# Patient Record
Sex: FEMALE | Race: WHITE | NOT HISPANIC OR LATINO | Employment: OTHER | URBAN - METROPOLITAN AREA
[De-identification: names, ages, dates, MRNs, and addresses within clinical notes are randomized per-mention and may not be internally consistent; named-entity substitution may affect disease eponyms.]

---

## 2017-12-24 ENCOUNTER — HOSPITAL ENCOUNTER (EMERGENCY)
Facility: HOSPITAL | Age: 42
Discharge: HOME | End: 2017-12-24
Attending: EMERGENCY MEDICINE | Admitting: EMERGENCY MEDICINE
Payer: MEDICARE

## 2017-12-24 ENCOUNTER — APPOINTMENT (EMERGENCY)
Dept: NON INVASIVE DIAGNOSTICS | Facility: HOSPITAL | Age: 42
End: 2017-12-24
Payer: MEDICARE

## 2017-12-24 ENCOUNTER — OFFICE VISIT (OUTPATIENT)
Dept: URGENT CARE | Facility: CLINIC | Age: 42
End: 2017-12-24
Payer: MEDICARE

## 2017-12-24 VITALS
HEART RATE: 84 BPM | SYSTOLIC BLOOD PRESSURE: 110 MMHG | OXYGEN SATURATION: 97 % | DIASTOLIC BLOOD PRESSURE: 70 MMHG | RESPIRATION RATE: 18 BRPM | TEMPERATURE: 98.3 F | WEIGHT: 135 LBS

## 2017-12-24 DIAGNOSIS — R60.9 EDEMA: ICD-10-CM

## 2017-12-24 DIAGNOSIS — M79.605 LEFT LEG PAIN: ICD-10-CM

## 2017-12-24 DIAGNOSIS — L03.90 CELLULITIS: ICD-10-CM

## 2017-12-24 DIAGNOSIS — R60.9 SWELLING: Primary | ICD-10-CM

## 2017-12-24 PROCEDURE — 99204 OFFICE O/P NEW MOD 45 MIN: CPT

## 2017-12-24 PROCEDURE — 99283 EMERGENCY DEPT VISIT LOW MDM: CPT

## 2017-12-24 PROCEDURE — 93971 EXTREMITY STUDY: CPT

## 2017-12-24 PROCEDURE — G0463 HOSPITAL OUTPT CLINIC VISIT: HCPCS

## 2017-12-24 RX ORDER — CEPHALEXIN 500 MG/1
500 CAPSULE ORAL 3 TIMES DAILY
Qty: 21 CAPSULE | Refills: 0 | Status: SHIPPED | OUTPATIENT
Start: 2017-12-24 | End: 2017-12-31

## 2017-12-24 RX ORDER — CEPHALEXIN 250 MG/1
500 CAPSULE ORAL ONCE
Status: COMPLETED | OUTPATIENT
Start: 2017-12-24 | End: 2017-12-24

## 2017-12-24 RX ADMIN — CEPHALEXIN 500 MG: 250 CAPSULE ORAL at 17:13

## 2017-12-24 NOTE — DISCHARGE INSTRUCTIONS
Cellulitis, Ambulatory Care   GENERAL INFORMATION:   Cellulitis  is a skin infection caused by bacteria  Common symptoms include the following:   · Fever    · A red, warm, swollen area on your skin    · Pain when the area is touched    · Bumps or blisters (abscess) that may drain pus    · Bumpy, raised skin that feels like an orange peel  Seek immediate care for the following symptoms:   · An increase in pain, redness, warmth, and size    · Red streaks coming from the infected area    · A thin, gray-brown discharge coming from your infected skin area    · A crackling under your skin when you touch it    · Purple dots or bumps on your skin, or bleeding under your skin    · New swelling and pain in your legs    · Sudden trouble breathing or chest pain  Treatment for cellulitis  may include medicines to treat the bacterial infection or decrease pain  The infection may need to be cleaned out  Damaged, dead, or infected tissue may need to be cut away to help your wound heal   Manage your symptoms:   · Elevate your wound above the level of your heart  as often as you can  This will help decrease swelling and pain  Prop your wound on pillows or blankets to keep it elevated comfortably  · Clean your wound as directed  You may need to wash the wound with soap and water  Look for signs of infection  · Wear pressure stockings as directed  The stockings are tight and put pressure on your legs  This improves blood flow and decreases swelling  Prevent cellulitis:   · Wash your hands often  Use soap and water  Wash your hands after you use the bathroom, change a child's diapers, or sneeze  Wash your hands before you prepare or eat food  Use lotion to prevent dry, cracked skin  · Do not share personal items, such as towels, clothing, and razors  · Clean exercise equipment  with germ-killing  before and after you use it    Follow up with your healthcare provider as directed:  Write down your questions so you remember to ask them during your visits  CARE AGREEMENT:   You have the right to help plan your care  Learn about your health condition and how it may be treated  Discuss treatment options with your caregivers to decide what care you want to receive  You always have the right to refuse treatment  The above information is an  only  It is not intended as medical advice for individual conditions or treatments  Talk to your doctor, nurse or pharmacist before following any medical regimen to see if it is safe and effective for you  © 2014 [address] is for End User's use only and may not be sold, redistributed or otherwise used for commercial purposes  All illustrations and images included in CareNotes® are the copyrighted property of [employer]  or Reuben Tong

## 2017-12-24 NOTE — ED PROVIDER NOTES
Final Diagnosis:  1  Swelling    2  Edema    3  Cellulitis    4  Left leg pain      Chief Complaint   Patient presents with    Leg Pain     Pt reports left calf pain and swelling x 2 days  Pt denies CP and SOB  Pt reports was evaluated at [facility] urgent care and was sent to rule out blood clot  This is a 38year old female with long hx of R  A  Presenting for left calf pain  For the past 2 days she feels it is tender, swollen, painful  Never had similar  Denies f/ch/n/v/cp/sob  Denies any upper respiratory tract infection symptoms (cough, congestion, rhinorrhea, sore throat)  Denies any urinary tract infection symptoms (burning, itching, pain, blood, frequency)  Denies similar in the past   She has been using the left side a lot more recently as she is supposed to have some type of a fusion surgery done in a few more days of her right ankle and so would be heavily LLE dependent but it is whatever the level that PT had suggested  Nothing increasingly so  No falls, direct trauma  Denies dizziness/LH  Denies numbness/tingling  No recent steroids  No blood thinners  No hx of blood clots  PMH:  - R  A  Not on meds  - Asthma  PSH:  - Joint fusions (LEFT) 3-4 years ago  - Shoulder surgery  - Elbow surgery  -   No smoking, drinking, drugs  PE:   Vitals:    17 1414 17 1636   BP: 119/91 110/70   Pulse: 88 84   Resp: 18 18   Temp: 98 3 °F (36 8 °C)    TempSrc: Oral    SpO2: 96% 97%   Weight: 61 2 kg (135 lb)    General: VSS, NAD, awake, alert  Well-nourished, well-developed  Appears stated age  Speaking normally in full sentences  Head: Normocephalic, atraumatic, nontender  Eyes: PERRL, EOM-I  No diplopia  No hyphema  No subconjunctival hemorrhages  Symmetrical lids  ENT: Atraumatic external nose and ears  MMM  No malocclusion  No stridor  Normal phonation  No drooling  Normal swallowing  Neck: Symmetric, trachea midline  No JVD    CV: RRR  +S1/S2  No murmurs or gallops  Peripheral pulses +2 throughout  No chest wall tenderness  Lungs:   Unlabored No retractions  CTAB, lungs sounds equal bilateral    No tachypnea  Abd: +BS, soft, NT/ND    MSK:   Left leg: able to move toes without difficulty  Can't PF/DF (chronically)  KF KE with pain, minimal ROM (baseline)  Tenderness of gastrocnemius posteriorly  No obvious swelling  No redness  No warmth  DP 2+ bilaterally equal    Back:   No rashes  Skin: Dry, intact  Neuro: AAOx3, GCS 15, CN II-XII grossly intact  Motor grossly intact  Psychiatric/Behavioral: Appropriate mood and affect   Exam: deferred  A:  - Left calf pain  P:  - U/S to evaluate for clot  - Likely DC home with IVY f/maggie ortho  Has appointment on Friday  - 13 point ROS was performed and all are normal unless stated in the history above  - Nursing note reviewed  Vitals reviewed  - Orders placed by myself and/or advanced practitioner / resident     - Previous chart was not reviewed  - No language barrier    - History obtained from  patient  - There are no limitations to the history obtained  - Critical care time: Not applicable for this patient  ED Course as of Dec 24 1703   Sun Dec 24, 2017   1614 U/S tech at bedside  1636 Duplex:   - negative for DVt  - Lymph node in groin  - ?cellulitis? Will treat as such  1700 Updated patient about the imaging results  Will give ABX, dc home  1703 I reviewed all testing with the patient: cellulitis based on u/s  I gave oral return precautions for what to return for in addition to the written return precautions  The patient (and any family present: ) verbalized understanding of the discharge instructions and warnings that would necessitate return to the Emergency Department  I specifically highlighted areas of special concern regarding the written and verbal discharge instructions and return precautions  All questions were answered prior to discharge        Medications cephalexin (KEFLEX) capsule 500 mg (not administered)     VAS lower limb venous duplex study, unilateral/limited    (Results Pending)     No orders of the defined types were placed in this encounter  Labs Reviewed - No data to display  Time reflects when diagnosis was documented in both MDM as applicable and the Disposition within this note     Time User Action Codes Description Comment    12/24/2017  3:34 PM Mara [address] Add [R60 9] Edema     12/24/2017  3:34 PM Mara Moose [address] Add [R60 9] Swelling     12/24/2017  5:02 PM Bony Quigley, Mirtha1 [facility] [E31 133,  L02 416] Cellulitis and abscess of left lower extremity     12/24/2017  5:02 PM Denver Hitch [R60 9] Edema     12/24/2017  5:02 PM Jolan Pickle Remove [A56 387,  L02 416] Cellulitis and abscess of left lower extremity     12/24/2017  5:02 PM Jolan Pickle Add [L03 90] Cellulitis     12/24/2017  5:02 PM Jolan Pickle Add [M79 605] Left leg pain     12/24/2017  5:02 PM Jolan Pickle Modify [R60 9] Edema     12/24/2017  5:02 PM Jolan Pickle Modify [R60 9] Swelling       ED Disposition     ED Disposition Condition Comment    Discharge  Ирина Hector discharge to home/self care      Condition at discharge: Good        Follow-up Information     Follow up With Specialties Details Why Contact Info Additional [address] [facility] Emergency Department Emergency Medicine Go to If symptoms worsen [address] [facility]  [phone] [place] ED, [address] [facility] , [place], [place], [facility],  Family Medicine Call in 1 day To make appointment for re-evaluation in 1 week [address] [place] [address] Road  [phone]           Patient's Medications   Discharge Prescriptions    CEPHALEXIN (KEFLEX) 500 MG CAPSULE    Take 1 capsule by mouth 3 (three) times a day for 7 days       Start Date: 12/24/2017End Date: 12/31/2017       Order Dose: 500 mg Quantity: 21 capsule    Refills: 0     No discharge procedures on file  None       Portions of the record may have been created with voice recognition software  Occasional wrong word or "sound a like" substitutions may have occurred due to the inherent limitations of voice recognition software  Read the chart carefully and recognize, using context, where substitutions have occurred      Electronically signed by:  Chio Melendez MD  12/24/17 7500

## 2017-12-27 NOTE — PROGRESS NOTES
Assessment   1  Pain of left calf (039 5) (X27 875)    Discussion/Summary   Discussion Summary:    Discussed with patient due to chief complaint of left posterior calf pain upon physical [place] patient have further evaluation at emergency room patient is in agreement and will go to [address] will call report at this time  Understands and agrees with treatment plan: The treatment plan was reviewed with the patient/guardian  The patient/guardian understands and agrees with the treatment plan    Counseling Documentation With Imm: The patient was counseled regarding instructions for management,-- patient and family education,-- importance of compliance with treatment  total time of encounter was 25 minutes-- and-- 10 minutes was spent counseling  Follow Up Instructions:    proceed to ER  If your symptoms worsen, go to the nearest [place] [address] Emergency Department  Chief Complaint   1  Leg Pain  Chief Complaint Free Text Note Form: Pt c/o pain in her left calf for two days  History of Present Illness   HPI: 46-year-old female at urgent care with chief complaint of left posterior calf pain for the past 3 days pain is worse when laying is dependent she has not used any over-the-counter medications at this time reports no improvement in symptoms    Hospital Based Practices Required Assessment:      Pain Assessment      the patient states they have pain  The pain is located in the left calf  Abuse And Domestic Violence Screen       Yes, the patient is safe at home  -- The patient states no one is hurting them  Depression And Suicide Screen  No, the patient has not had thoughts of hurting themself  No, the patient has not felt depressed in the past 7 days  Prefered Language is  [place]  Primary Language is  English  Readiness To Learn: Receptive  Barriers To Learning: none        Preferred Learning: verbal      Education Completed: disease/condition,-- medications-- and-- further treatment/follow-up      Teaching Method: verbal      Person Taught: patient      Evaluation Of Learning: verbalized/demonstrated understanding    Leg Pain: Lisseth Whiting presents with complaints of leg pain  Associated symptoms include swelling-- and-- calf tenderness  Review of Systems   Focused-Female:      Constitutional: No fever, no chills, feels well, no tiredness, no recent weight gain or loss  ENT: no ear ache, no loss of hearing, no nosebleeds or nasal discharge, no sore throat or hoarseness  Cardiovascular: no complaints of slow or fast heart rate, no chest pain, no palpitations, no leg claudication or lower extremity edema  Respiratory: no complaints of shortness of breath, no wheezing, no dyspnea on exertion, no orthopnea or PND  Breasts: no complaints of breast pain, breast lump or nipple discharge  Gastrointestinal: no complaints of abdominal pain, no constipation, no nausea or diarrhea, no vomiting, no bloody stools  Genitourinary: no complaints of dysuria, no incontinence, no pelvic pain, no dysmenorrhea, no vaginal discharge or abnormal vaginal bleeding  Musculoskeletal: as noted in HPI  Integumentary: no complaints of skin rash or lesion, no itching or dry skin, no skin wounds  Neurological: no complaints of headache, no confusion, no numbness or tingling, no dizziness or fainting  ROS Reviewed:    ROS reviewed  Active Problems   1  Blue color skin (782 5) (R23 0)   2  Rheumatoid arthritis (714 0) (M06 9)    Past Medical History   Active Problems And Past Medical History Reviewed: The active problems and past medical history were reviewed and updated today  Family History   Family History    1  Family history of No Significant Family History  Family History Reviewed: The family history was reviewed and updated today  Social History    · Never A Smoker  Social History Reviewed:  The social history was reviewed and updated today  The social history was reviewed and is unchanged  Surgical History   1  History of  Section   2  History of Nose Surgery  Surgical History Reviewed: The surgical history was reviewed and updated today  Current Meds    1  Aleve CAPS; Therapy: (Recorded:2014) to Recorded   2  Bromelains TABS; Therapy: (Recorded:2014) to Recorded   3  D-Hist D TB12;     Therapy: (Recorded:2014) to Recorded   4  Hydrocodone-Acetaminophen TABS; Therapy: (Recorded:2014) to Recorded   5  N-Acetyl-L-Cysteine CAPS; Therapy: (Recorded:2014) to Recorded   6  Quercetin TABS; Therapy: (Recorded:2014) to Recorded   7  Vitamin C CAPS; Therapy: (Recorded:2014) to Recorded  Medication List Reviewed: The medication list was reviewed and updated today  Allergies   1  Ibuprofen TABS   2  Tamiflu CAPS  3  Baker's Yeast   4  Dairy   5  Eggs   6  Gluten   7  Soy    Vitals   Signs   Recorded: [id number] 12:45PM   Temperature: 98 F  Heart Rate: 84  Respiration: 18  Systolic: 237  Diastolic: 68  O2 Saturation: 98    Physical Exam        Constitutional      General appearance: No acute distress, well appearing and well nourished  Eyes      Conjunctiva and lids: No swelling, erythema or discharge  Pupils and irises: Equal, round and reactive to light  Ears, Nose, Mouth, and Throat      External inspection of ears and nose: Normal        Oropharynx: Normal with no erythema, edema, exudate or lesions  Pulmonary      Respiratory effort: No increased work of breathing or signs of respiratory distress  Auscultation of lungs: Clear to auscultation  Cardiovascular      Palpation of heart: Normal PMI, no thrills  Auscultation of heart: Normal rate and rhythm, normal S1 and S2, without murmurs         Musculoskeletal      Gait and station: Normal        Inspection/palpation of joints, bones, and muscles: Abnormal  -- left posterior calf appears edematous erythematous and tender to touch        Signatures    Electronically signed by : Sedrick Baumgarten, NP; Dec 24 2017 12:56PM EST                       (Author)     Electronically signed by : MAYTE Valente ; Dec 26 2017 10:26AM EST                       (Co-author)

## 2018-01-23 VITALS
SYSTOLIC BLOOD PRESSURE: 111 MMHG | OXYGEN SATURATION: 98 % | DIASTOLIC BLOOD PRESSURE: 68 MMHG | TEMPERATURE: 98 F | HEART RATE: 84 BPM | RESPIRATION RATE: 18 BRPM

## 2020-06-04 ENCOUNTER — OFFICE VISIT (OUTPATIENT)
Dept: GYNECOLOGY | Facility: CLINIC | Age: 45
End: 2020-06-04
Payer: MEDICARE

## 2020-06-04 VITALS — SYSTOLIC BLOOD PRESSURE: 84 MMHG | DIASTOLIC BLOOD PRESSURE: 64 MMHG

## 2020-06-04 DIAGNOSIS — L30.4 INTERTRIGO: Primary | ICD-10-CM

## 2020-06-04 PROCEDURE — 99203 OFFICE O/P NEW LOW 30 MIN: CPT | Performed by: OBSTETRICS & GYNECOLOGY

## 2020-06-04 RX ORDER — FLUCONAZOLE 150 MG/1
150 TABLET ORAL ONCE
Qty: 4 TABLET | Refills: 1 | Status: SHIPPED | OUTPATIENT
Start: 2020-06-04 | End: 2020-06-04

## 2020-06-04 RX ORDER — METHYLPREDNISOLONE 4 MG/1
TABLET ORAL
Qty: 21 TABLET | Refills: 1 | Status: SHIPPED | OUTPATIENT
Start: 2020-06-04

## 2020-06-08 ENCOUNTER — TELEPHONE (OUTPATIENT)
Dept: GYNECOLOGY | Facility: CLINIC | Age: 45
End: 2020-06-08

## 2020-06-08 LAB
A VAGINAE DNA VAG NAA+PROBE-LOG#: NOT DETECTED LOG CELLS/ML
C GLABRATA DNA VAG QL NAA+PROBE: NOT DETECTED
CANDIDA DNA VAG QL NAA+PROBE: NOT DETECTED
G VAGINALIS DNA VAG NAA+PROBE-LOG#: NOT DETECTED LOG CELLS/ML
LACTOBACILLUS DNA VAG NAA+PROBE-LOG#: >8 LOG (CELLS/ML)
MEGASPHAERA SP DNA VAG NAA+PROBE-LOG#: NOT DETECTED LOG CELLS/ML
SL AMB BV CATEGORY:: NORMAL
SL AMB C. PARAPSILOSIS, DNA: NOT DETECTED
SL AMB C. TROPICALIS, DNA: NOT DETECTED
T VAGINALIS RRNA SPEC QL NAA+PROBE: NOT DETECTED

## 2020-06-15 ENCOUNTER — DOCUMENTATION (OUTPATIENT)
Dept: GYNECOLOGY | Facility: CLINIC | Age: 45
End: 2020-06-15

## 2020-06-18 ENCOUNTER — PROCEDURE VISIT (OUTPATIENT)
Dept: GYNECOLOGY | Facility: CLINIC | Age: 45
End: 2020-06-18
Payer: MEDICARE

## 2020-06-18 DIAGNOSIS — N90.89 VULVAR LESION: ICD-10-CM

## 2020-06-18 PROCEDURE — 88312 SPECIAL STAINS GROUP 1: CPT | Performed by: PATHOLOGY

## 2020-06-18 PROCEDURE — 88305 TISSUE EXAM BY PATHOLOGIST: CPT | Performed by: PATHOLOGY

## 2020-06-18 PROCEDURE — 56605 BIOPSY OF VULVA/PERINEUM: CPT | Performed by: OBSTETRICS & GYNECOLOGY

## 2020-06-26 ENCOUNTER — TELEPHONE (OUTPATIENT)
Dept: GYNECOLOGY | Facility: CLINIC | Age: 45
End: 2020-06-26

## 2021-12-11 ENCOUNTER — APPOINTMENT (OUTPATIENT)
Dept: RADIOLOGY | Facility: MEDICAL CENTER | Age: 46
End: 2021-12-11
Payer: MEDICARE

## 2021-12-11 DIAGNOSIS — R10.9 ABDOMINAL PAIN, UNSPECIFIED ABDOMINAL LOCATION: ICD-10-CM

## 2021-12-11 PROCEDURE — 74018 RADEX ABDOMEN 1 VIEW: CPT

## 2021-12-15 ENCOUNTER — HOSPITAL ENCOUNTER (OUTPATIENT)
Dept: CT IMAGING | Facility: HOSPITAL | Age: 46
Discharge: HOME | End: 2021-12-15
Payer: MEDICARE

## 2021-12-15 DIAGNOSIS — R10.9 UNSPECIFIED ABDOMINAL PAIN: ICD-10-CM

## 2021-12-15 PROCEDURE — 74177 CT ABD & PELVIS W/CONTRAST: CPT

## 2021-12-15 PROCEDURE — G1004 CDSM NDSC: HCPCS

## 2021-12-15 RX ADMIN — IOHEXOL 100 ML: 350 INJECTION, SOLUTION INTRAVENOUS at 13:54

## 2023-05-26 ENCOUNTER — OFFICE VISIT (OUTPATIENT)
Dept: URGENT CARE | Facility: CLINIC | Age: 48
End: 2023-05-26

## 2023-05-26 VITALS
TEMPERATURE: 98.6 F | SYSTOLIC BLOOD PRESSURE: 113 MMHG | OXYGEN SATURATION: 92 % | DIASTOLIC BLOOD PRESSURE: 76 MMHG | HEART RATE: 122 BPM

## 2023-05-26 DIAGNOSIS — R09.02 HYPOXIA: Primary | ICD-10-CM

## 2023-05-26 RX ORDER — CALCIUM CARBONATE/VITAMIN D3 500-10/5ML
1 LIQUID (ML) ORAL
COMMUNITY

## 2023-05-26 NOTE — PROGRESS NOTES
[facility]    NAME: Stefania Lees is a 41 y o  female  : 1975    MRN: [id number]  DATE: May 26, 2023  TIME: 6:29 PM    Assessment and Plan   Hypoxia [R09 02]  1  Hypoxia  Transfer to other facility          Patient Instructions     Patient Instructions   Recommend evaluation in the emergency room  Chief Complaint     Chief Complaint   Patient presents with   • Cough     Has chronic cough from allergies, but has worsened in the past week    • Fever   • Urticaria       History of Present Illness   29-year-old female here with history of rheumatoid arthritis and asthma  Has chronic cough from allergies but states that it worsened about 10 days ago with fever and now has urticarial rash  Has been coughing  Has been using albuterol inhaler which she  Has been also using over-the-counter natural antihistamines and Benadryl with no relief  Patient was supposed to get an injection today but could not go because she just did not feel well  Review of Systems   Review of Systems   Constitutional: Positive for fatigue and fever  Negative for appetite change and chills  HENT: Positive for congestion  Negative for ear discharge, ear pain, facial swelling, postnasal drip, sinus pressure, sneezing and sore throat  Respiratory: Positive for cough, chest tightness, shortness of breath and wheezing  Skin: Positive for rash  Neurological: Negative for headaches         Current Medications     Current Outpatient Medications:   •  Magnesium Oxide 400 MG CAPS, Take 1 capsule by mouth, Disp: , Rfl:   •  methylPREDNISolone 4 MG tablet therapy pack, Use as directed on package, Disp: 21 tablet, Rfl: 1    Current Allergies     Allergies as of 2023 - Reviewed 2023   Allergen Reaction Noted   • Corn oil - food allergy - food allergy Shortness Of Breath    • Gluten meal - food allergy Shortness Of Breath    • Isoflavones (soy) Shortness Of Breath    • Oseltamivir Shortness Of Breath 2014   • Eggs or egg-derived products - food allergy  2014   • Ibuprofen  2014   • Tilactase  2014   • Wheat bran - food allergy Other (See Comments)    • Yeast - food allergy  2014          The following portions of the patient's history were reviewed and updated as appropriate: allergies, current medications, past family history, past medical history, past social history, past surgical history and problem list    Past Medical History:   Diagnosis Date   • Asthma    • RA (rheumatoid arthritis) ([facility] [address] )      Past Surgical History:   Procedure Laterality Date   • ANKLE SURGERY      2 surgerys   •  SECTION     • ELBOW SURGERY     • JOINT REPLACEMENT      knees bilateral   • TOTAL SHOULDER REPLACEMENT       Family History   Problem Relation Age of Onset   • No Known Problems Mother    • No Known Problems Father      Social History     Socioeconomic History   • Marital status: /Civil Union     Spouse name: Not on file   • Number of children: Not on file   • Years of education: Not on file   • Highest education level: Not on file   Occupational History   • Not on file   Tobacco Use   • Smoking status: Never   • Smokeless tobacco: Never   Vaping Use   • Vaping Use: Never used   Substance and Sexual Activity   • Alcohol use: No   • Drug use: No   • Sexual activity: Not Currently     Birth control/protection: None   Other Topics Concern   • Not on file   Social History Narrative   • Not on file     Social Determinants of Health     Financial Resource Strain: Not on file   Food Insecurity: Not on file   Transportation Needs: Not on file   Physical Activity: Not on file   Stress: Not on file   Social Connections: Not on file   Intimate Partner Violence: Not on file   Housing Stability: Not on file     Medications have been verified  Objective   /76   Pulse (!) 122   Temp 98 6 °F (37 °C)   SpO2 92%      Physical Exam   Physical Exam  Vitals and nursing note reviewed  Constitutional:       General: She is not in acute distress  Appearance: She is well-developed  HENT:      Head: Normocephalic and atraumatic  Right Ear: Tympanic membrane normal       Left Ear: Tympanic membrane normal       Nose: Nose normal  No mucosal edema or rhinorrhea  Right Sinus: No maxillary sinus tenderness or frontal sinus tenderness  Left Sinus: No maxillary sinus tenderness or frontal sinus tenderness  Mouth/Throat:      Pharynx: No oropharyngeal exudate or posterior oropharyngeal erythema  Eyes:      Conjunctiva/sclera: Conjunctivae normal    Cardiovascular:      Rate and Rhythm: Regular rhythm  Tachycardia present  Heart sounds: Normal heart sounds  No murmur heard  Pulmonary:      Breath sounds: Wheezing and rhonchi present

## 2023-06-29 ENCOUNTER — HOSPITAL ENCOUNTER (OUTPATIENT)
Dept: RADIOLOGY | Facility: HOSPITAL | Age: 48
Discharge: HOME | End: 2023-06-29
Attending: OTOLARYNGOLOGY
Payer: MEDICARE

## 2023-06-29 ENCOUNTER — HOSPITAL ENCOUNTER (OUTPATIENT)
Dept: RADIOLOGY | Facility: HOSPITAL | Age: 48
End: 2023-06-29
Payer: MEDICARE

## 2023-06-29 DIAGNOSIS — R13.12 OROPHARYNGEAL DYSPHAGIA: ICD-10-CM

## 2023-06-29 DIAGNOSIS — R06.02 SHORTNESS OF BREATH: ICD-10-CM

## 2023-06-29 DIAGNOSIS — J31.2 CHRONIC SORE THROAT: ICD-10-CM

## 2023-06-29 PROCEDURE — 74230 X-RAY XM SWLNG FUNCJ C+: CPT

## 2023-06-29 PROCEDURE — 92611 MOTION FLUOROSCOPY/SWALLOW: CPT

## 2023-06-29 PROCEDURE — 71046 X-RAY EXAM CHEST 2 VIEWS: CPT

## 2023-06-29 NOTE — PROCEDURES
"Speech Pathology Videofluoroscopic Swallow Study (VFSS/VBSS/MBSS)    Patient Name: Hernandez Carpio  PWRAF'P Date: 2023     Problem List  Active Problems: There are no active Hospital Problems  Past Medical History  Past Medical History:   Diagnosis Date   • Asthma    • RA (rheumatoid arthritis) ([facility] [address] )      Past Surgical History  Past Surgical History:   Procedure Laterality Date   • ANKLE SURGERY      2 surgerys   •  SECTION     • ELBOW SURGERY     • JOINT REPLACEMENT      knees bilateral   • TOTAL SHOULDER REPLACEMENT         General Information:  Pt is a 41 y o  female with a PMH remarkable for rheumatoid arthritis and complex allergies including dairy, wheat, environmental factors, Tamiflu (and other medications) and bio meds attempted for RA  Current concerns for dysphagia include sensations that items are becoming stuck  She reports that she became sick with a \"virus\" on 5/15/23 and symptoms have persisted since that time  In conversation, patient presents with wet vocal quality, chronic throat clear, and intermittent hoarseness  Pt has been intubated at least 4x in her lifetime, all surgery related, and she reports having been on Prednisone from age [age] or longer  She also just began a course of prednisone prior to this study but has noticed as it tapers, her throat soreness is returning  A VFSS was recommended to assess oropharyngeal stage swallowing skills at this time  Pt was viewed in lateral position and assessed with thin liquid and puree  Unfortunately, due to both gluten and dairy allergy we were unable to trial a more dense solid item  However, results were conclusive with those items trialed  Results listed below  Oral stage:  Pt presented with minimal oral stage dysphagia  Mastication was timely and grossly effective with materials administered today   Bolus formation and transfer were functional   Oral control appeared adequate with no gross premature spillage " "over the base of tongue  Overall process is slightly delayed but purposeful, appears more related to hesitation by patient for fear of items becoming \"stuck  \"     Pharyngeal stage:  Pt presented with mild pharyngeal dysphagia  Velar elevation noted  Swallowing initiation was mildly delayed initially resulting in pooling to underside of epiglottis  Patient's swallow appeared to have difficulty \"triggering\" initially, with partial epiglottic inversion and then returned to neutral position, moving immediately into full inversion swallow  Laryngeal rise and anterior hyoid excursion appeared adequate  Airway entrance closure appeared adequate  Tongue base retraction appeared to be of adequate strength  Pharyngeal constriction suspected or appeared adequate  PES opening was adequate  Strategies and Efficacy: All food, liquid and the barium tablet passed through the pharynx safely and efficiently (ie no deep laryngeal penetration, aspiration or significant pharyngeal residue noted today)  Esophageal stage:  Brief view of esophagus was available during trials  Viewed suspension of puree materials near C7 with >5 second hold of items in proximal esophageal space  Once items cleared they tend to clear \"all at once\" out of view below level of shoulder  Overall patient required 3 swallows for a single bolus to clear residue from proximal esophageal segment  Assessment Summary:  Pt presents with mild oropharyngeal dysphagia characterized by delayed swallow and incomplete swallow initiation resulting in pooling of the valleculae space  Once triggered, pt's swallow has appropriate ROM and demonstrates a generalized mild weakness of BOT  During puree trials pt demonstrates trace residue on BOT and vallecular during puree trials  Suspension of materials in proximal esophageal segment noted for >5 seconds near C7, required 3 swallows to clear    During trials of thin, pt demonstrates transient penetration x2 " but no deep penetration or aspiration occurs  Due to timing of study mid-way through course of Prednisone, it's difficult to determine what role inflammation or irritation would have played on swallow today  Suspect that even mild virus exposure/illness to this area effects patient greater as an atypical patient  I would anticipate more residue, deeper penetration of materials, and in the setting of vocal cord weakness, also less able to clear penetration  Diet recommendations listed below to maintain regular diet and thin liquids  Recommending consultations for rheumatology to pursue treatment options that will be tolerated by her body (pt with anaphylaxis during last treatment cycle with bio-med requiring medication for RA which also required simultaneous use of benedryl and epinephrine to continue medication administration in office  Given overall allergy history to medications, PO items and environmental contributors, recommend pt pursue allergy testing for clarity of triggers and their role in the body if unknowingly or knowingly exposed  Given wet vocal quality, sore throat, decreased VC closure, hoarseness that varies in severity and frequent throat clear, recommend pt have videostroboscopy completed for pathology  VC were deemed inflamed on last ENT appt but no s/s of reflux suspected by ENT  Also, given allergens, autoimmune RA component, vocal quality changes, and suspension of items in the proximal esophagus- pt should also pursue GI for a full work up  Pt with questions regarding EOE defer to GI for probability  Suspect other autoimmune component possible based on overall presentation  Note: Images are available for review in PACS as desired      Recommendations:   Recommended Diet:  regular diet and thin liquids   Recommended Form of Medications: whole with liquid   Aspiration precautions and compensatory swallowing strategies: upright posture, only feed when fully alert, slow rate of feeding, small bites/sips, effortful swallow, quiet environment (tv off, limit talking, door closed, etc ) and alternating bites and sips  Consider referral to:  Rheumatology, ENT, Allergist, GI  SLP Dysphagia therapy recommended:  Not at this time until VCD identified    Results Reviewed with: patient, family and PCP   Pt/Family Education:  Initiated with patient and  present for imaging review, recommendations

## 2023-08-03 ENCOUNTER — HOSPITAL ENCOUNTER (INPATIENT)
Facility: HOSPITAL | Age: 48
LOS: 10 days | DRG: 546 | End: 2023-08-14
Attending: EMERGENCY MEDICINE | Admitting: INTERNAL MEDICINE
Payer: MEDICARE

## 2023-08-03 ENCOUNTER — APPOINTMENT (EMERGENCY)
Dept: MRI IMAGING | Facility: HOSPITAL | Age: 48
DRG: 546 | End: 2023-08-03
Payer: MEDICARE

## 2023-08-03 ENCOUNTER — APPOINTMENT (EMERGENCY)
Dept: CT IMAGING | Facility: HOSPITAL | Age: 48
DRG: 546 | End: 2023-08-03
Payer: MEDICARE

## 2023-08-03 DIAGNOSIS — E46 MALNUTRITION (HCC): ICD-10-CM

## 2023-08-03 DIAGNOSIS — E87.1 HYPONATREMIA: ICD-10-CM

## 2023-08-03 DIAGNOSIS — R20.2 PARESTHESIA AND PAIN OF BOTH UPPER EXTREMITIES: ICD-10-CM

## 2023-08-03 DIAGNOSIS — M79.601 PARESTHESIA AND PAIN OF BOTH UPPER EXTREMITIES: ICD-10-CM

## 2023-08-03 DIAGNOSIS — L89.150 PRESSURE INJURY OF COCCYGEAL REGION, UNSTAGEABLE (HCC): ICD-10-CM

## 2023-08-03 DIAGNOSIS — R52 ACUTE PAIN: Primary | ICD-10-CM

## 2023-08-03 DIAGNOSIS — M79.602 PARESTHESIA AND PAIN OF BOTH UPPER EXTREMITIES: ICD-10-CM

## 2023-08-03 DIAGNOSIS — M06.9 RHEUMATOID ARTHRITIS FLARE (HCC): ICD-10-CM

## 2023-08-03 DIAGNOSIS — G57.93 NEUROPATHY INVOLVING BOTH LOWER EXTREMITIES: ICD-10-CM

## 2023-08-03 PROBLEM — Z96.642 STATUS POST LEFT HIP REPLACEMENT: Status: ACTIVE | Noted: 2020-09-21

## 2023-08-03 PROBLEM — I73.89 ACROCYANOSIS (HCC): Status: ACTIVE | Noted: 2023-08-03

## 2023-08-03 PROBLEM — M19.90 DJD (DEGENERATIVE JOINT DISEASE): Status: ACTIVE | Noted: 2022-02-21

## 2023-08-03 PROBLEM — L40.9 PSORIASIS: Status: ACTIVE | Noted: 2023-08-03

## 2023-08-03 PROBLEM — M19.079 ANKLE ARTHRITIS: Status: ACTIVE | Noted: 2017-12-28

## 2023-08-03 PROBLEM — J45.909 ASTHMA: Status: ACTIVE | Noted: 2023-08-03

## 2023-08-03 PROBLEM — M21.542 ACQUIRED BILATERAL CLUB FEET: Status: ACTIVE | Noted: 2017-12-01

## 2023-08-03 PROBLEM — M16.12 OSTEOARTHRITIS OF LEFT HIP: Status: ACTIVE | Noted: 2020-01-01

## 2023-08-03 PROBLEM — M21.541 ACQUIRED BILATERAL CLUB FEET: Status: ACTIVE | Noted: 2017-12-01

## 2023-08-03 LAB
BASOPHILS # BLD AUTO: 0.01 THOUSANDS/ÂΜL (ref 0–0.1)
BASOPHILS NFR BLD AUTO: 0 % (ref 0–1)
EOSINOPHIL # BLD AUTO: 0 THOUSAND/ÂΜL (ref 0–0.61)
EOSINOPHIL NFR BLD AUTO: 0 % (ref 0–6)
ERYTHROCYTE [DISTWIDTH] IN BLOOD BY AUTOMATED COUNT: 16.2 % (ref 11.6–15.1)
HCT VFR BLD AUTO: 31 % (ref 34.8–46.1)
HGB BLD-MCNC: 9.5 G/DL (ref 11.5–15.4)
IMM GRANULOCYTES # BLD AUTO: 0.02 THOUSAND/UL (ref 0–0.2)
IMM GRANULOCYTES NFR BLD AUTO: 0 % (ref 0–2)
LYMPHOCYTES # BLD AUTO: 0.79 THOUSANDS/ÂΜL (ref 0.6–4.47)
LYMPHOCYTES NFR BLD AUTO: 13 % (ref 14–44)
MCH RBC QN AUTO: 24.4 PG (ref 26.8–34.3)
MCHC RBC AUTO-ENTMCNC: 30.6 G/DL (ref 31.4–37.4)
MCV RBC AUTO: 80 FL (ref 82–98)
MONOCYTES # BLD AUTO: 0.19 THOUSAND/ÂΜL (ref 0.17–1.22)
MONOCYTES NFR BLD AUTO: 3 % (ref 4–12)
NEUTROPHILS # BLD AUTO: 5.24 THOUSANDS/ÂΜL (ref 1.85–7.62)
NEUTS SEG NFR BLD AUTO: 84 % (ref 43–75)
NRBC BLD AUTO-RTO: 0 /100 WBCS
PLATELET # BLD AUTO: 414 THOUSANDS/UL (ref 149–390)
PMV BLD AUTO: 7.5 FL (ref 8.9–12.7)
RBC # BLD AUTO: 3.9 MILLION/UL (ref 3.81–5.12)
WBC # BLD AUTO: 6.25 THOUSAND/UL (ref 4.31–10.16)

## 2023-08-03 PROCEDURE — 96375 TX/PRO/DX INJ NEW DRUG ADDON: CPT

## 2023-08-03 PROCEDURE — 36415 COLL VENOUS BLD VENIPUNCTURE: CPT | Performed by: EMERGENCY MEDICINE

## 2023-08-03 PROCEDURE — 96374 THER/PROPH/DIAG INJ IV PUSH: CPT

## 2023-08-03 PROCEDURE — 70450 CT HEAD/BRAIN W/O DYE: CPT

## 2023-08-03 PROCEDURE — 84703 CHORIONIC GONADOTROPIN ASSAY: CPT | Performed by: EMERGENCY MEDICINE

## 2023-08-03 PROCEDURE — 72141 MRI NECK SPINE W/O DYE: CPT

## 2023-08-03 PROCEDURE — G1004 CDSM NDSC: HCPCS

## 2023-08-03 PROCEDURE — 99285 EMERGENCY DEPT VISIT HI MDM: CPT | Performed by: EMERGENCY MEDICINE

## 2023-08-03 PROCEDURE — 72125 CT NECK SPINE W/O DYE: CPT

## 2023-08-03 PROCEDURE — 83735 ASSAY OF MAGNESIUM: CPT | Performed by: EMERGENCY MEDICINE

## 2023-08-03 PROCEDURE — 85025 COMPLETE CBC W/AUTO DIFF WBC: CPT | Performed by: EMERGENCY MEDICINE

## 2023-08-03 PROCEDURE — 96372 THER/PROPH/DIAG INJ SC/IM: CPT

## 2023-08-03 PROCEDURE — 80048 BASIC METABOLIC PNL TOTAL CA: CPT | Performed by: EMERGENCY MEDICINE

## 2023-08-03 PROCEDURE — 99284 EMERGENCY DEPT VISIT MOD MDM: CPT

## 2023-08-03 RX ORDER — OXYCODONE HYDROCHLORIDE 10 MG/1
10 TABLET ORAL ONCE
Status: COMPLETED | OUTPATIENT
Start: 2023-08-03 | End: 2023-08-03

## 2023-08-03 RX ORDER — DEXAMETHASONE SODIUM PHOSPHATE 10 MG/ML
8 INJECTION, SOLUTION INTRAMUSCULAR; INTRAVENOUS ONCE
Status: COMPLETED | OUTPATIENT
Start: 2023-08-03 | End: 2023-08-03

## 2023-08-03 RX ORDER — HYDROCODONE BITARTRATE AND ACETAMINOPHEN 5; 325 MG/1; MG/1
1 TABLET ORAL EVERY 6 HOURS PRN
Status: DISCONTINUED | OUTPATIENT
Start: 2023-08-03 | End: 2023-08-14 | Stop reason: HOSPADM

## 2023-08-03 RX ORDER — FENTANYL CITRATE 50 UG/ML
50 INJECTION, SOLUTION INTRAMUSCULAR; INTRAVENOUS ONCE
Status: COMPLETED | OUTPATIENT
Start: 2023-08-03 | End: 2023-08-03

## 2023-08-03 RX ORDER — MELOXICAM 7.5 MG/1
7.5 TABLET ORAL DAILY
COMMUNITY
End: 2023-08-25

## 2023-08-03 RX ORDER — ONDANSETRON 2 MG/ML
4 INJECTION INTRAMUSCULAR; INTRAVENOUS EVERY 6 HOURS PRN
Status: DISCONTINUED | OUTPATIENT
Start: 2023-08-03 | End: 2023-08-14 | Stop reason: HOSPADM

## 2023-08-03 RX ORDER — HEPARIN SODIUM 5000 [USP'U]/ML
5000 INJECTION, SOLUTION INTRAVENOUS; SUBCUTANEOUS EVERY 8 HOURS SCHEDULED
Status: DISCONTINUED | OUTPATIENT
Start: 2023-08-03 | End: 2023-08-14 | Stop reason: HOSPADM

## 2023-08-03 RX ADMIN — OXYCODONE HYDROCHLORIDE 10 MG: 10 TABLET ORAL at 17:13

## 2023-08-03 RX ADMIN — FENTANYL CITRATE 50 MCG: 50 INJECTION, SOLUTION INTRAMUSCULAR; INTRAVENOUS at 23:34

## 2023-08-03 RX ADMIN — FENTANYL CITRATE 50 MCG: 50 INJECTION, SOLUTION INTRAMUSCULAR; INTRAVENOUS at 17:42

## 2023-08-03 RX ADMIN — DEXAMETHASONE SODIUM PHOSPHATE 8 MG: 10 INJECTION, SOLUTION INTRAMUSCULAR; INTRAVENOUS at 23:33

## 2023-08-03 NOTE — ED PROVIDER NOTES
History  Chief Complaint   Patient presents with   • Pain     Bilateral arm and leg pain and numbness     HPI    This is a very unfortunate 59-year-old female presents emerged department a longstanding history of asthma and more importantly rheumatoid arthritis. 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 She has a longstanding history of chronic pain in which took 2 oxycodone's for her pain which did not help earlier today.  reports that all her symptoms numbness and tingling in hands (worse on R > L, confined to 4th/ 5th digits on R side)  seem to get gradually worse since May of this year which she also has associated difficulty swallowing chronic in nature seen by ENT in  of this year, went a laryngoscopy in the office Dr. Carl Fay essentially unremarkable. Underwent a barium swallow after that visit with ENT, as the patient has mild pharyngeal dysphagia she was referred to a local vocal cord specialist and a GI specialist in our network. Recently patient was seen and evaluated for similar presentation today at [facility]. AND [facility] and the patient had a chest x-ray which shows she has some small bilateral pleural effusions otherwise negative given Lyrica and she is developed a allergic reaction to that after Benadryl and symptoms resolved. Other relevant PMHx: Distal bilateral ankle fusion, prior , right elbow surgery on the right in 2019 with subsequent replacement, right patellar fusion surgery at [facility], right total hip replacement, total bilateral knee replacement and total right shoulder replacement in 2016.   Bilateral lower extremity acrocyanosis. Prior to Admission Medications   Prescriptions Last Dose Informant Patient Reported? Taking? Magnesium Oxide 400 MG CAPS  Self Yes No   Sig: Take 1 capsule by mouth   Ventolin  (90 Base) MCG/ACT inhaler  Self Yes No   meloxicam (MOBIC) 7.5 mg tablet   Yes Yes   Sig: Take 7.5 mg by mouth daily   methylPREDNISolone 4 MG tablet therapy pack  Self No No   Sig: Use as directed on package   Patient not taking: Reported on 2023   prednisoLONE (ORAPRED) 15 mg/5 mL oral solution Not Taking  No No   Sig: 10 ml by mouth on days 1-4; 5 ml on days 5-8 and 2.5 ml on day 9-12   Patient not taking: Reported on 8/3/2023      Facility-Administered Medications: None       Past Medical History:   Diagnosis Date   • Asthma    • RA (rheumatoid arthritis) ([address] [facility])        Past Surgical History:   Procedure Laterality Date   • ANKLE SURGERY      2 surgerys   •  SECTION     • ELBOW SURGERY     • JOINT REPLACEMENT      knees bilateral   • TOTAL SHOULDER REPLACEMENT         Family History   Problem Relation Age of Onset   • No Known Problems Mother    • No Known Problems Father      I have reviewed and agree with the history as documented. E-Cigarette/Vaping   • E-Cigarette Use Never User      E-Cigarette/Vaping Substances   • Nicotine No    • THC No    • CBD No    • Flavoring No    • Other No    • Unknown No      Social History     Tobacco Use   • Smoking status: Never   • Smokeless tobacco: Never   Vaping Use   • Vaping Use: Never used   Substance Use Topics   • Alcohol use: No   • Drug use: No       Review of Systems   Constitutional: Negative. HENT: Negative. Eyes: Negative. Respiratory: Negative. Cardiovascular: Negative. Gastrointestinal: Negative. Endocrine: Negative. Genitourinary: Negative. Musculoskeletal: Negative. Skin: Negative. Allergic/Immunologic: Negative. Neurological: Negative. Hematological: Negative.     Psychiatric/Behavioral: Negative. Physical Exam  Physical Exam  Vitals and nursing note reviewed. Constitutional:       Appearance: Normal appearance. She is normal weight. HENT:      Head: Normocephalic and atraumatic. Right Ear: External ear normal.      Left Ear: External ear normal.      Nose: Nose normal.      Mouth/Throat:      Mouth: Mucous membranes are moist.      Pharynx: Oropharynx is clear. Eyes:      Extraocular Movements: Extraocular movements intact. Conjunctiva/sclera: Conjunctivae normal.      Pupils: Pupils are equal, round, and reactive to light. Cardiovascular:      Rate and Rhythm: Normal rate and regular rhythm. Pulses: Normal pulses. Heart sounds: Normal heart sounds. Pulmonary:      Effort: Pulmonary effort is normal.      Breath sounds: Normal breath sounds. Abdominal:      General: Abdomen is flat. Bowel sounds are normal.   Musculoskeletal:         General: Normal range of motion. Cervical back: Normal range of motion. Skin:     General: Skin is warm. Capillary Refill: Capillary refill takes less than 2 seconds. Neurological:      General: No focal deficit present. Mental Status: She is alert and oriented to person, place, and time. Mental status is at baseline. Sensory: Sensory deficit present. Motor: Atrophy present. No seizure activity. Comments: Numbness / paraesthesia over R hand / wrist / forearm. No motor deficits noted, contractures noted on R hand with nodules consistent with prior RA history. Distal radial pulses intact, + capillary refill noted. Psychiatric:         Mood and Affect: Mood normal.         Behavior: Behavior normal.         Thought Content:  Thought content normal.         Judgment: Judgment normal.         Vital Signs  ED Triage Vitals [08/03/23 1537]   Temperature Pulse Respirations Blood Pressure SpO2   (!) 97.2 °F (36.2 °C) (!) 106 18 121/81 95 %      Temp Source Heart Rate Source Patient Position - Orthostatic VS BP Location FiO2 (%)   Tympanic Monitor Sitting Left arm --      Pain Score       10 - Worst Possible Pain           Vitals:    08/03/23 2030 08/03/23 2300 08/04/23 0030 08/04/23 0602   BP: 132/74 124/74 130/76 125/99   Pulse:  100 70 (!) 109   Patient Position - Orthostatic VS: Lying Lying Lying Lying         Visual Acuity  Visual Acuity    Flowsheet Row Most Recent Value   L Pupil Size (mm) 3   R Pupil Size (mm) 3   L Pupil Shape Round   R Pupil Shape Round          ED Medications  Medications   albuterol (PROVENTIL HFA,VENTOLIN HFA) inhaler 2 puff (has no administration in time range)   meloxicam (MOBIC) tablet 7.5 mg (has no administration in time range)   ondansetron (ZOFRAN) injection 4 mg (4 mg Intravenous Given 8/4/23 0607)   heparin (porcine) subcutaneous injection 5,000 Units (5,000 Units Subcutaneous Given 8/4/23 0610)   HYDROcodone-acetaminophen (NORCO) 5-325 mg per tablet 1 tablet (1 tablet Oral Given 8/4/23 0200)   morphine injection 2 mg (2 mg Intravenous Given 8/4/23 8395)   magnesium Oxide (MAG-OX) tablet 400 mg (400 mg Oral Given 8/4/23 0132)   methylprednisolone (MEDROL) tablet 24 mg (24 mg Oral Given 8/4/23 0406)     Followed by   methylprednisolone (MEDROL) tablet 20 mg (has no administration in time range)     Followed by   methylPREDNISolone (MEDROL) tablet 16 mg (has no administration in time range)     Followed by   methylprednisolone (MEDROL) tablet 12 mg (has no administration in time range)     Followed by   methylprednisolone (MEDROL) tablet 8 mg (has no administration in time range)     Followed by   methylprednisolone (MEDROL) tablet 4 mg (has no administration in time range)   oxyCODONE (ROXICODONE) immediate release tablet 10 mg (10 mg Oral Given 8/3/23 1713)   fentanyl citrate (PF) 100 MCG/2ML 50 mcg (50 mcg Intramuscular Given 8/3/23 1742)   fentanyl citrate (PF) 100 MCG/2ML 50 mcg (50 mcg Intravenous Given 8/3/23 2334)   dexamethasone (PF) (DECADRON) injection 8 mg (8 mg Intravenous Given 8/3/23 2333)   ketorolac (TORADOL) injection 15 mg (15 mg Intravenous Given 8/4/23 0406)       Diagnostic Studies  Results Reviewed     Procedure Component Value Units Date/Time    hCG, qualitative pregnancy [[id number]]  (Normal) Collected: 08/03/23 2342    Lab Status: Final result Specimen: Blood from Hand, Right Updated: 08/04/23 0017     Preg, Serum Negative    Basic metabolic panel [[id number]]  (Abnormal) Collected: 08/03/23 2342    Lab Status: Final result Specimen: Blood from Hand, Right Updated: 08/04/23 0008     Sodium 127 mmol/L      Potassium 3.8 mmol/L      Chloride 96 mmol/L      CO2 23 mmol/L      ANION GAP 8 mmol/L      BUN 13 mg/dL      Creatinine 0.28 mg/dL      Glucose 108 mg/dL      Glucose, Fasting 108 mg/dL      Calcium 7.9 mg/dL      eGFR 141 ml/min/1.73sq m     Narrative:      [facility] guidelines for Chronic Kidney Disease (CKD):   •  Stage 1 with normal or high GFR (GFR > 90 mL/min/1.73 square meters)  •  Stage 2 Mild CKD (GFR = 60-89 mL/min/1.73 square meters)  •  Stage 3A Moderate CKD (GFR = 45-59 mL/min/1.73 square meters)  •  Stage 3B Moderate CKD (GFR = 30-44 mL/min/1.73 square meters)  •  Stage 4 Severe CKD (GFR = 15-29 mL/min/1.73 square meters)  •  Stage 5 End Stage CKD (GFR <15 mL/min/1.73 square meters)  Note: GFR calculation is accurate only with a steady state creatinine    Magnesium [[id number]]  (Abnormal) Collected: 08/03/23 2342    Lab Status: Final result Specimen: Blood from Hand, Right Updated: 08/04/23 0008     Magnesium 1.8 mg/dL     CBC and differential [[id number]]  (Abnormal) Collected: 08/03/23 2342    Lab Status: Final result Specimen: Blood from Hand, Right Updated: 08/03/23 2348     WBC 6.25 Thousand/uL      RBC 3.90 Million/uL      Hemoglobin 9.5 g/dL      Hematocrit 31.0 %      MCV 80 fL      MCH 24.4 pg      MCHC 30.6 g/dL      RDW 16.2 %      MPV 7.5 fL      Platelets 576 Thousands/uL      nRBC 0 /100 WBCs Neutrophils Relative 84 %      Immat GRANS % 0 %      Lymphocytes Relative 13 %      Monocytes Relative 3 %      Eosinophils Relative 0 %      Basophils Relative 0 %      Neutrophils Absolute 5.24 Thousands/µL      Immature Grans Absolute 0.02 Thousand/uL      Lymphocytes Absolute 0.79 Thousands/µL      Monocytes Absolute 0.19 Thousand/µL      Eosinophils Absolute 0.00 Thousand/µL      Basophils Absolute 0.01 Thousands/µL                  MRI cervical spine wo contrast   Final Result by SIL Solis MD (08/03 2103)   No disc herniation, canal or foraminal stenosis. 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 Workstation performed: [id number]         CT spine cervical without contrast   Final Result by Chrissy Sánchez MD (08/03 1752)      Acute on chronic left sphenoid sinusitis. Workstation performed: [id number]         CT head without contrast   Final Result by Chrissy Sánchez MD (08/03 1745)      No acute intracranial abnormality. Workstation performed: [id number]                    Procedures  Procedures         ED Course  ED Course as of 08/04/23 0714   Wed Aug 02, 2023   2230 Spoke with Jase Fallon PA-C, [facility] provider on call about disposition, request that more information be obtained to see if rheumatology is available via phone for consultation in AM, if not consider transfer to site that has this capability.    Thu Aug 03, 2023   1629 Seen and evaluated, orders placed, longstanding history of rheumatoid arthritis with multiple known drug allergies, pt presents karen significantly debilitated state, uses a wheelchair, primary care giver  present, diagnosed with juvenile rheumatoid arthritis for the age of 15 followed with this provider till she transition to adult rheumatology, had allergic reaction to Remicade has not been seen by rheumatologist since then, does not have PCP: located in [place]: as per pt: provided stated was to complicated for them, here today with acute exacerbation of chronic numbness and tingling in her feet and arms, now R hand completely numb, motor intact. Focused differential diagnosis: Acute exacerbation of chronic RA vs. Lesion in cervical spine (has neck pain) vs. Electrolyte abnormality: low mag vs. Supratentorial / psych. As the patient has a worsening of a chronic condition that has acute exacerbation, patient has not had any imaging of her neck, will proceed with CT imaging of the head neck with as needed pain medicine. 33 44 48 back into the room because the patient still having significant mount of pain after oxycodone. History with the patient and the  present at the bedside, patient woke up at 7 AM and had severe pain in her hand with numbness and tingling going up to her elbow difficult for her to feed herself she is right-hand dominant, very difficult to hold anything in her hand because of the numbness and paresthesias and pain that is bilateral in nature worse on the right than the left. Patient has an appointment on the 18 th of this month at [facility] from neurology. PRN fentanyl will be given. [phone] Currently no neurologist on call or available for consultation, no rheumatologist on call on tiger text. 1832 Due to level of patient pain, difficult to control, case reviewed with Dr. Pepe Bergman, Ubaldo Vergara provider, agree with admission if needed, but may benefit transfer to [facility] or other sites w/in network that have rheumatology available to comment and advise on RA component. Will attempt to proceed with MRI of cervical spine at campus. 1844 Case d/w Dr. Lacey Penn, radiologist on call, approval granted for semi urgent MRI of cervical spine, could be done without contrast.   1856 Updated patient as to the plan of care, pain level 7/10.   2008 Patient back from MRI.   2105 MRI shows: No disc herniation, canal or foraminal stenosis.   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   2123 Reassessment, patient still having persistent amount of pain we will place an IV, MRI shows no significant abnormality, reviewed with patient at the bedside with the , the  voiced concern about ability to take care of her at home because he owns his own business that is located in their house but he is her full-time caregiver. 2256 After consultation with the transfer center, they made multiple attempts to contact rheumatology on-call, no success, Tiger text I called the answering service of the [facility] rheumatology @ [phone] and call back. 2259 Patient signed out to Dr. Talha Orellana, awaiting call back from [facility] Rheumatology and transfer center to see if rheumatology is available for consultation, if not in person, could they be available by phone and admitted here, Pt /  updated w/ Louise Villa RN present during update. SBIRT 20yo+    Flowsheet Row Most Recent Value   Initial Alcohol Screen: US AUDIT-C     1. How often do you have a drink containing alcohol? 0 Filed at: 08/03/2023 1844   2. How many drinks containing alcohol do you have on a typical day you are drinking? 0 Filed at: 08/03/2023 1844   3a. Male UNDER 65: How often do you have five or more drinks on one occasion? 0 Filed at: 08/03/2023 1844   3b. FEMALE Any Age, or MALE 65+: How often do you have 4 or more drinks on one occassion? 0 Filed at: 08/03/2023 1844   Audit-C Score 0 Filed at: 08/03/2023 1844   SEE: How many times in the past year have you. .. Used an illegal drug or used a prescription medication for non-medical reasons?  Never Filed at: 08/03/2023 1844                    Medical Decision Making  Chronically debilitated female, very low BMI, transported by wheelchair most days by  w/ hx of poorly controlled RA (see HPI for specifics), presents with a new complaint of complete R hand numbness with parasthesias to R elbow w/ severe pain, multiple drug allergies, recently seen at [facility]. AND [facility] ED, no imaging done there, imaging here essentially unrevealing and had expected findings given hx of chronic RA since childhood. Multiple attempts were made to determine if rheumatology was available for consult, see ED course for specifics, pt  concerned about ability to take care of her at home in this state, signed out to night provider to determine disposition, admission for pain control and urgent management of acute flare RA: [place] vs. Other [facility] sites. Portions of the record may have been created with voice recognition software. Occasional wrong word or "sound a like" substitutions may have occurred due to the inherent limitations of voice recognition software. Read the chart carefully and recognize, using context, where substitutions have occurred. Amount and/or Complexity of Data Reviewed  Radiology: ordered. Risk  Prescription drug management. Decision regarding hospitalization. Disposition  Final diagnoses:   Acute pain   Rheumatoid arthritis flare ([address])     Time reflects when diagnosis was documented in both MDM as applicable and the Disposition within this note     Time User Action Codes Description Comment    8/3/2023 11:20 PM Jayy Martinez Add [R52] Acute pain     8/3/2023 11:21 PM Jayy Martinez Add [M06.9] Rheumatoid arthritis flare ([address])     8/3/2023 11:40 PM Mandi Rodriguez Add [R20.2,  M79.601,  M79.602] Paresthesia and pain of both upper extremities       ED Disposition     ED Disposition   Admit    Condition   Stable    Date/Time   Thu Aug 3, 2023 11:28 PM    Comment   Case was discussed with Dolly Norman and the patient's admission status was agreed to be Admission Status: observation status to the service of Dr. Lasha Soria .            Follow-up Information    None         Patient's Medications   Discharge Prescriptions    No medications on file       No discharge procedures on file.     PDMP Review     None          ED Provider  Electronically Signed by           Papito Sanabria III,   08/04/23 7149

## 2023-08-04 ENCOUNTER — APPOINTMENT (INPATIENT)
Dept: NON INVASIVE DIAGNOSTICS | Facility: HOSPITAL | Age: 48
DRG: 546 | End: 2023-08-04
Payer: MEDICARE

## 2023-08-04 LAB
ANION GAP SERPL CALCULATED.3IONS-SCNC: 6 MMOL/L
ANION GAP SERPL CALCULATED.3IONS-SCNC: 7 MMOL/L
ANION GAP SERPL CALCULATED.3IONS-SCNC: 8 MMOL/L
BUN SERPL-MCNC: 10 MG/DL (ref 5–25)
BUN SERPL-MCNC: 11 MG/DL (ref 5–25)
BUN SERPL-MCNC: 13 MG/DL (ref 5–25)
CALCIUM SERPL-MCNC: 7.2 MG/DL (ref 8.4–10.2)
CALCIUM SERPL-MCNC: 7.9 MG/DL (ref 8.4–10.2)
CALCIUM SERPL-MCNC: 8.4 MG/DL (ref 8.4–10.2)
CHLORIDE SERPL-SCNC: 91 MMOL/L (ref 96–108)
CHLORIDE SERPL-SCNC: 94 MMOL/L (ref 96–108)
CHLORIDE SERPL-SCNC: 96 MMOL/L (ref 96–108)
CHOLEST SERPL-MCNC: 106 MG/DL
CO2 SERPL-SCNC: 22 MMOL/L (ref 21–32)
CO2 SERPL-SCNC: 23 MMOL/L (ref 21–32)
CO2 SERPL-SCNC: 25 MMOL/L (ref 21–32)
CREAT SERPL-MCNC: 0.2 MG/DL (ref 0.6–1.3)
CREAT SERPL-MCNC: 0.28 MG/DL (ref 0.6–1.3)
CREAT SERPL-MCNC: 0.3 MG/DL (ref 0.6–1.3)
ERYTHROCYTE [DISTWIDTH] IN BLOOD BY AUTOMATED COUNT: 15.9 % (ref 11.6–15.1)
FERRITIN SERPL-MCNC: 142 NG/ML (ref 11–307)
GFR SERPL CREATININE-BSD FRML MDRD: 138 ML/MIN/1.73SQ M
GFR SERPL CREATININE-BSD FRML MDRD: 141 ML/MIN/1.73SQ M
GFR SERPL CREATININE-BSD FRML MDRD: 158 ML/MIN/1.73SQ M
GLUCOSE P FAST SERPL-MCNC: 108 MG/DL (ref 65–99)
GLUCOSE SERPL-MCNC: 108 MG/DL (ref 65–140)
GLUCOSE SERPL-MCNC: 137 MG/DL (ref 65–140)
GLUCOSE SERPL-MCNC: 183 MG/DL (ref 65–140)
HCG SERPL QL: NEGATIVE
HCT VFR BLD AUTO: 29.6 % (ref 34.8–46.1)
HDLC SERPL-MCNC: 32 MG/DL
HGB BLD-MCNC: 9 G/DL (ref 11.5–15.4)
IRON SATN MFR SERPL: 18 % (ref 15–50)
IRON SERPL-MCNC: 23 UG/DL (ref 50–170)
LDLC SERPL CALC-MCNC: 59 MG/DL (ref 0–100)
MAGNESIUM SERPL-MCNC: 1.6 MG/DL (ref 1.9–2.7)
MAGNESIUM SERPL-MCNC: 1.8 MG/DL (ref 1.9–2.7)
MCH RBC QN AUTO: 23.9 PG (ref 26.8–34.3)
MCHC RBC AUTO-ENTMCNC: 30.4 G/DL (ref 31.4–37.4)
MCV RBC AUTO: 79 FL (ref 82–98)
NONHDLC SERPL-MCNC: 74 MG/DL
PLATELET # BLD AUTO: 439 THOUSANDS/UL (ref 149–390)
PMV BLD AUTO: 7.7 FL (ref 8.9–12.7)
POTASSIUM SERPL-SCNC: 3.8 MMOL/L (ref 3.5–5.3)
POTASSIUM SERPL-SCNC: 4 MMOL/L (ref 3.5–5.3)
POTASSIUM SERPL-SCNC: 4.6 MMOL/L (ref 3.5–5.3)
RBC # BLD AUTO: 3.76 MILLION/UL (ref 3.81–5.12)
RETICS # AUTO: NORMAL 10*3/UL (ref 14097–95744)
RETICS # CALC: 0.65 % (ref 0.37–1.87)
SODIUM SERPL-SCNC: 122 MMOL/L (ref 135–147)
SODIUM SERPL-SCNC: 123 MMOL/L (ref 135–147)
SODIUM SERPL-SCNC: 127 MMOL/L (ref 135–147)
T4 FREE SERPL-MCNC: 1.06 NG/DL (ref 0.61–1.12)
TIBC SERPL-MCNC: 131 UG/DL (ref 250–450)
TRIGL SERPL-MCNC: 73 MG/DL
TSH SERPL DL<=0.05 MIU/L-ACNC: 5.23 UIU/ML (ref 0.45–4.5)
WBC # BLD AUTO: 4.73 THOUSAND/UL (ref 4.31–10.16)

## 2023-08-04 PROCEDURE — 84300 ASSAY OF URINE SODIUM: CPT | Performed by: INTERNAL MEDICINE

## 2023-08-04 PROCEDURE — 80061 LIPID PANEL: CPT | Performed by: INTERNAL MEDICINE

## 2023-08-04 PROCEDURE — 99223 1ST HOSP IP/OBS HIGH 75: CPT | Performed by: INTERNAL MEDICINE

## 2023-08-04 PROCEDURE — 85027 COMPLETE CBC AUTOMATED: CPT | Performed by: INTERNAL MEDICINE

## 2023-08-04 PROCEDURE — 83935 ASSAY OF URINE OSMOLALITY: CPT | Performed by: INTERNAL MEDICINE

## 2023-08-04 PROCEDURE — 83735 ASSAY OF MAGNESIUM: CPT | Performed by: INTERNAL MEDICINE

## 2023-08-04 PROCEDURE — 93925 LOWER EXTREMITY STUDY: CPT

## 2023-08-04 PROCEDURE — 82570 ASSAY OF URINE CREATININE: CPT | Performed by: INTERNAL MEDICINE

## 2023-08-04 PROCEDURE — 80048 BASIC METABOLIC PNL TOTAL CA: CPT | Performed by: INTERNAL MEDICINE

## 2023-08-04 PROCEDURE — 93923 UPR/LXTR ART STDY 3+ LVLS: CPT

## 2023-08-04 PROCEDURE — 36415 COLL VENOUS BLD VENIPUNCTURE: CPT | Performed by: INTERNAL MEDICINE

## 2023-08-04 PROCEDURE — 83540 ASSAY OF IRON: CPT | Performed by: INTERNAL MEDICINE

## 2023-08-04 PROCEDURE — 82607 VITAMIN B-12: CPT | Performed by: INTERNAL MEDICINE

## 2023-08-04 PROCEDURE — 84443 ASSAY THYROID STIM HORMONE: CPT | Performed by: INTERNAL MEDICINE

## 2023-08-04 PROCEDURE — 83550 IRON BINDING TEST: CPT | Performed by: INTERNAL MEDICINE

## 2023-08-04 PROCEDURE — 84439 ASSAY OF FREE THYROXINE: CPT | Performed by: INTERNAL MEDICINE

## 2023-08-04 PROCEDURE — 85045 AUTOMATED RETICULOCYTE COUNT: CPT | Performed by: INTERNAL MEDICINE

## 2023-08-04 PROCEDURE — 82728 ASSAY OF FERRITIN: CPT | Performed by: INTERNAL MEDICINE

## 2023-08-04 RX ORDER — KETOROLAC TROMETHAMINE 30 MG/ML
15 INJECTION, SOLUTION INTRAMUSCULAR; INTRAVENOUS ONCE
Status: COMPLETED | OUTPATIENT
Start: 2023-08-04 | End: 2023-08-04

## 2023-08-04 RX ORDER — GABAPENTIN 300 MG/1
300 CAPSULE ORAL 3 TIMES DAILY
Status: DISCONTINUED | OUTPATIENT
Start: 2023-08-04 | End: 2023-08-06

## 2023-08-04 RX ORDER — METHYLPREDNISOLONE 16 MG/1
16 TABLET ORAL DAILY
Status: DISCONTINUED | OUTPATIENT
Start: 2023-08-06 | End: 2023-08-04

## 2023-08-04 RX ORDER — METHYLPREDNISOLONE SODIUM SUCCINATE 40 MG/ML
40 INJECTION, POWDER, LYOPHILIZED, FOR SOLUTION INTRAMUSCULAR; INTRAVENOUS EVERY 6 HOURS SCHEDULED
Status: DISCONTINUED | OUTPATIENT
Start: 2023-08-04 | End: 2023-08-10

## 2023-08-04 RX ORDER — ALBUTEROL SULFATE 90 UG/1
2 AEROSOL, METERED RESPIRATORY (INHALATION) EVERY 4 HOURS PRN
Status: DISCONTINUED | OUTPATIENT
Start: 2023-08-04 | End: 2023-08-14 | Stop reason: HOSPADM

## 2023-08-04 RX ORDER — LANOLIN ALCOHOL/MO/W.PET/CERES
400 CREAM (GRAM) TOPICAL
Status: DISCONTINUED | OUTPATIENT
Start: 2023-08-04 | End: 2023-08-14 | Stop reason: HOSPADM

## 2023-08-04 RX ORDER — MELOXICAM 7.5 MG/1
7.5 TABLET ORAL DAILY
Status: DISCONTINUED | OUTPATIENT
Start: 2023-08-04 | End: 2023-08-14 | Stop reason: HOSPADM

## 2023-08-04 RX ORDER — METHYLPREDNISOLONE 4 MG/1
4 TABLET ORAL DAILY
Status: DISCONTINUED | OUTPATIENT
Start: 2023-08-09 | End: 2023-08-04

## 2023-08-04 RX ORDER — METHYLPREDNISOLONE 4 MG/1
12 TABLET ORAL DAILY
Status: DISCONTINUED | OUTPATIENT
Start: 2023-08-07 | End: 2023-08-04

## 2023-08-04 RX ORDER — METHYLPREDNISOLONE 4 MG/1
8 TABLET ORAL DAILY
Status: DISCONTINUED | OUTPATIENT
Start: 2023-08-08 | End: 2023-08-04

## 2023-08-04 RX ORDER — CALCIUM CARBONATE/VITAMIN D3 500-10/5ML
400 LIQUID (ML) ORAL DAILY
Status: DISCONTINUED | OUTPATIENT
Start: 2023-08-04 | End: 2023-08-04

## 2023-08-04 RX ORDER — MAGNESIUM SULFATE HEPTAHYDRATE 40 MG/ML
2 INJECTION, SOLUTION INTRAVENOUS ONCE
Status: COMPLETED | OUTPATIENT
Start: 2023-08-04 | End: 2023-08-04

## 2023-08-04 RX ORDER — METHYLPREDNISOLONE 4 MG/1
TABLET ORAL
Status: DISPENSED
Start: 2023-08-04 | End: 2023-08-04

## 2023-08-04 RX ORDER — METHYLPREDNISOLONE 16 MG/1
TABLET ORAL
Status: DISPENSED
Start: 2023-08-04 | End: 2023-08-04

## 2023-08-04 RX ADMIN — HYDROCODONE BITARTRATE AND ACETAMINOPHEN 1 TABLET: 5; 325 TABLET ORAL at 18:57

## 2023-08-04 RX ADMIN — MORPHINE SULFATE 2 MG: 2 INJECTION, SOLUTION INTRAMUSCULAR; INTRAVENOUS at 15:43

## 2023-08-04 RX ADMIN — MORPHINE SULFATE 2 MG: 2 INJECTION, SOLUTION INTRAMUSCULAR; INTRAVENOUS at 06:07

## 2023-08-04 RX ADMIN — Medication 400 MG: at 21:29

## 2023-08-04 RX ADMIN — ONDANSETRON 4 MG: 2 INJECTION INTRAMUSCULAR; INTRAVENOUS at 18:55

## 2023-08-04 RX ADMIN — HYDROCODONE BITARTRATE AND ACETAMINOPHEN 1 TABLET: 5; 325 TABLET ORAL at 09:07

## 2023-08-04 RX ADMIN — HEPARIN SODIUM 5000 UNITS: 5000 INJECTION INTRAVENOUS; SUBCUTANEOUS at 06:10

## 2023-08-04 RX ADMIN — HEPARIN SODIUM 5000 UNITS: 5000 INJECTION INTRAVENOUS; SUBCUTANEOUS at 21:29

## 2023-08-04 RX ADMIN — Medication 400 MG: at 01:32

## 2023-08-04 RX ADMIN — GABAPENTIN 300 MG: 300 CAPSULE ORAL at 15:43

## 2023-08-04 RX ADMIN — METHYLPREDNISOLONE SODIUM SUCCINATE 40 MG: 40 INJECTION, POWDER, FOR SOLUTION INTRAMUSCULAR; INTRAVENOUS at 15:43

## 2023-08-04 RX ADMIN — METHYLPREDNISOLONE 24 MG: 4 TABLET ORAL at 04:06

## 2023-08-04 RX ADMIN — HEPARIN SODIUM 5000 UNITS: 5000 INJECTION INTRAVENOUS; SUBCUTANEOUS at 00:06

## 2023-08-04 RX ADMIN — ONDANSETRON 4 MG: 2 INJECTION INTRAMUSCULAR; INTRAVENOUS at 06:07

## 2023-08-04 RX ADMIN — ONDANSETRON 4 MG: 2 INJECTION INTRAMUSCULAR; INTRAVENOUS at 12:21

## 2023-08-04 RX ADMIN — HEPARIN SODIUM 5000 UNITS: 5000 INJECTION INTRAVENOUS; SUBCUTANEOUS at 16:08

## 2023-08-04 RX ADMIN — HYDROCODONE BITARTRATE AND ACETAMINOPHEN 1 TABLET: 5; 325 TABLET ORAL at 02:00

## 2023-08-04 RX ADMIN — MAGNESIUM SULFATE HEPTAHYDRATE 2 G: 40 INJECTION, SOLUTION INTRAVENOUS at 16:11

## 2023-08-04 RX ADMIN — KETOROLAC TROMETHAMINE 15 MG: 30 INJECTION, SOLUTION INTRAMUSCULAR at 04:06

## 2023-08-04 RX ADMIN — MELOXICAM 7.5 MG: 7.5 TABLET ORAL at 09:01

## 2023-08-04 RX ADMIN — GABAPENTIN 300 MG: 300 CAPSULE ORAL at 21:29

## 2023-08-04 NOTE — ED NOTES
Asked patient if she would like vishnu moved to a hospital bed. She is declining at this time due to pain level.  Awaiting pain medication to start working and will see if she feels like moving into a hospital bed then     Seamus Castro RN  08/04/23 9952

## 2023-08-04 NOTE — ASSESSMENT & PLAN NOTE
· Not in exacerbation at this time  · We will continue prehospital Ventolin 90 mcg 2 puffs every 4 hours as needed

## 2023-08-04 NOTE — NUTRITION
08/04/23 1312   Biochemical Data,Medical Tests, and Procedures   Biochemical Data/Medical Tests/Procedures Lab values reviewed; Meds reviewed   Labs (Comment) 8/4/23 Na 122, Cl 94, creat 0.20, glucose 183, Ca 7.2, Mg 1.6, H&H 9.0/29.6   Meds (Comment) heparin, magnesium oxide, morphine, zofran   Nutrition-Focused Physical Exam   Nutrition-Focused Physical Exam Findings RN skin assessment reviewed   Nutrition-Focused Physical Exam Findings Pt is thin-framed and wheelchair bound per chart review   Medical-Related Concerns rheumatoid arthritis, asthma, mild pharyngeal dysphagia   Current PO Intake   Current Diet Order Regular diet, thin liquids   Current Meal Intake Suboptimal, but improving   Estimated calorie intake compared to estimated need Nutrient needs are not met at this time. PES Statement   Oral or Nutritional Support Intake (2) Inadequate oral intake NI-2.1   Related to Other (Comment)  (Rheumatoid arthritis flare)   As evidenced by: Per patient/family interview; Intake < estimated needs   Recommendations/Interventions   Malnutrition/BMI Present No   Summary Low BMI; poor PO - MST 1. Presents with BL arm and leg pain. Past medical history significant for rheumatoid arthritis, asthma, mild pharyngeal dysphagia. Pt is thin-framed and wheelchair bound per chart review. Weight history in past year unknown per EMR. 8/3/23 110# stated weight. Prescribed a Regular diet, thin liquids. Patient reports having a terrible appetite. States she feels hungry however is feeling sick at present. She usually has 1-2 meals daily. She follows an anti-inflammatory diet compliant with all food allergies as listed in chart. She enjoys foods such as squash, legumes, fish, chicken, cucumbers, avocados. She reports her weight is stable now. Years ago experienced weight loss. Usual body weight is 110#. Patient is agreeable to double portions and adding gluten free diet restriction. RD to follow.    Interventions/Recommendations Adjust diet order;Monitor I & O's   Education Assessment   Education Education not indicated at this time   Patient Nutrition Goals   Goal Adequate intake

## 2023-08-04 NOTE — H&P
[address]  H&P  Name: Remington Hector 41 y.o. female I MRN: [id number]  Unit/Bed#: ED 25 I Date of Admission: 8/3/2023   Date of Service: 8/4/2023 I Hospital Day: 0      Assessment/Plan   * Rheumatoid arthritis flare ([address])  Assessment & Plan  · Placed in observation medicine  · Patient has a longstanding history of rheumatoid arthritis but has not seen rheumatology in 17 years  · Patient reports being on multiple biological DMARDs in the past  · Will be given Medrol Dosepak  · Give Toradol 15 mg IV x1  · Continue prehospital Mobic 7.5 mg p.o. daily    Asthma  Assessment & Plan  · Not in exacerbation at this time  · We will continue prehospital Ventolin 90 mcg 2 puffs every 4 hours as needed         VTE Prophylaxis: Heparin  Code Status: Level 1  POLST: There is no POLST form on file for this patient (pre-hospital)  Discussion with family:  who was present at bedside at time of exam, diagnosis and treatment plan were reviewed and all questions answered to their satisfaction    Anticipated Length of Stay:  Patient will be admitted on an Observation basis with an anticipated length of stay of  < 2 midnights. Justification for Hospital Stay: Rheumatoid arthritis flare requiring steroids pain control    Chief Complaint:   Leg pain and numbness x2 weeks and bilateral arm pain and numbness x3 days    History of Present Illness:    Noe Lauren is a 41 y.o. female who presents with bilateral lower extremity pain and numbness x2 weeks and bilateral arm pain and numbness x3 days.   Patient with longstanding history of rheumatoid arthritis diagnosed at age 15 was previously followed by rheumatology but states she has not seen them in 17 years as she has been intolerant of biologicals as well as DMARDs she has tried in the past states that she began with progressive pain and numbness of her bilateral lower extremities now is complaining of arm pain and numbness that has been ongoing for the past 3 days. Patient was previously seen at [facility] in the ER started on Lyrica but she had an allergic reaction to that and took Benadryl and her symptoms resolved. Patient is denying any other complaints at this time denies any fever or chills. Review of Systems:  Review of Systems   Constitutional: Negative for chills and fever. HENT: Negative for congestion and sore throat. Respiratory: Negative for cough, shortness of breath and wheezing. Cardiovascular: Negative for chest pain and palpitations. Gastrointestinal: Negative for diarrhea, nausea and vomiting. Genitourinary: Negative for dysuria, frequency, hematuria and urgency. Musculoskeletal: Positive for arthralgias, gait problem and joint swelling. Negative for myalgias. Skin: Negative for wound. Neurological: Negative for dizziness, syncope, light-headedness and headaches. All other systems reviewed and are negative. Past Medical and Surgical History:   Past Medical History:   Diagnosis Date   • Asthma    • RA (rheumatoid arthritis) ([address])        Past Surgical History:   Procedure Laterality Date   • ANKLE SURGERY      2 surgerys   •  SECTION     • ELBOW SURGERY     • JOINT REPLACEMENT      knees bilateral   • TOTAL SHOULDER REPLACEMENT         Meds/Allergies:  Prior to Admission medications    Medication Sig Start Date End Date Taking?  Authorizing Provider   meloxicam (MOBIC) 7.5 mg tablet Take 7.5 mg by mouth daily   Yes Historical Provider, MD   Magnesium Oxide 400 MG CAPS Take 1 capsule by mouth    Historical Provider, MD   methylPREDNISolone 4 MG tablet therapy pack Use as directed on package  Patient not taking: Reported on 2023   CARMITA Hansen   prednisoLONE (ORAPRED) 15 mg/5 mL oral solution 10 ml by mouth on days 1-4; 5 ml on days 5-8 and 2.5 ml on day 9-12  Patient not taking: Reported on 8/3/2023 6/22/23   DO González Perez  ([address]) MCG/ACT inhaler  3/28/23   Historical Provider, MD     I have reviewed home medications with patient personally. Allergies: Allergies   Allergen Reactions   • Corn Oil - Food Allergy - Food Allergy Shortness Of Breath   • Gluten Meal - Food Allergy Shortness Of Breath   • Isoflavones (Soy) Shortness Of Breath   • Oseltamivir Shortness Of Breath   • Yeast - Food Allergy Shortness Of Breath   • Corn-Containing Products - Food Allergy Other (See Comments)   • Eggs Or Egg-Derived Products - Food Allergy    • Glyceryl Monolaurate Other (See Comments)     oats, barley, millot, **GLUTEN**   • Ibuprofen    • Tilactase    • Wheat Bran - Food Allergy Other (See Comments)     cough, stiffness, asthma   • Medical Tape Rash     If on for too long; please use paper tape   • Milk-Related Compounds - Food Allergy Other (See Comments)     dairy  Nasal congestion     • Sulfa Antibiotics Hives       Social History:  Marital Status: /Civil Union   Occupation: Disabled  Patient Pre-hospital Living Situation: Resides at home with   Patient Pre-hospital Level of Mobility: Wheelchair-bound  Patient Pre-hospital Diet Restrictions: None    Social History     Substance and Sexual Activity   Alcohol Use No     Social History     Tobacco Use   Smoking Status Never   Smokeless Tobacco Never     Social History     Substance and Sexual Activity   Drug Use No       Family History:  I have reviewed the patients family history    Physical Exam:   Vitals:   Blood Pressure: 130/76 (08/04/23 0030)  Pulse: 70 (08/04/23 0030)  Temperature: 98.5 °F (36.9 °C) (08/04/23 0030)  Temp Source: Tympanic (08/03/23 1537)  Respirations: 18 (08/04/23 0030)  Height: 5' 7" (170.2 cm) (08/03/23 1537)  Weight - Scale: 49.9 kg (110 lb) (08/03/23 1537)  SpO2: 95 % (08/03/23 2300)    Physical Exam  Vitals and nursing note reviewed. Constitutional:       General: She is not in acute distress. Appearance: Normal appearance. She is ill-appearing.    HENT: Head: Normocephalic and atraumatic. Right Ear: Tympanic membrane normal.      Left Ear: Tympanic membrane normal.      Nose: Nose normal.      Mouth/Throat:      Mouth: Mucous membranes are moist.      Pharynx: Oropharynx is clear. No posterior oropharyngeal erythema. Eyes:      Extraocular Movements: Extraocular movements intact. Conjunctiva/sclera: Conjunctivae normal.      Pupils: Pupils are equal, round, and reactive to light. Cardiovascular:      Rate and Rhythm: Normal rate and regular rhythm. Pulses: Normal pulses. Heart sounds: Normal heart sounds. No murmur heard. Pulmonary:      Effort: Pulmonary effort is normal. No respiratory distress. Breath sounds: Normal breath sounds. No rhonchi or rales. Abdominal:      General: Bowel sounds are normal.      Palpations: Abdomen is soft. Tenderness: There is no abdominal tenderness. Musculoskeletal:         General: Deformity present. Cervical back: Normal range of motion and neck supple. No muscular tenderness. Right lower leg: No edema. Left lower leg: No edema. Skin:     General: Skin is warm and dry. Capillary Refill: Capillary refill takes less than 2 seconds. Neurological:      General: No focal deficit present. Mental Status: She is alert and oriented to person, place, and time. Additional Data:   Lab Results: I have personally reviewed pertinent reports. Results from last 7 days   Lab Units 08/03/23  2342   WBC Thousand/uL 6.25   HEMOGLOBIN g/dL 9.5*   HEMATOCRIT % 31.0*   PLATELETS Thousands/uL 414*   NEUTROS PCT % 84*   LYMPHS PCT % 13*   MONOS PCT % 3*   EOS PCT % 0     Results from last 7 days   Lab Units 08/03/23  2342   SODIUM mmol/L 127*   POTASSIUM mmol/L 3.8   CHLORIDE mmol/L 96   CO2 mmol/L 23   BUN mg/dL 13   CREATININE mg/dL 0.28*   CALCIUM mg/dL 7.9*                   Imaging: I have personally reviewed pertinent reports.     MRI cervical spine wo contrast   Final Result by SIL Mendoza MD (08/03 2103)   No disc herniation, canal or foraminal stenosis. 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 Workstation performed: [id number]         CT spine cervical without contrast   Final Result by Dorthy Kocher, MD (08/03 1752)      Acute on chronic left sphenoid sinusitis. Workstation performed: [id number]         CT head without contrast   Final Result by Dorthy Kocher, MD (08/03 8795)      No acute intracranial abnormality. Workstation performed: [id number]             EKG, Pathology, and Other Studies Reviewed on Admission:   · EKG: N/A    Epic Records Reviewed: Yes     ** Please Note: This note has been constructed using a voice recognition system.  **

## 2023-08-04 NOTE — ASSESSMENT & PLAN NOTE
· Placed in observation medicine  · Patient has a longstanding history of rheumatoid arthritis but has not seen rheumatology in 17 years  · Patient reports being on multiple biological DMARDs in the past  · Will be given Medrol Dosepak  · Give Toradol 15 mg IV x1  · Continue prehospital Mobic 7.5 mg p.o. daily

## 2023-08-05 PROBLEM — D50.9 MICROCYTIC ANEMIA: Status: ACTIVE | Noted: 2023-08-05

## 2023-08-05 PROBLEM — G62.9 NEUROPATHY: Status: ACTIVE | Noted: 2023-08-05

## 2023-08-05 LAB
ALBUMIN SERPL BCP-MCNC: 2.6 G/DL (ref 3.5–5)
ANION GAP SERPL CALCULATED.3IONS-SCNC: 6 MMOL/L
ANION GAP SERPL CALCULATED.3IONS-SCNC: 6 MMOL/L
BUN SERPL-MCNC: 11 MG/DL (ref 5–25)
BUN SERPL-MCNC: 13 MG/DL (ref 5–25)
CALCIUM SERPL-MCNC: 8.3 MG/DL (ref 8.4–10.2)
CALCIUM SERPL-MCNC: 8.7 MG/DL (ref 8.4–10.2)
CHLORIDE SERPL-SCNC: 94 MMOL/L (ref 96–108)
CHLORIDE SERPL-SCNC: 96 MMOL/L (ref 96–108)
CO2 SERPL-SCNC: 27 MMOL/L (ref 21–32)
CO2 SERPL-SCNC: 27 MMOL/L (ref 21–32)
CREAT SERPL-MCNC: 0.3 MG/DL (ref 0.6–1.3)
CREAT SERPL-MCNC: 0.36 MG/DL (ref 0.6–1.3)
CREAT UR-MCNC: <13 MG/DL
ERYTHROCYTE [DISTWIDTH] IN BLOOD BY AUTOMATED COUNT: 15.7 % (ref 11.6–15.1)
ERYTHROCYTE [SEDIMENTATION RATE] IN BLOOD: 109 MM/HOUR (ref 0–19)
GFR SERPL CREATININE-BSD FRML MDRD: 130 ML/MIN/1.73SQ M
GFR SERPL CREATININE-BSD FRML MDRD: 138 ML/MIN/1.73SQ M
GLUCOSE SERPL-MCNC: 133 MG/DL (ref 65–140)
GLUCOSE SERPL-MCNC: 143 MG/DL (ref 65–140)
HCT VFR BLD AUTO: 30.5 % (ref 34.8–46.1)
HGB BLD-MCNC: 9.4 G/DL (ref 11.5–15.4)
MCH RBC QN AUTO: 24.4 PG (ref 26.8–34.3)
MCHC RBC AUTO-ENTMCNC: 30.8 G/DL (ref 31.4–37.4)
MCV RBC AUTO: 79 FL (ref 82–98)
OSMOLALITY UR: 106 MMOL/KG
PLATELET # BLD AUTO: 524 THOUSANDS/UL (ref 149–390)
PMV BLD AUTO: 7.9 FL (ref 8.9–12.7)
POTASSIUM SERPL-SCNC: 4.4 MMOL/L (ref 3.5–5.3)
POTASSIUM SERPL-SCNC: 4.5 MMOL/L (ref 3.5–5.3)
RBC # BLD AUTO: 3.86 MILLION/UL (ref 3.81–5.12)
SODIUM 24H UR-SCNC: 16 MOL/L
SODIUM SERPL-SCNC: 127 MMOL/L (ref 135–147)
SODIUM SERPL-SCNC: 129 MMOL/L (ref 135–147)
VIT B12 SERPL-MCNC: 340 PG/ML (ref 180–914)
WBC # BLD AUTO: 5.77 THOUSAND/UL (ref 4.31–10.16)

## 2023-08-05 PROCEDURE — 93922 UPR/L XTREMITY ART 2 LEVELS: CPT | Performed by: SURGERY

## 2023-08-05 PROCEDURE — 84207 ASSAY OF VITAMIN B-6: CPT | Performed by: PSYCHIATRY & NEUROLOGY

## 2023-08-05 PROCEDURE — 82525 ASSAY OF COPPER: CPT | Performed by: PSYCHIATRY & NEUROLOGY

## 2023-08-05 PROCEDURE — 82306 VITAMIN D 25 HYDROXY: CPT | Performed by: PSYCHIATRY & NEUROLOGY

## 2023-08-05 PROCEDURE — 80048 BASIC METABOLIC PNL TOTAL CA: CPT | Performed by: INTERNAL MEDICINE

## 2023-08-05 PROCEDURE — 84425 ASSAY OF VITAMIN B-1: CPT | Performed by: PSYCHIATRY & NEUROLOGY

## 2023-08-05 PROCEDURE — 93925 LOWER EXTREMITY STUDY: CPT | Performed by: SURGERY

## 2023-08-05 PROCEDURE — 83930 ASSAY OF BLOOD OSMOLALITY: CPT | Performed by: INTERNAL MEDICINE

## 2023-08-05 PROCEDURE — 85027 COMPLETE CBC AUTOMATED: CPT | Performed by: INTERNAL MEDICINE

## 2023-08-05 PROCEDURE — 86141 C-REACTIVE PROTEIN HS: CPT | Performed by: PSYCHIATRY & NEUROLOGY

## 2023-08-05 PROCEDURE — G0426 INPT/ED TELECONSULT50: HCPCS | Performed by: PSYCHIATRY & NEUROLOGY

## 2023-08-05 PROCEDURE — 86037 ANCA TITER EACH ANTIBODY: CPT | Performed by: PSYCHIATRY & NEUROLOGY

## 2023-08-05 PROCEDURE — 99233 SBSQ HOSP IP/OBS HIGH 50: CPT | Performed by: INTERNAL MEDICINE

## 2023-08-05 PROCEDURE — 99223 1ST HOSP IP/OBS HIGH 75: CPT | Performed by: INTERNAL MEDICINE

## 2023-08-05 PROCEDURE — 83520 IMMUNOASSAY QUANT NOS NONAB: CPT | Performed by: PSYCHIATRY & NEUROLOGY

## 2023-08-05 PROCEDURE — 82040 ASSAY OF SERUM ALBUMIN: CPT | Performed by: INTERNAL MEDICINE

## 2023-08-05 PROCEDURE — 85652 RBC SED RATE AUTOMATED: CPT | Performed by: PSYCHIATRY & NEUROLOGY

## 2023-08-05 PROCEDURE — 82747 ASSAY OF FOLIC ACID RBC: CPT | Performed by: PSYCHIATRY & NEUROLOGY

## 2023-08-05 RX ORDER — LIDOCAINE HYDROCHLORIDE 20 MG/ML
15 SOLUTION OROPHARYNGEAL 4 TIMES DAILY PRN
Status: DISCONTINUED | OUTPATIENT
Start: 2023-08-05 | End: 2023-08-06

## 2023-08-05 RX ADMIN — MELOXICAM 7.5 MG: 7.5 TABLET ORAL at 08:31

## 2023-08-05 RX ADMIN — Medication 15 ML: at 12:16

## 2023-08-05 RX ADMIN — GABAPENTIN 300 MG: 300 CAPSULE ORAL at 22:00

## 2023-08-05 RX ADMIN — METHYLPREDNISOLONE SODIUM SUCCINATE 40 MG: 40 INJECTION, POWDER, FOR SOLUTION INTRAMUSCULAR; INTRAVENOUS at 17:02

## 2023-08-05 RX ADMIN — Medication 15 ML: at 16:59

## 2023-08-05 RX ADMIN — HEPARIN SODIUM 5000 UNITS: 5000 INJECTION INTRAVENOUS; SUBCUTANEOUS at 16:02

## 2023-08-05 RX ADMIN — METHYLPREDNISOLONE SODIUM SUCCINATE 40 MG: 40 INJECTION, POWDER, FOR SOLUTION INTRAMUSCULAR; INTRAVENOUS at 13:10

## 2023-08-05 RX ADMIN — ONDANSETRON 4 MG: 2 INJECTION INTRAMUSCULAR; INTRAVENOUS at 06:20

## 2023-08-05 RX ADMIN — METHYLPREDNISOLONE SODIUM SUCCINATE 40 MG: 40 INJECTION, POWDER, FOR SOLUTION INTRAMUSCULAR; INTRAVENOUS at 06:20

## 2023-08-05 RX ADMIN — GABAPENTIN 300 MG: 300 CAPSULE ORAL at 08:31

## 2023-08-05 RX ADMIN — HEPARIN SODIUM 5000 UNITS: 5000 INJECTION INTRAVENOUS; SUBCUTANEOUS at 06:24

## 2023-08-05 RX ADMIN — GABAPENTIN 300 MG: 300 CAPSULE ORAL at 16:02

## 2023-08-05 RX ADMIN — HEPARIN SODIUM 5000 UNITS: 5000 INJECTION INTRAVENOUS; SUBCUTANEOUS at 22:04

## 2023-08-05 RX ADMIN — Medication 400 MG: at 22:03

## 2023-08-05 RX ADMIN — HYDROCODONE BITARTRATE AND ACETAMINOPHEN 1 TABLET: 5; 325 TABLET ORAL at 06:18

## 2023-08-05 RX ADMIN — ONDANSETRON 4 MG: 2 INJECTION INTRAMUSCULAR; INTRAVENOUS at 00:23

## 2023-08-05 RX ADMIN — METHYLPREDNISOLONE SODIUM SUCCINATE 40 MG: 40 INJECTION, POWDER, FOR SOLUTION INTRAMUSCULAR; INTRAVENOUS at 00:20

## 2023-08-05 RX ADMIN — HYDROCODONE BITARTRATE AND ACETAMINOPHEN 1 TABLET: 5; 325 TABLET ORAL at 06:20

## 2023-08-05 NOTE — ASSESSMENT & PLAN NOTE
· Presented with bilateral upper and lower extremity pain, weakness, and numbness. · In particular discomfort is located from elbows to hands and knees to feet  · Recently seen in ED and given Lyrica, however, had an adverse reaction to this medication  · MRI C-spine (8/3): No disc herniation, canal or foraminal stenosis.  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   · B12 pending  · Continue gabapentin 300 mg 3 times daily; increase as needed  · We will continue Solu-Medrol 40 mg every 6 hours given possibility of RA flare as well  · Neurology consultation requested

## 2023-08-05 NOTE — ASSESSMENT & PLAN NOTE
· Not on any maintenance therapy at this time as the patient has several allergies to DMARDs  · Continue IV Solu-Medrol as above for possible acute RA flare  · Would recommend further outpatient follow-up with rheumatology

## 2023-08-05 NOTE — TELEMEDICINE
REQUIRED DOCUMENTATION:     1. This service was provided via Telemedicine. 2. Patient located at [facility]. 3. TeleMed provider: Laura Oquendo MD.  4. Identify all parties in room with patient during tele consult:  Patient,   5. Patient was then informed that this was a Telemedicine visit and that the exam was being conducted confidentially over secure lines. My office door was closed. No one else was in the room. Patient acknowledged consent and understanding of privacy and security of the Telemedicine visit, and gave us permission to have the assistant stay in the room in order to assist with the history and to conduct the exam.  I informed the patient that I have reviewed their record in Epic and presented the opportunity for them to ask any questions regarding the visit today. The patient agreed to participate. Consulted by: Zuleyka Chaney III, DO         Assessment and Recommendations:    No new Assessment & Plan notes have been filed under this hospital service since the last note was generated. 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 Reversible neuropathy workup, Vit B1, B6, B12, Folate, Copper, TSH/free T4, Vit D, HbA1C. ESR/ hs CRP/ ANCA ordered, however it should be noted that Inflammatory workup wont be reliable as she has long standing RA    She would benefit from EMG/ Nerve conduction studies for objective assessment of her neuropathy, typically done on outpatient basis by neuromuscular specialist    Opioids wont be best therapy for neuropathy pain. She is started on Solumedrol 40 mg q6 by primary team for rheumatoid flare. Would defer RA management per primary team/ Rheumatology. She was also started on gabapentin 300 tid. Dose can be further increased to 300-300-600 after one week if needed. Loura Phlegm will need follow up in in 6 weeks with neuromuscular attending. She will require a EMG/NCS within 4-6 weeks. History/Data Review: This is a 41 y.o. female with h/o advanced rheumatoid arthritis presented with bilateral lower extremity pain and numbness x2 weeks and bilateral arm pain and numbness x3 days. Patient with longstanding history of rheumatoid arthritis diagnosed at age 15 was previously followed by rheumatology. She has not seen them in more than a decade She has been intolerant of biologicals as well as DMARDs she has tried in the past. She started to have progressive pain and numbness of her bilateral lower extremities now is complaining of arm pain and numbness. More prominent in Right compared to left, especially on 4/5 th digit/ ulnar distribution It got worse in last few days/ week. She was was previously seen at [facility] in the ER started on Lyrica but she had an allergic reaction to that, which required Benadryl. Not specific about type of allergy.       No hemibody weakness, slurred speech, blurred vision or neurological deficits except numbness and pain     Vitals: Blood pressure 101/72, pulse 82, temperature 97.8 °F (36.6 °C), resp. rate 15, height 5' 7" (1.702 m), weight 49.9 kg (110 lb), SpO2 94 %. ,Body mass index is 17.23 kg/m². Lab, Imaging and other studies: I have personally reviewed pertinent reports.   , CBC:   Results from last 7 days   Lab Units 08/05/23  0617 08/04/23  0858 08/03/23  2342   WBC Thousand/uL 5.77 4.73 6.25   RBC Million/uL 3.86 3.76* 3.90   HEMOGLOBIN g/dL 9.4* 9.0* 9.5*   HEMATOCRIT % 30.5* 29.6* 31.0*   MCV fL 79* 79* 80*   PLATELETS Thousands/uL 524* 439* 414*   , BMP/CMP:   Results from last 7 days   Lab Units 08/05/23  0617 08/05/23  0030 08/04/23  1820   SODIUM mmol/L 129* 127* 123*   POTASSIUM mmol/L 4.5 4.4 4.6   CHLORIDE mmol/L 96 94* 91*   CO2 mmol/L 27 27 25   BUN mg/dL 13 11 11   CREATININE mg/dL 0.36* 0.30* 0.30*   CALCIUM mg/dL 8.7 8.3* 8.4   EGFR ml/min/1.73sq m 130 138 138   , Vitamin B12:   , HgBA1C:   , TSH:   Results from last 7 days   Lab Units 08/04/23  0858   TSH 3RD GENERATON uIU/mL 5.229*   , Coagulation:   , Lipid Profile:   Results from last 7 days   Lab Units 08/04/23  0858   HDL mg/dL 32*   LDL CALC mg/dL 59   TRIGLYCERIDES mg/dL 73   , Ammonia:   , Urinalysis:       Invalid input(s): "URIBILINOGEN", Drug Screen:   , Medication Drug Levels:       Invalid input(s): "CARBAMAZEPINE", "LACOSAMIDE", "OXCARBAZEPINE",     Available brain Imaging during admission including CT, MRI brains      Current Facility-Administered Medications:   •  albuterol (PROVENTIL HFA,VENTOLIN HFA) inhaler 2 puff, 2 puff, Inhalation, Q4H PRN, Ila Alonso PA-C  •  gabapentin (NEURONTIN) capsule 300 mg, 300 mg, Oral, TID, Paulett Prima Gasca, DO, 300 mg at 08/05/23 0831  •  heparin (porcine) subcutaneous injection 5,000 Units, 5,000 Units, Subcutaneous, Q8H [address], Ila Alonso PA-C, 5,000 Units at 08/05/23 9529  •  HYDROcodone-acetaminophen ([address]) 5-325 mg per tablet 1 tablet, 1 tablet, Oral, Q6H PRN, Ila Alonso PA-C, 1 tablet at 08/05/23 0620  •  Lidocaine Viscous HCl (XYLOCAINE) 2 % mucosal solution 15 mL, 15 mL, Swish & Spit, 4x Daily PRN, Paulett Prima Gasca, DO, 15 mL at 08/05/23 1216  •  magnesium Oxide (MAG-OX) tablet 400 mg, 400 mg, Oral, HS, Ila Alonso PA-C, 400 mg at 08/04/23 2129  •  meloxicam (MOBIC) tablet 7.5 mg, 7.5 mg, Oral, Daily, Ila Alonso PA-C, 7.5 mg at 08/05/23 0831  •  methylPREDNISolone sodium succinate (Solu-MEDROL) injection 40 mg, 40 mg, Intravenous, Q6H [address] [facility], , 40 mg at 08/05/23 0620  •  morphine injection 2 mg, 2 mg, Intravenous, Q4H PRN, Ila Alonso PA-C, 2 mg at 08/04/23 5129  •  ondansetron (ZOFRAN) injection 4 mg, 4 mg, Intravenous, Q6H PRN, Ila Alonso PA-C, 4 mg at 08/05/23 0517    The purposes of this evaluation are for urgent/ emergent neurological assessment and management, while this patient is admitted at a remote hospital location with a very limited in-house neurologist availability. It complements, but does not substitute in-person evaluation by neurologist in hospital, or outpatient neurologist visit.

## 2023-08-05 NOTE — PROGRESS NOTES
[id number] [facility]  Progress Note  Name: Neha To I  MRN: [id number]  Unit/Bed#: -01 I Date of Admission: 8/3/2023   Date of Service: 8/5/2023 I Hospital Day: 1    Assessment/Plan   * Bilateral upper and lower extremity neuropathy  Assessment & Plan  · Presented with bilateral upper and lower extremity pain, weakness, and numbness. · In particular discomfort is located from elbows to hands and knees to feet  · Recently seen in ED and given Lyrica, however, had an adverse reaction to this medication  · MRI C-spine (8/3): No disc herniation, canal or foraminal stenosis. 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   · B12 pending  · Continue gabapentin 300 mg 3 times daily; increase as needed  · We will continue Solu-Medrol 40 mg every 6 hours given possibility of RA flare as well  · Neurology consultation requested    Hyponatremia  Assessment & Plan  · History of acute on chronic hyponatremia  · Has required salt tabs in the past following surgical intervention  · In the setting of a patient who takes meloxicam for pain and chronic pain/nausea  · Na has improved to 129 this morning  · Continue symptomatic control as best as possible  · Nephrology following    Microcytic anemia  Assessment & Plan  · No active bleeding noted at this time  · Anemia panel showing combination of LANDEN and anemia of chronic disease  · We will continue to monitor with daily labs    Rheumatoid arthritis ([address])  Assessment & Plan  · Not on any maintenance therapy at this time as the patient has several allergies to DMARDs  · Continue IV Solu-Medrol as above for possible acute RA flare  · Would recommend further outpatient follow-up with rheumatology         VTE Pharmacologic Prophylaxis: VTE Score: 1 Heprarin ordered    Patient Centered Rounds: I performed bedside rounds with nursing staff today.   Discussions with Specialists or Other Care Team Provider: Nursing    Education and Discussions with Family / Patient: Patient updated on plan of care. Total Time Spent on Date of Encounter in care of patient: 35 minutes This time was spent on one or more of the following: performing physical exam; counseling and coordination of care; obtaining or reviewing history; documenting in the medical record; reviewing/ordering tests, medications or procedures; communicating with other healthcare professionals and discussing with patient's family/caregivers. Current Length of Stay: 1 day(s)  Current Patient Status: Inpatient   Certification Statement: The patient will continue to require additional inpatient hospital stay due to Upper/lower extremity neuropathy; hyponatremia  Discharge Plan: TBD based on clinical improvement    Code Status: Level 1 - Full Code    Subjective:   Patient is resting in bed without any acute complaints. She notes mild improvement in her bilateral upper and lower extremity pain with initiation of gabapentin and IV Solu-Medrol. Bilateral lower extremities feel warm to the touch this morning which is significant improvement from prior examination yesterday afternoon. She continues to report significant nausea but no vomiting which has made it difficult for her to have any oral intake. Objective:     Vitals:   Temp (24hrs), Av.5 °F (36.4 °C), Min:96.9 °F (36.1 °C), Max:97.9 °F (36.6 °C)    Temp:  [96.9 °F (36.1 °C)-97.9 °F (36.6 °C)] 97.8 °F (36.6 °C)  HR:  [] 82  Resp:  [15-18] 15  BP: (101-116)/(72-88) 101/72  SpO2:  [94 %] 94 %  Body mass index is 17.23 kg/m². Input and Output Summary (last 24 hours): Intake/Output Summary (Last 24 hours) at 2023 1136  Last data filed at 2023 0736  Gross per 24 hour   Intake 0 ml   Output --   Net 0 ml       Physical Exam:   Physical Exam  Vitals and nursing note reviewed. Constitutional:       General: She is not in acute distress. Appearance: She is underweight.       Comments: Chronically ill-appearing   HENT: Head: Normocephalic and atraumatic. Mouth/Throat:      Mouth: Mucous membranes are moist.      Pharynx: Oropharynx is clear. Eyes:      Extraocular Movements: Extraocular movements intact. Conjunctiva/sclera: Conjunctivae normal.   Cardiovascular:      Rate and Rhythm: Normal rate and regular rhythm. Pulses: Normal pulses. Heart sounds: Normal heart sounds. No murmur heard. Pulmonary:      Effort: Pulmonary effort is normal. No respiratory distress. Breath sounds: Normal breath sounds. Abdominal:      General: Bowel sounds are normal. There is no distension. Palpations: Abdomen is soft. Tenderness: There is no abdominal tenderness. Musculoskeletal:         General: No swelling. Cervical back: Normal range of motion and neck supple. Comments: Significant joint deformities in the hands with ulnar deviation, boutonniere deformity, and swan deformity; range of motion is limited at the ankles due to prior fusion   Skin:     General: Skin is warm and dry. Capillary Refill: Capillary refill takes less than 2 seconds. Neurological:      General: No focal deficit present. Mental Status: She is alert and oriented to person, place, and time. Psychiatric:         Mood and Affect: Mood normal.         Behavior: Behavior normal.         Labs:  Results from last 7 days   Lab Units 08/05/23  0617 08/04/23  0858 08/03/23  2342   WBC Thousand/uL 5.77   < > 6.25   HEMOGLOBIN g/dL 9.4*   < > 9.5*   HEMATOCRIT % 30.5*   < > 31.0*   PLATELETS Thousands/uL 524*   < > 414*   NEUTROS PCT %  --   --  84*   LYMPHS PCT %  --   --  13*   MONOS PCT %  --   --  3*   EOS PCT %  --   --  0    < > = values in this interval not displayed.      Results from last 7 days   Lab Units 08/05/23  0617   SODIUM mmol/L 129*   POTASSIUM mmol/L 4.5   CHLORIDE mmol/L 96   CO2 mmol/L 27   BUN mg/dL 13   CREATININE mg/dL 0.36*   ANION GAP mmol/L 6   CALCIUM mg/dL 8.7   ALBUMIN g/dL 2.6*   GLUCOSE RANDOM mg/dL 143*                       Lines/Drains:  Invasive Devices     Peripheral Intravenous Line  Duration           Peripheral IV 08/03/23 Right;Ventral (anterior) Hand 1 day                Imaging: No new imaging. Recent Cultures (last 7 days):         Last 24 Hours Medication List:   Current Facility-Administered Medications   Medication Dose Route Frequency Provider Last Rate   • albuterol  2 puff Inhalation Q4H PRN Romario Blanchard PA-C     • gabapentin  300 mg Oral TID [facility] G[place], DO     • heparin (porcine)  5,000 Units Subcutaneous [facility] Romario Blanchard PA-C     • HYDROcodone-acetaminophen  1 tablet Oral Q6H PRN Romario Blanchard PA-C     • Lidocaine Viscous HCl  15 mL Swish & Spit 4x Daily PRN [facility], DO     • magnesium Oxide  400 mg Oral HS Romario Blanchard PA-C     • meloxicam  7.5 mg Oral Daily Romario Blanchard PA-C     • methylPREDNISolone sodium succinate  40 mg Intravenous Q6H [address], DO     • morphine injection  2 mg Intravenous Q4H PRN Romario Blanchard PA-C     • ondansetron  4 mg Intravenous Q6H PRN Romario Blanchard PA-C          Today, Patient Was Seen By: Jaylene Carbajal DO    **Please Note: This note may have been constructed using a voice recognition system. **

## 2023-08-05 NOTE — CASE MANAGEMENT
Case Management Progress Note    Patient name Isamar Hector  Location /-01 MRN [id number]  : 1975 Date 2023       LOS (days): 1  Geometric Mean LOS (GMLOS) (days): 3.40  Days to GMLOS:2.3        OBJECTIVE:  Current admission status: Inpatient  Preferred Pharmacy:   [address] [address], [address]  [address] [facility] [address]  Phone: [phone] Fax: [phone]    [address] [facility], [address] [place] [address]  Phone: [phone] Fax: [phone]    Primary Care Provider: Nereida Kehr, DO    Primary Insurance: MEDICARE  Secondary Insurance:     PROGRESS NOTE:  Reviewed the chart for CM purposes. Do not anticipate any care needs upon DC. Will continue to follow for any care needs.

## 2023-08-05 NOTE — ASSESSMENT & PLAN NOTE
· No active bleeding noted at this time  · Anemia panel showing combination of LANDEN and anemia of chronic disease  · We will continue to monitor with daily labs

## 2023-08-05 NOTE — PLAN OF CARE
Problem: Potential for Falls  Goal: Patient will remain free of falls  Description: INTERVENTIONS:  - Educate patient/family on patient safety including physical limitations  - Instruct patient to call for assistance with activity   - Consult OT/PT to assist with strengthening/mobility   - Keep Call bell within reach  - Keep bed low and locked with side rails adjusted as appropriate  - Keep care items and personal belongings within reach  - Initiate and maintain comfort rounds  - Make Fall Risk Sign visible to staff  - Offer Toileting every 2 Hours, in advance of need  - Initiate/Maintain bed alarm  - Obtain necessary fall risk management equipment: bed alarm  - Apply yellow socks and bracelet for high fall risk patients  - Consider moving patient to room near nurses station  Outcome: Progressing     Problem: MOBILITY - ADULT  Goal: Maintain or return to baseline ADL function  Description: INTERVENTIONS:  -  Assess patient's ability to carry out ADLs; assess patient's baseline for ADL function and identify physical deficits which impact ability to perform ADLs (bathing, care of mouth/teeth, toileting, grooming, dressing, etc.)  - Assess/evaluate cause of self-care deficits   - Assess range of motion  - Assess patient's mobility; develop plan if impaired  - Assess patient's need for assistive devices and provide as appropriate  - Encourage maximum independence but intervene and supervise when necessary  - Involve family in performance of ADLs  - Assess for home care needs following discharge   - Consider OT consult to assist with ADL evaluation and planning for discharge  - Provide patient education as appropriate  Outcome: Progressing  Goal: Maintains/Returns to pre admission functional level  Description: INTERVENTIONS:  - Perform BMAT or MOVE assessment daily.   - Set and communicate daily mobility goal to care team and patient/family/caregiver.    - Collaborate with rehabilitation services on mobility goals if consulted  - Perform Range of Motion 3 times a day. - Reposition patient every 2 hours. - Dangle patient 3 times a day  - Stand patient 3 times a day  - Ambulate patient 3 times a day  - Out of bed to chair 3 times a day   - Out of bed for meals 3 times a day  - Out of bed for toileting  - Record patient progress and toleration of activity level   Outcome: Progressing     Problem: Prexisting or High Potential for Compromised Skin Integrity  Goal: Skin integrity is maintained or improved  Description: INTERVENTIONS:  - Identify patients at risk for skin breakdown  - Assess and monitor skin integrity  - Assess and monitor nutrition and hydration status  - Monitor labs   - Assess for incontinence   - Turn and reposition patient  - Assist with mobility/ambulation  - Relieve pressure over bony prominences  - Avoid friction and shearing  - Provide appropriate hygiene as needed including keeping skin clean and dry  - Evaluate need for skin moisturizer/barrier cream  - Collaborate with interdisciplinary team   - Patient/family teaching  - Consider wound care consult   Outcome: Progressing     Problem: Nutrition/Hydration-ADULT  Goal: Nutrient/Hydration intake appropriate for improving, restoring or maintaining nutritional needs  Description: Monitor and assess patient's nutrition/hydration status for malnutrition. Collaborate with interdisciplinary team and initiate plan and interventions as ordered. Monitor patient's weight and dietary intake as ordered or per policy. Utilize nutrition screening tool and intervene as necessary. Determine patient's food preferences and provide high-protein, high-caloric foods as appropriate.      INTERVENTIONS:  - Monitor oral intake, urinary output, labs, and treatment plans  - Assess nutrition and hydration status and recommend course of action  - Evaluate amount of meals eaten  - Assist patient with eating if necessary   - Allow adequate time for meals  - Recommend/ encourage appropriate diets, oral nutritional supplements, and vitamin/mineral supplements  - Order, calculate, and assess calorie counts as needed  - Recommend, monitor, and adjust tube feedings and TPN/PPN based on assessed needs  - Assess need for intravenous fluids  - Provide specific nutrition/hydration education as appropriate  - Include patient/family/caregiver in decisions related to nutrition  Outcome: Progressing

## 2023-08-05 NOTE — PLAN OF CARE
Problem: Potential for Falls  Goal: Patient will remain free of falls  Description: INTERVENTIONS:  - Educate patient/family on patient safety including physical limitations  - Instruct patient to call for assistance with activity   - Consult OT/PT to assist with strengthening/mobility   - Keep Call bell within reach  - Keep bed low and locked with side rails adjusted as appropriate  - Keep care items and personal belongings within reach  - Initiate and maintain comfort rounds  - Make Fall Risk Sign visible to staff  - Offer Toileting every 2 Hours, in advance of need  - Initiate/Maintain bed alarm  - Obtain necessary fall risk management equipment: bed alarm  - Apply yellow socks and bracelet for high fall risk patients  - Consider moving patient to room near nurses station  8/5/2023 1507 by Lencho Smith RN  Outcome: Progressing  8/5/2023 1507 by Lencho Smith RN  Outcome: Progressing     Problem: MOBILITY - ADULT  Goal: Maintain or return to baseline ADL function  Description: INTERVENTIONS:  -  Assess patient's ability to carry out ADLs; assess patient's baseline for ADL function and identify physical deficits which impact ability to perform ADLs (bathing, care of mouth/teeth, toileting, grooming, dressing, etc.)  - Assess/evaluate cause of self-care deficits   - Assess range of motion  - Assess patient's mobility; develop plan if impaired  - Assess patient's need for assistive devices and provide as appropriate  - Encourage maximum independence but intervene and supervise when necessary  - Involve family in performance of ADLs  - Assess for home care needs following discharge   - Consider OT consult to assist with ADL evaluation and planning for discharge  - Provide patient education as appropriate  8/5/2023 1507 by Lencho Smith RN  Outcome: Progressing  8/5/2023 1507 by Lencho Smith RN  Outcome: Progressing  Goal: Maintains/Returns to pre admission functional level  Description: INTERVENTIONS:  - Perform BMAT or MOVE assessment daily.   - Set and communicate daily mobility goal to care team and patient/family/caregiver. - Collaborate with rehabilitation services on mobility goals if consulted  - Perform Range of Motion 3 times a day. - Reposition patient every 2 hours. - Dangle patient 3 times a day  - Stand patient 3 times a day  - Ambulate patient 3 times a day  - Out of bed to chair 3 times a day   - Out of bed for meals 3 times a day  - Out of bed for toileting  - Record patient progress and toleration of activity level   8/5/2023 1507 by Imelda Shelby RN  Outcome: Progressing  8/5/2023 1507 by Imelda Shelby RN  Outcome: Progressing     Problem: Prexisting or High Potential for Compromised Skin Integrity  Goal: Skin integrity is maintained or improved  Description: INTERVENTIONS:  - Identify patients at risk for skin breakdown  - Assess and monitor skin integrity  - Assess and monitor nutrition and hydration status  - Monitor labs   - Assess for incontinence   - Turn and reposition patient  - Assist with mobility/ambulation  - Relieve pressure over bony prominences  - Avoid friction and shearing  - Provide appropriate hygiene as needed including keeping skin clean and dry  - Evaluate need for skin moisturizer/barrier cream  - Collaborate with interdisciplinary team   - Patient/family teaching  - Consider wound care consult   8/5/2023 1507 by Imelda Shelby RN  Outcome: Progressing  8/5/2023 1507 by Imelda Shelby RN  Outcome: Progressing     Problem: Nutrition/Hydration-ADULT  Goal: Nutrient/Hydration intake appropriate for improving, restoring or maintaining nutritional needs  Description: Monitor and assess patient's nutrition/hydration status for malnutrition. Collaborate with interdisciplinary team and initiate plan and interventions as ordered. Monitor patient's weight and dietary intake as ordered or per policy. Utilize nutrition screening tool and intervene as necessary.  Determine patient's food preferences and provide high-protein, high-caloric foods as appropriate.      INTERVENTIONS:  - Monitor oral intake, urinary output, labs, and treatment plans  - Assess nutrition and hydration status and recommend course of action  - Evaluate amount of meals eaten  - Assist patient with eating if necessary   - Allow adequate time for meals  - Recommend/ encourage appropriate diets, oral nutritional supplements, and vitamin/mineral supplements  - Order, calculate, and assess calorie counts as needed  - Recommend, monitor, and adjust tube feedings and TPN/PPN based on assessed needs  - Assess need for intravenous fluids  - Provide specific nutrition/hydration education as appropriate  - Include patient/family/caregiver in decisions related to nutrition  8/5/2023 1507 by Osvaldo Villasenor, RN  Outcome: Progressing  8/5/2023 1507 by Osvaldo Villasenor, RN  Outcome: Progressing

## 2023-08-05 NOTE — CONSULTS
Consultation - Nephrology   Candace Adele Hector 41 y.o. female MRN: [id number]  Unit/Bed#: -01 Encounter: [id number]      A/P:  1. Hyponatremia acute on chronic   - records review reveal that her sodium ranges 132 mmlol liter   - she chronically takes meloxicam which can cause hyponatremia   - she also has chronic pain which causes nonosmotic release of ADH   - albumin, and urine studies are pending at this time   - would continue to follow and not institute a fluid restriction due to dec po intake   - check cortisol and aldosterone levels   - check urine electrolytes   - expect correction to her normally low  Level    2. Asthma   - is oxygenating well today    3. Flare of rheumatoid arthritis   - receiving IV steroids   - had a reaction to a biologic in the past so did not pursue rheumatology follow up   - will have a rheumatology consult during this admission           Thank you for allowing us to participate in the care of your patient. Please feel free to contact us regarding the care of this patient, or any other questions/concerns that may be applicable.     Patient Active Problem List   Diagnosis   • Abnormal electrocardiogram   • Abnormal findings on diagnostic imaging of heart and coronary circulation   • Acquired bilateral club feet   • Acrocyanosis (HCC)   • Ankle arthritis   • Asthma   • Blue color skin   • Cardiac murmur   • Cardiomyopathy (HCC)   • DJD (degenerative joint disease)   • Hyponatremia   • Nonrheumatic mitral valve insufficiency   • Osteoarthritis of left hip   • Psoriasis   • Rheumatoid arthritis flare (HCC)   • Status post left hip replacement       History of Present Illness   Physician Requesting Consult: Cm Rose DO  Reason for Consult / Principal Problem: acute on chronic hyponatremia  Hx and PE limited by:   HPI: Wicho Clark is a 41y.o. year old female who presents with long standing juvenile rheumatoid arthritis with bilateral lower and upper extremity numbness and pain. She had been given Lyrica but stopped due to side effects. She states that she has lost 10 lbs of weight in the last 6 months. She had dysphagia  And has seen GI and ENT (neg laryngoscopy). We are consulted due t a serum sodium of 123 mmol/liter on admission. Overnight her serum sodium has improved to 129 mmol/liter without IVF. She received steroid treatment  She takes meloxicam at home . History obtained from chart review and the patient    Constitutional ROS- Has fatigue no , fever, chills, night sweats,has 10 lb  weight decline over 6 months  HEENT ROS- No , blurred vision. .  Endocrine ROS- No history diabetes mellitus or thyroid disease. Cardiovascular ROS- Denies chest pain, palpitation,has  dyspnea exertion,no  orthopnea, claudication. Pulmonary ROS-Has long standing asthma. Has a cough at itimes. GI ROS- Denies abdominal pain, diarrhea,has  Nausea,this morningno, vomiting, constipation, blood in stools,    Hematological ROS- Genitourinary ROS- Denies change in urination, hematuria  . Musculoskeletal ROS-Has muscle weakness and joint pain, Is nonambulatory and wheelchair boundin. Dermatological ROS-. Psychiatric ROS-Generally not depressed but feels down today. Neurological ROS- No stroke or TIA symptoms.  .    Historical Information   Past Medical History:   Diagnosis Date   • Asthma    • RA (rheumatoid arthritis) ([facility])      Past Surgical History:   Procedure Laterality Date   • ANKLE SURGERY      2 surgerys   •  SECTION     • ELBOW SURGERY     • JOINT REPLACEMENT      knees bilateral   • TOTAL SHOULDER REPLACEMENT       Social History   Social History     Substance and Sexual Activity   Alcohol Use No     Social History     Substance and Sexual Activity   Drug Use No     Social History     Tobacco Use   Smoking Status Never   Smokeless Tobacco Never     Family History   Problem Relation Age of Onset   • No Known Problems Mother    • No Known Problems Father Meds/Allergies   all current active meds have been reviewed, current meds:   Current Facility-Administered Medications   Medication Dose Route Frequency   • albuterol (PROVENTIL HFA,VENTOLIN HFA) inhaler 2 puff  2 puff Inhalation Q4H PRN   • gabapentin (NEURONTIN) capsule 300 mg  300 mg Oral TID   • heparin (porcine) subcutaneous injection 5,000 Units  5,000 Units Subcutaneous Q8H [address]   • HYDROcodone-acetaminophen (NORCO) 5-325 mg per tablet 1 tablet  1 tablet Oral Q6H PRN   • Lidocaine Viscous HCl (XYLOCAINE) 2 % mucosal solution 15 mL  15 mL Swish & Spit 4x Daily PRN   • magnesium Oxide (MAG-OX) tablet 400 mg  400 mg Oral HS   • meloxicam (MOBIC) tablet 7.5 mg  7.5 mg Oral Daily   • methylPREDNISolone sodium succinate (Solu-MEDROL) injection 40 mg  40 mg Intravenous Q6H [address]   • morphine injection 2 mg  2 mg Intravenous Q4H PRN   • ondansetron (ZOFRAN) injection 4 mg  4 mg Intravenous Q6H PRN    and PTA meds:    Medications Prior to Admission   Medication   • meloxicam (MOBIC) 7.5 mg tablet   • Magnesium Oxide 400 MG CAPS   • methylPREDNISolone 4 MG tablet therapy pack   • prednisoLONE (ORAPRED) 15 mg/5 mL oral solution   • Ventolin  (90 Base) MCG/ACT inhaler         Allergies   Allergen Reactions   • Corn Oil - Food Allergy - Food Allergy Shortness Of Breath   • Gluten Meal - Food Allergy Shortness Of Breath   • Isoflavones (Soy) Shortness Of Breath   • Oseltamivir Shortness Of Breath   • Yeast - Food Allergy Shortness Of Breath   • Tilactase    • Medical Tape Rash     If on for too long; please use paper tape   • Sulfa Antibiotics Hives       Objective     Intake/Output Summary (Last 24 hours) at 8/5/2023 1057  Last data filed at 8/5/2023 0736  Gross per 24 hour   Intake 0 ml   Output --   Net 0 ml       Invasive Devices:        Physical Exam      No intake/output data recorded.     Vitals:    08/05/23 0736   BP: 101/72   Pulse: 82   Resp:    Temp: 97.8 °F (36.6 °C)   SpO2: 94%       General Appearance:    No acute distress. Cooperative. Appears stated age. Head:    Normocephalic. Atraumatic. Normal jaw occlusion. Eyes:    Lids, conjunctiva normal. No scleral icterus. Ears:    Normal external ears. Nose:   Nares normal. No drainage. Mouth:   Lips, tongue normal. Mucosa normal. Phonation normal.   Neck:   Supple. Symmetrical.   Back:     Symmetric. No CVA tenderness. Lungs:     Normal respiratory effort. Clear to auscultation bilaterally. Chest wall:    No tenderness or deformity. Heart:    Regular rate and rhythm. Normal S1 and S2. No murmur. No JVD. No edema. Abdomen:     Soft. Non-tender. Bowel sounds active. Genitourinary:   No Barker catheter present. Extremities:   Extremities with contractures MCP and feet with deformities   Skin:   Warm and dry. Has pallor,no  jaundice, rash, ecchymoses. Neurologic:   Alert and oriented has a whispery weak voice. Current Weight: Weight - Scale: 49.9 kg (110 lb)  First Weight: Weight - Scale: 49.9 kg (110 lb)    Lab Results:  I have personally reviewed pertinent labs.     CBC:   Lab Results   Component Value Date    WBC 5.77 08/05/2023    HGB 9.4 (L) 08/05/2023    HCT 30.5 (L) 08/05/2023    MCV 79 (L) 08/05/2023     (H) 08/05/2023    RBC 3.86 08/05/2023    MCH 24.4 (L) 08/05/2023    MCHC 30.8 (L) 08/05/2023    RDW 15.7 (H) 08/05/2023    MPV 7.9 (L) 08/05/2023     CMP:   Lab Results   Component Value Date    K 4.5 08/05/2023    CL 96 08/05/2023    CO2 27 08/05/2023    BUN 13 08/05/2023    CREATININE 0.36 (L) 08/05/2023    CALCIUM 8.7 08/05/2023    EGFR 130 08/05/2023     Phosphorus: No results found for: "PHOS"  Magnesium: No results found for: "MG"  Urinalysis: No results found for: "COLORU", "CLARITYU", "Whyte Tono", "PHUR", "LEUKOCYTESUR", "NITRITE", "PROTEINUA", "GLUCOSEU", "Aline Whitehall", "Haresh Moser", "BLOODU"  Ionized Calcium: No results found for: "CAION"  Coagulation: No results found for: "PT", "INR", "APTT"  Troponin: No results found for: "TROPONINI"  ABG: No results found for: "PHART", "CCM8TMR", "PO2ART", "NUU4XVE", "X3QFSIPT", "BEART", "SOURCE"    Results from last 7 days   Lab Units 08/05/23  0617 08/05/23  0030 08/04/23  1820   POTASSIUM mmol/L 4.5 4.4 4.6   CHLORIDE mmol/L 96 94* 91*   CO2 mmol/L 27 27 25   BUN mg/dL 13 11 11   CREATININE mg/dL 0.36* 0.30* 0.30*   CALCIUM mg/dL 8.7 8.3* 8.4       Radiology review:  Procedure: MRI cervical spine wo contrast    Result Date: 8/3/2023  Narrative: MRI CERVICAL SPINE WITHOUT CONTRAST INDICATION: Chronic neck pain. Paresthesia.) COMPARISON: CT from earlier today. TECHNIQUE:  Multiplanar, multisequence imaging of the cervical spine was performed. . IMAGE QUALITY:  Diagnostic FINDINGS: ALIGNMENT: There is straightening of the normal cervical lordosis. Minimal anterolisthesis at C5-C6, C6-7 and C7-T1. C7-T1 is unchanged. MARROW SIGNAL: There is bilateral facet ankylosis from C2-C6. Subchondral edema and sclerosis in the C6-7 facets related to degenerative facet arthropathy. No suspicious marrow signal normality. CERVICAL AND VISUALIZED THORACIC CORD:  Normal signal within the visualized cord. PREVERTEBRAL AND PARASPINAL SOFT TISSUES:  Normal. VISUALIZED POSTERIOR FOSSA:  The visualized posterior fossa demonstrates no abnormal signal. CERVICAL DISC SPACES: C2-C3: No disc herniation, canal or foraminal stenosis. C3-C4: No disc herniation, canal or foraminal stenosis. C4-C5: No disc herniation, canal or foraminal stenosis. C5-C6: Minimal anterolisthesis. No disc herniation, canal or foraminal stenosis. C6-C7: Small disc bulge and facet arthropathy, left worse than right. No significant canal or foraminal stenosis. C7-T1: Minimal anterolisthesis. No disc herniation, canal or foraminal stenosis. UPPER THORACIC DISC SPACES:  Normal. OTHER FINDINGS:  None. Impression: No disc herniation, canal or foraminal stenosis. 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  Workstation performed: [id number]     Procedure: CT spine cervical without contrast    Result Date: 8/3/2023  Narrative: CT CERVICAL SPINE - WITHOUT CONTRAST INDICATION:   Neck pain, chronic neck pain, parasthesia. COMPARISON:  None. TECHNIQUE:  CT examination of the cervical spine was performed without intravenous contrast.  Contiguous axial images were obtained. Multiplanar 2D reformatted images were created from the source data. Radiation dose length product (DLP) for this visit:  201.66 mGy-cm . This examination, like all CT scans performed in the [facility], was performed utilizing techniques to minimize radiation dose exposure, including the use of iterative  reconstruction and automated exposure control. IMAGE QUALITY:  Diagnostic. FINDINGS: ALIGNMENT:  Normal alignment of the cervical spine. No subluxation. VERTEBRAE:  No fracture. DEGENERATIVE CHANGES: Mild multilevel cervical degenerative changes are noted without critical central canal stenosis. PREVERTEBRAL AND PARASPINAL SOFT TISSUES: Acute on chronic left sphenoid sinusitis with an air-fluid level and periosteal thickening. THORACIC INLET:  Normal.     Impression: Acute on chronic left sphenoid sinusitis. Workstation performed: [id number]     Procedure: CT head without contrast    Result Date: 8/3/2023  Narrative: CT BRAIN - WITHOUT CONTRAST INDICATION:   hand numbness. COMPARISON:  None. TECHNIQUE:  CT examination of the brain was performed. Multiplanar 2D reformatted images were created from the source data. Radiation dose length product (DLP) for this visit:  819.14 mGy-cm . This examination, like all CT scans performed in the [facility], was performed utilizing techniques to minimize radiation dose exposure, including the use of iterative  reconstruction and automated exposure control. IMAGE QUALITY:  Diagnostic. FINDINGS: PARENCHYMA:  No intracranial mass, mass effect or midline shift. No CT signs of acute infarction.   No acute parenchymal hemorrhage. VENTRICLES AND EXTRA-AXIAL SPACES:  Normal for the patient's age. VISUALIZED ORBITS: Normal visualized orbits. PARANASAL SINUSES: Mild mucosal thickening of the visualized paranasal sinuses. CALVARIUM AND EXTRACRANIAL SOFT TISSUES:  Normal.     Impression: No acute intracranial abnormality. Workstation performed: [id number]         EKG, Pathology, and Other Studies: I reviewed current notes and prior notes and labs      Counseling / Coordination of Care  Total ADDITIONAL floor / unit time spent today 35 minutes. Greater than 50% of total time was spent with the patient and / or family counseling and / or coordination of care. A description of the counseling / coordination of care: will discuss with primary team    Jennifer Miller MD      This consultation note was produced in part using a dictation device which may document imprecise wording from author's original intent.

## 2023-08-05 NOTE — ASSESSMENT & PLAN NOTE
· History of acute on chronic hyponatremia  · Has required salt tabs in the past following surgical intervention  · In the setting of a patient who takes meloxicam for pain and chronic pain/nausea  · Na has improved to 129 this morning  · Continue symptomatic control as best as possible  · Nephrology following

## 2023-08-06 LAB
ANION GAP SERPL CALCULATED.3IONS-SCNC: 6 MMOL/L
BUN SERPL-MCNC: 22 MG/DL (ref 5–25)
CALCIUM SERPL-MCNC: 8.8 MG/DL (ref 8.4–10.2)
CHLORIDE SERPL-SCNC: 99 MMOL/L (ref 96–108)
CO2 SERPL-SCNC: 28 MMOL/L (ref 21–32)
CREAT SERPL-MCNC: 0.46 MG/DL (ref 0.6–1.3)
CRP SERPL HS-MCNC: >90 MG/L
ERYTHROCYTE [DISTWIDTH] IN BLOOD BY AUTOMATED COUNT: 16.1 % (ref 11.6–15.1)
GFR SERPL CREATININE-BSD FRML MDRD: 120 ML/MIN/1.73SQ M
GLUCOSE SERPL-MCNC: 136 MG/DL (ref 65–140)
HCT VFR BLD AUTO: 27.7 % (ref 34.8–46.1)
HGB BLD-MCNC: 8.4 G/DL (ref 11.5–15.4)
MCH RBC QN AUTO: 24.2 PG (ref 26.8–34.3)
MCHC RBC AUTO-ENTMCNC: 30.3 G/DL (ref 31.4–37.4)
MCV RBC AUTO: 80 FL (ref 82–98)
OSMOLALITY UR/SERPL-RTO: 286 MMOL/KG (ref 282–298)
PLATELET # BLD AUTO: 568 THOUSANDS/UL (ref 149–390)
PMV BLD AUTO: 8.1 FL (ref 8.9–12.7)
POTASSIUM SERPL-SCNC: 4.6 MMOL/L (ref 3.5–5.3)
RBC # BLD AUTO: 3.47 MILLION/UL (ref 3.81–5.12)
SODIUM SERPL-SCNC: 133 MMOL/L (ref 135–147)
WBC # BLD AUTO: 9.11 THOUSAND/UL (ref 4.31–10.16)

## 2023-08-06 PROCEDURE — 85027 COMPLETE CBC AUTOMATED: CPT | Performed by: INTERNAL MEDICINE

## 2023-08-06 PROCEDURE — 80048 BASIC METABOLIC PNL TOTAL CA: CPT | Performed by: INTERNAL MEDICINE

## 2023-08-06 PROCEDURE — 99232 SBSQ HOSP IP/OBS MODERATE 35: CPT | Performed by: INTERNAL MEDICINE

## 2023-08-06 RX ORDER — GABAPENTIN 400 MG/1
400 CAPSULE ORAL 3 TIMES DAILY
Status: DISCONTINUED | OUTPATIENT
Start: 2023-08-06 | End: 2023-08-11

## 2023-08-06 RX ORDER — DIPHENHYDRAMINE HYDROCHLORIDE AND LIDOCAINE HYDROCHLORIDE AND ALUMINUM HYDROXIDE AND MAGNESIUM HYDRO
10 KIT EVERY 4 HOURS PRN
Status: DISCONTINUED | OUTPATIENT
Start: 2023-08-06 | End: 2023-08-14 | Stop reason: HOSPADM

## 2023-08-06 RX ADMIN — GABAPENTIN 400 MG: 400 CAPSULE ORAL at 17:11

## 2023-08-06 RX ADMIN — METHYLPREDNISOLONE SODIUM SUCCINATE 40 MG: 40 INJECTION, POWDER, FOR SOLUTION INTRAMUSCULAR; INTRAVENOUS at 17:11

## 2023-08-06 RX ADMIN — METHYLPREDNISOLONE SODIUM SUCCINATE 40 MG: 40 INJECTION, POWDER, FOR SOLUTION INTRAMUSCULAR; INTRAVENOUS at 05:12

## 2023-08-06 RX ADMIN — HEPARIN SODIUM 5000 UNITS: 5000 INJECTION INTRAVENOUS; SUBCUTANEOUS at 05:12

## 2023-08-06 RX ADMIN — METHYLPREDNISOLONE SODIUM SUCCINATE 40 MG: 40 INJECTION, POWDER, FOR SOLUTION INTRAMUSCULAR; INTRAVENOUS at 00:01

## 2023-08-06 RX ADMIN — HEPARIN SODIUM 5000 UNITS: 5000 INJECTION INTRAVENOUS; SUBCUTANEOUS at 21:46

## 2023-08-06 RX ADMIN — GABAPENTIN 300 MG: 300 CAPSULE ORAL at 08:58

## 2023-08-06 RX ADMIN — HEPARIN SODIUM 5000 UNITS: 5000 INJECTION INTRAVENOUS; SUBCUTANEOUS at 14:16

## 2023-08-06 RX ADMIN — DIPHENHYDRAMINE HYDROCHLORIDE AND LIDOCAINE HYDROCHLORIDE AND ALUMINUM HYDROXIDE AND MAGNESIUM HYDRO 10 ML: KIT at 12:09

## 2023-08-06 RX ADMIN — DIPHENHYDRAMINE HYDROCHLORIDE AND LIDOCAINE HYDROCHLORIDE AND ALUMINUM HYDROXIDE AND MAGNESIUM HYDRO 10 ML: KIT at 17:11

## 2023-08-06 RX ADMIN — GABAPENTIN 400 MG: 400 CAPSULE ORAL at 21:39

## 2023-08-06 RX ADMIN — HYDROCODONE BITARTRATE AND ACETAMINOPHEN 1 TABLET: 5; 325 TABLET ORAL at 05:11

## 2023-08-06 RX ADMIN — Medication 400 MG: at 21:39

## 2023-08-06 RX ADMIN — METHYLPREDNISOLONE SODIUM SUCCINATE 40 MG: 40 INJECTION, POWDER, FOR SOLUTION INTRAMUSCULAR; INTRAVENOUS at 12:10

## 2023-08-06 RX ADMIN — MELOXICAM 7.5 MG: 7.5 TABLET ORAL at 08:58

## 2023-08-06 NOTE — PLAN OF CARE
Problem: MUSCULOSKELETAL - ADULT  Goal: Maintain or return mobility to safest level of function  Description: INTERVENTIONS:  - Assess patient's ability to carry out ADLs; assess patient's baseline for ADL function and identify physical deficits which impact ability to perform ADLs (bathing, care of mouth/teeth, toileting, grooming, dressing, etc.)  - Assess/evaluate cause of self-care deficits   - Assess range of motion  - Assess patient's mobility  - Assess patient's need for assistive devices and provide as appropriate  - Encourage maximum independence but intervene and supervise when necessary  - Involve family in performance of ADLs  - Assess for home care needs following discharge   - Consider OT consult to assist with ADL evaluation and planning for discharge  - Provide patient education as appropriate  Outcome: Progressing

## 2023-08-06 NOTE — PLAN OF CARE
Problem: Potential for Falls  Goal: Patient will remain free of falls  Description: INTERVENTIONS:  - Educate patient/family on patient safety including physical limitations  - Instruct patient to call for assistance with activity   - Consult OT/PT to assist with strengthening/mobility   - Keep Call bell within reach  - Keep bed low and locked with side rails adjusted as appropriate  - Keep care items and personal belongings within reach  - Initiate and maintain comfort rounds  - Make Fall Risk Sign visible to staff  - Offer Toileting every 2 Hours, in advance of need  - Initiate/Maintain bed alarm  - Obtain necessary fall risk management equipment: bed alarm  - Apply yellow socks and bracelet for high fall risk patients  - Consider moving patient to room near nurses station  Outcome: Progressing     Problem: MOBILITY - ADULT  Goal: Maintain or return to baseline ADL function  Description: INTERVENTIONS:  -  Assess patient's ability to carry out ADLs; assess patient's baseline for ADL function and identify physical deficits which impact ability to perform ADLs (bathing, care of mouth/teeth, toileting, grooming, dressing, etc.)  - Assess/evaluate cause of self-care deficits   - Assess range of motion  - Assess patient's mobility; develop plan if impaired  - Assess patient's need for assistive devices and provide as appropriate  - Encourage maximum independence but intervene and supervise when necessary  - Involve family in performance of ADLs  - Assess for home care needs following discharge   - Consider OT consult to assist with ADL evaluation and planning for discharge  - Provide patient education as appropriate  Outcome: Progressing  Goal: Maintains/Returns to pre admission functional level  Description: INTERVENTIONS:  - Perform BMAT or MOVE assessment daily.   - Set and communicate daily mobility goal to care team and patient/family/caregiver.    - Collaborate with rehabilitation services on mobility goals if consulted  - Perform Range of Motion 3 times a day. - Reposition patient every 2 hours. - Dangle patient 3 times a day  - Stand patient 3 times a day  - Ambulate patient 3 times a day  - Out of bed to chair 3 times a day   - Out of bed for meals 3 times a day  - Out of bed for toileting  - Record patient progress and toleration of activity level   Outcome: Progressing     Problem: Prexisting or High Potential for Compromised Skin Integrity  Goal: Skin integrity is maintained or improved  Description: INTERVENTIONS:  - Identify patients at risk for skin breakdown  - Assess and monitor skin integrity  - Assess and monitor nutrition and hydration status  - Monitor labs   - Assess for incontinence   - Turn and reposition patient  - Assist with mobility/ambulation  - Relieve pressure over bony prominences  - Avoid friction and shearing  - Provide appropriate hygiene as needed including keeping skin clean and dry  - Evaluate need for skin moisturizer/barrier cream  - Collaborate with interdisciplinary team   - Patient/family teaching  - Consider wound care consult   Outcome: Progressing     Problem: Nutrition/Hydration-ADULT  Goal: Nutrient/Hydration intake appropriate for improving, restoring or maintaining nutritional needs  Description: Monitor and assess patient's nutrition/hydration status for malnutrition. Collaborate with interdisciplinary team and initiate plan and interventions as ordered. Monitor patient's weight and dietary intake as ordered or per policy. Utilize nutrition screening tool and intervene as necessary. Determine patient's food preferences and provide high-protein, high-caloric foods as appropriate.      INTERVENTIONS:  - Monitor oral intake, urinary output, labs, and treatment plans  - Assess nutrition and hydration status and recommend course of action  - Evaluate amount of meals eaten  - Assist patient with eating if necessary   - Allow adequate time for meals  - Recommend/ encourage appropriate diets, oral nutritional supplements, and vitamin/mineral supplements  - Order, calculate, and assess calorie counts as needed  - Recommend, monitor, and adjust tube feedings and TPN/PPN based on assessed needs  - Assess need for intravenous fluids  - Provide specific nutrition/hydration education as appropriate  - Include patient/family/caregiver in decisions related to nutrition  Outcome: Progressing     Problem: INFECTION - ADULT  Goal: Absence or prevention of progression during hospitalization  Description: INTERVENTIONS:  - Assess and monitor for signs and symptoms of infection  - Monitor lab/diagnostic results  - Monitor all insertion sites, i.e. indwelling lines, tubes, and drains  - Monitor endotracheal if appropriate and nasal secretions for changes in amount and color  - [place] appropriate cooling/warming therapies per order  - Administer medications as ordered  - Instruct and encourage patient and family to use good hand hygiene technique  - Identify and instruct in appropriate isolation precautions for identified infection/condition  Outcome: Progressing  Goal: Absence of fever/infection during neutropenic period  Description: INTERVENTIONS:  - Monitor WBC    Outcome: Progressing     Problem: SAFETY ADULT  Goal: Patient will remain free of falls  Description: INTERVENTIONS:  - Educate patient/family on patient safety including physical limitations  - Instruct patient to call for assistance with activity   - Consult OT/PT to assist with strengthening/mobility   - Keep Call bell within reach  - Keep bed low and locked with side rails adjusted as appropriate  - Keep care items and personal belongings within reach  - Initiate and maintain comfort rounds  - Make Fall Risk Sign visible to staff  - Offer Toileting every 2 Hours, in advance of need  - Initiate/Maintain bed alarm  - Obtain necessary fall risk management equipment:  bed alarm  - Apply yellow socks and bracelet for high fall risk patients  - Consider moving patient to room near nurses station  Outcome: Progressing     Problem: MUSCULOSKELETAL - ADULT  Goal: Maintain or return mobility to safest level of function  Description: INTERVENTIONS:  - Assess patient's ability to carry out ADLs; assess patient's baseline for ADL function and identify physical deficits which impact ability to perform ADLs (bathing, care of mouth/teeth, toileting, grooming, dressing, etc.)  - Assess/evaluate cause of self-care deficits   - Assess range of motion  - Assess patient's mobility  - Assess patient's need for assistive devices and provide as appropriate  - Encourage maximum independence but intervene and supervise when necessary  - Involve family in performance of ADLs  - Assess for home care needs following discharge   - Consider OT consult to assist with ADL evaluation and planning for discharge  - Provide patient education as appropriate  Outcome: Progressing  Goal: Maintain proper alignment of affected body part  Description: INTERVENTIONS:  - Support, maintain and protect limb and body alignment  - Provide patient/ family with appropriate education  Outcome: Progressing

## 2023-08-06 NOTE — PROGRESS NOTES
Progress Note - Nephrology   Marymayito Hector 41 y.o. female MRN: [id number]  Unit/Bed#: -01 Encounter: [id number]    A/P:  1. Acute on chronic hyponatremia   - Has improved to 133 mmol/L this morning   - She is back at her baseline.    -No indication for salt tablets today. - Apparently needs meloxicam for pain which can contribute to hyponatremia   - She eats and drinks little so would not institute a fluid restriction   - Check cortisol and aldosterone levels    2. Asthma   - Appears comfortable today with clear lung fields    3. Flare of rheumatoid arthritis   - Receiving IV steroids   - Had taken Remicade in the past and had anaphylaxis and was afraid to try any other biologic   - She is open to following up with rheumatology for treatment options for pain and decrease in joint destruction        Follow up reason for today's visit: Hyponatremia    Neuropathy    Patient Active Problem List   Diagnosis   • Abnormal electrocardiogram   • Abnormal findings on diagnostic imaging of heart and coronary circulation   • Acquired bilateral club feet   • Acrocyanosis (HCC)   • Ankle arthritis   • Asthma   • Blue color skin   • Cardiac murmur   • Cardiomyopathy ([address])   • DJD (degenerative joint disease)   • Hyponatremia   • Nonrheumatic mitral valve insufficiency   • Osteoarthritis of left hip   • Psoriasis   • Rheumatoid arthritis ([address])   • Status post left hip replacement   • Bilateral upper and lower extremity neuropathy   • Microcytic anemia         Subjective:   Still has numbness of her upper and lower extremities but remainder of the review of systems is unremarkable    Objective:     Vitals: Blood pressure 109/77, pulse 78, temperature (!) 96.1 °F (35.6 °C), temperature source Oral, resp. rate 18, height 5' 7" (1.702 m), weight 49.9 kg (110 lb), SpO2 94 %. ,Body mass index is 17.23 kg/m².     Weight (last 2 days)     None            Intake/Output Summary (Last 24 hours) at 8/6/2023 1122  Last data filed at 8/6/2023 0840  Gross per 24 hour   Intake 180 ml   Output --   Net 180 ml     No intake/output data recorded. Physical Exam: /77 (BP Location: Right arm)   Pulse 78   Temp (!) 96.1 °F (35.6 °C) (Oral)   Resp 18   Ht 5' 7" (1.702 m)   Wt 49.9 kg (110 lb)   SpO2 94%   BMI 17.23 kg/m²     General Appearance:    Alert, cooperative, no distress, appears stated age   Head:    Normocephalic, without obvious abnormality, atraumatic   Eyes:    Conjunctiva/corneas clear   Ears:    Normal external ears   Nose:   Nares normal, septum midline, mucosa normal, no drainage    or sinus tenderness   Throat:   Lips, mucosa, and tongue normal; teeth and gums normal   Neck:   Supple, symmetrical, trachea midline, no adenopathy;        thyroid:  No enlargement/tenderness/nodules; no carotid    bruit or JVD   Back:     Symmetric, no curvature, ROM normal, no CVA tenderness   Lungs:     Clear to auscultation bilaterally, respirations unlabored   Chest wall:    No tenderness or deformity   Heart:    Regular rate and rhythm, S1 and S2 normal, no murmur, rub   or gallop   Abdomen:     Soft, non-tender, bowel sounds active   Extremities:   Extremities swelling of MCPs and MTPs with contractures   Skin:   Skin color pale   Lymph nodes:   Cervical normal   Neurologic:   CNII-XII intact            Lab, Imaging and other studies: I have personally reviewed pertinent labs. CBC:   Lab Results   Component Value Date    WBC 9.11 08/06/2023    HGB 8.4 (L) 08/06/2023    HCT 27.7 (L) 08/06/2023    MCV 80 (L) 08/06/2023     (H) 08/06/2023    RBC 3.47 (L) 08/06/2023    MCH 24.2 (L) 08/06/2023    MCHC 30.3 (L) 08/06/2023    RDW 16.1 (H) 08/06/2023    MPV 8.1 (L) 08/06/2023     CMP:   Lab Results   Component Value Date    K 4.6 08/06/2023    CL 99 08/06/2023    CO2 28 08/06/2023    BUN 22 08/06/2023    CREATININE 0.46 (L) 08/06/2023    CALCIUM 8.8 08/06/2023    EGFR 120 08/06/2023       .   Results from last 7 days   Lab Units 08/06/23  0532 08/05/23  0617 08/05/23  0030   POTASSIUM mmol/L 4.6 4.5 4.4   CHLORIDE mmol/L 99 96 94*   CO2 mmol/L 28 27 27   BUN mg/dL 22 13 11   CREATININE mg/dL 0.46* 0.36* 0.30*   CALCIUM mg/dL 8.8 8.7 8.3*         Phosphorus: No results found for: "PHOS"  Magnesium: No results found for: "MG"  Urinalysis: No results found for: "COLORU", "CLARITYU", "SPECGRAV", "PHUR", "LEUKOCYTESUR", "NITRITE", "PROTEINUA", "GLUCOSEU", "Gonzalo Hoar", "BILIRUBINUR", "BLOODU"  Ionized Calcium: No results found for: "CAION"  Coagulation: No results found for: "PT", "INR", "APTT"  Troponin: No results found for: "TROPONINI"  ABG: No results found for: "PHART", "KSS9YST", "PO2ART", "BJD8EEH", "F2GBNSPE", "BEART", "SOURCE"  Radiology review:     IMAGING  Procedure: VAS lower limb arterial duplex, complete bilateral    Result Date: 8/5/2023  Narrative:  [facility] REPORT CLINICAL: Indications:  Patient presents with cold, painful, numb lower extremities x several days, worsening. Operative History: No prior heart or vascular surgery Risk Factors: Patient has history of rheumatoid arthritis  FINDINGS:  Segment                Right                  Left                                          Waveform   PSV (cm/s)  Waveform   PSV (cm/s)  Post. Tibial           Triphasic              Triphasic              Ant.  Tibial            Triphasic              Triphasic              Common Femoral Artery                     51                     48  Prox Profunda                             26                     26  Prox SFA                                  38                     34  Mid SFA                                   47                     55  Dist SFA                                  26                     37  Proximal Pop                              27                     26  Distal Pop                                39                     37  Tibioperoneal                             39                     50  Dist Post Tibial                          40                     59  Prox. Ant. Tibial                         27                         Dist. Ant. Tibial                         44                     38     CONCLUSION:  Impression: RIGHT LOWER LIMB: Diffuse disease noted throughout the femoral-popliteal arteries without significant focal stenosis. Evidence suggestive of tibioperoneal disease. Ankle/Brachial index:   1.16, normal. (Prior 1.1) PVR/ PPG tracings are normal. Metatarsal pressure of 107 mmHg Great toe pressure of 82 mmHg, within the healing range  LEFT LOWER LIMB: Diffuse disease noted throughout the femoral-popliteal arteries without significant focal stenosis. Evidence suggestive of tibioperoneal disease Ankle/Brachial index:   1.20, normal. (Prior 1.0) PVR/ PPG tracings are normal. Metatarsal pressure of 117 mmHg Great toe pressure of 86 mmHg, within the healing range  Compared to previous GRAZYNA study on 4/13/2014, there is no significant change. No prior duplex for comparison. SIGNATURE: Electronically Signed by: Ponce Benavidez MD on 2023-08-05 07:32:56 PM    Procedure: MRI cervical spine wo contrast    Result Date: 8/3/2023  Narrative: MRI CERVICAL SPINE WITHOUT CONTRAST INDICATION: Chronic neck pain. Paresthesia.) COMPARISON: CT from earlier today. TECHNIQUE:  Multiplanar, multisequence imaging of the cervical spine was performed. . IMAGE QUALITY:  Diagnostic FINDINGS: ALIGNMENT: There is straightening of the normal cervical lordosis. Minimal anterolisthesis at C5-C6, C6-7 and C7-T1. C7-T1 is unchanged. MARROW SIGNAL: There is bilateral facet ankylosis from C2-C6. Subchondral edema and sclerosis in the C6-7 facets related to degenerative facet arthropathy. No suspicious marrow signal normality. CERVICAL AND VISUALIZED THORACIC CORD:  Normal signal within the visualized cord.  PREVERTEBRAL AND PARASPINAL SOFT TISSUES:  Normal. VISUALIZED POSTERIOR FOSSA:  The visualized posterior fossa demonstrates no abnormal signal. CERVICAL DISC SPACES: C2-C3: No disc herniation, canal or foraminal stenosis. C3-C4: No disc herniation, canal or foraminal stenosis. C4-C5: No disc herniation, canal or foraminal stenosis. C5-C6: Minimal anterolisthesis. No disc herniation, canal or foraminal stenosis. C6-C7: Small disc bulge and facet arthropathy, left worse than right. No significant canal or foraminal stenosis. C7-T1: Minimal anterolisthesis. No disc herniation, canal or foraminal stenosis. UPPER THORACIC DISC SPACES:  Normal. OTHER FINDINGS:  None. Impression: No disc herniation, canal or foraminal stenosis. 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 Workstation performed: [id number]     Procedure: CT spine cervical without contrast    Result Date: 8/3/2023  Narrative: CT CERVICAL SPINE - WITHOUT CONTRAST INDICATION:   Neck pain, chronic neck pain, parasthesia. COMPARISON:  None. TECHNIQUE:  CT examination of the cervical spine was performed without intravenous contrast.  Contiguous axial images were obtained. Multiplanar 2D reformatted images were created from the source data. Radiation dose length product (DLP) for this visit:  201.66 mGy-cm . This examination, like all CT scans performed in the [facility], was performed utilizing techniques to minimize radiation dose exposure, including the use of iterative  reconstruction and automated exposure control. IMAGE QUALITY:  Diagnostic. FINDINGS: ALIGNMENT:  Normal alignment of the cervical spine. No subluxation. VERTEBRAE:  No fracture. DEGENERATIVE CHANGES: Mild multilevel cervical degenerative changes are noted without critical central canal stenosis. PREVERTEBRAL AND PARASPINAL SOFT TISSUES: Acute on chronic left sphenoid sinusitis with an air-fluid level and periosteal thickening. THORACIC INLET:  Normal.     Impression: Acute on chronic left sphenoid sinusitis.  Workstation performed: [id number]     Procedure: CT head without contrast    Result Date: 8/3/2023  Narrative: CT BRAIN - WITHOUT CONTRAST INDICATION:   hand numbness. COMPARISON:  None. TECHNIQUE:  CT examination of the brain was performed. Multiplanar 2D reformatted images were created from the source data. Radiation dose length product (DLP) for this visit:  819.14 mGy-cm . This examination, like all CT scans performed in the [facility], was performed utilizing techniques to minimize radiation dose exposure, including the use of iterative  reconstruction and automated exposure control. IMAGE QUALITY:  Diagnostic. FINDINGS: PARENCHYMA:  No intracranial mass, mass effect or midline shift. No CT signs of acute infarction. No acute parenchymal hemorrhage. VENTRICLES AND EXTRA-AXIAL SPACES:  Normal for the patient's age. VISUALIZED ORBITS: Normal visualized orbits. PARANASAL SINUSES: Mild mucosal thickening of the visualized paranasal sinuses. CALVARIUM AND EXTRACRANIAL SOFT TISSUES:  Normal.     Impression: No acute intracranial abnormality.  Workstation performed: [id number]       Current Facility-Administered Medications   Medication Dose Route Frequency   • albuterol (PROVENTIL HFA,VENTOLIN HFA) inhaler 2 puff  2 puff Inhalation Q4H PRN   • diphenhydramine, lidocaine, Al/Mg hydroxide, simethicone (Magic Mouthwash) oral solution 10 mL  10 mL Swish & Spit Q4H PRN   • gabapentin (NEURONTIN) capsule 400 mg  400 mg Oral TID   • heparin (porcine) subcutaneous injection 5,000 Units  5,000 Units Subcutaneous Q8H [address]   • HYDROcodone-acetaminophen (NORCO) 5-325 mg per tablet 1 tablet  1 tablet Oral Q6H PRN   • magnesium Oxide (MAG-OX) tablet 400 mg  400 mg Oral HS   • meloxicam (MOBIC) tablet 7.5 mg  7.5 mg Oral Daily   • methylPREDNISolone sodium succinate (Solu-MEDROL) injection 40 mg  40 mg Intravenous Q6H [address]   • morphine injection 2 mg  2 mg Intravenous Q4H PRN   • ondansetron (ZOFRAN) injection 4 mg  4 mg Intravenous Q6H PRN     Medications Discontinued During This Encounter Medication Reason   • Magnesium Oxide CAPS 400 mg    • methylprednisolone (MEDROL) tablet 20 mg    • methylPREDNISolone (MEDROL) tablet 16 mg    • methylprednisolone (MEDROL) tablet 12 mg    • methylprednisolone (MEDROL) tablet 8 mg    • methylprednisolone (MEDROL) tablet 4 mg    • Lidocaine Viscous HCl (XYLOCAINE) 2 % mucosal solution 15 mL    • gabapentin (NEURONTIN) capsule 300 mg        Amy Lala MD      This progress note was produced in part using a dictation device which may document imprecise wording from author's original intent.

## 2023-08-06 NOTE — ASSESSMENT & PLAN NOTE
· Presented with bilateral upper and lower extremity pain, weakness, and numbness. · In particular discomfort is located from elbows to hands and knees to feet  · Recently seen in ED and given Lyrica, however, had an adverse reaction to this medication  · MRI C-spine (8/3): No disc herniation, canal or foraminal stenosis. 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 · Increase gabapentin to 400 mg 3 times daily; increase as needed  · We will continue Solu-Medrol 40 mg every 6 hours given possibility of RA flare as well  · Neurology evaluation appreciated  · Will need follow up in in 6 weeks with neuromuscular attending. She will require a EMG/NCS within 4-6 weeks.

## 2023-08-06 NOTE — ASSESSMENT & PLAN NOTE
· History of acute on chronic hyponatremia  · Has required salt tabs in the past following surgical intervention  · In the setting of a patient who takes meloxicam for pain and chronic pain/nausea  · Na has improved to 133 this morning  · Continue symptomatic control as best as possible  · Nephrology following

## 2023-08-06 NOTE — PROGRESS NOTES
[address]  Progress Note  Name: Storm Shearer I  MRN: [id number]  Unit/Bed#: -01 I Date of Admission: 8/3/2023   Date of Service: 8/6/2023 I Hospital Day: 2    Assessment/Plan   * Bilateral upper and lower extremity neuropathy  Assessment & Plan  · Presented with bilateral upper and lower extremity pain, weakness, and numbness. · In particular discomfort is located from elbows to hands and knees to feet  · Recently seen in ED and given Lyrica, however, had an adverse reaction to this medication  · MRI C-spine (8/3): No disc herniation, canal or foraminal stenosis. 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 · Increase gabapentin to 400 mg 3 times daily; increase as needed  · We will continue Solu-Medrol 40 mg every 6 hours given possibility of RA flare as well  · Neurology evaluation appreciated  · Will need follow up in in 6 weeks with neuromuscular attending. She will require a EMG/NCS within 4-6 weeks.     Hyponatremia  Assessment & Plan  · History of acute on chronic hyponatremia  · Has required salt tabs in the past following surgical intervention  · In the setting of a patient who takes meloxicam for pain and chronic pain/nausea  · Na has improved to 133 this morning  · Continue symptomatic control as best as possible  · Nephrology following    Microcytic anemia  Assessment & Plan  · No active bleeding noted at this time  · Anemia panel showing combination of LANDEN and anemia of chronic disease  · We will continue to monitor with daily labs    Rheumatoid arthritis ([address])  Assessment & Plan  · Not on any maintenance therapy at this time as the patient has several allergies to DMARDs  · Continue IV Solu-Medrol as above for possible acute RA flare  · Would recommend further outpatient follow-up with rheumatology    Asthma  Assessment & Plan  · Not in exacerbation at this time  · We will continue prehospital Ventolin 90 mcg 2 puffs every 4 hours as needed VTE Pharmacologic Prophylaxis: VTE Score: 1 Heparin     Patient Centered Rounds: I performed bedside rounds with nursing staff today. Discussions with Specialists or Other Care Team Provider: Nursing    Education and Discussions with Family / Patient: Patient updated on plan of care. Total Time Spent on Date of Encounter in care of patient: 35 minutes This time was spent on one or more of the following: performing physical exam; counseling and coordination of care; obtaining or reviewing history; documenting in the medical record; reviewing/ordering tests, medications or procedures; communicating with other healthcare professionals and discussing with patient's family/caregivers. Current Length of Stay: 2 day(s)  Current Patient Status: Inpatient   Certification Statement: The patient will continue to require additional inpatient hospital stay due to acute RA flare with neuropathy  Discharge Plan: Anticipate discharge in 24-48 hrs to home. Code Status: Level 1 - Full Code    Subjective:   Patient is resting in bed without any acute complaints. She continues to report improvement in her pain and numbness. She was able to tolerate some oral intake but continues to have some pain due to mucositis. No significant over night events reported by nursing. Objective:     Vitals:   Temp (24hrs), Av.2 °F (36.2 °C), Min:96.1 °F (35.6 °C), Max:98 °F (36.7 °C)    Temp:  [96.1 °F (35.6 °C)-98 °F (36.7 °C)] 96.1 °F (35.6 °C)  HR:  [78-79] 78  Resp:  [16-18] 18  BP: (106-109)/(70-77) 109/77  SpO2:  [94 %-95 %] 94 %  Body mass index is 17.23 kg/m². Input and Output Summary (last 24 hours): Intake/Output Summary (Last 24 hours) at 2023 1054  Last data filed at 2023 0840  Gross per 24 hour   Intake 180 ml   Output --   Net 180 ml       Physical Exam:   Physical Exam  Vitals and nursing note reviewed. Constitutional:       General: She is not in acute distress.      Appearance: She is well-developed. Comments: Chronically ill appearing   HENT:      Head: Normocephalic and atraumatic. Mouth/Throat:      Comments: Mucositis  Eyes:      Extraocular Movements: Extraocular movements intact. Conjunctiva/sclera: Conjunctivae normal.   Cardiovascular:      Rate and Rhythm: Normal rate and regular rhythm. Pulses: Normal pulses. Heart sounds: Normal heart sounds. No murmur heard. Pulmonary:      Effort: Pulmonary effort is normal. No respiratory distress. Breath sounds: Normal breath sounds. Abdominal:      General: Bowel sounds are normal. There is no distension. Palpations: Abdomen is soft. Tenderness: There is no abdominal tenderness. Musculoskeletal:      Cervical back: Neck supple. Comments: Significant joint deformities in the hands with ulnar deviation, boutonniere deformity, and swan deformity, range of motion is limited at the ankles due to prior fusion   Skin:     General: Skin is warm and dry. Capillary Refill: Capillary refill takes less than 2 seconds. Neurological:      General: No focal deficit present. Mental Status: She is alert and oriented to person, place, and time. Mental status is at baseline. Psychiatric:         Mood and Affect: Mood normal.         Behavior: Behavior normal.         Judgment: Judgment normal.          Labs:  Results from last 7 days   Lab Units 08/06/23  0532 08/04/23  0858 08/03/23  2342   WBC Thousand/uL 9.11   < > 6.25   HEMOGLOBIN g/dL 8.4*   < > 9.5*   HEMATOCRIT % 27.7*   < > 31.0*   PLATELETS Thousands/uL 568*   < > 414*   NEUTROS PCT %  --   --  84*   LYMPHS PCT %  --   --  13*   MONOS PCT %  --   --  3*   EOS PCT %  --   --  0    < > = values in this interval not displayed.      Results from last 7 days   Lab Units 08/06/23  0532 08/05/23  0617   SODIUM mmol/L 133* 129*   POTASSIUM mmol/L 4.6 4.5   CHLORIDE mmol/L 99 96   CO2 mmol/L 28 27   BUN mg/dL 22 13   CREATININE mg/dL 0.46* 0.36*   ANION GAP mmol/L 6 6   CALCIUM mg/dL 8.8 8.7   ALBUMIN g/dL  --  2.6*   GLUCOSE RANDOM mg/dL 136 143*                       Lines/Drains:  Invasive Devices     Peripheral Intravenous Line  Duration           Peripheral IV 23 Right;Ventral (anterior) Hand 2 days                      Imaging: No new imaging. Recent Cultures (last 7 days):         Last 24 Hours Medication List:   Current Facility-Administered Medications   Medication Dose Route Frequency Provider Last Rate   • albuterol  2 puff Inhalation Q4H PRN Nelson McCaskill, PA-C     • diphenhydramine, lidocaine, Al/Mg hydroxide, simethicone  10 mL Swish & Spit Q4H PRN [facility], DO     • gabapentin  400 mg Oral TID [facility], DO     • heparin (porcine)  5,000 Units Subcutaneous [facility] Nelson McCaskill, PA-C     • HYDROcodone-acetaminophen  1 tablet Oral Q6H PRN Nelson McCaskill, PA-C     • magnesium Oxide  400 mg Oral HS Nelson McCaskill, PA-C     • meloxicam  7.5 mg Oral Daily Nelson McCaskill, PA-C     • methylPREDNISolone sodium succinate  40 mg Intravenous Q6H [address],      • morphine injection  2 mg Intravenous Q4H PRN Nelson McCaskill, PA-C     • ondansetron  4 mg Intravenous Q6H PRN Nelson McCaskill, PA-C          Today, Patient Was Seen By: Flako Aquino DO    **Please Note: This note may have been constructed using a voice recognition system. **

## 2023-08-07 ENCOUNTER — APPOINTMENT (INPATIENT)
Dept: MRI IMAGING | Facility: HOSPITAL | Age: 48
DRG: 546 | End: 2023-08-07
Payer: MEDICARE

## 2023-08-07 LAB
ANION GAP SERPL CALCULATED.3IONS-SCNC: 7 MMOL/L
BUN SERPL-MCNC: 22 MG/DL (ref 5–25)
CALCIUM SERPL-MCNC: 8.7 MG/DL (ref 8.4–10.2)
CHLORIDE SERPL-SCNC: 100 MMOL/L (ref 96–108)
CO2 SERPL-SCNC: 27 MMOL/L (ref 21–32)
CREAT SERPL-MCNC: 0.32 MG/DL (ref 0.6–1.3)
ERYTHROCYTE [DISTWIDTH] IN BLOOD BY AUTOMATED COUNT: 16.3 % (ref 11.6–15.1)
GFR SERPL CREATININE-BSD FRML MDRD: 135 ML/MIN/1.73SQ M
GLUCOSE SERPL-MCNC: 126 MG/DL (ref 65–140)
HCT VFR BLD AUTO: 29.3 % (ref 34.8–46.1)
HGB BLD-MCNC: 8.7 G/DL (ref 11.5–15.4)
MCH RBC QN AUTO: 24.3 PG (ref 26.8–34.3)
MCHC RBC AUTO-ENTMCNC: 29.7 G/DL (ref 31.4–37.4)
MCV RBC AUTO: 82 FL (ref 82–98)
PLATELET # BLD AUTO: 522 THOUSANDS/UL (ref 149–390)
PMV BLD AUTO: 7.9 FL (ref 8.9–12.7)
POTASSIUM SERPL-SCNC: 4.7 MMOL/L (ref 3.5–5.3)
RBC # BLD AUTO: 3.58 MILLION/UL (ref 3.81–5.12)
SODIUM SERPL-SCNC: 134 MMOL/L (ref 135–147)
WBC # BLD AUTO: 7.71 THOUSAND/UL (ref 4.31–10.16)

## 2023-08-07 PROCEDURE — 99233 SBSQ HOSP IP/OBS HIGH 50: CPT

## 2023-08-07 PROCEDURE — 99232 SBSQ HOSP IP/OBS MODERATE 35: CPT | Performed by: INTERNAL MEDICINE

## 2023-08-07 PROCEDURE — 72157 MRI CHEST SPINE W/O & W/DYE: CPT

## 2023-08-07 PROCEDURE — 80048 BASIC METABOLIC PNL TOTAL CA: CPT | Performed by: INTERNAL MEDICINE

## 2023-08-07 PROCEDURE — 72158 MRI LUMBAR SPINE W/O & W/DYE: CPT

## 2023-08-07 PROCEDURE — 85027 COMPLETE CBC AUTOMATED: CPT | Performed by: INTERNAL MEDICINE

## 2023-08-07 PROCEDURE — A9585 GADOBUTROL INJECTION: HCPCS

## 2023-08-07 RX ORDER — GADOBUTROL 604.72 MG/ML
4 INJECTION INTRAVENOUS
Status: COMPLETED | OUTPATIENT
Start: 2023-08-07 | End: 2023-08-07

## 2023-08-07 RX ADMIN — METHYLPREDNISOLONE SODIUM SUCCINATE 40 MG: 40 INJECTION, POWDER, FOR SOLUTION INTRAMUSCULAR; INTRAVENOUS at 06:03

## 2023-08-07 RX ADMIN — MELOXICAM 7.5 MG: 7.5 TABLET ORAL at 08:55

## 2023-08-07 RX ADMIN — HEPARIN SODIUM 5000 UNITS: 5000 INJECTION INTRAVENOUS; SUBCUTANEOUS at 06:03

## 2023-08-07 RX ADMIN — HEPARIN SODIUM 5000 UNITS: 5000 INJECTION INTRAVENOUS; SUBCUTANEOUS at 21:33

## 2023-08-07 RX ADMIN — METHYLPREDNISOLONE SODIUM SUCCINATE 40 MG: 40 INJECTION, POWDER, FOR SOLUTION INTRAMUSCULAR; INTRAVENOUS at 12:30

## 2023-08-07 RX ADMIN — HYDROCODONE BITARTRATE AND ACETAMINOPHEN 1 TABLET: 5; 325 TABLET ORAL at 12:30

## 2023-08-07 RX ADMIN — Medication 400 MG: at 21:33

## 2023-08-07 RX ADMIN — HEPARIN SODIUM 5000 UNITS: 5000 INJECTION INTRAVENOUS; SUBCUTANEOUS at 14:46

## 2023-08-07 RX ADMIN — DIPHENHYDRAMINE HYDROCHLORIDE AND LIDOCAINE HYDROCHLORIDE AND ALUMINUM HYDROXIDE AND MAGNESIUM HYDRO 10 ML: KIT at 12:38

## 2023-08-07 RX ADMIN — GADOBUTROL 4 ML: 604.72 INJECTION INTRAVENOUS at 11:48

## 2023-08-07 RX ADMIN — GABAPENTIN 400 MG: 400 CAPSULE ORAL at 08:55

## 2023-08-07 RX ADMIN — GABAPENTIN 400 MG: 400 CAPSULE ORAL at 21:33

## 2023-08-07 RX ADMIN — GABAPENTIN 400 MG: 400 CAPSULE ORAL at 16:41

## 2023-08-07 RX ADMIN — METHYLPREDNISOLONE SODIUM SUCCINATE 40 MG: 40 INJECTION, POWDER, FOR SOLUTION INTRAMUSCULAR; INTRAVENOUS at 17:31

## 2023-08-07 RX ADMIN — DIPHENHYDRAMINE HYDROCHLORIDE AND LIDOCAINE HYDROCHLORIDE AND ALUMINUM HYDROXIDE AND MAGNESIUM HYDRO 10 ML: KIT at 07:54

## 2023-08-07 RX ADMIN — METHYLPREDNISOLONE SODIUM SUCCINATE 40 MG: 40 INJECTION, POWDER, FOR SOLUTION INTRAMUSCULAR; INTRAVENOUS at 00:23

## 2023-08-07 RX ADMIN — METHYLPREDNISOLONE SODIUM SUCCINATE 40 MG: 40 INJECTION, POWDER, FOR SOLUTION INTRAMUSCULAR; INTRAVENOUS at 23:50

## 2023-08-07 NOTE — PROGRESS NOTES
[address]  Progress Note  Name: Mason Batista I  MRN: [id number]  Unit/Bed#: -01 I Date of Admission: 8/3/2023   Date of Service: 8/7/2023 I Hospital Day: 3    Assessment/Plan   * Bilateral upper and lower extremity neuropathy  Assessment & Plan  · Resident on admission   · Presented with bilateral upper and lower extremity pain, weakness, and numbness  · Recently seen in ED and given Lyrica, however, had an adverse reaction to this medication  · MRI C-spine (8/3): No disc herniation, canal or foraminal stenosis. 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 · Today patient reports that the pain in her upper and lower extremities has improved since the steroid was added, however, feels that the numbness and weakness in her hands is worse to the point that she cannot hold utensils to feed herself. She also notes that there is no improvement in the numbness in her feet. States the foot numbness started about 2 weeks ago after an extended car ride that lasted 12 hours  · Discussed findings with neurology, will obtain MRI T-spine and L-spine with and without contrast and LP  · Consult IR for LP  · Continue gabapentin  · Continue Solu-Medrol  · Per neurology will need follow up in in 6 weeks with neuromuscular attending. She will require a EMG/NCS within 4-6 weeks. Rheumatoid arthritis ([address])  Assessment & Plan  · Not on any maintenance therapy at this time as the patient has several allergies to DMARDs  · Continue IV Solu-Medrol as above for possible acute RA flare  · Planning rheumatology follow-up in outpatient setting    Hyponatremia  Assessment & Plan  · History of acute on chronic hyponatremia  · Has required salt tabs in the past following surgical intervention  · In the setting of a patient who takes meloxicam for pain and chronic pain/nausea  · 8/7 serum sodium 134  · Nephrology following, appreciate recommendations  · BMP a.m.     Microcytic anemia  Assessment & Plan  · No active bleeding noted at this time  · Anemia panel showing combination of LANDEN and anemia of chronic disease  · CBC a.m. Asthma  Assessment & Plan  · Without exacerbation  · Continue Ventolin inhaler           VTE Pharmacologic Prophylaxis:   Pharmacologic: Heparin  Mechanical VTE Prophylaxis in Place: Yes    Patient Centered Rounds: I have performed bedside rounds with nursing staff today. Discussions with Specialists or Other Care Team Provider: Neurology, IR, nursing, case management    Education and Discussions with Family / Patient: Treatment plan discussed with patient and  at bedside, both of whom understand the plan as it has been explained and are agreeable to the plan as stated. All questions answered to satisfaction. Time Spent for Care: 45 minutes. More than 50% of total time spent on counseling and coordination of care as described above. Current Length of Stay: 3 day(s)    Current Patient Status: Inpatient   Certification Statement: The patient will continue to require additional inpatient hospital stay due to Need for MRI T-spine, L-spine, pending LP    Discharge Plan: Pending hospital course. Patient is from home with  prior to arrival.  She is wheelchair bound at baseline. She had worsening weakness in upper extremities, continues with numbness in her hands and feet. Discussed with neurology, will obtain further imaging including MRI C-spine and L-spine with and without contrast and an LP. Discussed this with IR, unable to do LP today. Planning LP tomorrow, neurology is okay with this plan. Code Status: Level 1 - Full Code      Subjective:   Patient reports weakness in her hands, states that "I cannot hold a utensil to feed myself". Still complaining of numbness in hands and feet. States that the pain in her hands and feet has greatly improved since steroids were added.     Objective:     Vitals:   Temp (24hrs), Av.6 °F (36.4 °C), Min:97.1 °F (36.2 °C), Max:98.1 °F (36.7 °C)    Temp:  [97.1 °F (36.2 °C)-98.1 °F (36.7 °C)] 97.6 °F (36.4 °C)  HR:  [82-89] 89  Resp:  [15-18] 15  BP: (103-118)/(75-84) 118/84  SpO2:  [94 %-96 %] 95 %  Body mass index is 17.23 kg/m². Input and Output Summary (last 24 hours): Intake/Output Summary (Last 24 hours) at 8/7/2023 1514  Last data filed at 8/7/2023 1300  Gross per 24 hour   Intake 660 ml   Output --   Net 660 ml       Physical Exam:     Physical Exam  Constitutional:       General: She is not in acute distress. Appearance: Normal appearance. She is not ill-appearing. HENT:      Head: Normocephalic and atraumatic. Nose: Nose normal.      Mouth/Throat:      Mouth: Mucous membranes are moist.   Eyes:      Extraocular Movements: Extraocular movements intact. Conjunctiva/sclera: Conjunctivae normal.      Pupils: Pupils are equal, round, and reactive to light. Cardiovascular:      Rate and Rhythm: Normal rate and regular rhythm. Pulses: Normal pulses. Heart sounds: Normal heart sounds. Pulmonary:      Effort: Pulmonary effort is normal. No respiratory distress. Breath sounds: Normal breath sounds. No wheezing or rales. Abdominal:      General: Bowel sounds are normal. There is no distension. Palpations: Abdomen is soft. Tenderness: There is no abdominal tenderness. There is no guarding. Musculoskeletal:         General: Normal range of motion. Skin:     General: Skin is warm and dry. Capillary Refill: Capillary refill takes less than 2 seconds. Neurological:      General: No focal deficit present. Mental Status: She is alert and oriented to person, place, and time. Mental status is at baseline. Comments: Patient continues with numbness and tingling in hands and feet; reports motor weakness in hands today. Gait not assessed at this time,  at bedside reports patient is wheelchair-bound at baseline.    Psychiatric: Mood and Affect: Mood normal.         Behavior: Behavior normal.         Thought Content: Thought content normal.         Judgment: Judgment normal.         Additional Data:     Labs:    Results from last 7 days   Lab Units 08/07/23  0558 08/04/23  0858 08/03/23  2342   WBC Thousand/uL 7.71   < > 6.25   HEMOGLOBIN g/dL 8.7*   < > 9.5*   HEMATOCRIT % 29.3*   < > 31.0*   PLATELETS Thousands/uL 522*   < > 414*   NEUTROS PCT %  --   --  84*   LYMPHS PCT %  --   --  13*   MONOS PCT %  --   --  3*   EOS PCT %  --   --  0    < > = values in this interval not displayed. Results from last 7 days   Lab Units 08/07/23  0558   POTASSIUM mmol/L 4.7   CHLORIDE mmol/L 100   CO2 mmol/L 27   BUN mg/dL 22   CREATININE mg/dL 0.32*   CALCIUM mg/dL 8.7           * I Have Reviewed All Lab Data Listed Above. * Additional Pertinent Lab Tests Reviewed:  [address] Admission Reviewed    Imaging:    Imaging Reports Reviewed Today Include: MRI C-spine, VAS lower extremity  Imaging Personally Reviewed by Myself Includes:      Recent Cultures (last 7 days):           Last 24 Hours Medication List:   Current Facility-Administered Medications   Medication Dose Route Frequency Provider Last Rate   • albuterol  2 puff Inhalation Q4H PRN Flor Bravo PA-C     • diphenhydramine, lidocaine, Al/Mg hydroxide, simethicone  10 mL Swish & Spit Q4H PRN [facility], DO     • gabapentin  400 mg Oral TID [facility], DO     • heparin (porcine)  5,000 Units Subcutaneous [facility] Flor Bravo PA-C     • HYDROcodone-acetaminophen  1 tablet Oral Q6H PRN Flor Bravo PA-C     • magnesium Oxide  400 mg Oral HS Flor Bravo PA-C     • meloxicam  7.5 mg Oral Daily Flor Bravo PA-C     • methylPREDNISolone sodium succinate  40 mg Intravenous Q6H [address],      • morphine injection  2 mg Intravenous Q4H PRN Flor Bravo PA-C     • ondansetron  4 mg Intravenous Q6H PRN Flor Bravo PA-C          Today, Patient Was Seen By: CARMITA Newman    ** Please Note: Dictation voice to text software may have been used in the creation of this document.  **

## 2023-08-07 NOTE — ASSESSMENT & PLAN NOTE
· Resident on admission   · Presented with bilateral upper and lower extremity pain, weakness, and numbness  · Recently seen in ED and given Lyrica, however, had an adverse reaction to this medication  · MRI C-spine (8/3): No disc herniation, canal or foraminal stenosis. 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 · Today patient reports that the pain in her upper and lower extremities has improved since the steroid was added, however, feels that the numbness and weakness in her hands is worse to the point that she cannot hold utensils to feed herself. She also notes that there is no improvement in the numbness in her feet. States the foot numbness started about 2 weeks ago after an extended car ride that lasted 12 hours  · Discussed findings with neurology, will obtain MRI T-spine and L-spine with and without contrast and LP  · Consult IR for LP  · Continue gabapentin  · Continue Solu-Medrol  · Per neurology will need follow up in in 6 weeks with neuromuscular attending. She will require a EMG/NCS within 4-6 weeks.

## 2023-08-07 NOTE — ASSESSMENT & PLAN NOTE
· No active bleeding noted at this time  · Anemia panel showing combination of LANDEN and anemia of chronic disease  · CBC a.m.

## 2023-08-07 NOTE — PROGRESS NOTES
Progress Note - Nephrology   Mary Sonia Hector 41 y.o. female MRN: [id number]  Unit/Bed#: -01 Encounter: [id number]    A/P:  1. Acute on chronic hyponatremia   Continue to monitor, patient's cortisol level remains pending, serum sodium level now up to 134 mmol/L. Avoid excess fluid intake. 2.  Rheumatoid arthritis   Patient currently experiencing active flare, on Solu-Medrol 40 mg IV every 6 hours. Patient is also on meloxicam 7.5 mg daily. 3.  Hypomagnesemia   Patient is taking oral supplement, check level tomorrow, additional supplement as indicated. 4.  Iron deficiency   Patient will benefit from iron supplementation, if she has been receiving iron supplementation along, she may need to have IV iron infused when clinically appropriate. Follow up reason for today's visit: Hyponatremia/hypomagnesemia    Neuropathy    Patient Active Problem List   Diagnosis   • Abnormal electrocardiogram   • Abnormal findings on diagnostic imaging of heart and coronary circulation   • Acquired bilateral club feet   • Acrocyanosis (HCC)   • Ankle arthritis   • Asthma   • Blue color skin   • Cardiac murmur   • Cardiomyopathy (HCC)   • DJD (degenerative joint disease)   • Hyponatremia   • Nonrheumatic mitral valve insufficiency   • Osteoarthritis of left hip   • Psoriasis   • Rheumatoid arthritis (HCC)   • Status post left hip replacement   • Bilateral upper and lower extremity neuropathy   • Microcytic anemia         Subjective:   No acute issues, patient able to eat breakfast this morning with no nausea or vomiting. Objective:     Vitals: Blood pressure 103/75, pulse 88, temperature 98.1 °F (36.7 °C), resp. rate 16, height 5' 7" (1.702 m), weight 49.9 kg (110 lb), SpO2 94 %. ,Body mass index is 17.23 kg/m².     Weight (last 2 days)     None            Intake/Output Summary (Last 24 hours) at 8/7/2023 1138  Last data filed at 8/7/2023 0900  Gross per 24 hour   Intake 660 ml   Output --   Net 660 ml     I/O last 3 completed shifts: In: 65 [P.O.:660]  Out: -          Physical Exam: /75   Pulse 88   Temp 98.1 °F (36.7 °C)   Resp 16   Ht 5' 7" (1.702 m)   Wt 49.9 kg (110 lb)   SpO2 94%   BMI 17.23 kg/m²     General Appearance:    Alert, cooperative, no distress, appears stated age   Head:    Normocephalic, without obvious abnormality, atraumatic   Eyes:    Conjunctiva/corneas clear   Ears:    Normal external ears   Nose:   Nares normal, septum midline, mucosa normal, no drainage    or sinus tenderness   Throat:   Lips, mucosa, and tongue normal; teeth and gums normal   Neck:   Supple   Back:     Symmetric, no curvature, ROM normal, no CVA tenderness   Lungs:     Clear to auscultation bilaterally, respirations unlabored   Chest wall:    No tenderness or deformity   Heart:    Regular rate and rhythm, S1 and S2 normal, no murmur, rub   or gallop   Abdomen:     Soft, non-tender, bowel sounds active   Extremities:   Extremities normal, atraumatic, no cyanosis or edema   Skin:   Skin color, texture, turgor normal, no rashes or lesions   Lymph nodes:   Cervical normal   Neurologic:   CNII-XII intact            Lab, Imaging and other studies: I have personally reviewed pertinent labs. CBC:   Lab Results   Component Value Date    WBC 7.71 08/07/2023    HGB 8.7 (L) 08/07/2023    HCT 29.3 (L) 08/07/2023    MCV 82 08/07/2023     (H) 08/07/2023    RBC 3.58 (L) 08/07/2023    MCH 24.3 (L) 08/07/2023    MCHC 29.7 (L) 08/07/2023    RDW 16.3 (H) 08/07/2023    MPV 7.9 (L) 08/07/2023     CMP:   Lab Results   Component Value Date    K 4.7 08/07/2023     08/07/2023    CO2 27 08/07/2023    BUN 22 08/07/2023    CREATININE 0.32 (L) 08/07/2023    CALCIUM 8.7 08/07/2023    EGFR 135 08/07/2023       .   Results from last 7 days   Lab Units 08/07/23  0558 08/06/23  0532 08/05/23  0617   POTASSIUM mmol/L 4.7 4.6 4.5   CHLORIDE mmol/L 100 99 96   CO2 mmol/L 27 28 27   BUN mg/dL 22 22 13   CREATININE mg/dL 0.32* 0.46* 0.36* CALCIUM mg/dL 8.7 8.8 8.7         Phosphorus: No results found for: "PHOS"  Magnesium: No results found for: "MG"  Urinalysis: No results found for: "COLORU", "CLARITYU", "[place]", "PHUR", "LEUKOCYTESUR", "NITRITE", "[place]", "GLUCOSEU", "Sergio Barley", "[place]", "BLOODU"  Ionized Calcium: No results found for: "CAION"  Coagulation: No results found for: "PT", "INR", "APTT"  Troponin: No results found for: "TROPONINI"  ABG: No results found for: "PHART", "TAD6LSO", "PO2ART", "DRV5OVG", "Z9QXIOCV", "BEART", "SOURCE"  Radiology review:     IMAGING  Procedure: VAS lower limb arterial duplex, complete bilateral    Result Date: 8/5/2023  Narrative:  [facility] REPORT CLINICAL: Indications:  Patient presents with cold, painful, numb lower extremities x several days, worsening. Operative History: No prior heart or vascular surgery Risk Factors: Patient has history of rheumatoid arthritis  FINDINGS:  Segment                Right                  Left                                          Waveform   PSV (cm/s)  Waveform   PSV (cm/s)  Post. Tibial           Triphasic              Triphasic              Ant. Tibial            Triphasic              Triphasic              Common Femoral Artery                     51                     48  Prox Profunda                             26                     26  Prox SFA                                  38                     34  Mid SFA                                   47                     55  Dist SFA                                  26                     37  Proximal Pop                              27                     26  Distal Pop                                39                     37  Tibioperoneal                             39                     50  Dist Post Tibial                          40                     59  Prox. Ant. Tibial                         27                         Dist. Ant.  Tibial                         44 45     CONCLUSION:  Impression: RIGHT LOWER LIMB: Diffuse disease noted throughout the femoral-popliteal arteries without significant focal stenosis. Evidence suggestive of tibioperoneal disease. Ankle/Brachial index:   1.16, normal. (Prior 1.1) PVR/ PPG tracings are normal. Metatarsal pressure of 107 mmHg Great toe pressure of 82 mmHg, within the healing range  LEFT LOWER LIMB: Diffuse disease noted throughout the femoral-popliteal arteries without significant focal stenosis. Evidence suggestive of tibioperoneal disease Ankle/Brachial index:   1.20, normal. (Prior 1.0) PVR/ PPG tracings are normal. Metatarsal pressure of 117 mmHg Great toe pressure of 86 mmHg, within the healing range  Compared to previous GRAZYNA study on 4/13/2014, there is no significant change. No prior duplex for comparison.   SIGNATURE: Electronically Signed by: Kristyn Franco MD on 2023-08-05 07:32:56 PM      Current Facility-Administered Medications   Medication Dose Route Frequency   • albuterol (PROVENTIL HFA,VENTOLIN HFA) inhaler 2 puff  2 puff Inhalation Q4H PRN   • diphenhydramine, lidocaine, Al/Mg hydroxide, simethicone (Magic Mouthwash) oral solution 10 mL  10 mL Swish & Spit Q4H PRN   • gabapentin (NEURONTIN) capsule 400 mg  400 mg Oral TID   • heparin (porcine) subcutaneous injection 5,000 Units  5,000 Units Subcutaneous Q8H [address]   • HYDROcodone-acetaminophen (NORCO) 5-325 mg per tablet 1 tablet  1 tablet Oral Q6H PRN   • magnesium Oxide (MAG-OX) tablet 400 mg  400 mg Oral HS   • meloxicam (MOBIC) tablet 7.5 mg  7.5 mg Oral Daily   • methylPREDNISolone sodium succinate (Solu-MEDROL) injection 40 mg  40 mg Intravenous Q6H [address]   • morphine injection 2 mg  2 mg Intravenous Q4H PRN   • ondansetron (ZOFRAN) injection 4 mg  4 mg Intravenous Q6H PRN     Medications Discontinued During This Encounter   Medication Reason   • Magnesium Oxide CAPS 400 mg    • methylprednisolone (MEDROL) tablet 20 mg    • methylPREDNISolone (MEDROL) tablet 16 mg • methylprednisolone (MEDROL) tablet 12 mg    • methylprednisolone (MEDROL) tablet 8 mg    • methylprednisolone (MEDROL) tablet 4 mg    • Lidocaine Viscous HCl (XYLOCAINE) 2 % mucosal solution 15 mL    • gabapentin (NEURONTIN) capsule 300 mg        Anjum Hoskins DO      This progress note was produced in part using a dictation device which may document imprecise wording from author's original intent.

## 2023-08-07 NOTE — ASSESSMENT & PLAN NOTE
Problem: Self Harm/Suicidality  Goal: Will have no self-injury during hospital stay  Description: INTERVENTIONS:  1. Q 30 MINUTES: Routine safety checks  2. Q SHIFT & PRN: Assess risk to determine if routine checks are adequate to maintain patient safety  5/18/2022 2159 by Sherry Lujan LPN  Outcome: Progressing  Note: Patient is free of self harm at this time. Patient denies self harm this shift. Patient agrees to seek out staff if thoughts to harm self arise. Staff will provide support and reassurance as needed. Safety checks maintained every 15 minutes. Problem: Pain  Goal: Verbalizes/displays adequate comfort level or baseline comfort level  5/18/2022 2159 by Sherry Lujan LPN  Outcome: Progressing  Note: Patient admits to pain this shift. Patient state pain at a 4 on a 0-10 scale at this time. Patient educated and agrees to alert staff if pain gets worse or does not go away. Patient monitored every 15 minutes with environmental safety checks. · History of acute on chronic hyponatremia  · Has required salt tabs in the past following surgical intervention  · In the setting of a patient who takes meloxicam for pain and chronic pain/nausea  · 8/7 serum sodium 134  · Nephrology following, appreciate recommendations  · BMP a.m.

## 2023-08-07 NOTE — ASSESSMENT & PLAN NOTE
· Not on any maintenance therapy at this time as the patient has several allergies to DMARDs  · Continue IV Solu-Medrol as above for possible acute RA flare  · Planning rheumatology follow-up in outpatient setting

## 2023-08-08 ENCOUNTER — APPOINTMENT (INPATIENT)
Dept: INTERVENTIONAL RADIOLOGY/VASCULAR | Facility: HOSPITAL | Age: 48
DRG: 546 | End: 2023-08-08
Attending: RADIOLOGY
Payer: MEDICARE

## 2023-08-08 LAB
ANION GAP SERPL CALCULATED.3IONS-SCNC: 4 MMOL/L
APPEARANCE CSF: NORMAL
BASOPHILS # BLD AUTO: 0.01 THOUSANDS/ÂΜL (ref 0–0.1)
BASOPHILS NFR BLD AUTO: 0 % (ref 0–1)
BUN SERPL-MCNC: 21 MG/DL (ref 5–25)
CALCIUM SERPL-MCNC: 8.4 MG/DL (ref 8.4–10.2)
CHLORIDE SERPL-SCNC: 99 MMOL/L (ref 96–108)
CO2 SERPL-SCNC: 30 MMOL/L (ref 21–32)
CREAT SERPL-MCNC: 0.34 MG/DL (ref 0.6–1.3)
EOSINOPHIL # BLD AUTO: 0 THOUSAND/ÂΜL (ref 0–0.61)
EOSINOPHIL NFR BLD AUTO: 0 % (ref 0–6)
ERYTHROCYTE [DISTWIDTH] IN BLOOD BY AUTOMATED COUNT: 16.2 % (ref 11.6–15.1)
FOLATE BLD-MCNC: 245 NG/ML
FOLATE RBC-MCNC: 822 NG/ML
GFR SERPL CREATININE-BSD FRML MDRD: 133 ML/MIN/1.73SQ M
GLUCOSE CSF-MCNC: 79 MG/DL (ref 40–70)
GLUCOSE SERPL-MCNC: 112 MG/DL (ref 65–140)
GRAM STN SPEC: NORMAL
HCT VFR BLD AUTO: 28.5 % (ref 34.8–46.1)
HCT VFR BLD AUTO: 29.8 % (ref 34–46.6)
HGB BLD-MCNC: 8.6 G/DL (ref 11.5–15.4)
IMM GRANULOCYTES # BLD AUTO: 0.06 THOUSAND/UL (ref 0–0.2)
IMM GRANULOCYTES NFR BLD AUTO: 1 % (ref 0–2)
INR PPP: 1.07 (ref 0.84–1.19)
LYMPHOCYTES # BLD AUTO: 2.08 THOUSANDS/ÂΜL (ref 0.6–4.47)
LYMPHOCYTES NFR BLD AUTO: 28 % (ref 14–44)
MCH RBC QN AUTO: 24.5 PG (ref 26.8–34.3)
MCHC RBC AUTO-ENTMCNC: 30.2 G/DL (ref 31.4–37.4)
MCV RBC AUTO: 81 FL (ref 82–98)
MONOCYTES # BLD AUTO: 0.26 THOUSAND/ÂΜL (ref 0.17–1.22)
MONOCYTES NFR BLD AUTO: 4 % (ref 4–12)
NEUTROPHILS # BLD AUTO: 5.04 THOUSANDS/ÂΜL (ref 1.85–7.62)
NEUTS SEG NFR BLD AUTO: 67 % (ref 43–75)
NRBC BLD AUTO-RTO: 0 /100 WBCS
PLATELET # BLD AUTO: 465 THOUSANDS/UL (ref 149–390)
PMV BLD AUTO: 7.8 FL (ref 8.9–12.7)
POTASSIUM SERPL-SCNC: 4.4 MMOL/L (ref 3.5–5.3)
PROT CSF-MCNC: 27 MG/DL (ref 15–45)
PROTHROMBIN TIME: 13.9 SECONDS (ref 11.6–14.5)
RBC # BLD AUTO: 3.51 MILLION/UL (ref 3.81–5.12)
RBC # CSF MANUAL: 67 UL (ref 0–10)
SODIUM SERPL-SCNC: 133 MMOL/L (ref 135–147)
TOTAL CELLS COUNTED BLD: NO
TUBE # CSF: 4
WBC # BLD AUTO: 7.45 THOUSAND/UL (ref 4.31–10.16)
WBC # CSF AUTO: 0 /UL (ref 0–5)

## 2023-08-08 PROCEDURE — 82300 ASSAY OF CADMIUM: CPT | Performed by: PSYCHIATRY & NEUROLOGY

## 2023-08-08 PROCEDURE — 80048 BASIC METABOLIC PNL TOTAL CA: CPT

## 2023-08-08 PROCEDURE — 83825 ASSAY OF MERCURY: CPT | Performed by: PSYCHIATRY & NEUROLOGY

## 2023-08-08 PROCEDURE — 36410 VNPNXR 3YR/> PHY/QHP DX/THER: CPT

## 2023-08-08 PROCEDURE — 82175 ASSAY OF ARSENIC: CPT | Performed by: PSYCHIATRY & NEUROLOGY

## 2023-08-08 PROCEDURE — 89050 BODY FLUID CELL COUNT: CPT | Performed by: PSYCHIATRY & NEUROLOGY

## 2023-08-08 PROCEDURE — 99233 SBSQ HOSP IP/OBS HIGH 50: CPT

## 2023-08-08 PROCEDURE — 86039 ANTINUCLEAR ANTIBODIES (ANA): CPT | Performed by: PSYCHIATRY & NEUROLOGY

## 2023-08-08 PROCEDURE — 82040 ASSAY OF SERUM ALBUMIN: CPT | Performed by: PSYCHIATRY & NEUROLOGY

## 2023-08-08 PROCEDURE — 83916 OLIGOCLONAL BANDS: CPT | Performed by: PSYCHIATRY & NEUROLOGY

## 2023-08-08 PROCEDURE — NC001 PR NO CHARGE: Performed by: RADIOLOGY

## 2023-08-08 PROCEDURE — 99232 SBSQ HOSP IP/OBS MODERATE 35: CPT | Performed by: INTERNAL MEDICINE

## 2023-08-08 PROCEDURE — 84157 ASSAY OF PROTEIN OTHER: CPT | Performed by: PSYCHIATRY & NEUROLOGY

## 2023-08-08 PROCEDURE — 85025 COMPLETE CBC W/AUTO DIFF WBC: CPT

## 2023-08-08 PROCEDURE — 83655 ASSAY OF LEAD: CPT | Performed by: PSYCHIATRY & NEUROLOGY

## 2023-08-08 PROCEDURE — 10030 IMG GID FLU COLL DRG SFT TIS: CPT | Performed by: RADIOLOGY

## 2023-08-08 PROCEDURE — 86038 ANTINUCLEAR ANTIBODIES: CPT | Performed by: PSYCHIATRY & NEUROLOGY

## 2023-08-08 PROCEDURE — 86235 NUCLEAR ANTIGEN ANTIBODY: CPT | Performed by: PSYCHIATRY & NEUROLOGY

## 2023-08-08 PROCEDURE — NC001 PR NO CHARGE: Performed by: PHYSICIAN ASSISTANT

## 2023-08-08 PROCEDURE — 89051 BODY FLUID CELL COUNT: CPT | Performed by: PSYCHIATRY & NEUROLOGY

## 2023-08-08 PROCEDURE — 62328 DX LMBR SPI PNXR W/FLUOR/CT: CPT

## 2023-08-08 PROCEDURE — 87476 LYME DIS DNA AMP PROBE: CPT | Performed by: PSYCHIATRY & NEUROLOGY

## 2023-08-08 PROCEDURE — 84166 PROTEIN E-PHORESIS/URINE/CSF: CPT | Performed by: PSYCHIATRY & NEUROLOGY

## 2023-08-08 PROCEDURE — C1751 CATH, INF, PER/CENT/MIDLINE: HCPCS

## 2023-08-08 PROCEDURE — 82945 GLUCOSE OTHER FLUID: CPT | Performed by: PSYCHIATRY & NEUROLOGY

## 2023-08-08 PROCEDURE — 85610 PROTHROMBIN TIME: CPT | Performed by: RADIOLOGY

## 2023-08-08 PROCEDURE — 62328 DX LMBR SPI PNXR W/FLUOR/CT: CPT | Performed by: RADIOLOGY

## 2023-08-08 PROCEDURE — 83883 ASSAY NEPHELOMETRY NOT SPEC: CPT | Performed by: PSYCHIATRY & NEUROLOGY

## 2023-08-08 PROCEDURE — 009U3ZX DRAINAGE OF SPINAL CANAL, PERCUTANEOUS APPROACH, DIAGNOSTIC: ICD-10-PCS | Performed by: RADIOLOGY

## 2023-08-08 PROCEDURE — 86225 DNA ANTIBODY NATIVE: CPT | Performed by: PSYCHIATRY & NEUROLOGY

## 2023-08-08 PROCEDURE — 82784 ASSAY IGA/IGD/IGG/IGM EACH: CPT | Performed by: PSYCHIATRY & NEUROLOGY

## 2023-08-08 PROCEDURE — B01B1ZZ FLUOROSCOPY OF SPINAL CORD USING LOW OSMOLAR CONTRAST: ICD-10-PCS | Performed by: RADIOLOGY

## 2023-08-08 PROCEDURE — 84165 PROTEIN E-PHORESIS SERUM: CPT | Performed by: PSYCHIATRY & NEUROLOGY

## 2023-08-08 PROCEDURE — 86334 IMMUNOFIX E-PHORESIS SERUM: CPT | Performed by: PSYCHIATRY & NEUROLOGY

## 2023-08-08 RX ORDER — LIDOCAINE HYDROCHLORIDE 10 MG/ML
INJECTION, SOLUTION EPIDURAL; INFILTRATION; INTRACAUDAL; PERINEURAL AS NEEDED
Status: COMPLETED | OUTPATIENT
Start: 2023-08-08 | End: 2023-08-08

## 2023-08-08 RX ADMIN — METHYLPREDNISOLONE SODIUM SUCCINATE 40 MG: 40 INJECTION, POWDER, FOR SOLUTION INTRAMUSCULAR; INTRAVENOUS at 12:59

## 2023-08-08 RX ADMIN — GABAPENTIN 400 MG: 400 CAPSULE ORAL at 10:05

## 2023-08-08 RX ADMIN — Medication 400 MG: at 21:37

## 2023-08-08 RX ADMIN — DIPHENHYDRAMINE HYDROCHLORIDE AND LIDOCAINE HYDROCHLORIDE AND ALUMINUM HYDROXIDE AND MAGNESIUM HYDRO 10 ML: KIT at 17:06

## 2023-08-08 RX ADMIN — MELOXICAM 7.5 MG: 7.5 TABLET ORAL at 10:05

## 2023-08-08 RX ADMIN — GABAPENTIN 400 MG: 400 CAPSULE ORAL at 21:34

## 2023-08-08 RX ADMIN — HEPARIN SODIUM 5000 UNITS: 5000 INJECTION INTRAVENOUS; SUBCUTANEOUS at 13:51

## 2023-08-08 RX ADMIN — DIPHENHYDRAMINE HYDROCHLORIDE AND LIDOCAINE HYDROCHLORIDE AND ALUMINUM HYDROXIDE AND MAGNESIUM HYDRO 10 ML: KIT at 10:05

## 2023-08-08 RX ADMIN — METHYLPREDNISOLONE SODIUM SUCCINATE 40 MG: 40 INJECTION, POWDER, FOR SOLUTION INTRAMUSCULAR; INTRAVENOUS at 05:55

## 2023-08-08 RX ADMIN — GABAPENTIN 400 MG: 400 CAPSULE ORAL at 17:06

## 2023-08-08 RX ADMIN — HEPARIN SODIUM 5000 UNITS: 5000 INJECTION INTRAVENOUS; SUBCUTANEOUS at 05:56

## 2023-08-08 RX ADMIN — METHYLPREDNISOLONE SODIUM SUCCINATE 40 MG: 40 INJECTION, POWDER, FOR SOLUTION INTRAMUSCULAR; INTRAVENOUS at 17:06

## 2023-08-08 RX ADMIN — LIDOCAINE HYDROCHLORIDE 10 ML: 10 INJECTION, SOLUTION EPIDURAL; INFILTRATION; INTRACAUDAL; PERINEURAL at 11:32

## 2023-08-08 RX ADMIN — METHYLPREDNISOLONE SODIUM SUCCINATE 40 MG: 40 INJECTION, POWDER, FOR SOLUTION INTRAMUSCULAR; INTRAVENOUS at 23:55

## 2023-08-08 RX ADMIN — HYDROCODONE BITARTRATE AND ACETAMINOPHEN 1 TABLET: 5; 325 TABLET ORAL at 13:54

## 2023-08-08 RX ADMIN — HEPARIN SODIUM 5000 UNITS: 5000 INJECTION INTRAVENOUS; SUBCUTANEOUS at 21:34

## 2023-08-08 NOTE — ASSESSMENT & PLAN NOTE
· POA,history of acute on chronic hyponatremia  · Now resolved, serum sodium today 136  · Has required salt tabs in the past following surgical intervention  · In the setting of a patient who takes meloxicam for pain and chronic pain/nausea  · Nephrology following, appreciate recommendations  · Further monitoring of labs to be determined at receiving facility

## 2023-08-08 NOTE — BRIEF OP NOTE (RAD/CATH)
IR LUMBAR PUNCTURE Procedure Note    PATIENT NAME: Sharri Hector  : 1975  MRN: [id number]    Pre-op Diagnosis:   1. Acute pain    2. Rheumatoid arthritis flare (HCC)    3. Paresthesia and pain of both upper extremities    4. Hyponatremia    5. Neuropathy involving both lower extremities      Post-op Diagnosis:   1. Acute pain    2. Rheumatoid arthritis flare (HCC)    3. Paresthesia and pain of both upper extremities    4. Hyponatremia    5.  Neuropathy involving both lower extremities        Provider:   Bobbi Teixeira PA-C    Estimated Blood Loss: none    Findings: FL guided lumbar puncture, clear csf, opening pressure approx 13-14cm    Specimens: collected and sent    Complications:  None immedaite    Anesthesia: local    Bobbi Teixeira PA-C     Date: 2023  Time: 11:57 AM
38.2

## 2023-08-08 NOTE — PLAN OF CARE
Problem: Potential for Falls  Goal: Patient will remain free of falls  Description: INTERVENTIONS:  - Educate patient/family on patient safety including physical limitations  - Instruct patient to call for assistance with activity   - Consult OT/PT to assist with strengthening/mobility   - Keep Call bell within reach  - Keep bed low and locked with side rails adjusted as appropriate  - Keep care items and personal belongings within reach  - Initiate and maintain comfort rounds  - Make Fall Risk Sign visible to staff  - Offer Toileting every 2 Hours, in advance of need  - Initiate/Maintain bed alarm  - Apply yellow socks and bracelet for high fall risk patients  - Consider moving patient to room near nurses station  Outcome: Progressing     Problem: MOBILITY - ADULT  Goal: Maintain or return to baseline ADL function  Description: INTERVENTIONS:  -  Assess patient's ability to carry out ADLs; assess patient's baseline for ADL function and identify physical deficits which impact ability to perform ADLs (bathing, care of mouth/teeth, toileting, grooming, dressing, etc.)  - Assess/evaluate cause of self-care deficits   - Assess range of motion  - Assess patient's mobility; develop plan if impaired  - Assess patient's need for assistive devices and provide as appropriate  - Encourage maximum independence but intervene and supervise when necessary  - Involve family in performance of ADLs  - Assess for home care needs following discharge   - Consider OT consult to assist with ADL evaluation and planning for discharge  - Provide patient education as appropriate  Outcome: Progressing  Goal: Maintains/Returns to pre admission functional level  Description: INTERVENTIONS:  - Perform BMAT or MOVE assessment daily.   - Set and communicate daily mobility goal to care team and patient/family/caregiver. - Collaborate with rehabilitation services on mobility goals if consulted  - Perform Range of Motion 3 times a day.   - Reposition patient every 2 hours. - Dangle patient 3 times a day  - Stand patient 3 times a day  - Ambulate patient 3 times a day  - Out of bed to chair 3 times a day   - Out of bed for meals 3 times a day  - Out of bed for toileting  - Record patient progress and toleration of activity level   Outcome: Progressing     Problem: Prexisting or High Potential for Compromised Skin Integrity  Goal: Skin integrity is maintained or improved  Description: INTERVENTIONS:  - Identify patients at risk for skin breakdown  - Assess and monitor skin integrity  - Assess and monitor nutrition and hydration status  - Monitor labs   - Assess for incontinence   - Turn and reposition patient  - Assist with mobility/ambulation  - Relieve pressure over bony prominences  - Avoid friction and shearing  - Provide appropriate hygiene as needed including keeping skin clean and dry  - Evaluate need for skin moisturizer/barrier cream  - Collaborate with interdisciplinary team   - Patient/family teaching  - Consider wound care consult   Outcome: Progressing     Problem: Nutrition/Hydration-ADULT  Goal: Nutrient/Hydration intake appropriate for improving, restoring or maintaining nutritional needs  Description: Monitor and assess patient's nutrition/hydration status for malnutrition. Collaborate with interdisciplinary team and initiate plan and interventions as ordered. Monitor patient's weight and dietary intake as ordered or per policy. Utilize nutrition screening tool and intervene as necessary. Determine patient's food preferences and provide high-protein, high-caloric foods as appropriate.      INTERVENTIONS:  - Monitor oral intake, urinary output, labs, and treatment plans  - Assess nutrition and hydration status and recommend course of action  - Evaluate amount of meals eaten  - Assist patient with eating if necessary   - Allow adequate time for meals  - Recommend/ encourage appropriate diets, oral nutritional supplements, and vitamin/mineral supplements  - Order, calculate, and assess calorie counts as needed  - Recommend, monitor, and adjust tube feedings and TPN/PPN based on assessed needs  - Assess need for intravenous fluids  - Provide specific nutrition/hydration education as appropriate  - Include patient/family/caregiver in decisions related to nutrition  Outcome: Progressing     Problem: INFECTION - ADULT  Goal: Absence or prevention of progression during hospitalization  Description: INTERVENTIONS:  - Assess and monitor for signs and symptoms of infection  - Monitor lab/diagnostic results  - Monitor all insertion sites, i.e. indwelling lines, tubes, and drains  - Monitor endotracheal if appropriate and nasal secretions for changes in amount and color  - [place] appropriate cooling/warming therapies per order  - Administer medications as ordered  - Instruct and encourage patient and family to use good hand hygiene technique  - Identify and instruct in appropriate isolation precautions for identified infection/condition  Outcome: Progressing  Goal: Absence of fever/infection during neutropenic period  Description: INTERVENTIONS:  - Monitor WBC    Outcome: Progressing     Problem: SAFETY ADULT  Goal: Patient will remain free of falls  Description: INTERVENTIONS:  - Educate patient/family on patient safety including physical limitations  - Instruct patient to call for assistance with activity   - Consult OT/PT to assist with strengthening/mobility   - Keep Call bell within reach  - Keep bed low and locked with side rails adjusted as appropriate  - Keep care items and personal belongings within reach  - Initiate and maintain comfort rounds  - Make Fall Risk Sign visible to staff  - Offer Toileting every 2 Hours, in advance of need  - Initiate/Maintain bed alarm  - Obtain necessary fall risk management equipment: bed alarm  - Apply yellow socks and bracelet for high fall risk patients  - Consider moving patient to room near nurses station  Outcome: Progressing     Problem: MUSCULOSKELETAL - ADULT  Goal: Maintain or return mobility to safest level of function  Description: INTERVENTIONS:  - Assess patient's ability to carry out ADLs; assess patient's baseline for ADL function and identify physical deficits which impact ability to perform ADLs (bathing, care of mouth/teeth, toileting, grooming, dressing, etc.)  - Assess/evaluate cause of self-care deficits   - Assess range of motion  - Assess patient's mobility  - Assess patient's need for assistive devices and provide as appropriate  - Encourage maximum independence but intervene and supervise when necessary  - Involve family in performance of ADLs  - Assess for home care needs following discharge   - Consider OT consult to assist with ADL evaluation and planning for discharge  - Provide patient education as appropriate  Outcome: Progressing  Goal: Maintain proper alignment of affected body part  Description: INTERVENTIONS:  - Support, maintain and protect limb and body alignment  - Provide patient/ family with appropriate education  Outcome: Progressing

## 2023-08-08 NOTE — DISCHARGE INSTRUCTIONS
[facility]  Interventional Radiology  [phone]   Lumbar Puncture     WHAT YOU NEED TO KNOW:   Lumbar puncture (LP) is a procedure in which a needle is inserted in your back and into your spinal canal. This is usually done to collect cerebrospinal fluid (CSF) to check for an infection, inflammation, bleeding, or other conditions that affect the brain. CSF is a clear, protective fluid that flows around the brain and inside the spinal canal. LP may also be done to remove CSF to reduce pressure in the brain. DISCHARGE INSTRUCTIONS:     Follow up with your healthcare provider as directed: Write down your questions so you remember to ask them during your visits. Post-lumbar puncture headache: You may develop a headache during the first few hours after your LP that may last for several days. The headache may be mild to severe and may get worse when you sit or stand. The following may help ease a post-lumbar puncture headache:  Drink plenty of liquids: You should drink more liquid than usual after your LP. Ask how much liquid is right for you. Caffeine may be used to treat a headache. Drinks, such as coffee, tea, or some sodas, have caffeine. Ask a Do not drink alcohol. Lie down: If you have a headache after your lumbar puncture, it may be helpful to lie down and rest.  You may have a slight soreness over the LP area. This is normal.  Remove the band aid or dressing in 24 hours. Contact Interventional Radiology imediately  at [phone] [place] PATIENTS: Contact Interventional Radiology at [phone]) [place] PATIENTS: Contact Interventional Radiology at [phone]) if any of the following occur: You have a severe headache that does not get better after you lie down. Persistent nausea or vomiting   You have a fever. You have a stiff neck or have trouble thinking clearly. Your legs, feet, or other parts below the waist feel numb, tingly, or weak.    You have bleeding or a discharge coming from the area where the needle was put into your back. You have severe pain in your back or neck.

## 2023-08-08 NOTE — WOUND OSTOMY CARE
Consult Note - Wound   Jeromy Hector 41 y.o. female MRN: [id number]  Unit/Bed#: -01 Encounter: [id number]    History and Present Illness:  41year old female patient admitted with bilateral upper and lower extremity neuropathy. Wound care consulted for sacral wound. Patient history significant for asthma, Rheumatoid arthritis, microcytic anemia, acquired club feet, and DJD. Assessment Findings:   Patient in bed for assessment, she has a wheelchair at the bedside with offloading cushion, patient has other offloading cushions as well from home at bedside. She is an assist to turn onto her side due to pain.  at bedside, and is assisting with patient care, at this time the patient is dependent for care. She is not incontinent. She is awake, alert, very soft spoken and cooperative with care. Paient is on a P-500 low air loss therapy surface. 1. Bilateral heel intact, elevated on pillow  2. POA-unstageable pressure injury to the left upper buttock at the sacral region. Per  the wound started a couple of days prior to admission. Appears as evolving deep tissue injury presents as unstageable wound. Periwound blanchable erythema. Wound bed mix of dry brown eschar with small open yellow wound to the base, outer edge with yellow slough. Wound may evolve to a stage 3 or stage 4. Other scarring noted to the buttocks from prior wounds. Skin and Wound Care Plan:   1. Offloading cushion to chair when OOB  2. Elevate heels off of bed with pillows to offload  3. Turn and reposition patient  Q2 hours  4. Cleanse wound to the left upper buttocks at the sacral region with Remedy foaming cleanser, pat dry. Place oil emulsion-non-adherent dressing on wound bed, top with Allevyn life silicone bordered foam dressing, doc with T, date, change every other day and PRN with soilage or dislodgement    Follow up with Wound Care center on discharge recommended.        Wounds:  Wound 08/07/23 Sacrum (Active) Wound Image    08/07/23 1440   Wound Description Dry;Beefy red;Brown;Eschar;Fragile 08/07/23 1440   Pressure Injury Stage U 08/07/23 1440   Nia-wound Assessment Erythema 08/07/23 1440   Wound Length (cm) 1.7 cm 08/07/23 1440   Wound Width (cm) 1.7 cm 08/07/23 1440   Wound Depth (cm) 0.1 cm 08/07/23 1440   Wound Surface Area (cm^2) 2.89 cm^2 08/07/23 1440   Wound Volume (cm^3) 0.289 cm^3 08/07/23 1440   Calculated Wound Volume (cm^3) 0.29 cm^3 08/07/23 1440   Tunneling 0 cm 08/07/23 1440   Undermining 0 08/07/23 1440   Drainage Amount Scant 08/07/23 1440   Drainage Description Brown 08/07/23 1440   Treatments Cleansed 08/07/23 1440   Dressing Non adherent; Foam, Silicon (eg.  Allevyn, etc) 08/07/23 1440   Dressing Changed Changed 08/07/23 1440   Patient Tolerance Tolerated well 08/07/23 1440     Reviewed plan of care with primary RN Justen  Recommendations written as orders  Wound care team to follow weekly while admitted  Questions or concerns [phone] BSN, RN, [facility]

## 2023-08-08 NOTE — PROGRESS NOTES
Progress Note - Nephrology   [place] Tonia Hector 41 y.o. female MRN: [id number]  Unit/Bed#: -01 Encounter: [id number]    A/P:  1. Acute on chronic hyponatremia              Serum sodium level remained stable at 133 mmol/L. Continue fluid restriction at this time, patient currently does not have a sodium deficiency. 2.  Rheumatoid arthritis              Continue care according to hospitalist, patient on meloxicam 7.5 mg daily, also on IV steroids. 3.  Hypomagnesemia              Continues oral supplementation at this time, follow-up magnesium levels from time to time. 4.  Iron deficiency   As noted previously, patient may benefit from iron infusions, otherwise initiation of oral supplementation when clinically appropriate. Consider the use of heme iron as opposed to elemental iron supplements. We will follow the patient's labs, please contact us regarding seeing the patient personally if clinically indicated going forward during this hospitalization. Follow up reason for today's visit: Hyponatremia/hypomagnesemia/iron deficiency    Neuropathy    Patient Active Problem List   Diagnosis   • Abnormal electrocardiogram   • Abnormal findings on diagnostic imaging of heart and coronary circulation   • Acquired bilateral club feet   • Acrocyanosis (HCC)   • Ankle arthritis   • Asthma   • Blue color skin   • Cardiac murmur   • Cardiomyopathy (HCC)   • DJD (degenerative joint disease)   • Hyponatremia   • Nonrheumatic mitral valve insufficiency   • Osteoarthritis of left hip   • Psoriasis   • Rheumatoid arthritis (HCC)   • Status post left hip replacement   • Bilateral upper and lower extremity neuropathy   • Microcytic anemia         Subjective:   Patient appetite continues to slowly improve, ate all of her eggs this morning. No nausea or vomiting. Objective:     Vitals: Blood pressure 121/87, pulse 86, temperature 97.6 °F (36.4 °C), temperature source Oral, resp.  rate 18, height 5' 7" (1.702 m), weight 49.9 kg (110 lb), SpO2 93 %. ,Body mass index is 17.23 kg/m². Weight (last 2 days)     None            Intake/Output Summary (Last 24 hours) at 8/8/2023 0923  Last data filed at 8/8/2023 4011  Gross per 24 hour   Intake 540 ml   Output --   Net 540 ml     I/O last 3 completed shifts: In: 600 [P.O.:600]  Out: -          Physical Exam: /87 (BP Location: Left arm)   Pulse 86   Temp 97.6 °F (36.4 °C) (Oral)   Resp 18   Ht 5' 7" (1.702 m)   Wt 49.9 kg (110 lb)   SpO2 93%   BMI 17.23 kg/m²     General Appearance:    Alert, cooperative, no distress, appears stated age   Head:    Normocephalic, without obvious abnormality, atraumatic   Eyes:    Conjunctiva/corneas clear   Ears:    Normal external ears   Nose:   Nares normal, septum midline, mucosa normal, no drainage    or sinus tenderness   Throat:   Lips, mucosa, and tongue normal; teeth and gums normal   Neck:   Supple   Back:     Symmetric, no curvature, ROM normal, no CVA tenderness   Lungs:     Clear to auscultation bilaterally, respirations unlabored   Chest wall:    No tenderness or deformity   Heart:    Regular rate and rhythm, S1 and S2 normal, no murmur, rub   or gallop   Abdomen:     Soft, non-tender, bowel sounds active   Extremities:   Extremities normal, atraumatic, no cyanosis or edema   Skin:   Skin color, texture, turgor normal, no rashes or lesions   Lymph nodes:   Cervical normal   Neurologic:   CNII-XII intact            Lab, Imaging and other studies: I have personally reviewed pertinent labs.   CBC:   Lab Results   Component Value Date    WBC 7.45 08/08/2023    HGB 8.6 (L) 08/08/2023    HCT 28.5 (L) 08/08/2023    MCV 81 (L) 08/08/2023     (H) 08/08/2023    RBC 3.51 (L) 08/08/2023    MCH 24.5 (L) 08/08/2023    MCHC 30.2 (L) 08/08/2023    RDW 16.2 (H) 08/08/2023    MPV 7.8 (L) 08/08/2023    NRBC 0 08/08/2023     CMP:   Lab Results   Component Value Date    K 4.4 08/08/2023    CL 99 08/08/2023    CO2 30 08/08/2023    BUN 21 08/08/2023    CREATININE 0.34 (L) 08/08/2023    CALCIUM 8.4 08/08/2023    EGFR 133 08/08/2023       . Results from last 7 days   Lab Units 08/08/23  0634 08/07/23  0558 08/06/23  0532   POTASSIUM mmol/L 4.4 4.7 4.6   CHLORIDE mmol/L 99 100 99   CO2 mmol/L 30 27 28   BUN mg/dL 21 22 22   CREATININE mg/dL 0.34* 0.32* 0.46*   CALCIUM mg/dL 8.4 8.7 8.8         Phosphorus: No results found for: "PHOS"  Magnesium: No results found for: "MG"  Urinalysis: No results found for: "COLORU", "CLARITYU", "SPECGRAV", "PHUR", "LEUKOCYTESUR", "NITRITE", "PROTEINUA", "GLUCOSEU", "[place]", "BILIRUBINUR", "BLOODU"  Ionized Calcium: No results found for: "CAION"  Coagulation: No results found for: "PT", "INR", "APTT"  Troponin: No results found for: "TROPONINI"  ABG: No results found for: "PHART", "SRO1VOS", "PO2ART", "GCB6ICD", "R0JOZELO", "BEART", "SOURCE"  Radiology review:     IMAGING  Procedure: MRI lumbar spine w wo contrast    Result Date: 8/7/2023  Narrative: MRI LUMBAR SPINE WITH AND WITHOUT CONTRAST INDICATION: Bilateral arm and leg numbness. COMPARISON:  None. TECHNIQUE:  Multiplanar, multisequence imaging of the lumbar spine was performed before and after gadolinium administration. . IV Contrast:  4 mL of Gadobutrol injection (SINGLE-DOSE) IMAGE QUALITY:  Diagnostic FINDINGS: VERTEBRAL BODIES:  There are 5 lumbar type vertebral bodies. There is dextroscoliosis with apex at L1-2. Normal marrow signal is identified within the visualized bony structures. No discrete marrow lesion. SACRUM:  Normal signal within the sacrum. No evidence of insufficiency or stress fracture. DISTAL CORD AND CONUS:  Normal size and signal within the distal cord and conus. PARASPINAL SOFT TISSUES:  Paraspinal soft tissues are unremarkable. LOWER THORACIC DISC SPACES:  Normal disc height and signal.  No disc herniation, canal stenosis or foraminal narrowing. LUMBAR DISC SPACES: L1-L2: No disc bulge. Mild facet arthropathy.  No canal or foraminal stenosis. L2-L3: No disc bulge. Mild facet arthropathy. No canal or foraminal stenosis. L3-L4: No disc bulge. Mild facet arthropathy. No canal or foraminal stenosis. L4-L5: There is degenerative disc dehydration without significant disc height loss. No significant disc bulge. Mild to moderate facet arthropathy. No canal or foraminal stenosis. L5-S1: No disc bulge. Mild facet arthropathy. No canal or foraminal stenosis. POSTCONTRAST IMAGING:  No abnormal enhancement. OTHER FINDINGS: Hepatomegaly     Impression: 1. No abnormal signal or pathologic enhancement in the visualized cord. 2.  Mild degenerative change without canal or foraminal stenosis. 3.  Hepatomegaly. Workstation performed: [id number]     Procedure: MRI thoracic spine w wo contrast    Result Date: 8/7/2023  Narrative: MRI THORACIC SPINE WITH AND WITHOUT CONTRAST INDICATION: no. COMPARISON:  None. TECHNIQUE:  Multiplanar, multisequence imaging of the thoracic spine was performed before and after gadolinium administration. . IV Contrast:  4 mL of Gadobutrol injection (SINGLE-DOSE) IMAGE QUALITY:  Diagnostic. FINDINGS: ALIGNMENT:  Normal alignment of the thoracic spine. No compression fracture. No subluxation. No evidence of scoliosis. MARROW SIGNAL:  Normal marrow signal is identified within the visualized bony structures. No discrete marrow lesion. THORACIC CORD:  Normal signal within the thoracic cord. PREVERTEBRAL AND PARASPINAL SOFT TISSUES:   Normal. THORACIC DEGENERATIVE CHANGE:  No disc herniation, canal stenosis or foraminal narrowing. No degenerative changes. POSTCONTRAST:  No abnormal enhancement. OTHER FINDINGS: Small right and minimal left pleural effusion. Impression: 1. No cord signal abnormality or pathologic enhancement. Unremarkable MRI of the thoracic spine. 2.  Small right and minimal left pleural effusion.  Workstation performed: [id number]       Current Facility-Administered Medications   Medication Dose Route Frequency   • albuterol (PROVENTIL HFA,VENTOLIN HFA) inhaler 2 puff  2 puff Inhalation Q4H PRN   • diphenhydramine, lidocaine, Al/Mg hydroxide, simethicone (Magic Mouthwash) oral solution 10 mL  10 mL Swish & Spit Q4H PRN   • gabapentin (NEURONTIN) capsule 400 mg  400 mg Oral TID   • heparin (porcine) subcutaneous injection 5,000 Units  5,000 Units Subcutaneous Q8H [address]   • HYDROcodone-acetaminophen (NORCO) 5-325 mg per tablet 1 tablet  1 tablet Oral Q6H PRN   • magnesium Oxide (MAG-OX) tablet 400 mg  400 mg Oral HS   • meloxicam (MOBIC) tablet 7.5 mg  7.5 mg Oral Daily   • methylPREDNISolone sodium succinate (Solu-MEDROL) injection 40 mg  40 mg Intravenous Q6H [address]   • morphine injection 2 mg  2 mg Intravenous Q4H PRN   • ondansetron (ZOFRAN) injection 4 mg  4 mg Intravenous Q6H PRN     Medications Discontinued During This Encounter   Medication Reason   • Magnesium Oxide CAPS 400 mg    • methylprednisolone (MEDROL) tablet 20 mg    • methylPREDNISolone (MEDROL) tablet 16 mg    • methylprednisolone (MEDROL) tablet 12 mg    • methylprednisolone (MEDROL) tablet 8 mg    • methylprednisolone (MEDROL) tablet 4 mg    • Lidocaine Viscous HCl (XYLOCAINE) 2 % mucosal solution 15 mL    • gabapentin (NEURONTIN) capsule 300 mg        Lucia Arriaga DO      This progress note was produced in part using a dictation device which may document imprecise wording from author's original intent.

## 2023-08-08 NOTE — ASSESSMENT & PLAN NOTE
· Without exacerbation  · On room air with nonlabored respirations  · Continue Ventolin inhaler as needed

## 2023-08-08 NOTE — ASSESSMENT & PLAN NOTE
· POA  · Presented with bilateral upper and lower extremity pain, weakness, and numbness  · Recently seen in ED and given Lyrica, however, had an adverse reaction to this medication  · MRI C-spine (8/3): No disc herniation, canal or foraminal stenosis. 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 · 8/7 patient reports that the pain in her upper and lower extremities has improved since the steroid was added, however, feels that the numbness and weakness in her hands is worse to the point that she cannot hold utensils to feed herself. She also notes that there is no improvement in the numbness in her feet. States the foot numbness started about 2 weeks ago after an extended car ride that lasted 12 hours  · 8/7 discussed findings with neurology, will obtain MRI T-spine and L-spine with and without contrast and LP  · 8/7 MRI thoracic spine: No cord signal abnormality or pathologic enhancement. Unremarkable MRI of thoracic spine. Small right and minimal left pleural effusion. · 8/7 MRI lumbar spine: No abnormal signal or pathologic enhancement in the visualized cord. Mild degenerative changes without canal or foraminal stenosis. Hepatomegaly. · Assessment  unchanged since 8/8. Continues with burning sensation in hands and feet, unable to wiggle toes, limited mobility of first 2 fingers and right hand and unable to move fingers on left hand. · 8/8 IR performed LP for AIPD/CIPD per my discussion with neurology  · 8/11 CSF fluid studies per orders of neurology. Discussed with neurology via [place] text, [facility] fluid does not indicate AIDP etiology. Patient noted to have B6 deficiency which neurology feels may be causing some of symptoms.   Will add vitamin B6 supplementation starting today  · 8/11 patient reports with worsening burning in her hands and feet today, will increase gabapentin and monitor effectiveness  · Continue Solu-Medrol  (have been titrating down)  · Neurology has been following closely, telemetry conference with neurology today. 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 Nurse established diagnosis would require rheumatology consultation and nerve biopsy. Recommending transfer to [facility]. Dr. Dennis Chino of neurology did the telemetry conference, discussed findings with neurologist at [facility]   · Transfer to [facility].  For pickup by Amy Devries today at 15:45

## 2023-08-08 NOTE — ASSESSMENT & PLAN NOTE
· Not on any maintenance therapy at this time as the patient has several allergies to DMARDs  · Continue IV Solu-Medrol as above for possible acute RA flare, titrating downward  · Planning rheumatology follow-up in outpatient setting. Patient reports that she had not seen a rheumatologist in about 17 years and that her PCP was managing her rheumatoid arthritis. She added that she does [facility] [place] [facility] because that is the quickest she could get in. · 8/11, I secured outpatient appointment with Yasmany Light rheumatologist for 9/11 at 8 AM  · After neurology telemetry consult today plan is to transfer [facility]. Follow-up rheumatology at [facility].   · Transferred to [facility], arrangements finalized

## 2023-08-08 NOTE — PLAN OF CARE
Problem: Potential for Falls  Goal: Patient will remain free of falls  Description: INTERVENTIONS:  - Educate patient/family on patient safety including physical limitations  - Instruct patient to call for assistance with activity   - Consult OT/PT to assist with strengthening/mobility   - Keep Call bell within reach  - Keep bed low and locked with side rails adjusted as appropriate  - Keep care items and personal belongings within reach  - Initiate and maintain comfort rounds  - Make Fall Risk Sign visible to staff  - Offer Toileting every 2 Hours, in advance of need  - Initiate/Maintain 2alarm  - Obtain necessary fall risk management equipment: 2  - Apply yellow socks and bracelet for high fall risk patients  - Consider moving patient to room near nurses station  Outcome: Progressing     Problem: MOBILITY - ADULT  Goal: Maintain or return to baseline ADL function  Description: INTERVENTIONS:  -  Assess patient's ability to carry out ADLs; assess patient's baseline for ADL function and identify physical deficits which impact ability to perform ADLs (bathing, care of mouth/teeth, toileting, grooming, dressing, etc.)  - Assess/evaluate cause of self-care deficits   - Assess range of motion  - Assess patient's mobility; develop plan if impaired  - Assess patient's need for assistive devices and provide as appropriate  - Encourage maximum independence but intervene and supervise when necessary  - Involve family in performance of ADLs  - Assess for home care needs following discharge   - Consider OT consult to assist with ADL evaluation and planning for discharge  - Provide patient education as appropriate  Outcome: Progressing  Goal: Maintains/Returns to pre admission functional level  Description: INTERVENTIONS:  - Perform BMAT or MOVE assessment daily.   - Set and communicate daily mobility goal to care team and patient/family/caregiver.    - Collaborate with rehabilitation services on mobility goals if consulted  - Perform Range of Motion 2 times a day. - Reposition patient every 2 hours. - Dangle patient 2 times a day  - Stand patient 2 times a day  - Ambulate patient 2 times a day  - Out of bed to chair 2 times a day   - Out of bed for meals 2 times a day  - Out of bed for toileting  - Record patient progress and toleration of activity level   Outcome: Progressing     Problem: Prexisting or High Potential for Compromised Skin Integrity  Goal: Skin integrity is maintained or improved  Description: INTERVENTIONS:  - Identify patients at risk for skin breakdown  - Assess and monitor skin integrity  - Assess and monitor nutrition and hydration status  - Monitor labs   - Assess for incontinence   - Turn and reposition patient  - Assist with mobility/ambulation  - Relieve pressure over bony prominences  - Avoid friction and shearing  - Provide appropriate hygiene as needed including keeping skin clean and dry  - Evaluate need for skin moisturizer/barrier cream  - Collaborate with interdisciplinary team   - Patient/family teaching  - Consider wound care consult   Outcome: Progressing     Problem: Nutrition/Hydration-ADULT  Goal: Nutrient/Hydration intake appropriate for improving, restoring or maintaining nutritional needs  Description: Monitor and assess patient's nutrition/hydration status for malnutrition. Collaborate with interdisciplinary team and initiate plan and interventions as ordered. Monitor patient's weight and dietary intake as ordered or per policy. Utilize nutrition screening tool and intervene as necessary. Determine patient's food preferences and provide high-protein, high-caloric foods as appropriate.      INTERVENTIONS:  - Monitor oral intake, urinary output, labs, and treatment plans  - Assess nutrition and hydration status and recommend course of action  - Evaluate amount of meals eaten  - Assist patient with eating if necessary   - Allow adequate time for meals  - Recommend/ encourage appropriate diets, oral nutritional supplements, and vitamin/mineral supplements  - Order, calculate, and assess calorie counts as needed  - Recommend, monitor, and adjust tube feedings and TPN/PPN based on assessed needs  - Assess need for intravenous fluids  - Provide specific nutrition/hydration education as appropriate  - Include patient/family/caregiver in decisions related to nutrition  Outcome: Progressing     Problem: INFECTION - ADULT  Goal: Absence or prevention of progression during hospitalization  Description: INTERVENTIONS:  - Assess and monitor for signs and symptoms of infection  - Monitor lab/diagnostic results  - Monitor all insertion sites, i.e. indwelling lines, tubes, and drains  - Monitor endotracheal if appropriate and nasal secretions for changes in amount and color  - [place] appropriate cooling/warming therapies per order  - Administer medications as ordered  - Instruct and encourage patient and family to use good hand hygiene technique  - Identify and instruct in appropriate isolation precautions for identified infection/condition  Outcome: Progressing  Goal: Absence of fever/infection during neutropenic period  Description: INTERVENTIONS:  - Monitor WBC    Outcome: Progressing     Problem: SAFETY ADULT  Goal: Patient will remain free of falls  Description: INTERVENTIONS:  - Educate patient/family on patient safety including physical limitations  - Instruct patient to call for assistance with activity   - Consult OT/PT to assist with strengthening/mobility   - Keep Call bell within reach  - Keep bed low and locked with side rails adjusted as appropriate  - Keep care items and personal belongings within reach  - Initiate and maintain comfort rounds  - Make Fall Risk Sign visible to staff  - Offer Toileting every 2 Hours, in advance of need  - Initiate/Maintain 2alarm  - Obtain necessary fall risk management equipment: 2  - Apply yellow socks and bracelet for high fall risk patients  - Consider moving patient to room near nurses station  Outcome: Progressing     Problem: MUSCULOSKELETAL - ADULT  Goal: Maintain or return mobility to safest level of function  Description: INTERVENTIONS:  - Assess patient's ability to carry out ADLs; assess patient's baseline for ADL function and identify physical deficits which impact ability to perform ADLs (bathing, care of mouth/teeth, toileting, grooming, dressing, etc.)  - Assess/evaluate cause of self-care deficits   - Assess range of motion  - Assess patient's mobility  - Assess patient's need for assistive devices and provide as appropriate  - Encourage maximum independence but intervene and supervise when necessary  - Involve family in performance of ADLs  - Assess for home care needs following discharge   - Consider OT consult to assist with ADL evaluation and planning for discharge  - Provide patient education as appropriate  Outcome: Progressing  Goal: Maintain proper alignment of affected body part  Description: INTERVENTIONS:  - Support, maintain and protect limb and body alignment  - Provide patient/ family with appropriate education  Outcome: Progressing

## 2023-08-08 NOTE — PROGRESS NOTES
[id number] [facility]  Progress Note  Name: Brittney Coronado I  MRN: [id number]  Unit/Bed#: -01 I Date of Admission: 8/3/2023   Date of Service: 8/8/2023 I Hospital Day: 4    Assessment/Plan   * Bilateral upper and lower extremity neuropathy  Assessment & Plan  · POA  · Presented with bilateral upper and lower extremity pain, weakness, and numbness  · Recently seen in ED and given Lyrica, however, had an adverse reaction to this medication  · MRI C-spine (8/3): No disc herniation, canal or foraminal stenosis. 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 · 8/7 today patient reports that the pain in her upper and lower extremities has improved since the steroid was added, however, feels that the numbness and weakness in her hands is worse to the point that she cannot hold utensils to feed herself. She also notes that there is no improvement in the numbness in her feet. States the foot numbness started about 2 weeks ago after an extended car ride that lasted 12 hours  · 8/7 discussed findings with neurology, will obtain MRI T-spine and L-spine with and without contrast and LP  · 8/7 MRI thoracic spine: No cord signal abnormality or pathologic enhancement. Unremarkable MRI of thoracic spine. Small right and minimal left pleural effusion. · 8/7 MRI lumbar spine: No abnormal signal or pathologic enhancement in the visualized cord. Mild degenerative changes without canal or foraminal stenosis. Hepatomegaly. 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 She will require a EMG/NCS within 4-6 weeks.     Rheumatoid arthritis ([address])  Assessment & Plan  · Not on any maintenance therapy at this time as the patient has several allergies to DMARDs  · Continue IV Solu-Medrol as above for possible acute RA flare  · Planning rheumatology follow-up in outpatient setting    Hyponatremia  Assessment & Plan  · History of acute on chronic hyponatremia  · Has required salt tabs in the past following surgical intervention  · In the setting of a patient who takes meloxicam for pain and chronic pain/nausea  · Nephrology following, appreciate recommendations  · BMP a.m. Microcytic anemia  Assessment & Plan  · No active bleeding noted at this time  · Anemia panel showing combination of LANDEN and anemia of chronic disease  · CBC a.m. Asthma  Assessment & Plan  · Currently without exacerbation  · Nonlabored respirations on room air, no shortness of breath, clear lungs  · Continue Ventolin inhaler           VTE Pharmacologic Prophylaxis:   Pharmacologic: Heparin  Mechanical VTE Prophylaxis in Place: Yes    Patient Centered Rounds: I have performed bedside rounds with nursing staff today. Discussions with Specialists or Other Care Team Provider: Neurology, IR, nursing, case management    Education and Discussions with Family / Patient: Treatment plan discussed with patient and  at bedside, both of whom understand the plan as it has been explained and are agreeable to the plan as stated. All questions answered to satisfaction. Time Spent for Care: 44 minutes. More than 50% of total time spent on counseling and coordination of care as described above. Current Length of Stay: 4 day(s)    Current Patient Status: Inpatient   Certification Statement: The patient will continue to require additional inpatient hospital stay due to IR consulted, patient is pending LP today. Discharge Plan: Pending hospital course. Patient is from home with  prior to arrival.  She is wheelchair bound at baseline.   As of yesterday patient reported that the pain in her hands and feet improved since the steroids were added, however, she is now having intermittent burning sensation and numbness in her hands and feet. She also reported that she never had numbness in her feet up to 2 weeks ago, although she does not ambulate at baseline and is wheelchair-bound. I discussed this with neurology, patient had further imaging including MRI of T-spine and L-spine both of which were essentially unremarkable. This morning her symptoms have not changed. Again I discussed this with neurology, IR will do LP today. Unclear discharge plan at this time, patient is hoping to return home with her  on discharge. Code Status: Level 1 - Full Code      Subjective:   Patient reports continued numbness in her hands and feet and occasional burning sensation in her hands and feet. Also reports she still does not have mobility in her fingers of her left hand and cannot hold a utensil to eat. The findings were no worse than yesterday, essentially unchanged. Objective:     Vitals:   Temp (24hrs), Av.6 °F (36.4 °C), Min:97.6 °F (36.4 °C), Max:97.6 °F (36.4 °C)    Temp:  [97.6 °F (36.4 °C)] 97.6 °F (36.4 °C)  HR:  [80-92] 86  Resp:  [15-18] 18  BP: (110-121)/(81-87) 121/87  SpO2:  [92 %-97 %] 93 %  Body mass index is 17.23 kg/m². Input and Output Summary (last 24 hours): Intake/Output Summary (Last 24 hours) at 2023 1131  Last data filed at 2023 7276  Gross per 24 hour   Intake 540 ml   Output --   Net 540 ml       Physical Exam:     Physical Exam  Constitutional:       General: She is not in acute distress. Appearance: Normal appearance. She is not ill-appearing. HENT:      Head: Normocephalic and atraumatic. Nose: Nose normal.      Mouth/Throat:      Mouth: Mucous membranes are moist.   Eyes:      Extraocular Movements: Extraocular movements intact. Conjunctiva/sclera: Conjunctivae normal.      Pupils: Pupils are equal, round, and reactive to light. Cardiovascular:      Rate and Rhythm: Normal rate and regular rhythm. Pulses: Normal pulses.       Heart sounds: Normal heart sounds. Pulmonary:      Effort: Pulmonary effort is normal. No respiratory distress. Breath sounds: Normal breath sounds. No wheezing or rales. Abdominal:      General: Bowel sounds are normal. There is no distension. Palpations: Abdomen is soft. Tenderness: There is no abdominal tenderness. There is no guarding. Musculoskeletal:         General: Normal range of motion. Skin:     General: Skin is warm and dry. Capillary Refill: Capillary refill takes less than 2 seconds. Neurological:      General: No focal deficit present. Mental Status: She is alert and oriented to person, place, and time. Mental status is at baseline. Comments: Patient continues with numbness and tingling in hands and feet; reports motor weakness in hands today. Gait not assessed at this time,  at bedside reports patient is wheelchair-bound at baseline. Psychiatric:         Mood and Affect: Mood normal.         Behavior: Behavior normal.         Thought Content: Thought content normal.         Judgment: Judgment normal.         Additional Data:     Labs:    Results from last 7 days   Lab Units 08/08/23  0634   WBC Thousand/uL 7.45   HEMOGLOBIN g/dL 8.6*   HEMATOCRIT % 28.5*   PLATELETS Thousands/uL 465*   NEUTROS PCT % 67   LYMPHS PCT % 28   MONOS PCT % 4   EOS PCT % 0     Results from last 7 days   Lab Units 08/08/23  0634   POTASSIUM mmol/L 4.4   CHLORIDE mmol/L 99   CO2 mmol/L 30   BUN mg/dL 21   CREATININE mg/dL 0.34*   CALCIUM mg/dL 8.4     Results from last 7 days   Lab Units 08/08/23  1015   INR  1.07       * I Have Reviewed All Lab Data Listed Above. * Additional Pertinent Lab Tests Reviewed:  [facility] Admission Reviewed    Imaging:    Imaging Reports Reviewed Today Include: MRI C-spine, VAS lower extremity, RI T-spine, MRI L-spine  Imaging Personally Reviewed by Myself Includes:      Recent Cultures (last 7 days):           Last 24 Hours Medication List:   Current Facility-Administered Medications   Medication Dose Route Frequency Provider Last Rate   • albuterol  2 puff Inhalation Q4H PRN Oralia Falcon PA-C     • diphenhydramine, lidocaine, Al/Mg hydroxide, simethicone  10 mL Swish & Spit Q4H PRN Ethelda Moulding Gasca, DO     • gabapentin  400 mg Oral TID Ethelda Moulding Gasca, DO     • heparin (porcine)  5,000 Units Subcutaneous [facility] Oralia Falcon PA-C     • HYDROcodone-acetaminophen  1 tablet Oral Q6H PRN Oralia Falcon PA-C     • magnesium Oxide  400 mg Oral HS Oralia Falcon PA-C     • meloxicam  7.5 mg Oral Daily Oralia Falcon PA-C     • methylPREDNISolone sodium succinate  40 mg Intravenous Q6H [address], DO     • morphine injection  2 mg Intravenous Q4H PRN Oralia Falcon PA-C     • ondansetron  4 mg Intravenous Q6H PRN Oralia Falcon PA-C          Today, Patient Was Seen By: CARMITA Shelby    ** Please Note: Dictation voice to text software may have been used in the creation of this document.  **

## 2023-08-08 NOTE — ASSESSMENT & PLAN NOTE
· No active bleeding noted at this time  · Anemia panel showing combination of LANDEN and anemia of chronic disease  · Nephrology recommending heme iron supplementation  · Further monitoring of labs to be determined at receiving facility

## 2023-08-08 NOTE — TELEMEDICINE
e-Consult (IPC)  - Interventional Radiology  Baljit Hector 41 y.o. female MRN: [id number]  Unit/Bed#: -01 Encounter: [id number]          Interventional Radiology has been consulted to evaluate Ирина Hector    We were consulted by [facility] concerning this patient with neuropathy of the upper and lower extremities of unclear etiology. Case was previously reviewed by neurology, there is possibility patients symptoms may be inflammatory in nature to the patient's history of RA. MRIs have been obtained. A lumbar puncture has been ordered for a more thorough evaluation. Inpatient Consult to IR  Consult performed by: Nii Patel PA-C  Consult ordered by: CARMITA Stapleton        08/08/23    Assessment/Recommendation:     1. Polyneuropathy  - plan for LP today with IR, scheduling pending  - reviewed with Dr. Gurpreet Leos    11-20 minutes, >50% of the total time devoted to medical consultative verbal/EMR discussion between providers. Written report will be generated in the EMR. Thank you for allowing Interventional Radiology to participate in the care of Ryan Allison. Please don't hesitate to call or TigerText us with any questions.      Nii Patel PA-C

## 2023-08-08 NOTE — DISCHARGE INSTR - OTHER ORDERS
Skin and Wound Care Plan:   1. Offloading cushion to chair when OOB  2. Elevate heels off of bed with pillows to offload  3. Turn and reposition patient  Q2 hours  4. Cleanse wound to the left upper buttocks at the sacral region with Remedy foaming cleanser, pat dry. Place oil emulsion-non-adherent dressing on wound bed, top with Allevyn life silicone bordered foam dressing, doc with T, date, change every other day and PRN with soilage or dislodgement    Follow up with Wound Care center on discharge recommended.

## 2023-08-08 NOTE — CASE MANAGEMENT
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   • Asthma 2023   • Psoriasis 2023   • DJD (degenerative joint disease) 2022   • Hyponatremia 2020   • Status post left hip replacement 2020   • Osteoarthritis of left hip 2020   • Ankle arthritis 2017   • Acquired bilateral club feet 2017   • Abnormal electrocardiogram 2016   • Abnormal findings on diagnostic imaging of heart and coronary circulation 2016   • Cardiac murmur 2016   • Cardiomyopathy ([address]) 2016   • Nonrheumatic mitral valve insufficiency 2016   • Blue color skin 2014   • Rheumatoid arthritis ([address]) 2014      LOS (days): 4  Geometric Mean LOS (GMLOS) (days): 3.40  Days to GMLOS:-0.7     OBJECTIVE:    Risk of Unplanned Readmission Score: 8.02     Current admission status: Inpatient    Preferred Pharmacy:   [facility] [place] [address], [address] [place]  [address] [facility]. DR. Noel PEARSON [address]  Phone: [phone] Fax: [phone]    7 [facility], [address] [place] [address]  Phone: [phone] Fax: [phone]    Primary Care Provider: Arnav Araujo DO    Primary Insurance: MEDICARE  Secondary Insurance:     ASSESSMENT:  Alem Proxies    There are no active Health Care Proxies on file.        Advance Directives  Does patient have a [address] [place] [address] Avenue?: No  Was patient offered paperwork?: Yes (Paperwork provided)  Does patient currently have a Health Care decision maker?: Yes, please see Health Care Proxy section  Does patient have Advance Directives?: No  Was patient offered paperwork?: Yes (Provided the paperwork)  Primary Contact: Raheel Hector spouse    Readmission Root Cause  30 Day Readmission: No    Patient Information  Admitted from[de-identified] Home  Mental Status: Alert  During Assessment patient was accompanied by: Spouse  Assessment information provided by[de-identified] Patient, Spouse  Primary Caregiver: Spouse  Caregiver's Name[de-identified] Virgil Hint Relationship to Patient[de-identified] Family Member  Caregiver's Telephone Number[de-identified] [phone]  Support Systems: Spouse/significant other  [place] of Residence: [facility] do you live in?: [place]  Type of Current Residence: [facility]  In the last 12 months, was there a time when you were not able to pay the mortgage or rent on time?: No  In the last 12 months, how many places have you lived?: 1  In the last 12 months, was there a time when you did not have a steady place to sleep or slept in a shelter (including now)?: No  Homeless/housing insecurity resource given?: N/A  Living Arrangements: Lives w/ Spouse/significant other  Is patient a ?: No    Activities of Daily Living Prior to Admission  Functional Status: Assistance  Completes ADLs independently?: No  Level of ADL dependence: Assistance  Ambulates independently?: No  Level of ambulatory dependence:  Total Dependent  Does patient use assisted devices?: Yes (WC bound)  Assisted Devices (DME) used: Madalyn Barker, Electric wheelchair, Wheelchair  Does patient currently own DME?: Yes  What DME does the patient currently own?: Electric Wheelchair, Madalyn Barker, Wheelchair (Platform walker)  Does patient have a history of Outpatient Therapy (PT/OT)?: Yes ([facility])  Does the patient have a history of Short-Term Rehab?: Yes [facility])  Does patient have a history of HHC?: Yes ([facility])  Does patient currently have [facility]?: No    Patient Information Continued  Income Source: SSI/SSD  Does patient have prescription coverage?: Yes  Within the past 12 months, you worried that your food would run out before you got the money to buy more.: Never true  Within the past 12 months, the food you bought just didn't last and you didn't have money to get more.: Never true  Food insecurity resource given?: N/A  Does patient receive dialysis treatments?: No  Does patient have a history of substance abuse?: No  Means of Transportation  Means of Transport to Appts[de-identified] Family transport  In the past 12 months, has lack of transportation kept you from medical appointments or from getting medications?: No  In the past 12 months, has lack of transportation kept you from meetings, work, or from getting things needed for daily living?: No  Was application for public transport provided?: N/A    DISCHARGE DETAILS:    Discharge planning discussed with[de-identified] Pt and spouse  Freedom of Choice: Yes  Comments - Freedom of Choice: Pt agreeable to a blanket referral via [address] for PT/OT    Contacts  Patient Contacts: Marci  Relationship to Patient[de-identified] Family  Contact Method:  In Person  Reason/Outcome: Emergency [address]         Is the patient interested in [address] at discharge?: Yes  [facility] requested[de-identified] Occupational Therapy, Physical [address] [facility] Name[de-identified] [facility] External Referral Reason (only applicable if external HHA name selected): Patient has established relationship with provider  [facility] Provider[de-identified] PCP  Home Health Services Needed[de-identified] Strengthening/Theraputic Exercises to Improve Function, Gait/ADL Training, Evaluate Functional Status and Safety  Homebound Criteria Met[de-identified] Uses an Assist Device (i.e. cane, walker, etc), Requires the Assistance of Another Person for Safe Ambulation or to Leave the Home  Supporting Clincal Findings[de-identified] Limited Endurance  Treatment Team Recommendation: Home with [address] [facility]  Discharge Destination Plan[de-identified] Home with [address] at Discharge : Family

## 2023-08-09 PROBLEM — E44.0 MODERATE MALNUTRITION (HCC): Status: ACTIVE | Noted: 2023-08-09

## 2023-08-09 LAB
ANA SER QL IA: POSITIVE
ANION GAP SERPL CALCULATED.3IONS-SCNC: 6 MMOL/L
BASOPHILS # BLD AUTO: 0.01 THOUSANDS/ÂΜL (ref 0–0.1)
BASOPHILS NFR BLD AUTO: 0 % (ref 0–1)
BUN SERPL-MCNC: 21 MG/DL (ref 5–25)
CALCIUM SERPL-MCNC: 8.8 MG/DL (ref 8.4–10.2)
CHLORIDE SERPL-SCNC: 98 MMOL/L (ref 96–108)
CO2 SERPL-SCNC: 30 MMOL/L (ref 21–32)
CREAT SERPL-MCNC: 0.39 MG/DL (ref 0.6–1.3)
EOSINOPHIL # BLD AUTO: 0 THOUSAND/ÂΜL (ref 0–0.61)
EOSINOPHIL NFR BLD AUTO: 0 % (ref 0–6)
ERYTHROCYTE [DISTWIDTH] IN BLOOD BY AUTOMATED COUNT: 16.6 % (ref 11.6–15.1)
GFR SERPL CREATININE-BSD FRML MDRD: 127 ML/MIN/1.73SQ M
GLUCOSE SERPL-MCNC: 128 MG/DL (ref 65–140)
HCT VFR BLD AUTO: 30.9 % (ref 34.8–46.1)
HGB BLD-MCNC: 9.2 G/DL (ref 11.5–15.4)
IMM GRANULOCYTES # BLD AUTO: 0.1 THOUSAND/UL (ref 0–0.2)
IMM GRANULOCYTES NFR BLD AUTO: 1 % (ref 0–2)
LYMPHOCYTES # BLD AUTO: 2.19 THOUSANDS/ÂΜL (ref 0.6–4.47)
LYMPHOCYTES NFR BLD AUTO: 25 % (ref 14–44)
MCH RBC QN AUTO: 24.2 PG (ref 26.8–34.3)
MCHC RBC AUTO-ENTMCNC: 29.8 G/DL (ref 31.4–37.4)
MCV RBC AUTO: 81 FL (ref 82–98)
MONOCYTES # BLD AUTO: 0.3 THOUSAND/ÂΜL (ref 0.17–1.22)
MONOCYTES NFR BLD AUTO: 3 % (ref 4–12)
NEUTROPHILS # BLD AUTO: 6.31 THOUSANDS/ÂΜL (ref 1.85–7.62)
NEUTS SEG NFR BLD AUTO: 71 % (ref 43–75)
NRBC BLD AUTO-RTO: 0 /100 WBCS
PLATELET # BLD AUTO: 507 THOUSANDS/UL (ref 149–390)
PMV BLD AUTO: 7.7 FL (ref 8.9–12.7)
POTASSIUM SERPL-SCNC: 4.2 MMOL/L (ref 3.5–5.3)
RBC # BLD AUTO: 3.8 MILLION/UL (ref 3.81–5.12)
SODIUM SERPL-SCNC: 134 MMOL/L (ref 135–147)
WBC # BLD AUTO: 8.91 THOUSAND/UL (ref 4.31–10.16)

## 2023-08-09 PROCEDURE — 83521 IG LIGHT CHAINS FREE EACH: CPT | Performed by: STUDENT IN AN ORGANIZED HEALTH CARE EDUCATION/TRAINING PROGRAM

## 2023-08-09 PROCEDURE — 85025 COMPLETE CBC W/AUTO DIFF WBC: CPT

## 2023-08-09 PROCEDURE — 82784 ASSAY IGA/IGD/IGG/IGM EACH: CPT | Performed by: STUDENT IN AN ORGANIZED HEALTH CARE EDUCATION/TRAINING PROGRAM

## 2023-08-09 PROCEDURE — 80048 BASIC METABOLIC PNL TOTAL CA: CPT

## 2023-08-09 PROCEDURE — 99233 SBSQ HOSP IP/OBS HIGH 50: CPT

## 2023-08-09 RX ADMIN — DIPHENHYDRAMINE HYDROCHLORIDE AND LIDOCAINE HYDROCHLORIDE AND ALUMINUM HYDROXIDE AND MAGNESIUM HYDRO 10 ML: KIT at 16:47

## 2023-08-09 RX ADMIN — GABAPENTIN 400 MG: 400 CAPSULE ORAL at 09:45

## 2023-08-09 RX ADMIN — METHYLPREDNISOLONE SODIUM SUCCINATE 40 MG: 40 INJECTION, POWDER, FOR SOLUTION INTRAMUSCULAR; INTRAVENOUS at 23:57

## 2023-08-09 RX ADMIN — DIPHENHYDRAMINE HYDROCHLORIDE AND LIDOCAINE HYDROCHLORIDE AND ALUMINUM HYDROXIDE AND MAGNESIUM HYDRO 10 ML: KIT at 23:57

## 2023-08-09 RX ADMIN — HEPARIN SODIUM 5000 UNITS: 5000 INJECTION INTRAVENOUS; SUBCUTANEOUS at 21:29

## 2023-08-09 RX ADMIN — SODIUM CHLORIDE 200 MG: 9 INJECTION, SOLUTION INTRAVENOUS at 15:33

## 2023-08-09 RX ADMIN — METHYLPREDNISOLONE SODIUM SUCCINATE 40 MG: 40 INJECTION, POWDER, FOR SOLUTION INTRAMUSCULAR; INTRAVENOUS at 17:00

## 2023-08-09 RX ADMIN — METHYLPREDNISOLONE SODIUM SUCCINATE 40 MG: 40 INJECTION, POWDER, FOR SOLUTION INTRAMUSCULAR; INTRAVENOUS at 12:57

## 2023-08-09 RX ADMIN — GABAPENTIN 400 MG: 400 CAPSULE ORAL at 15:33

## 2023-08-09 RX ADMIN — METHYLPREDNISOLONE SODIUM SUCCINATE 40 MG: 40 INJECTION, POWDER, FOR SOLUTION INTRAMUSCULAR; INTRAVENOUS at 05:05

## 2023-08-09 RX ADMIN — GABAPENTIN 400 MG: 400 CAPSULE ORAL at 20:45

## 2023-08-09 RX ADMIN — Medication 400 MG: at 21:29

## 2023-08-09 RX ADMIN — HEPARIN SODIUM 5000 UNITS: 5000 INJECTION INTRAVENOUS; SUBCUTANEOUS at 14:28

## 2023-08-09 RX ADMIN — HEPARIN SODIUM 5000 UNITS: 5000 INJECTION INTRAVENOUS; SUBCUTANEOUS at 05:07

## 2023-08-09 RX ADMIN — MELOXICAM 7.5 MG: 7.5 TABLET ORAL at 09:45

## 2023-08-09 RX ADMIN — HYDROCODONE BITARTRATE AND ACETAMINOPHEN 1 TABLET: 5; 325 TABLET ORAL at 20:45

## 2023-08-09 NOTE — PROGRESS NOTES
[address]  Progress Note  Name: Norma Carbajal I  MRN: [id number]  Unit/Bed#: -01 I Date of Admission: 8/3/2023   Date of Service: 8/9/2023 I Hospital Day: 5    Assessment/Plan   * Bilateral upper and lower extremity neuropathy  Assessment & Plan  · POA  · Presented with bilateral upper and lower extremity pain, weakness, and numbness  · Recently seen in ED and given Lyrica, however, had an adverse reaction to this medication  · MRI C-spine (8/3): No disc herniation, canal or foraminal stenosis. 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 · 8/7 patient reports that the pain in her upper and lower extremities has improved since the steroid was added, however, feels that the numbness and weakness in her hands is worse to the point that she cannot hold utensils to feed herself. She also notes that there is no improvement in the numbness in her feet. States the foot numbness started about 2 weeks ago after an extended car ride that lasted 12 hours  · 8/7 discussed findings with neurology, will obtain MRI T-spine and L-spine with and without contrast and LP  · 8/7 MRI thoracic spine: No cord signal abnormality or pathologic enhancement. Unremarkable MRI of thoracic spine. Small right and minimal left pleural effusion. · 8/7 MRI lumbar spine: No abnormal signal or pathologic enhancement in the visualized cord. Mild degenerative changes without canal or foraminal stenosis. Hepatomegaly.   · As of 8/8, assessment findings unchanged from day prior  · 8/9 assessment findings unchanged, reports continued numbness in hands and feet, inability to wiggle toes and limited mobility of fingers; still unable to grasp utensil to feed self  · 8/8 IR performed LP for AIPD/CIPD per my discussion with neurology  · CSF fluid studies per orders of neurology, most are still ending results  · Continue gabapentin  · Continue Solu-Medrol  · Per neurology will need follow up in in 6 weeks with neuromuscular attending. She will require a EMG/NCS within 4-6 weeks. Rheumatoid arthritis ([address])  Assessment & Plan  · Not on any maintenance therapy at this time as the patient has several allergies to DMARDs  · Continue IV Solu-Medrol as above for possible acute RA flare  · Planning rheumatology follow-up in outpatient setting. Patient reports that she had not seen a rheumatologist in about 17 years and that her PCP was managing her rheumatoid arthritis. She added that she does have a rheumatology appointment with a doctor at [facility] in [place] in January because that is the quickest she could get in. Hyponatremia  Assessment & Plan  · History of acute on chronic hyponatremia  · Has required salt tabs in the past following surgical intervention  · In the setting of a patient who takes meloxicam for pain and chronic pain/nausea  · Nephrology following, appreciate recommendations  · BMP a.m. Microcytic anemia  Assessment & Plan  · No active bleeding noted at this time  · Anemia panel showing combination of LANDEN and anemia of chronic disease  · Initiate iron supplementation  · CBC a.m. Asthma  Assessment & Plan  · Without exacerbation  · Nonlabored respirations on room air, no shortness of breath, clear lungs  · Continue Ventolin inhaler           VTE Pharmacologic Prophylaxis:   Pharmacologic: Heparin  Mechanical VTE Prophylaxis in Place: Yes    Patient Centered Rounds: I have performed bedside rounds with nursing staff today. Discussions with Specialists or Other Care Team Provider: Neurology, IR, nursing, case management    Education and Discussions with Family / Patient: Treatment plan discussed with patient and  at bedside, both of whom understand the plan as it has been explained and are agreeable to the plan as stated. All questions answered to satisfaction. Time Spent for Care: 40 minutes.   More than 50% of total time spent on counseling and coordination of care as described above. Current Length of Stay: 5 day(s)    Current Patient Status: Inpatient   Certification Statement: The patient will continue to require additional inpatient hospital stay due to Receiving IV iron infusion, IV steroids    Discharge Plan: Pending hospital course. Patient is from home with  prior to arrival.  She is wheelchair bound at baseline. She continues with numbness in her hands and feet, continues with decreased mobility with fingers on right hand, unable to wiggle fingers on left hand. This is unchanged. Patient had an LP yesterday by IR, CSF results per orders of neurology who are following patient. Continues on IV steroids for presumed RA flare. The pain has significantly improved with the steroids. Also added IV iron infusion x 1 today. Repeat labs in the morning. Code Status: Level 1 - Full Code      Subjective:   Patient reports continued numbness in her hands and feet change from yesterday. Also reports she still does not have mobility in her fingers of her left hand and cannot hold a utensil to eat. These findings have been unchanged over the last 48 hours, she does report that she no longer has pain in her hands and feet since the IV steroid was added. Objective:     Vitals:   Temp (24hrs), Av.2 °F (36.2 °C), Min:97 °F (36.1 °C), Max:97.5 °F (36.4 °C)    Temp:  [97 °F (36.1 °C)-97.5 °F (36.4 °C)] 97 °F (36.1 °C)  HR:  [74-86] 74  Resp:  [16-18] 16  BP: (112-128)/(83-91) 124/91  SpO2:  [92 %-95 %] 93 %  Body mass index is 17.23 kg/m². Input and Output Summary (last 24 hours): Intake/Output Summary (Last 24 hours) at 2023 1257  Last data filed at 2023 0900  Gross per 24 hour   Intake 480 ml   Output --   Net 480 ml       Physical Exam:     Physical Exam  Constitutional:       General: She is not in acute distress. Appearance: Normal appearance. She is not ill-appearing. HENT:      Head: Normocephalic and atraumatic. Nose: Nose normal.      Mouth/Throat:      Mouth: Mucous membranes are moist.   Eyes:      Extraocular Movements: Extraocular movements intact. Conjunctiva/sclera: Conjunctivae normal.      Pupils: Pupils are equal, round, and reactive to light. Cardiovascular:      Rate and Rhythm: Normal rate and regular rhythm. Pulses: Normal pulses. Heart sounds: Normal heart sounds. Pulmonary:      Effort: Pulmonary effort is normal. No respiratory distress. Breath sounds: Normal breath sounds. No wheezing or rales. Abdominal:      General: Bowel sounds are normal. There is no distension. Palpations: Abdomen is soft. Tenderness: There is no abdominal tenderness. There is no guarding. Musculoskeletal:         General: Normal range of motion. Skin:     General: Skin is warm and dry. Capillary Refill: Capillary refill takes less than 2 seconds. Neurological:      General: No focal deficit present. Mental Status: She is alert and oriented to person, place, and time. Mental status is at baseline. Comments: Patient continues with numbness and tingling in hands and feet; he is with motor weakness in her hands. Gait not assessed at this time,  at bedside reports patient is wheelchair-bound at baseline. Psychiatric:         Mood and Affect: Mood normal.         Behavior: Behavior normal.         Thought Content:  Thought content normal.         Judgment: Judgment normal.         Additional Data:     Labs:    Results from last 7 days   Lab Units 08/09/23  0503   WBC Thousand/uL 8.91   HEMOGLOBIN g/dL 9.2*   HEMATOCRIT % 30.9*   PLATELETS Thousands/uL 507*   NEUTROS PCT % 71   LYMPHS PCT % 25   MONOS PCT % 3*   EOS PCT % 0     Results from last 7 days   Lab Units 08/09/23  0503   POTASSIUM mmol/L 4.2   CHLORIDE mmol/L 98   CO2 mmol/L 30   BUN mg/dL 21   CREATININE mg/dL 0.39*   CALCIUM mg/dL 8.8     Results from last 7 days   Lab Units 08/08/23  1015   INR  1.07       * I Have Reviewed All Lab Data Listed Above. * Additional Pertinent Lab Tests Reviewed: [address] Admission Reviewed    Imaging:    Imaging Reports Reviewed Today Include: MRI C-spine, VAS lower extremity, RI T-spine, MRI L-spine  Imaging Personally Reviewed by Myself Includes:      Recent Cultures (last 7 days):     Results from last 7 days   Lab Units 08/08/23  1239   GRAM STAIN RESULT  No Polys or Bacteria seen       Last 24 Hours Medication List:   Current Facility-Administered Medications   Medication Dose Route Frequency Provider Last Rate   • albuterol  2 puff Inhalation Q4H PRN Lela Santoyo PA-C     • diphenhydramine, lidocaine, Al/Mg hydroxide, simethicone  10 mL Swish & Spit Q4H PRN Adonis Gasca, DO     • gabapentin  400 mg Oral TID Adonis Gasca, DO     • heparin (porcine)  5,000 Units Subcutaneous [facility] Lela Santoyo PA-C     • HYDROcodone-acetaminophen  1 tablet Oral Q6H PRN Lela Santoyo PA-C     • iron sucrose  200 mg Intravenous Once CARMITA Stapleton     • magnesium Oxide  400 mg Oral HS Lela Santoyo PA-C     • meloxicam  7.5 mg Oral Daily Lela Santoyo PA-C     • methylPREDNISolone sodium succinate  40 mg Intravenous Q6H [address],      • morphine injection  2 mg Intravenous Q4H PRN Lela Santoyo PA-C     • ondansetron  4 mg Intravenous Q6H PRN Lela Santoyo PA-C          Today, Patient Was Seen By: CARMITA Gallegos    ** Please Note: Dictation voice to text software may have been used in the creation of this document.  **

## 2023-08-09 NOTE — CASE MANAGEMENT
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   • Asthma 2023   • Psoriasis 2023   • DJD (degenerative joint disease) 2022   • Hyponatremia 2020   • Status post left hip replacement 2020   • Osteoarthritis of left hip 2020   • Ankle arthritis 2017   • Acquired bilateral club feet 2017   • Abnormal electrocardiogram 2016   • Abnormal findings on diagnostic imaging of heart and coronary circulation 2016   • Cardiac murmur 2016   • Cardiomyopathy ([address]) 2016   • Nonrheumatic mitral valve insufficiency 2016   • Blue color skin 2014   • Rheumatoid arthritis ([address]) 2014      LOS (days): 5  Geometric Mean LOS (GMLOS) (days): 3.40  Days to GMLOS:-1.8     OBJECTIVE:  Risk of Unplanned Readmission Score: 8.05         Current admission status: Inpatient   Preferred Pharmacy:   [address] [place] [address], [address] [place] [address]. [address]  [address] [facility]. [place] [address]  Phone: [phone] Fax: [phone]    9 [facility], [address] [place] [address] Road  [address] [place] [address] Road [address]  Phone: [phone] Fax: [phone]    Primary Care Provider: Leoma Barthel, DO    Primary Insurance: MEDICARE  Secondary Insurance:     DISCHARGE DETAILS:  Provided the pt choice list for [facility] to pt and spouse to review.   Will choose from the list.

## 2023-08-09 NOTE — PLAN OF CARE
Problem: Potential for Falls  Goal: Patient will remain free of falls  Description: INTERVENTIONS:  - Educate patient/family on patient safety including physical limitations  - Instruct patient to call for assistance with activity   - Consult OT/PT to assist with strengthening/mobility   - Keep Call bell within reach  - Keep bed low and locked with side rails adjusted as appropriate  - Keep care items and personal belongings within reach  - Initiate and maintain comfort rounds  - Make Fall Risk Sign visible to staff  - Apply yellow socks and bracelet for high fall risk patients  - Consider moving patient to room near nurses station  8/8/2023 2011 by Johnna Nunez RN  Outcome: Progressing  8/8/2023 2011 by Johnna Nunez RN  Outcome: Progressing     Problem: MOBILITY - ADULT  Goal: Maintain or return to baseline ADL function  Description: INTERVENTIONS:  -  Assess patient's ability to carry out ADLs; assess patient's baseline for ADL function and identify physical deficits which impact ability to perform ADLs (bathing, care of mouth/teeth, toileting, grooming, dressing, etc.)  - Assess/evaluate cause of self-care deficits   - Assess range of motion  - Assess patient's mobility; develop plan if impaired  - Assess patient's need for assistive devices and provide as appropriate  - Encourage maximum independence but intervene and supervise when necessary  - Involve family in performance of ADLs  - Assess for home care needs following discharge   - Consider OT consult to assist with ADL evaluation and planning for discharge  - Provide patient education as appropriate  8/8/2023 2011 by Johnna Nunez RN  Outcome: Progressing  8/8/2023 2011 by Johnna Nunez RN  Outcome: Progressing  Goal: Maintains/Returns to pre admission functional level  Description: INTERVENTIONS:  - Perform BMAT or MOVE assessment daily.   - Set and communicate daily mobility goal to care team and patient/family/caregiver.    - Collaborate with rehabilitation services on mobility goals if consulted  - Reposition patient every 2 hours. - Dangle patient 3 times a day  - Stand patient 3 times a day  - Ambulate patient 3 times a day  - Out of bed to chair 3 times a day   - Out of bed for meals 3 times a day  - Out of bed for toileting  - Record patient progress and toleration of activity level   8/8/2023 2011 by David Hanna RN  Outcome: Progressing  8/8/2023 2011 by David Hanna RN  Outcome: Progressing     Problem: Prexisting or High Potential for Compromised Skin Integrity  Goal: Skin integrity is maintained or improved  Description: INTERVENTIONS:  - Identify patients at risk for skin breakdown  - Assess and monitor skin integrity  - Assess and monitor nutrition and hydration status  - Monitor labs   - Assess for incontinence   - Turn and reposition patient  - Assist with mobility/ambulation  - Relieve pressure over bony prominences  - Avoid friction and shearing  - Provide appropriate hygiene as needed including keeping skin clean and dry  - Evaluate need for skin moisturizer/barrier cream  - Collaborate with interdisciplinary team   - Patient/family teaching  - Consider wound care consult   8/8/2023 2011 by David Hanna RN  Outcome: Progressing  8/8/2023 2011 by David Hanna RN  Outcome: Progressing     Problem: Nutrition/Hydration-ADULT  Goal: Nutrient/Hydration intake appropriate for improving, restoring or maintaining nutritional needs  Description: Monitor and assess patient's nutrition/hydration status for malnutrition. Collaborate with interdisciplinary team and initiate plan and interventions as ordered. Monitor patient's weight and dietary intake as ordered or per policy. Utilize nutrition screening tool and intervene as necessary. Determine patient's food preferences and provide high-protein, high-caloric foods as appropriate.      INTERVENTIONS:  - Monitor oral intake, urinary output, labs, and treatment plans  - Assess nutrition and hydration status and recommend course of action  - Evaluate amount of meals eaten  - Assist patient with eating if necessary   - Allow adequate time for meals  - Recommend/ encourage appropriate diets, oral nutritional supplements, and vitamin/mineral supplements  - Order, calculate, and assess calorie counts as needed  - Recommend, monitor, and adjust tube feedings and TPN/PPN based on assessed needs  - Assess need for intravenous fluids  - Provide specific nutrition/hydration education as appropriate  - Include patient/family/caregiver in decisions related to nutrition  8/8/2023 2011 by Bubba Pryor RN  Outcome: Progressing  8/8/2023 2011 by Bubba Pryor RN  Outcome: Progressing     Problem: INFECTION - ADULT  Goal: Absence or prevention of progression during hospitalization  Description: INTERVENTIONS:  - Assess and monitor for signs and symptoms of infection  - Monitor lab/diagnostic results  - Monitor all insertion sites, i.e. indwelling lines, tubes, and drains  - Monitor endotracheal if appropriate and nasal secretions for changes in amount and color  - [place] appropriate cooling/warming therapies per order  - Administer medications as ordered  - Instruct and encourage patient and family to use good hand hygiene technique  - Identify and instruct in appropriate isolation precautions for identified infection/condition  8/8/2023 2011 by Bubba Pryor RN  Outcome: Progressing  8/8/2023 2011 by Bubba Pryor RN  Outcome: Progressing  Goal: Absence of fever/infection during neutropenic period  Description: INTERVENTIONS:  - Monitor WBC    8/8/2023 2011 by Bubba Pryor RN  Outcome: Progressing  8/8/2023 2011 by Bubba Pryor RN  Outcome: Progressing     Problem: SAFETY ADULT  Goal: Patient will remain free of falls  Description: INTERVENTIONS:  - Educate patient/family on patient safety including physical limitations  - Instruct patient to call for assistance with activity   - Consult OT/PT to assist with strengthening/mobility   - Keep Call bell within reach  - Keep bed low and locked with side rails adjusted as appropriate  - Keep care items and personal belongings within reach  - Initiate and maintain comfort rounds  - Make Fall Risk Sign visible to staff  - Offer Toileting every 2 Hours, in advance of need  - Initiate/Maintain bed alarm  - Obtain necessary fall risk management equipment  - Apply yellow socks and bracelet for high fall risk patients  - Consider moving patient to room near nurses station  8/8/2023 2011 by Timothy Bender RN  Outcome: Progressing  8/8/2023 2011 by Timothy Bender RN  Outcome: Progressing     Problem: MUSCULOSKELETAL - ADULT  Goal: Maintain or return mobility to safest level of function  Description: INTERVENTIONS:  - Assess patient's ability to carry out ADLs; assess patient's baseline for ADL function and identify physical deficits which impact ability to perform ADLs (bathing, care of mouth/teeth, toileting, grooming, dressing, etc.)  - Assess/evaluate cause of self-care deficits   - Assess range of motion  - Assess patient's mobility  - Assess patient's need for assistive devices and provide as appropriate  - Encourage maximum independence but intervene and supervise when necessary  - Involve family in performance of ADLs  - Assess for home care needs following discharge   - Consider OT consult to assist with ADL evaluation and planning for discharge  - Provide patient education as appropriate  8/8/2023 2011 by Timothy Bender RN  Outcome: Progressing  8/8/2023 2011 by Timothy Bender RN  Outcome: Progressing  Goal: Maintain proper alignment of affected body part  Description: INTERVENTIONS:  - Support, maintain and protect limb and body alignment  - Provide patient/ family with appropriate education  8/8/2023 2011 by Timothy Bender RN  Outcome: Progressing  8/8/2023 2011 by Timothy Bender RN  Outcome: Progressing

## 2023-08-09 NOTE — CONSULTS
Patient stated she is not sure what her current wt is estimated by patient as 110#. Spoke with RN and will try to get bed zeroed out for accurate bed wt. Patient stated her appetite is starting to improve now. She is trying to complete 50-75% meals and is trying to eat more now with double portions. Her food allergies do limit extensive menu variety. Patient did agree to try Ensure Plant Based Protein shake to see if she would like as po supplements.  Stated her  is also bringing some hummus from home for snack

## 2023-08-09 NOTE — PLAN OF CARE
Problem: Potential for Falls  Goal: Patient will remain free of falls  Description: INTERVENTIONS:  - Educate patient/family on patient safety including physical limitations  - Instruct patient to call for assistance with activity   - Consult OT/PT to assist with strengthening/mobility   - Keep Call bell within reach  - Keep bed low and locked with side rails adjusted as appropriate  - Keep care items and personal belongings within reach  - Initiate and maintain comfort rounds  - Make Fall Risk Sign visible to staff  - Offer Toileting every 2 Hours, in advance of need  - Initiate/Maintain bed alarm  - Obtain necessary fall risk management equipment: bed alarm  - Apply yellow socks and bracelet for high fall risk patients  - Consider moving patient to room near nurses station  Outcome: Progressing     Problem: MOBILITY - ADULT  Goal: Maintain or return to baseline ADL function  Description: INTERVENTIONS:  -  Assess patient's ability to carry out ADLs; assess patient's baseline for ADL function and identify physical deficits which impact ability to perform ADLs (bathing, care of mouth/teeth, toileting, grooming, dressing, etc.)  - Assess/evaluate cause of self-care deficits   - Assess range of motion  - Assess patient's mobility; develop plan if impaired  - Assess patient's need for assistive devices and provide as appropriate  - Encourage maximum independence but intervene and supervise when necessary  - Involve family in performance of ADLs  - Assess for home care needs following discharge   - Consider OT consult to assist with ADL evaluation and planning for discharge  - Provide patient education as appropriate  Outcome: Progressing  Goal: Maintains/Returns to pre admission functional level  Description: INTERVENTIONS:  - Perform BMAT or MOVE assessment daily.   - Set and communicate daily mobility goal to care team and patient/family/caregiver.    - Collaborate with rehabilitation services on mobility goals if consulted  - Perform Range of Motion 3 times a day. - Reposition patient every 2 hours. - Dangle patient 3 times a day  - Stand patient 3 times a day  - Ambulate patient 3 times a day  - Out of bed to chair 3 times a day   - Out of bed for meals 3 times a day  - Out of bed for toileting  - Record patient progress and toleration of activity level   Outcome: Progressing     Problem: Prexisting or High Potential for Compromised Skin Integrity  Goal: Skin integrity is maintained or improved  Description: INTERVENTIONS:  - Identify patients at risk for skin breakdown  - Assess and monitor skin integrity  - Assess and monitor nutrition and hydration status  - Monitor labs   - Assess for incontinence   - Turn and reposition patient  - Assist with mobility/ambulation  - Relieve pressure over bony prominences  - Avoid friction and shearing  - Provide appropriate hygiene as needed including keeping skin clean and dry  - Evaluate need for skin moisturizer/barrier cream  - Collaborate with interdisciplinary team   - Patient/family teaching  - Consider wound care consult   Outcome: Progressing     Problem: Nutrition/Hydration-ADULT  Goal: Nutrient/Hydration intake appropriate for improving, restoring or maintaining nutritional needs  Description: Monitor and assess patient's nutrition/hydration status for malnutrition. Collaborate with interdisciplinary team and initiate plan and interventions as ordered. Monitor patient's weight and dietary intake as ordered or per policy. Utilize nutrition screening tool and intervene as necessary. Determine patient's food preferences and provide high-protein, high-caloric foods as appropriate.      INTERVENTIONS:  - Monitor oral intake, urinary output, labs, and treatment plans  - Assess nutrition and hydration status and recommend course of action  - Evaluate amount of meals eaten  - Assist patient with eating if necessary   - Allow adequate time for meals  - Recommend/ encourage appropriate diets, oral nutritional supplements, and vitamin/mineral supplements  - Order, calculate, and assess calorie counts as needed  - Recommend, monitor, and adjust tube feedings and TPN/PPN based on assessed needs  - Assess need for intravenous fluids  - Provide specific nutrition/hydration education as appropriate  - Include patient/family/caregiver in decisions related to nutrition  Outcome: Progressing     Problem: INFECTION - ADULT  Goal: Absence or prevention of progression during hospitalization  Description: INTERVENTIONS:  - Assess and monitor for signs and symptoms of infection  - Monitor lab/diagnostic results  - Monitor all insertion sites, i.e. indwelling lines, tubes, and drains  - Monitor endotracheal if appropriate and nasal secretions for changes in amount and color  - [place] appropriate cooling/warming therapies per order  - Administer medications as ordered  - Instruct and encourage patient and family to use good hand hygiene technique  - Identify and instruct in appropriate isolation precautions for identified infection/condition  Outcome: Progressing  Goal: Absence of fever/infection during neutropenic period  Description: INTERVENTIONS:  - Monitor WBC    Outcome: Progressing     Problem: SAFETY ADULT  Goal: Patient will remain free of falls  Description: INTERVENTIONS:  - Educate patient/family on patient safety including physical limitations  - Instruct patient to call for assistance with activity   - Consult OT/PT to assist with strengthening/mobility   - Keep Call bell within reach  - Keep bed low and locked with side rails adjusted as appropriate  - Keep care items and personal belongings within reach  - Initiate and maintain comfort rounds  - Make Fall Risk Sign visible to staff  - Offer Toileting every 2 Hours, in advance of need  - Initiate/Maintain bed alarm  - Obtain necessary fall risk management equipment: bed alarm  - Apply yellow socks and bracelet for high fall risk patients  - Consider moving patient to room near nurses station  Outcome: Progressing     Problem: MUSCULOSKELETAL - ADULT  Goal: Maintain or return mobility to safest level of function  Description: INTERVENTIONS:  - Assess patient's ability to carry out ADLs; assess patient's baseline for ADL function and identify physical deficits which impact ability to perform ADLs (bathing, care of mouth/teeth, toileting, grooming, dressing, etc.)  - Assess/evaluate cause of self-care deficits   - Assess range of motion  - Assess patient's mobility  - Assess patient's need for assistive devices and provide as appropriate  - Encourage maximum independence but intervene and supervise when necessary  - Involve family in performance of ADLs  - Assess for home care needs following discharge   - Consider OT consult to assist with ADL evaluation and planning for discharge  - Provide patient education as appropriate  Outcome: Progressing  Goal: Maintain proper alignment of affected body part  Description: INTERVENTIONS:  - Support, maintain and protect limb and body alignment  - Provide patient/ family with appropriate education  Outcome: Progressing

## 2023-08-09 NOTE — MALNUTRITION/BMI
This medical record reflects one or more clinical indicators suggestive of malnutrition and underweight    Malnutrition Findings:   Adult Malnutrition type: Chronic illness  Adult Degree of Malnutrition: Malnutrition of moderate degree  Malnutrition Characteristics: Fat loss, Muscle loss, Inadequate energy                  360 Statement: Malnutrition of Moderate degree related to disease/condition RA evidenced by <75% energy intake versus needs for > 1 month exhibiting moderate clavicle region muscle loss and moderate buccal fat pads loss,Weight loss to be validated when current bed wt obtained. Treating with Gluten Free diet double portions and trialing Ensure Plant Based Protein Shakes BID as po supplement. BMI Findings:  Adult BMI Classifications: Underweight < 18.5        Body mass index is 17.23 kg/m². See Nutrition note dated 08/09/2023   for additional details. Completed nutrition assessment is viewable in the nutrition documentation.

## 2023-08-10 PROBLEM — E53.1 VITAMIN B6 DEFICIENCY: Status: ACTIVE | Noted: 2023-08-10

## 2023-08-10 LAB
ALBUMIN SERPL ELPH-MCNC: 2.71 G/DL (ref 3.2–5.1)
ALBUMIN SERPL ELPH-MCNC: 37.7 % (ref 48–70)
ALPHA1 GLOB SERPL ELPH-MCNC: 0.4 G/DL (ref 0.15–0.47)
ALPHA1 GLOB SERPL ELPH-MCNC: 5.5 % (ref 1.8–7)
ALPHA2 GLOB SERPL ELPH-MCNC: 0.92 G/DL (ref 0.42–1.04)
ALPHA2 GLOB SERPL ELPH-MCNC: 12.8 % (ref 5.9–14.9)
ANION GAP SERPL CALCULATED.3IONS-SCNC: 4 MMOL/L
BASOPHILS # BLD AUTO: 0.01 THOUSANDS/ÂΜL (ref 0–0.1)
BASOPHILS NFR BLD AUTO: 0 % (ref 0–1)
BETA GLOB ABNORMAL SERPL ELPH-MCNC: 0.37 G/DL (ref 0.31–0.57)
BETA1 GLOB SERPL ELPH-MCNC: 5.2 % (ref 4.7–7.7)
BETA2 GLOB SERPL ELPH-MCNC: 5.4 % (ref 3.1–7.9)
BETA2+GAMMA GLOB SERPL ELPH-MCNC: 0.39 G/DL (ref 0.2–0.58)
BUN SERPL-MCNC: 27 MG/DL (ref 5–25)
C-ANCA TITR SER IF: ABNORMAL TITER
CALCIUM SERPL-MCNC: 8.4 MG/DL (ref 8.4–10.2)
CHLORIDE SERPL-SCNC: 101 MMOL/L (ref 96–108)
CO2 SERPL-SCNC: 30 MMOL/L (ref 21–32)
CREAT SERPL-MCNC: 0.37 MG/DL (ref 0.6–1.3)
EOSINOPHIL # BLD AUTO: 0 THOUSAND/ÂΜL (ref 0–0.61)
EOSINOPHIL NFR BLD AUTO: 0 % (ref 0–6)
ERYTHROCYTE [DISTWIDTH] IN BLOOD BY AUTOMATED COUNT: 17.2 % (ref 11.6–15.1)
GAMMA GLOB ABNORMAL SERPL ELPH-MCNC: 2.4 G/DL (ref 0.4–1.66)
GAMMA GLOB SERPL ELPH-MCNC: 33.4 % (ref 6.9–22.3)
GFR SERPL CREATININE-BSD FRML MDRD: 129 ML/MIN/1.73SQ M
GLUCOSE SERPL-MCNC: 121 MG/DL (ref 65–140)
HCT VFR BLD AUTO: 32.2 % (ref 34.8–46.1)
HGB BLD-MCNC: 9.6 G/DL (ref 11.5–15.4)
IGA SERPL-MCNC: 375 MG/DL (ref 70–400)
IGG SERPL-MCNC: 2270 MG/DL (ref 700–1600)
IGG/ALB SER: 0.61 {RATIO} (ref 1.1–1.8)
IGM SERPL-MCNC: 559 MG/DL (ref 40–230)
IMM GRANULOCYTES # BLD AUTO: 0.18 THOUSAND/UL (ref 0–0.2)
IMM GRANULOCYTES NFR BLD AUTO: 2 % (ref 0–2)
INTERPRETATION UR IFE-IMP: NORMAL
LYMPHOCYTES # BLD AUTO: 1.88 THOUSANDS/ÂΜL (ref 0.6–4.47)
LYMPHOCYTES NFR BLD AUTO: 17 % (ref 14–44)
MCH RBC QN AUTO: 24.6 PG (ref 26.8–34.3)
MCHC RBC AUTO-ENTMCNC: 29.8 G/DL (ref 31.4–37.4)
MCV RBC AUTO: 82 FL (ref 82–98)
MONOCYTES # BLD AUTO: 0.37 THOUSAND/ÂΜL (ref 0.17–1.22)
MONOCYTES NFR BLD AUTO: 3 % (ref 4–12)
MYELOPEROXIDASE AB SER IA-ACNC: >8 UNITS (ref 0–0.9)
NEUTROPHILS # BLD AUTO: 8.5 THOUSANDS/ÂΜL (ref 1.85–7.62)
NEUTS SEG NFR BLD AUTO: 78 % (ref 43–75)
NRBC BLD AUTO-RTO: 0 /100 WBCS
P-ANCA ATYPICAL TITR SER IF: ABNORMAL TITER
P-ANCA TITR SER IF: ABNORMAL TITER
PLATELET # BLD AUTO: 478 THOUSANDS/UL (ref 149–390)
PMV BLD AUTO: 7.9 FL (ref 8.9–12.7)
POTASSIUM SERPL-SCNC: 4.4 MMOL/L (ref 3.5–5.3)
PROT PATTERN SERPL ELPH-IMP: ABNORMAL
PROT SERPL-MCNC: 7.2 G/DL (ref 6.4–8.2)
PROTEINASE3 AB SER IA-ACNC: <0.2 UNITS (ref 0–0.9)
RBC # BLD AUTO: 3.91 MILLION/UL (ref 3.81–5.12)
SODIUM SERPL-SCNC: 135 MMOL/L (ref 135–147)
VIT B1 BLD-SCNC: 84 NMOL/L (ref 66.5–200)
VIT B6 SERPL-MCNC: 2.7 UG/L (ref 3.4–65.2)
WBC # BLD AUTO: 10.94 THOUSAND/UL (ref 4.31–10.16)

## 2023-08-10 PROCEDURE — 85025 COMPLETE CBC W/AUTO DIFF WBC: CPT

## 2023-08-10 PROCEDURE — 80048 BASIC METABOLIC PNL TOTAL CA: CPT

## 2023-08-10 PROCEDURE — 99233 SBSQ HOSP IP/OBS HIGH 50: CPT

## 2023-08-10 PROCEDURE — 84165 PROTEIN E-PHORESIS SERUM: CPT | Performed by: STUDENT IN AN ORGANIZED HEALTH CARE EDUCATION/TRAINING PROGRAM

## 2023-08-10 PROCEDURE — 83521 IG LIGHT CHAINS FREE EACH: CPT | Performed by: STUDENT IN AN ORGANIZED HEALTH CARE EDUCATION/TRAINING PROGRAM

## 2023-08-10 RX ORDER — PYRIDOXINE HCL (VITAMIN B6) 50 MG
50 TABLET ORAL DAILY
Status: DISCONTINUED | OUTPATIENT
Start: 2023-08-11 | End: 2023-08-14 | Stop reason: HOSPADM

## 2023-08-10 RX ORDER — PYRIDOXINE HCL (VITAMIN B6) 50 MG
50 TABLET ORAL DAILY
Status: DISCONTINUED | OUTPATIENT
Start: 2023-08-10 | End: 2023-08-10

## 2023-08-10 RX ORDER — METHYLPREDNISOLONE SODIUM SUCCINATE 40 MG/ML
40 INJECTION, POWDER, LYOPHILIZED, FOR SOLUTION INTRAMUSCULAR; INTRAVENOUS EVERY 8 HOURS SCHEDULED
Status: DISCONTINUED | OUTPATIENT
Start: 2023-08-10 | End: 2023-08-11

## 2023-08-10 RX ADMIN — DIPHENHYDRAMINE HYDROCHLORIDE AND LIDOCAINE HYDROCHLORIDE AND ALUMINUM HYDROXIDE AND MAGNESIUM HYDRO 10 ML: KIT at 09:25

## 2023-08-10 RX ADMIN — METHYLPREDNISOLONE SODIUM SUCCINATE 40 MG: 40 INJECTION, POWDER, FOR SOLUTION INTRAMUSCULAR; INTRAVENOUS at 05:37

## 2023-08-10 RX ADMIN — HEPARIN SODIUM 5000 UNITS: 5000 INJECTION INTRAVENOUS; SUBCUTANEOUS at 21:12

## 2023-08-10 RX ADMIN — HYDROCODONE BITARTRATE AND ACETAMINOPHEN 1 TABLET: 5; 325 TABLET ORAL at 00:00

## 2023-08-10 RX ADMIN — GABAPENTIN 400 MG: 400 CAPSULE ORAL at 09:25

## 2023-08-10 RX ADMIN — PYRIDOXINE HYDROCHLORIDE 50 MG: 100 INJECTION, SOLUTION INTRAMUSCULAR; INTRAVENOUS at 14:15

## 2023-08-10 RX ADMIN — METHYLPREDNISOLONE SODIUM SUCCINATE 40 MG: 40 INJECTION, POWDER, FOR SOLUTION INTRAMUSCULAR; INTRAVENOUS at 21:12

## 2023-08-10 RX ADMIN — MELOXICAM 7.5 MG: 7.5 TABLET ORAL at 09:25

## 2023-08-10 RX ADMIN — HEPARIN SODIUM 5000 UNITS: 5000 INJECTION INTRAVENOUS; SUBCUTANEOUS at 14:11

## 2023-08-10 RX ADMIN — Medication 400 MG: at 21:12

## 2023-08-10 RX ADMIN — HEPARIN SODIUM 5000 UNITS: 5000 INJECTION INTRAVENOUS; SUBCUTANEOUS at 05:37

## 2023-08-10 RX ADMIN — GABAPENTIN 400 MG: 400 CAPSULE ORAL at 21:12

## 2023-08-10 RX ADMIN — HYDROCODONE BITARTRATE AND ACETAMINOPHEN 1 TABLET: 5; 325 TABLET ORAL at 21:57

## 2023-08-10 RX ADMIN — GABAPENTIN 400 MG: 400 CAPSULE ORAL at 15:59

## 2023-08-10 NOTE — PROGRESS NOTES
[id number] [facility]  Progress Note  Name: Amanda Eubanks I  MRN: [id number]  Unit/Bed#: -Myrtle I Date of Admission: 8/3/2023   Date of Service: 8/10/2023 I Hospital Day: 6    Assessment/Plan   * Bilateral upper and lower extremity neuropathy  Assessment & Plan  · POA  · Presented with bilateral upper and lower extremity pain, weakness, and numbness  · Recently seen in ED and given Lyrica, however, had an adverse reaction to this medication  · MRI C-spine (8/3): No disc herniation, canal or foraminal stenosis. 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 · 8/7 patient reports that the pain in her upper and lower extremities has improved since the steroid was added, however, feels that the numbness and weakness in her hands is worse to the point that she cannot hold utensils to feed herself. She also notes that there is no improvement in the numbness in her feet. States the foot numbness started about 2 weeks ago after an extended car ride that lasted 12 hours  · 8/7 discussed findings with neurology, will obtain MRI T-spine and L-spine with and without contrast and LP  · 8/7 MRI thoracic spine: No cord signal abnormality or pathologic enhancement. Unremarkable MRI of thoracic spine. Small right and minimal left pleural effusion. · 8/7 MRI lumbar spine: No abnormal signal or pathologic enhancement in the visualized cord. Mild degenerative changes without canal or foraminal stenosis. Hepatomegaly. · Assessment changes unchanged since 8/8  · 8/8 IR performed LP for AIPD/CIPD per my discussion with neurology  · CSF fluid studies per orders of neurology. Discussed with neurology today [place] text, [facility] fluid does not indicate AIDP etiology. Patient noted to have B6 deficiency which neurology feels may be causing some of symptoms.   Will add vitamin B6 supplementation starting today  · Continue gabapentin  · Continue Solu-Medrol, continue titrating dose down  · Per neurology will need follow up in in 6 weeks with neuromuscular attending. She will require a EMG/NCS within 4-6 weeks. Neurology will follow in outpatient setting. As of conversation today they feel not much else can be done in the inpatient setting. Rheumatoid arthritis ([address])  Assessment & Plan  · Not on any maintenance therapy at this time as the patient has several allergies to DMARDs  · Continue IV Solu-Medrol as above for possible acute RA flare, titrating downward  · Planning rheumatology follow-up in outpatient setting. Patient reports that she had not seen a rheumatologist in about 17 years and that her PCP was managing her rheumatoid arthritis. She added that she does have a rheumatology appointment with a doctor at [facility] in [place] in January because that is the quickest she could get in. Moderate protein-calorie malnutrition ([address])  Assessment & Plan  Malnutrition Findings:   Adult Malnutrition type: Chronic illness  Adult Degree of Malnutrition: Malnutrition of moderate degree  Malnutrition Characteristics: Fat loss, Muscle loss, Inadequate energy        ·  Present on admission  · Evidenced by less than 75% energy intake versus need for greater than 1 month exhibiting moderate clavicle region muscle loss and moderate buccal fat pad loss  · Continue to monitor daily weights  · Nutritional consult appreciate recommendations  · Continue gluten-free double portion diet and Ensure plant based protein shakes twice daily as supplements         360 Statement: Malnutrition of Moderate degree related to disease/condition RA evidenced by <75% energy intake versus needs for > 1 month exhibiting moderate clavicle region muscle loss and moderate buccal fat pads loss,Weight loss to be validated when current bed wt obtained. Treating with Gluten Free diet double portions and trialing Ensure Plant Based Protein Shakes BID as po supplement.     BMI Findings:  Adult BMI Classifications: Underweight < 18.5 Body mass index is 15.43 kg/m². Hyponatremia  Assessment & Plan  · History of acute on chronic hyponatremia  · Has required salt tabs in the past following surgical intervention  · In the setting of a patient who takes meloxicam for pain and chronic pain/nausea  · Nephrology following, appreciate recommendations  · BMP a.m. Microcytic anemia  Assessment & Plan  · No active bleeding noted at this time  · Anemia panel showing combination of LANDEN and anemia of chronic disease  · Initiate iron supplementation  · CBC a.m. Asthma  Assessment & Plan  · Without exacerbation  · Continue Ventolin inhaler as needed    Vitamin B6 deficiency  Assessment & Plan  · Initiate supplementation today           VTE Pharmacologic Prophylaxis:   Pharmacologic: Heparin  Mechanical VTE Prophylaxis in Place: Yes    Patient Centered Rounds: I have performed bedside rounds with nursing staff today. Discussions with Specialists or Other Care Team Provider: Neurology, IR, nursing, case management    Education and Discussions with Family / Patient: Treatment plan discussed with patient and  at bedside, both of whom understand the plan as it has been explained and are agreeable to the plan as stated. All questions answered to satisfaction. Time Spent for Care: 45 minutes. More than 50% of total time spent on counseling and coordination of care as described above. Current Length of Stay: 6 day(s)    Current Patient Status: Inpatient   Certification Statement: The patient will continue to require additional inpatient hospital stay due to Receiving IV receiving IV steroids, titrating downward. Completing 24-hour urine study. Discharge Plan: Plan is for patient to be discharged home with home health care on discharge. Spoke with neurology today who reviewed patient's LP and imaging. Feel no further workup needed in the hospital.  She is to have her EMG as soon as possible.   Also noted that patient had vitamin B6 deficiency which could be contributing to her symptoms. She was initiated on vitamin B6 supplementation today. Titrating IV steroids down, plan to transition to oral prednisone on discharge. Tentative discharge next 24 to 48 hours. She is in the process of completing a 24-hour urine study at this time. Code Status: Level 1 - Full Code      Subjective:   Patient reports continued numbness in her hands and feet change from yesterday. Also reports she still does not have mobility in her fingers of her left hand and cannot hold a utensil to eat. These findings have been unchanged over the last 48 hours, she does report that she no longer has pain in her hands and feet since the IV steroid was added. Objective:     Vitals:   Temp (24hrs), Av.2 °F (36.2 °C), Min:96.4 °F (35.8 °C), Max:97.7 °F (36.5 °C)    Temp:  [96.4 °F (35.8 °C)-97.7 °F (36.5 °C)] 96.4 °F (35.8 °C)  HR:  [77-88] 81  Resp:  [18] 18  BP: (111-126)/(81-92) 126/92  SpO2:  [92 %-95 %] 92 %  Body mass index is 15.43 kg/m². Input and Output Summary (last 24 hours): Intake/Output Summary (Last 24 hours) at 8/10/2023 1438  Last data filed at 8/10/2023 0100  Gross per 24 hour   Intake --   Output 400 ml   Net -400 ml       Physical Exam:     Physical Exam  Constitutional:       General: She is not in acute distress. Appearance: Normal appearance. She is not ill-appearing. HENT:      Head: Normocephalic and atraumatic. Nose: Nose normal.      Mouth/Throat:      Mouth: Mucous membranes are moist.   Eyes:      Extraocular Movements: Extraocular movements intact. Conjunctiva/sclera: Conjunctivae normal.      Pupils: Pupils are equal, round, and reactive to light. Cardiovascular:      Rate and Rhythm: Normal rate and regular rhythm. Pulses: Normal pulses. Heart sounds: Normal heart sounds. Pulmonary:      Effort: Pulmonary effort is normal. No respiratory distress. Breath sounds: Normal breath sounds.  No wheezing or rales. Abdominal:      General: Bowel sounds are normal. There is no distension. Palpations: Abdomen is soft. Tenderness: There is no abdominal tenderness. There is no guarding. Musculoskeletal:         General: Normal range of motion. Skin:     General: Skin is warm and dry. Capillary Refill: Capillary refill takes less than 2 seconds. Neurological:      General: No focal deficit present. Mental Status: She is alert and oriented to person, place, and time. Mental status is at baseline. Comments: Patient continues with numbness and tingling in hands and feet; he is with motor weakness in her hands. Gait not assessed at this time,  at bedside reports patient is wheelchair-bound at baseline. Psychiatric:         Mood and Affect: Mood normal.         Behavior: Behavior normal.         Thought Content: Thought content normal.         Judgment: Judgment normal.         Additional Data:     Labs:    Results from last 7 days   Lab Units 08/10/23  0533   WBC Thousand/uL 10.94*   HEMOGLOBIN g/dL 9.6*   HEMATOCRIT % 32.2*   PLATELETS Thousands/uL 478*   NEUTROS PCT % 78*   LYMPHS PCT % 17   MONOS PCT % 3*   EOS PCT % 0     Results from last 7 days   Lab Units 08/10/23  0533   POTASSIUM mmol/L 4.4   CHLORIDE mmol/L 101   CO2 mmol/L 30   BUN mg/dL 27*   CREATININE mg/dL 0.37*   CALCIUM mg/dL 8.4     Results from last 7 days   Lab Units 08/08/23  1015   INR  1.07       * I Have Reviewed All Lab Data Listed Above. * Additional Pertinent Lab Tests Reviewed:  [address] Admission Reviewed    Imaging:    Imaging Reports Reviewed Today Include: MRI C-spine, VAS lower extremity, RI T-spine, MRI L-spine  Imaging Personally Reviewed by Myself Includes:      Recent Cultures (last 7 days):     Results from last 7 days   Lab Units 08/08/23  1239   GRAM STAIN RESULT  No Polys or Bacteria seen       Last 24 Hours Medication List:   Current Facility-Administered Medications   Medication Dose Route Frequency Provider Last Rate   • albuterol  2 puff Inhalation Q4H PRN Julia Watson PA-C     • diphenhydramine, lidocaine, Al/Mg hydroxide, simethicone  10 mL Swish & Spit Q4H PRN Esperanza Reil Gasca, DO     • gabapentin  400 mg Oral TID Esperanza Reil Gasca, DO     • heparin (porcine)  5,000 Units Subcutaneous [facility] Julia Watson PA-C     • HYDROcodone-acetaminophen  1 tablet Oral Q6H PRN Julia Watson PA-C     • magnesium Oxide  400 mg Oral HS Julia Watson PA-C     • meloxicam  7.5 mg Oral Daily Julia Watson PA-C     • methylPREDNISolone sodium succinate  40 mg Intravenous Q8H [address] CARMITA Stapleton     • morphine injection  2 mg Intravenous Q4H PRN Julia Watson PA-C     • ondansetron  4 mg Intravenous Q6H PRN Julia Watson PA-C     • [START ON 8/11/2023] pyridoxine  50 mg Oral Daily CARMITA Stapleton          Today, Patient Was Seen By: CARMITA James    ** Please Note: Dictation voice to text software may have been used in the creation of this document.  **

## 2023-08-10 NOTE — CASE MANAGEMENT
Case Management Discharge Planning Note    Patient name Torito Hector  Location /-01 MRN [id number]  : 1975 Date 8/10/2023       Current Admission Date: 8/3/2023  Current Admission Diagnosis:Bilateral upper and lower extremity neuropathy   Patient Active Problem List    Diagnosis Date Noted   • Vitamin B6 deficiency 08/10/2023   • Moderate protein-calorie malnutrition ([address]) 2023   • Bilateral upper and lower extremity neuropathy 2023   • Microcytic anemia 2023   • 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   • Asthma 2023   • Psoriasis 2023   • DJD (degenerative joint disease) 2022   • Hyponatremia 2020   • Status post left hip replacement 2020   • Osteoarthritis of left hip 2020   • Ankle arthritis 2017   • Acquired bilateral club feet 2017   • Abnormal electrocardiogram 2016   • Abnormal findings on diagnostic imaging of heart and coronary circulation 2016   • Cardiac murmur 2016   • Cardiomyopathy ([address]) 2016   • Nonrheumatic mitral valve insufficiency 2016   • Blue color skin 2014   • Rheumatoid arthritis ([address]) 2014      LOS (days): 6  Geometric Mean LOS (GMLOS) (days): 3.40  Days to GMLOS:-2.8     OBJECTIVE:  Risk of Unplanned Readmission Score: 9.68         Current admission status: Inpatient   Preferred Pharmacy:   [address] [place] [address] [address] Nadia S, [address] [place], [place] [address]. [address]  [address] [facility].  DR. Yaakov PEARSON [id number]  Phone: [phone] Fax: [phone]    [facility], [address] [place] [address]  Phone: [phone] Fax: [phone]    Primary Care Provider: Rickey Mortimer, DO    Primary Insurance: MEDICARE  Secondary Insurance:     DISCHARGE DETAILS:    Discharge planning discussed with[de-identified] patient  Freedom of Choice: Yes  Comments - Freedom of Choice: list was rviewed with the pt- pt's choice is [facility] DME Referral Provided  Referral made for DME?: No    Other Referral/Resources/Interventions Provided:  Interventions: C  Referral Comments: home with [address]    Would you like to participate in our [address] service program?  : No - Declined    Treatment Team Recommendation: Home with [address] (home with spouse & Brett Miguel follow up- family)

## 2023-08-10 NOTE — PLAN OF CARE
Problem: Potential for Falls  Goal: Patient will remain free of falls  Description: INTERVENTIONS:  - Educate patient/family on patient safety including physical limitations  - Instruct patient to call for assistance with activity   - Consult OT/PT to assist with strengthening/mobility   - Keep Call bell within reach  - Keep bed low and locked with side rails adjusted as appropriate  - Keep care items and personal belongings within reach  - Initiate and maintain comfort rounds  - Make Fall Risk Sign visible to staff  - Offer Toileting every 2 Hours, in advance of need  - Initiate/Maintain bed alarm  - Obtain necessary fall risk management equipment: bed alarm  - Apply yellow socks and bracelet for high fall risk patients  - Consider moving patient to room near nurses station  Outcome: Progressing     Problem: MOBILITY - ADULT  Goal: Maintain or return to baseline ADL function  Description: INTERVENTIONS:  -  Assess patient's ability to carry out ADLs; assess patient's baseline for ADL function and identify physical deficits which impact ability to perform ADLs (bathing, care of mouth/teeth, toileting, grooming, dressing, etc.)  - Assess/evaluate cause of self-care deficits   - Assess range of motion  - Assess patient's mobility; develop plan if impaired  - Assess patient's need for assistive devices and provide as appropriate  - Encourage maximum independence but intervene and supervise when necessary  - Involve family in performance of ADLs  - Assess for home care needs following discharge   - Consider OT consult to assist with ADL evaluation and planning for discharge  - Provide patient education as appropriate  Outcome: Progressing  Goal: Maintains/Returns to pre admission functional level  Description: INTERVENTIONS:  - Perform BMAT or MOVE assessment daily.   - Set and communicate daily mobility goal to care team and patient/family/caregiver.    - Collaborate with rehabilitation services on mobility goals if consulted  - Perform Range of Motion 3 times a day. - Reposition patient every 2 hours. - Dangle patient 3 times a day  - Stand patient 3 times a day  - Ambulate patient 3 times a day  - Out of bed to chair 3 times a day   - Out of bed for meals 3 times a day  - Out of bed for toileting  - Record patient progress and toleration of activity level   Outcome: Progressing     Problem: Prexisting or High Potential for Compromised Skin Integrity  Goal: Skin integrity is maintained or improved  Description: INTERVENTIONS:  - Identify patients at risk for skin breakdown  - Assess and monitor skin integrity  - Assess and monitor nutrition and hydration status  - Monitor labs   - Assess for incontinence   - Turn and reposition patient  - Assist with mobility/ambulation  - Relieve pressure over bony prominences  - Avoid friction and shearing  - Provide appropriate hygiene as needed including keeping skin clean and dry  - Evaluate need for skin moisturizer/barrier cream  - Collaborate with interdisciplinary team   - Patient/family teaching  - Consider wound care consult   Outcome: Progressing     Problem: Nutrition/Hydration-ADULT  Goal: Nutrient/Hydration intake appropriate for improving, restoring or maintaining nutritional needs  Description: Monitor and assess patient's nutrition/hydration status for malnutrition. Collaborate with interdisciplinary team and initiate plan and interventions as ordered. Monitor patient's weight and dietary intake as ordered or per policy. Utilize nutrition screening tool and intervene as necessary. Determine patient's food preferences and provide high-protein, high-caloric foods as appropriate.      INTERVENTIONS:  - Monitor oral intake, urinary output, labs, and treatment plans  - Assess nutrition and hydration status and recommend course of action  - Evaluate amount of meals eaten  - Assist patient with eating if necessary   - Allow adequate time for meals  - Recommend/ encourage appropriate diets, oral nutritional supplements, and vitamin/mineral supplements  - Order, calculate, and assess calorie counts as needed  - Recommend, monitor, and adjust tube feedings and TPN/PPN based on assessed needs  - Assess need for intravenous fluids  - Provide specific nutrition/hydration education as appropriate  - Include patient/family/caregiver in decisions related to nutrition  Outcome: Progressing     Problem: INFECTION - ADULT  Goal: Absence or prevention of progression during hospitalization  Description: INTERVENTIONS:  - Assess and monitor for signs and symptoms of infection  - Monitor lab/diagnostic results  - Monitor all insertion sites, i.e. indwelling lines, tubes, and drains  - Monitor endotracheal if appropriate and nasal secretions for changes in amount and color  - [place] appropriate cooling/warming therapies per order  - Administer medications as ordered  - Instruct and encourage patient and family to use good hand hygiene technique  - Identify and instruct in appropriate isolation precautions for identified infection/condition  Outcome: Progressing  Goal: Absence of fever/infection during neutropenic period  Description: INTERVENTIONS:  - Monitor WBC    Outcome: Progressing     Problem: SAFETY ADULT  Goal: Patient will remain free of falls  Description: INTERVENTIONS:  - Educate patient/family on patient safety including physical limitations  - Instruct patient to call for assistance with activity   - Consult OT/PT to assist with strengthening/mobility   - Keep Call bell within reach  - Keep bed low and locked with side rails adjusted as appropriate  - Keep care items and personal belongings within reach  - Initiate and maintain comfort rounds  - Make Fall Risk Sign visible to staff  - Offer Toileting every 2 Hours, in advance of need  - Initiate/Maintain bed alarm  - Obtain necessary fall risk management equipment: bed alarm  - Apply yellow socks and bracelet for high fall risk patients  - Consider moving patient to room near nurses station  Outcome: Progressing     Problem: MUSCULOSKELETAL - ADULT  Goal: Maintain or return mobility to safest level of function  Description: INTERVENTIONS:  - Assess patient's ability to carry out ADLs; assess patient's baseline for ADL function and identify physical deficits which impact ability to perform ADLs (bathing, care of mouth/teeth, toileting, grooming, dressing, etc.)  - Assess/evaluate cause of self-care deficits   - Assess range of motion  - Assess patient's mobility  - Assess patient's need for assistive devices and provide as appropriate  - Encourage maximum independence but intervene and supervise when necessary  - Involve family in performance of ADLs  - Assess for home care needs following discharge   - Consider OT consult to assist with ADL evaluation and planning for discharge  - Provide patient education as appropriate  Outcome: Progressing  Goal: Maintain proper alignment of affected body part  Description: INTERVENTIONS:  - Support, maintain and protect limb and body alignment  - Provide patient/ family with appropriate education  Outcome: Progressing

## 2023-08-10 NOTE — ASSESSMENT & PLAN NOTE
Malnutrition Findings:   Adult Malnutrition type: Chronic illness  Adult Degree of Malnutrition: Malnutrition of moderate degree  Malnutrition Characteristics: Fat loss, Muscle loss, Inadequate energy            · Present on admission  · Evidenced by less than 75% energy intake versus need for greater than 1 month exhibiting moderate clavicle region muscle loss and moderate buccal fat pad loss  · Continue to monitor daily weights  · Nutritional consult appreciate recommendations  · Continue gluten-free double portion diet and Ensure plant based protein shakes twice daily as supplements  · Has been tolerating oral diet         360 Statement: Malnutrition of Moderate degree related to disease/condition RA evidenced by <75% energy intake versus needs for > 1 month exhibiting moderate clavicle region muscle loss and moderate buccal fat pads loss,Weight loss to be validated when current bed wt obtained. Treating with Gluten Free diet double portions and trialing Ensure Plant Based Protein Shakes BID as po supplement. BMI Findings:  Adult BMI Classifications: Underweight < 18.5        Body mass index is 15.43 kg/m².

## 2023-08-10 NOTE — PROGRESS NOTES
-- Patient:  -- MRN: [id number]  -- Aidin Request ID: [id number]  -- Level of care reserved: [address]  -- Partner Reserved: [facility] [place] [id number] [facility] [phone]  -- Clinical needs requested:  -- Geography searched: [id number]  -- Start of Service:  -- Request sent: 12:05pm EDT on 8/9/2023 by Puneet Badillo  -- Partner reserved: 3:51pm EDT on 8/10/2023 by Ofelia Lai  -- Choice list shared: 3:59pm EDT on 8/9/2023 by Puneet Badillo

## 2023-08-10 NOTE — PLAN OF CARE
Problem: Potential for Falls  Goal: Patient will remain free of falls  Description: INTERVENTIONS:  - Educate patient/family on patient safety including physical limitations  - Instruct patient to call for assistance with activity   - Consult OT/PT to assist with strengthening/mobility   - Keep Call bell within reach  - Keep bed low and locked with side rails adjusted as appropriate  - Keep care items and personal belongings within reach  - Initiate and maintain comfort rounds  - Make Fall Risk Sign visible to staff  - Offer Toileting every 2 Hours, in advance of need  - Initiate/Maintain bed alarm  - Obtain necessary fall risk management equipment: bed alarm  - Apply yellow socks and bracelet for high fall risk patients  - Consider moving patient to room near nurses station  Outcome: Progressing     Problem: MOBILITY - ADULT  Goal: Maintain or return to baseline ADL function  Description: INTERVENTIONS:  -  Assess patient's ability to carry out ADLs; assess patient's baseline for ADL function and identify physical deficits which impact ability to perform ADLs (bathing, care of mouth/teeth, toileting, grooming, dressing, etc.)  - Assess/evaluate cause of self-care deficits   - Assess range of motion  - Assess patient's mobility; develop plan if impaired  - Assess patient's need for assistive devices and provide as appropriate  - Encourage maximum independence but intervene and supervise when necessary  - Involve family in performance of ADLs  - Assess for home care needs following discharge   - Consider OT consult to assist with ADL evaluation and planning for discharge  - Provide patient education as appropriate  Outcome: Progressing     Problem: Prexisting or High Potential for Compromised Skin Integrity  Goal: Skin integrity is maintained or improved  Description: INTERVENTIONS:  - Identify patients at risk for skin breakdown  - Assess and monitor skin integrity  - Assess and monitor nutrition and hydration status  - Monitor labs   - Assess for incontinence   - Turn and reposition patient  - Assist with mobility/ambulation  - Relieve pressure over bony prominences  - Avoid friction and shearing  - Provide appropriate hygiene as needed including keeping skin clean and dry  - Evaluate need for skin moisturizer/barrier cream  - Collaborate with interdisciplinary team   - Patient/family teaching  - Consider wound care consult   Outcome: Progressing

## 2023-08-11 LAB
25(OH)D2 SERPL-MCNC: 1.7 NG/ML
25(OH)D3 SERPL-MCNC: 7.9 NG/ML
25(OH)D3+25(OH)D2 SERPL-MCNC: 9.6 NG/ML
ALB CSF/SERPL: 4 {RATIO} (ref 0–8)
ALBUMIN CSF-MCNC: 11 MG/DL (ref 8–37)
ALBUMIN SERPL-MCNC: 2.9 G/DL (ref 3.9–4.9)
ALBUMIN UR ELPH-MCNC: 100 %
ALPHA1 GLOB MFR UR ELPH: 0 %
ALPHA2 GLOB MFR UR ELPH: 0 %
ANA HOMOGEN SER QL IF: NORMAL
ANA HOMOGEN TITR SER: NORMAL {TITER}
ANION GAP SERPL CALCULATED.3IONS-SCNC: 4 MMOL/L
B-GLOBULIN MFR UR ELPH: 0 %
BASOPHILS # BLD AUTO: 0.02 THOUSANDS/ÂΜL (ref 0–0.1)
BASOPHILS NFR BLD AUTO: 0 % (ref 0–1)
BUN SERPL-MCNC: 28 MG/DL (ref 5–25)
CALCIUM SERPL-MCNC: 8.3 MG/DL (ref 8.4–10.2)
CHLORIDE SERPL-SCNC: 101 MMOL/L (ref 96–108)
CHROMATIN AB SERPL-ACNC: POSITIVE
CO2 SERPL-SCNC: 29 MMOL/L (ref 21–32)
COPPER SERPL-MCNC: 120 UG/DL (ref 80–158)
CREAT SERPL-MCNC: 0.36 MG/DL (ref 0.6–1.3)
DSDNA AB SER-ACNC: <4 IU/ML
ENA SS-A AB SER IA-ACNC: NEGATIVE
ENA SS-B AB SER IA-ACNC: NEGATIVE
EOSINOPHIL # BLD AUTO: 0 THOUSAND/ÂΜL (ref 0–0.61)
EOSINOPHIL NFR BLD AUTO: 0 % (ref 0–6)
ERYTHROCYTE [DISTWIDTH] IN BLOOD BY AUTOMATED COUNT: 18.1 % (ref 11.6–15.1)
GAMMA GLOB MFR UR ELPH: 0 %
GFR SERPL CREATININE-BSD FRML MDRD: 130 ML/MIN/1.73SQ M
GLUCOSE SERPL-MCNC: 98 MG/DL (ref 65–140)
HCT VFR BLD AUTO: 31.5 % (ref 34.8–46.1)
HGB BLD-MCNC: 9.4 G/DL (ref 11.5–15.4)
IGG CSF-MCNC: 5 MG/DL (ref 0–6.7)
IGG SERPL-MCNC: 2193 MG/DL (ref 586–1602)
IGG SYNTH RATE SER+CSF CALC-MRATE: -2.1 MG/DAY
IGG/ALB CLEAR SER+CSF-RTO: 0.6 (ref 0–0.7)
IGG/ALB CSF: 0.45 {RATIO} (ref 0–0.25)
IMM GRANULOCYTES # BLD AUTO: 0.3 THOUSAND/UL (ref 0–0.2)
IMM GRANULOCYTES NFR BLD AUTO: 2 % (ref 0–2)
KAPPA LC FREE SER-MCNC: 41.4 MG/L (ref 3.3–19.4)
KAPPA LC FREE/LAMBDA FREE SER: 1.02 {RATIO} (ref 0.26–1.65)
LAMBDA LC FREE SERPL-MCNC: 40.7 MG/L (ref 5.7–26.3)
LYMPHOCYTES # BLD AUTO: 2.7 THOUSANDS/ÂΜL (ref 0.6–4.47)
LYMPHOCYTES NFR BLD AUTO: 21 % (ref 14–44)
MCH RBC QN AUTO: 24.7 PG (ref 26.8–34.3)
MCHC RBC AUTO-ENTMCNC: 29.8 G/DL (ref 31.4–37.4)
MCV RBC AUTO: 83 FL (ref 82–98)
MONOCYTES # BLD AUTO: 0.55 THOUSAND/ÂΜL (ref 0.17–1.22)
MONOCYTES NFR BLD AUTO: 4 % (ref 4–12)
NEUTROPHILS # BLD AUTO: 9.46 THOUSANDS/ÂΜL (ref 1.85–7.62)
NEUTS SEG NFR BLD AUTO: 73 % (ref 43–75)
NRBC BLD AUTO-RTO: 0 /100 WBCS
PLATELET # BLD AUTO: 494 THOUSANDS/UL (ref 149–390)
PMV BLD AUTO: 7.7 FL (ref 8.9–12.7)
POTASSIUM SERPL-SCNC: 4.5 MMOL/L (ref 3.5–5.3)
PROT PATTERN UR ELPH-IMP: NORMAL
PROT UR-MCNC: 12 MG/DL
RBC # BLD AUTO: 3.81 MILLION/UL (ref 3.81–5.12)
SODIUM SERPL-SCNC: 134 MMOL/L (ref 135–147)
WBC # BLD AUTO: 13.03 THOUSAND/UL (ref 4.31–10.16)

## 2023-08-11 PROCEDURE — 99233 SBSQ HOSP IP/OBS HIGH 50: CPT

## 2023-08-11 PROCEDURE — 85025 COMPLETE CBC W/AUTO DIFF WBC: CPT

## 2023-08-11 PROCEDURE — 80048 BASIC METABOLIC PNL TOTAL CA: CPT

## 2023-08-11 RX ORDER — METHYLPREDNISOLONE SODIUM SUCCINATE 40 MG/ML
40 INJECTION, POWDER, LYOPHILIZED, FOR SOLUTION INTRAMUSCULAR; INTRAVENOUS EVERY 12 HOURS SCHEDULED
Status: DISCONTINUED | OUTPATIENT
Start: 2023-08-11 | End: 2023-08-12

## 2023-08-11 RX ORDER — GABAPENTIN 300 MG/1
600 CAPSULE ORAL 3 TIMES DAILY
Status: DISCONTINUED | OUTPATIENT
Start: 2023-08-11 | End: 2023-08-14 | Stop reason: HOSPADM

## 2023-08-11 RX ADMIN — HYDROCODONE BITARTRATE AND ACETAMINOPHEN 1 TABLET: 5; 325 TABLET ORAL at 21:02

## 2023-08-11 RX ADMIN — HEPARIN SODIUM 5000 UNITS: 5000 INJECTION INTRAVENOUS; SUBCUTANEOUS at 06:05

## 2023-08-11 RX ADMIN — DIPHENHYDRAMINE HYDROCHLORIDE AND LIDOCAINE HYDROCHLORIDE AND ALUMINUM HYDROXIDE AND MAGNESIUM HYDRO 10 ML: KIT at 16:10

## 2023-08-11 RX ADMIN — DIPHENHYDRAMINE HYDROCHLORIDE AND LIDOCAINE HYDROCHLORIDE AND ALUMINUM HYDROXIDE AND MAGNESIUM HYDRO 10 ML: KIT at 10:24

## 2023-08-11 RX ADMIN — GABAPENTIN 400 MG: 400 CAPSULE ORAL at 10:06

## 2023-08-11 RX ADMIN — Medication 400 MG: at 21:03

## 2023-08-11 RX ADMIN — METHYLPREDNISOLONE SODIUM SUCCINATE 40 MG: 40 INJECTION, POWDER, FOR SOLUTION INTRAMUSCULAR; INTRAVENOUS at 06:05

## 2023-08-11 RX ADMIN — HEPARIN SODIUM 5000 UNITS: 5000 INJECTION INTRAVENOUS; SUBCUTANEOUS at 21:04

## 2023-08-11 RX ADMIN — GABAPENTIN 600 MG: 300 CAPSULE ORAL at 16:09

## 2023-08-11 RX ADMIN — METHYLPREDNISOLONE SODIUM SUCCINATE 40 MG: 40 INJECTION, POWDER, FOR SOLUTION INTRAMUSCULAR; INTRAVENOUS at 21:05

## 2023-08-11 RX ADMIN — HYDROCODONE BITARTRATE AND ACETAMINOPHEN 1 TABLET: 5; 325 TABLET ORAL at 06:14

## 2023-08-11 RX ADMIN — GABAPENTIN 600 MG: 300 CAPSULE ORAL at 21:08

## 2023-08-11 RX ADMIN — HEPARIN SODIUM 5000 UNITS: 5000 INJECTION INTRAVENOUS; SUBCUTANEOUS at 16:09

## 2023-08-11 RX ADMIN — MELOXICAM 7.5 MG: 7.5 TABLET ORAL at 10:06

## 2023-08-11 RX ADMIN — Medication 50 MG: at 13:09

## 2023-08-11 NOTE — PLAN OF CARE
Problem: Potential for Falls  Goal: Patient will remain free of falls  Description: INTERVENTIONS:  - Educate patient/family on patient safety including physical limitations  - Instruct patient to call for assistance with activity   - Consult OT/PT to assist with strengthening/mobility   - Keep Call bell within reach  - Keep bed low and locked with side rails adjusted as appropriate  - Keep care items and personal belongings within reach  - Initiate and maintain comfort rounds  - Make Fall Risk Sign visible to staff  - Offer Toileting every 2 Hours, in advance of need  - Initiate/Maintain bed alarm  - Obtain necessary fall risk management equipment: bed alarm  - Apply yellow socks and bracelet for high fall risk patients  - Consider moving patient to room near nurses station  Outcome: Progressing     Problem: MOBILITY - ADULT  Goal: Maintain or return to baseline ADL function  Description: INTERVENTIONS:  -  Assess patient's ability to carry out ADLs; assess patient's baseline for ADL function and identify physical deficits which impact ability to perform ADLs (bathing, care of mouth/teeth, toileting, grooming, dressing, etc.)  - Assess/evaluate cause of self-care deficits   - Assess range of motion  - Assess patient's mobility; develop plan if impaired  - Assess patient's need for assistive devices and provide as appropriate  - Encourage maximum independence but intervene and supervise when necessary  - Involve family in performance of ADLs  - Assess for home care needs following discharge   - Consider OT consult to assist with ADL evaluation and planning for discharge  - Provide patient education as appropriate  Outcome: Progressing  Goal: Maintains/Returns to pre admission functional level  Description: INTERVENTIONS:  - Perform BMAT or MOVE assessment daily.   - Set and communicate daily mobility goal to care team and patient/family/caregiver.    - Collaborate with rehabilitation services on mobility goals if consulted  - Perform Range of Motion 3 times a day. - Reposition patient every 2 hours. - Dangle patient 3 times a day  - Stand patient 3 times a day  - Ambulate patient 3 times a day  - Out of bed to chair 3 times a day   - Out of bed for meals 3 times a day  - Out of bed for toileting  - Record patient progress and toleration of activity level   Outcome: Progressing     Problem: Prexisting or High Potential for Compromised Skin Integrity  Goal: Skin integrity is maintained or improved  Description: INTERVENTIONS:  - Identify patients at risk for skin breakdown  - Assess and monitor skin integrity  - Assess and monitor nutrition and hydration status  - Monitor labs   - Assess for incontinence   - Turn and reposition patient  - Assist with mobility/ambulation  - Relieve pressure over bony prominences  - Avoid friction and shearing  - Provide appropriate hygiene as needed including keeping skin clean and dry  - Evaluate need for skin moisturizer/barrier cream  - Collaborate with interdisciplinary team   - Patient/family teaching  - Consider wound care consult   Outcome: Progressing     Problem: Nutrition/Hydration-ADULT  Goal: Nutrient/Hydration intake appropriate for improving, restoring or maintaining nutritional needs  Description: Monitor and assess patient's nutrition/hydration status for malnutrition. Collaborate with interdisciplinary team and initiate plan and interventions as ordered. Monitor patient's weight and dietary intake as ordered or per policy. Utilize nutrition screening tool and intervene as necessary. Determine patient's food preferences and provide high-protein, high-caloric foods as appropriate.      INTERVENTIONS:  - Monitor oral intake, urinary output, labs, and treatment plans  - Assess nutrition and hydration status and recommend course of action  - Evaluate amount of meals eaten  - Assist patient with eating if necessary   - Allow adequate time for meals  - Recommend/ encourage appropriate diets, oral nutritional supplements, and vitamin/mineral supplements  - Order, calculate, and assess calorie counts as needed  - Recommend, monitor, and adjust tube feedings and TPN/PPN based on assessed needs  - Assess need for intravenous fluids  - Provide specific nutrition/hydration education as appropriate  - Include patient/family/caregiver in decisions related to nutrition  Outcome: Progressing     Problem: INFECTION - ADULT  Goal: Absence or prevention of progression during hospitalization  Description: INTERVENTIONS:  - Assess and monitor for signs and symptoms of infection  - Monitor lab/diagnostic results  - Monitor all insertion sites, i.e. indwelling lines, tubes, and drains  - Monitor endotracheal if appropriate and nasal secretions for changes in amount and color  - [place] appropriate cooling/warming therapies per order  - Administer medications as ordered  - Instruct and encourage patient and family to use good hand hygiene technique  - Identify and instruct in appropriate isolation precautions for identified infection/condition  Outcome: Progressing  Goal: Absence of fever/infection during neutropenic period  Description: INTERVENTIONS:  - Monitor WBC    Outcome: Progressing     Problem: SAFETY ADULT  Goal: Patient will remain free of falls  Description: INTERVENTIONS:  - Educate patient/family on patient safety including physical limitations  - Instruct patient to call for assistance with activity   - Consult OT/PT to assist with strengthening/mobility   - Keep Call bell within reach  - Keep bed low and locked with side rails adjusted as appropriate  - Keep care items and personal belongings within reach  - Initiate and maintain comfort rounds  - Make Fall Risk Sign visible to staff  - Offer Toileting every 2 Hours, in advance of need  - Initiate/Maintain bed alarm  - Obtain necessary fall risk management equipment: bed alarm  - Apply yellow socks and bracelet for high fall risk patients  - Consider moving patient to room near nurses station  Outcome: Progressing     Problem: MUSCULOSKELETAL - ADULT  Goal: Maintain or return mobility to safest level of function  Description: INTERVENTIONS:  - Assess patient's ability to carry out ADLs; assess patient's baseline for ADL function and identify physical deficits which impact ability to perform ADLs (bathing, care of mouth/teeth, toileting, grooming, dressing, etc.)  - Assess/evaluate cause of self-care deficits   - Assess range of motion  - Assess patient's mobility  - Assess patient's need for assistive devices and provide as appropriate  - Encourage maximum independence but intervene and supervise when necessary  - Involve family in performance of ADLs  - Assess for home care needs following discharge   - Consider OT consult to assist with ADL evaluation and planning for discharge  - Provide patient education as appropriate  Outcome: Progressing  Goal: Maintain proper alignment of affected body part  Description: INTERVENTIONS:  - Support, maintain and protect limb and body alignment  - Provide patient/ family with appropriate education  Outcome: Progressing

## 2023-08-11 NOTE — PROGRESS NOTES
[id number] [facility]  Progress Note  Name: Any Guzman I  MRN: [id number]  Unit/Bed#: -01 I Date of Admission: 8/3/2023   Date of Service: 8/11/2023 I Hospital Day: 7    Assessment/Plan   * Bilateral upper and lower extremity neuropathy  Assessment & Plan  · POA  · Presented with bilateral upper and lower extremity pain, weakness, and numbness  · Recently seen in ED and given Lyrica, however, had an adverse reaction to this medication  · MRI C-spine (8/3): No disc herniation, canal or foraminal stenosis. 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 · 8/7 patient reports that the pain in her upper and lower extremities has improved since the steroid was added, however, feels that the numbness and weakness in her hands is worse to the point that she cannot hold utensils to feed herself. She also notes that there is no improvement in the numbness in her feet. States the foot numbness started about 2 weeks ago after an extended car ride that lasted 12 hours  · 8/7 discussed findings with neurology, will obtain MRI T-spine and L-spine with and without contrast and LP  · 8/7 MRI thoracic spine: No cord signal abnormality or pathologic enhancement. Unremarkable MRI of thoracic spine. Small right and minimal left pleural effusion. · 8/7 MRI lumbar spine: No abnormal signal or pathologic enhancement in the visualized cord. Mild degenerative changes without canal or foraminal stenosis. Hepatomegaly. · Assessment changes unchanged since 8/8  · 8/8 IR performed LP for AIPD/CIPD per my discussion with neurology  · CSF fluid studies per orders of neurology. Discussed with neurology today [place] text, USF fluid does not indicate AIDP etiology. Patient noted to have B6 deficiency which neurology feels may be causing some of symptoms.   Will add vitamin B6 supplementation starting today  · Patient reports with worsening burning in her hands and feet today, will increase gabapentin and monitor effectiveness  · Continue Solu-Medrol, continue titrating dose down  · Per neurology will need follow up in in 6 weeks with neuromuscular attending. She will require a EMG/NCS within 4-6 weeks. Neurology will follow in outpatient setting. As of conversation today they feel not much else can be done in the inpatient setting. Rheumatoid arthritis ([address])  Assessment & Plan  · Not on any maintenance therapy at this time as the patient has several allergies to DMARDs  · Continue IV Solu-Medrol as above for possible acute RA flare, titrating downward  · Planning rheumatology follow-up in outpatient setting. Patient reports that she had not seen a rheumatologist in about 17 years and that her PCP was managing her rheumatoid arthritis. She added that she does have a rheumatology appointment with a doctor at [facility] in [place] in January because that is the quickest she could get in. Moderate protein-calorie malnutrition ([address])  Assessment & Plan  Malnutrition Findings:   Adult Malnutrition type: Chronic illness  Adult Degree of Malnutrition: Malnutrition of moderate degree  Malnutrition Characteristics: Fat loss, Muscle loss, Inadequate energy        ·  Present on admission  · Evidenced by less than 75% energy intake versus need for greater than 1 month exhibiting moderate clavicle region muscle loss and moderate buccal fat pad loss  · Continue to monitor daily weights  · Nutritional consult appreciate recommendations  · Continue gluten-free double portion diet and Ensure plant based protein shakes twice daily as supplements         360 Statement: Malnutrition of Moderate degree related to disease/condition RA evidenced by <75% energy intake versus needs for > 1 month exhibiting moderate clavicle region muscle loss and moderate buccal fat pads loss,Weight loss to be validated when current bed wt obtained.  Treating with Gluten Free diet double portions and trialing Ensure Plant Based Protein Shakes BID as po supplement. BMI Findings:  Adult BMI Classifications: Underweight < 18.5        Body mass index is 15.43 kg/m². Hyponatremia  Assessment & Plan  · History of acute on chronic hyponatremia  · Has required salt tabs in the past following surgical intervention  · In the setting of a patient who takes meloxicam for pain and chronic pain/nausea  · Nephrology following, appreciate recommendations  · BMP a.m. Microcytic anemia  Assessment & Plan  · No active bleeding noted at this time  · Anemia panel showing combination of LANDEN and anemia of chronic disease  · Nephrology recommending heme iron supplementation  · CBC a.m. Asthma  Assessment & Plan  · Without exacerbation  · On room air with nonlabored respirations  · Continue Ventolin inhaler as needed    Vitamin B6 deficiency  Assessment & Plan  · Initiated supplementation 8/10, will continue           VTE Pharmacologic Prophylaxis:   Pharmacologic: Heparin  Mechanical VTE Prophylaxis in Place: Yes    Patient Centered Rounds: I have performed bedside rounds with nursing staff today. Discussions with Specialists or Other Care Team Provider: Neurology, IR, nursing, case management    Education and Discussions with Family / Patient: Treatment plan discussed with patient and  and daughter at bedside, all of whom understand the plan as it has been explained and are agreeable to the plan as stated. All questions answered to satisfaction. They are pleased with the care patient has been receiving. Time Spent for Care: 45 minutes. More than 50% of total time spent on counseling and coordination of care as described above.     Current Length of Stay: 7 day(s)    Current Patient Status: Inpatient   Certification Statement: The patient will continue to require additional inpatient hospital stay due to Titrating IV steroids, reported worsening burning sensation hands and feet today increased gabapentin we will monitor effectiveness    Discharge Plan: Plan is for patient to be discharged home with home health care on discharge. Spoke with neurology yesterday, no further in-hospital work-up per neurology. She will need an EMG in the outpatient setting as soon as possible. She also needs to follow with rheumatology. I am currently titrating her IV Solu-Medrol downward, she was on high dose on admission. Planning to discharge her home on prednisone taper. She was also reporting increased burning sensation in hands and feet today. Increase gabapentin, will monitor effectiveness overnight. Tentative discharge home with home health care next 24-48 hours. Code Status: Level 1 - Full Code      Subjective:   Patient reporting increased burning hands and feet overnight. Will increase gabapentin and monitor effectiveness. Still reports decreased mobility fingers on right hand, no mobility of the fingers on left hand unchanged. We will continue to monitor    Objective:     Vitals:   Temp (24hrs), Av.8 °F (36.6 °C), Min:97.6 °F (36.4 °C), Max:98.1 °F (36.7 °C)    Temp:  [97.6 °F (36.4 °C)-98.1 °F (36.7 °C)] 97.6 °F (36.4 °C)  HR:  [78-84] 78  Resp:  [18-19] 19  BP: (109-123)/(82-91) 121/88  SpO2:  [92 %-96 %] 96 %  Body mass index is 15.43 kg/m². Input and Output Summary (last 24 hours):     No intake or output data in the 24 hours ending 23 1342    Physical Exam:     Physical Exam  Constitutional:       General: She is not in acute distress. Appearance: Normal appearance. She is not ill-appearing. HENT:      Head: Normocephalic and atraumatic. Nose: Nose normal.      Mouth/Throat:      Mouth: Mucous membranes are moist.   Eyes:      Extraocular Movements: Extraocular movements intact. Conjunctiva/sclera: Conjunctivae normal.      Pupils: Pupils are equal, round, and reactive to light. Cardiovascular:      Rate and Rhythm: Normal rate and regular rhythm. Pulses: Normal pulses.       Heart sounds: Normal heart sounds. Pulmonary:      Effort: Pulmonary effort is normal. No respiratory distress. Breath sounds: Normal breath sounds. No wheezing or rales. Abdominal:      General: Bowel sounds are normal. There is no distension. Palpations: Abdomen is soft. Tenderness: There is no abdominal tenderness. There is no guarding. Musculoskeletal:         General: Normal range of motion. Skin:     General: Skin is warm and dry. Capillary Refill: Capillary refill takes less than 2 seconds. Neurological:      General: No focal deficit present. Mental Status: She is alert and oriented to person, place, and time. Mental status is at baseline. Comments: Patient continues with numbness and tingling in hands and feet, also reported increased burning sensation in hands and feet when you touch them since yesterday; continues with  motor weakness in her hands. Gait not assessed at this time,  at bedside reports patient is wheelchair-bound at baseline. Psychiatric:         Mood and Affect: Mood normal.         Behavior: Behavior normal.         Thought Content: Thought content normal.         Judgment: Judgment normal.         Additional Data:     Labs:    Results from last 7 days   Lab Units 08/11/23  0607   WBC Thousand/uL 13.03*   HEMOGLOBIN g/dL 9.4*   HEMATOCRIT % 31.5*   PLATELETS Thousands/uL 494*   NEUTROS PCT % 73   LYMPHS PCT % 21   MONOS PCT % 4   EOS PCT % 0     Results from last 7 days   Lab Units 08/11/23  0607   POTASSIUM mmol/L 4.5   CHLORIDE mmol/L 101   CO2 mmol/L 29   BUN mg/dL 28*   CREATININE mg/dL 0.36*   CALCIUM mg/dL 8.3*     Results from last 7 days   Lab Units 08/08/23  1015   INR  1.07       * I Have Reviewed All Lab Data Listed Above. * Additional Pertinent Lab Tests Reviewed:  All [facility] Admission Reviewed    Imaging:    Imaging Reports Reviewed Today Include: MRI C-spine, VAS lower extremity, RI T-spine, MRI L-spine  Imaging Personally Reviewed by Myself Includes:      Recent Cultures (last 7 days):     Results from last 7 days   Lab Units 08/08/23  1239   GRAM STAIN RESULT  No Polys or Bacteria seen       Last 24 Hours Medication List:   Current Facility-Administered Medications   Medication Dose Route Frequency Provider Last Rate   • albuterol  2 puff Inhalation Q4H PRN Orval Socks, PA-C     • diphenhydramine, lidocaine, Al/Mg hydroxide, simethicone  10 mL Swish & Spit Q4H PRN [place], DO     • gabapentin  600 mg Oral TID CARMITA Stapleton     • heparin (porcine)  5,000 Units Subcutaneous [facility] Orval Socks, PA-C     • HYDROcodone-acetaminophen  1 tablet Oral Q6H PRN Orval Socks, FAHAD     • magnesium Oxide  400 mg Oral HS Orval Socks, PA-C     • meloxicam  7.5 mg Oral Daily Orval Socks, PA-C     • methylPREDNISolone sodium succinate  40 mg Intravenous Q12H [facility] CARMITA Stapleton     • morphine injection  2 mg Intravenous Q4H PRN Orval Socks, FAHAD     • ondansetron  4 mg Intravenous Q6H PRN Orval Socks, PA-C     • pyridoxine  50 mg Oral Daily CARMITA Stapleton          Today, Patient Was Seen By: CARMITA Richards    ** Please Note: Dictation voice to text software may have been used in the creation of this document.  **

## 2023-08-11 NOTE — CASE MANAGEMENT
Case Management Discharge Planning Note    Patient name Gee Hector  Location /-01 MRN [id number]  : 1975 Date 2023       Current Admission Date: 8/3/2023  Current Admission Diagnosis:Bilateral upper and lower extremity neuropathy   Patient Active Problem List    Diagnosis Date Noted   • Vitamin B6 deficiency 08/10/2023   • Moderate protein-calorie malnutrition ([address]) 2023   • Bilateral upper and lower extremity neuropathy 2023   • Microcytic anemia 2023   • 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   • Asthma 2023   • Psoriasis 2023   • DJD (degenerative joint disease) 2022   • Hyponatremia 2020   • Status post left hip replacement 2020   • Osteoarthritis of left hip 2020   • Ankle arthritis 2017   • Acquired bilateral club feet 2017   • Abnormal electrocardiogram 2016   • Abnormal findings on diagnostic imaging of heart and coronary circulation 2016   • Cardiac murmur 2016   • Cardiomyopathy ([address]) 2016   • Nonrheumatic mitral valve insufficiency 2016   • Blue color skin 2014   • Rheumatoid arthritis ([address]) 2014      LOS (days): 7  Geometric Mean LOS (GMLOS) (days): 3.40  Days to GMLOS:-3.6     OBJECTIVE:  Risk of Unplanned Readmission Score: 11.25     Current admission status: Inpatient   Preferred Pharmacy:   [address] [place] [address] [address] [facility] Nadia [address], [address] [place] [address] [facility]. [address]. PITTMAN[address]  [address] [facility].  DR. Carlitos PEARSON [id number]  Phone: [phone] Fax: [phone]    [address] [facility], [address] [place] [address]  Phone: [phone] Fax: [phone]    Primary Care Provider: Racheal Escobar DO    Primary Insurance: MEDICARE  Secondary Insurance:     DISCHARGE DETAILS:  IMM Given (Date):: 23  IMM Given to[de-identified] Family (Reviewed the IMM with the pt and the spouse who are in agreement with same)     As per SLIM the pt will be DC tomorrow. Family and pt in agreement with same. Will need AVS faxed to [facility].

## 2023-08-11 NOTE — DISCHARGE INSTR - AVS FIRST PAGE
I confirmed a rheumatology appointment for you with Dr. Karan Alford Monday, September 11 at 8 AM.  The location is [facility], [address] [facility], [address] [place] [address].   You have any questions or concerns regarding this appointment please call [phone]

## 2023-08-11 NOTE — PROGRESS NOTES
Pastoral Care Progress Note    2023  Patient: Huyen Hector : 1975  Admission Date & Time: 8/3/2023 1540  MRN: [id number] [facility]: [id number]        [address] [place] [address] in consult with CM initiated support visit to pt and family. Pt received CH upright in bed,  and daughter present bedside. Pt shared that she has become discouraged because of the length of her admission. Pt attests that in the past day it is her perception that her problem may be identified. Pt attests she is trying to remain hopeful.  shared that he really wants Saloni Delgado to come home, but he doesn't want her to leave the hospital too early, before her current issues are treated. Family shared about their involvement in the local fair and their plans for the near future of enjoying the summer before their daughter returns to school. Ch provided empathetic listening, support, encouragement. Chaplaincy Interventions Utilized:   Empowerment: Normalized experience of patient/family    Relationship Building: Listened empathically      Chaplaincy Outcomes Achieved:   Identified meaningful connections      Spiritual Coping Strategies Utilized:   Connectedness     23 1300   Clinical Encounter Type   Visited With Patient and family together  ( and daughter bedside)   Routine Visit Introduction   Referral From

## 2023-08-12 LAB
ANION GAP SERPL CALCULATED.3IONS-SCNC: 6 MMOL/L
ARSENIC BLD-MCNC: 2 UG/L (ref 0–9)
BASOPHILS # BLD AUTO: 0.02 THOUSANDS/ÂΜL (ref 0–0.1)
BASOPHILS NFR BLD AUTO: 0 % (ref 0–1)
BUN SERPL-MCNC: 30 MG/DL (ref 5–25)
CADMIUM BLD-MCNC: <0.5 UG/L (ref 0–1.2)
CALCIUM SERPL-MCNC: 8.6 MG/DL (ref 8.4–10.2)
CHLORIDE SERPL-SCNC: 101 MMOL/L (ref 96–108)
CO2 SERPL-SCNC: 26 MMOL/L (ref 21–32)
CREAT SERPL-MCNC: 0.34 MG/DL (ref 0.6–1.3)
EOSINOPHIL # BLD AUTO: 0 THOUSAND/ÂΜL (ref 0–0.61)
EOSINOPHIL NFR BLD AUTO: 0 % (ref 0–6)
ERYTHROCYTE [DISTWIDTH] IN BLOOD BY AUTOMATED COUNT: 18.8 % (ref 11.6–15.1)
GFR SERPL CREATININE-BSD FRML MDRD: 133 ML/MIN/1.73SQ M
GLUCOSE SERPL-MCNC: 104 MG/DL (ref 65–140)
HCT VFR BLD AUTO: 32.6 % (ref 34.8–46.1)
HGB BLD-MCNC: 9.9 G/DL (ref 11.5–15.4)
IMM GRANULOCYTES # BLD AUTO: 0.35 THOUSAND/UL (ref 0–0.2)
IMM GRANULOCYTES NFR BLD AUTO: 2 % (ref 0–2)
KAPPA LC UR-MCNC: 23.83 MG/L (ref 1.17–86.46)
KAPPA LC/LAMBDA UR: 3.97 {RATIO} (ref 1.83–14.26)
LAMBDA LC UR-MCNC: 6 MG/L (ref 0.27–15.21)
LEAD BLD-MCNC: <1 UG/DL (ref 0–3.4)
LYMPHOCYTES # BLD AUTO: 3 THOUSANDS/ÂΜL (ref 0.6–4.47)
LYMPHOCYTES NFR BLD AUTO: 19 % (ref 14–44)
MCH RBC QN AUTO: 25 PG (ref 26.8–34.3)
MCHC RBC AUTO-ENTMCNC: 30.4 G/DL (ref 31.4–37.4)
MCV RBC AUTO: 82 FL (ref 82–98)
MERCURY BLD-MCNC: 1.5 UG/L (ref 0–14.9)
MONOCYTES # BLD AUTO: 0.7 THOUSAND/ÂΜL (ref 0.17–1.22)
MONOCYTES NFR BLD AUTO: 4 % (ref 4–12)
NEUTROPHILS # BLD AUTO: 11.99 THOUSANDS/ÂΜL (ref 1.85–7.62)
NEUTS SEG NFR BLD AUTO: 75 % (ref 43–75)
NRBC BLD AUTO-RTO: 0 /100 WBCS
PLATELET # BLD AUTO: 512 THOUSANDS/UL (ref 149–390)
PMV BLD AUTO: 7.8 FL (ref 8.9–12.7)
POTASSIUM SERPL-SCNC: 4.5 MMOL/L (ref 3.5–5.3)
PROCALCITONIN SERPL-MCNC: 0.09 NG/ML
RBC # BLD AUTO: 3.96 MILLION/UL (ref 3.81–5.12)
SODIUM SERPL-SCNC: 133 MMOL/L (ref 135–147)
WBC # BLD AUTO: 16.06 THOUSAND/UL (ref 4.31–10.16)

## 2023-08-12 PROCEDURE — 83825 ASSAY OF MERCURY: CPT

## 2023-08-12 PROCEDURE — 82175 ASSAY OF ARSENIC: CPT

## 2023-08-12 PROCEDURE — 80048 BASIC METABOLIC PNL TOTAL CA: CPT

## 2023-08-12 PROCEDURE — 83655 ASSAY OF LEAD: CPT

## 2023-08-12 PROCEDURE — 82570 ASSAY OF URINE CREATININE: CPT

## 2023-08-12 PROCEDURE — 85025 COMPLETE CBC W/AUTO DIFF WBC: CPT

## 2023-08-12 PROCEDURE — 99233 SBSQ HOSP IP/OBS HIGH 50: CPT

## 2023-08-12 PROCEDURE — 84145 PROCALCITONIN (PCT): CPT

## 2023-08-12 RX ORDER — METHYLPREDNISOLONE SODIUM SUCCINATE 40 MG/ML
30 INJECTION, POWDER, LYOPHILIZED, FOR SOLUTION INTRAMUSCULAR; INTRAVENOUS EVERY 12 HOURS SCHEDULED
Status: DISCONTINUED | OUTPATIENT
Start: 2023-08-12 | End: 2023-08-14 | Stop reason: HOSPADM

## 2023-08-12 RX ADMIN — HEPARIN SODIUM 5000 UNITS: 5000 INJECTION INTRAVENOUS; SUBCUTANEOUS at 21:22

## 2023-08-12 RX ADMIN — HEPARIN SODIUM 5000 UNITS: 5000 INJECTION INTRAVENOUS; SUBCUTANEOUS at 13:58

## 2023-08-12 RX ADMIN — METHYLPREDNISOLONE SODIUM SUCCINATE 30 MG: 40 INJECTION, POWDER, FOR SOLUTION INTRAMUSCULAR; INTRAVENOUS at 08:19

## 2023-08-12 RX ADMIN — HEPARIN SODIUM 5000 UNITS: 5000 INJECTION INTRAVENOUS; SUBCUTANEOUS at 06:04

## 2023-08-12 RX ADMIN — GABAPENTIN 600 MG: 300 CAPSULE ORAL at 08:19

## 2023-08-12 RX ADMIN — HYDROCODONE BITARTRATE AND ACETAMINOPHEN 1 TABLET: 5; 325 TABLET ORAL at 21:47

## 2023-08-12 RX ADMIN — GABAPENTIN 600 MG: 300 CAPSULE ORAL at 21:20

## 2023-08-12 RX ADMIN — MELOXICAM 7.5 MG: 7.5 TABLET ORAL at 08:19

## 2023-08-12 RX ADMIN — HYDROCODONE BITARTRATE AND ACETAMINOPHEN 1 TABLET: 5; 325 TABLET ORAL at 16:00

## 2023-08-12 RX ADMIN — Medication 50 MG: at 08:19

## 2023-08-12 RX ADMIN — GABAPENTIN 600 MG: 300 CAPSULE ORAL at 16:00

## 2023-08-12 RX ADMIN — HYDROCODONE BITARTRATE AND ACETAMINOPHEN 1 TABLET: 5; 325 TABLET ORAL at 03:33

## 2023-08-12 RX ADMIN — METHYLPREDNISOLONE SODIUM SUCCINATE 30 MG: 40 INJECTION, POWDER, FOR SOLUTION INTRAMUSCULAR; INTRAVENOUS at 21:23

## 2023-08-12 RX ADMIN — Medication 400 MG: at 21:20

## 2023-08-12 NOTE — NURSING NOTE
Patient resting in bed at present, pleasant affect. Complains of pain 4/10. Offers no other complaints at this time. Call bell and belongings within reach.

## 2023-08-12 NOTE — PROGRESS NOTES
[address] [place] [employer]  Progress Note  Name: Joey Hilliard I  MRN: [id number]  Unit/Bed#: -01 I Date of Admission: 8/3/2023   Date of Service: 8/12/2023 I Hospital Day: 8    Assessment/Plan   * Bilateral upper and lower extremity neuropathy  Assessment & Plan  · POA  · Presented with bilateral upper and lower extremity pain, weakness, and numbness  · Recently seen in ED and given Lyrica, however, had an adverse reaction to this medication  · MRI C-spine (8/3): No disc herniation, canal or foraminal stenosis. 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 · 8/7 patient reports that the pain in her upper and lower extremities has improved since the steroid was added, however, feels that the numbness and weakness in her hands is worse to the point that she cannot hold utensils to feed herself. She also notes that there is no improvement in the numbness in her feet. States the foot numbness started about 2 weeks ago after an extended car ride that lasted 12 hours  · 8/7 discussed findings with neurology, will obtain MRI T-spine and L-spine with and without contrast and LP  · 8/7 MRI thoracic spine: No cord signal abnormality or pathologic enhancement. Unremarkable MRI of thoracic spine. Small right and minimal left pleural effusion. · 8/7 MRI lumbar spine: No abnormal signal or pathologic enhancement in the visualized cord. Mild degenerative changes without canal or foraminal stenosis. Hepatomegaly. · Assessment  unchanged since 8/8. Continues with burning sensation in hands and feet, unable to wiggle toes, limited mobility of first 2 fingers and right hand and unable to move fingers on left hand. · 8/8 IR performed LP for AIPD/CIPD per my discussion with neurology  · 8/11 CSF fluid studies per orders of neurology. Discussed with neurology via [place] text, [facility] fluid does not indicate AIDP etiology.   Patient noted to have B6 deficiency which neurology feels may be causing some of symptoms. Will add vitamin B6 supplementation starting today  · 8/11 patient reports with worsening burning in her hands and feet today, will increase gabapentin and monitor effectiveness  · Continue Solu-Medrol  (have been titrating down)  · Per neurology will need follow up in in 6 weeks with neuromuscular attending. She will require a EMG/NCS within 4-6 weeks. Neurology will follow in outpatient setting. As of conversation with neurologist on 8/11 they feel not much else can be done in the inpatient setting. Rheumatoid arthritis ([address])  Assessment & Plan  · Not on any maintenance therapy at this time as the patient has several allergies to DMARDs  · Continue IV Solu-Medrol as above for possible acute RA flare, titrating downward  · Planning rheumatology follow-up in outpatient setting. Patient reports that she had not seen a rheumatologist in about 17 years and that her PCP was managing her rheumatoid arthritis. She added that she does have a rheumatology appointment with a doctor at [facility] in [place] in January because that is the quickest she could get in.   · 8[facility]11, [facility] at 8 AM    Moderate protein-calorie malnutrition ([address])  Assessment & Plan  Malnutrition Findings:   Adult Malnutrition type: Chronic illness  Adult Degree of Malnutrition: Malnutrition of moderate degree  Malnutrition Characteristics: Fat loss, Muscle loss, Inadequate energy        ·  Present on admission  · Evidenced by less than 75% energy intake versus need for greater than 1 month exhibiting moderate clavicle region muscle loss and moderate buccal fat pad loss  · Continue to monitor daily weights  · Nutritional consult appreciate recommendations  · Continue gluten-free double portion diet and Ensure plant based protein shakes twice daily as supplements  · Has been tolerating oral diet         360 Statement: Malnutrition of Moderate degree related to disease/condition RA evidenced by <75% energy intake versus needs for > 1 month exhibiting moderate clavicle region muscle loss and moderate buccal fat pads loss,Weight loss to be validated when current bed wt obtained. Treating with Gluten Free diet double portions and trialing Ensure Plant Based Protein Shakes BID as po supplement. BMI Findings:  Adult BMI Classifications: Underweight < 18.5        Body mass index is 15.43 kg/m². Hyponatremia  Assessment & Plan  · History of acute on chronic hyponatremia  · Serum sodium today 133  · Has required salt tabs in the past following surgical intervention  · In the setting of a patient who takes meloxicam for pain and chronic pain/nausea  · Nephrology following, appreciate recommendations  · BMP a.m. Microcytic anemia  Assessment & Plan  · No active bleeding noted at this time  · Anemia panel showing combination of LANDEN and anemia of chronic disease  · Nephrology recommending heme iron supplementation  · CBC a.m. Asthma  Assessment & Plan  · Without exacerbation  · On room air with nonlabored respirations  · Continue Ventolin inhaler as needed    Vitamin B6 deficiency  Assessment & Plan  · Initiated supplementation 8/10, will continue           VTE Pharmacologic Prophylaxis:   Pharmacologic: Heparin  Mechanical VTE Prophylaxis in Place: Yes    Patient Centered Rounds: I have performed bedside rounds with nursing staff today. Discussions with Specialists or Other Care Team Provider: Neurology, IR, nursing, case management    Education and Discussions with Family / Patient: Treatment plan discussed with patient who understands all its been explained. All questions answered to satisfaction. Time Spent for Care: 45 minutes. More than 50% of total time spent on counseling and coordination of care as described above.     Current Length of Stay: 8 day(s)    Current Patient Status: Inpatient   Certification Statement: The patient will continue to require additional inpatient hospital stay due to Titrating IV steroids, reported worsening burning sensation hands and feet today increased gabapentin we will monitor effectiveness    Discharge Plan: Plan is for patient to be discharged home with home health care on discharge. Patient concerned with persistent burning sensation in hands and feet and inability to wiggle toes or fingers. I again reviewed with neurology via [facility] text, will add urine for heavy metal screen. Agree no further inpatient work-up at this time, will need EMG as soon as possible for definitive diagnosis. Neurology will reach out to the clinic to get an expedited appointment. Plan is to discharge patient home in a.m. on prednisone taper with follow-ups with PCP, neurology, and rheumatology. Code Status: Level 1 - Full Code      Subjective:   Patient states that she is concerned that she still cannot wiggle her fingers or toes. Still having some burning sensation when you touch her hands or feet. Objective:     Vitals:   Temp (24hrs), Av.7 °F (36.5 °C), Min:97.7 °F (36.5 °C), Max:97.7 °F (36.5 °C)    Temp:  [97.7 °F (36.5 °C)] 97.7 °F (36.5 °C)  HR:  [79-81] 79  Resp:  [18-19] 18  BP: (104-117)/(77-84) 117/84  SpO2:  [96 %-97 %] 97 %  Body mass index is 15.43 kg/m². Input and Output Summary (last 24 hours): Intake/Output Summary (Last 24 hours) at 2023 1559  Last data filed at 2023 1319  Gross per 24 hour   Intake 240 ml   Output --   Net 240 ml       Physical Exam:     Physical Exam  Constitutional:       General: She is not in acute distress. Appearance: Normal appearance. She is not ill-appearing. HENT:      Head: Normocephalic and atraumatic. Nose: Nose normal.      Mouth/Throat:      Mouth: Mucous membranes are moist.   Eyes:      Extraocular Movements: Extraocular movements intact.       Conjunctiva/sclera: Conjunctivae normal.      Pupils: Pupils are equal, round, and reactive to light. Cardiovascular:      Rate and Rhythm: Normal rate and regular rhythm. Pulses: Normal pulses. Heart sounds: Normal heart sounds. Pulmonary:      Effort: Pulmonary effort is normal. No respiratory distress. Breath sounds: Normal breath sounds. No wheezing or rales. Abdominal:      General: Bowel sounds are normal. There is no distension. Palpations: Abdomen is soft. Tenderness: There is no abdominal tenderness. There is no guarding. Musculoskeletal:         General: Normal range of motion. Skin:     General: Skin is warm and dry. Capillary Refill: Capillary refill takes less than 2 seconds. Neurological:      General: No focal deficit present. Mental Status: She is alert and oriented to person, place, and time. Mental status is at baseline. Comments: Patient continues with numbness and tingling in hands and feet, continued burning sensation in hands and feet when you touch them. Psychiatric:         Mood and Affect: Mood normal.         Behavior: Behavior normal.         Thought Content: Thought content normal.         Judgment: Judgment normal.         Additional Data:     Labs:    Results from last 7 days   Lab Units 08/12/23  0605   WBC Thousand/uL 16.06*   HEMOGLOBIN g/dL 9.9*   HEMATOCRIT % 32.6*   PLATELETS Thousands/uL 512*   NEUTROS PCT % 75   LYMPHS PCT % 19   MONOS PCT % 4   EOS PCT % 0     Results from last 7 days   Lab Units 08/12/23  0605   POTASSIUM mmol/L 4.5   CHLORIDE mmol/L 101   CO2 mmol/L 26   BUN mg/dL 30*   CREATININE mg/dL 0.34*   CALCIUM mg/dL 8.6     Results from last 7 days   Lab Units 08/08/23  1015   INR  1.07       * I Have Reviewed All Lab Data Listed Above. * Additional Pertinent Lab Tests Reviewed:  All [facility] Admission Reviewed    Imaging:    Imaging Reports Reviewed Today Include: MRI C-spine, VAS lower extremity, RI T-spine, MRI L-spine  Imaging Personally Reviewed by Myself Includes:      Recent Cultures (last 7 days):     Results from last 7 days   Lab Units 08/08/23  1239   GRAM STAIN RESULT  No Polys or Bacteria seen       Last 24 Hours Medication List:   Current Facility-Administered Medications   Medication Dose Route Frequency Provider Last Rate   • albuterol  2 puff Inhalation Q4H PRN Joshua Grier PA-C     • diphenhydramine, lidocaine, Al/Mg hydroxide, simethicone  10 mL Swish & Spit Q4H PRN [place], DO     • gabapentin  600 mg Oral TID CARMITA Stapleton     • heparin (porcine)  5,000 Units Subcutaneous [facility] Joshua Grier PA-C     • HYDROcodone-acetaminophen  1 tablet Oral Q6H PRN Joshua Grier PA-C     • magnesium Oxide  400 mg Oral HS Joshua Grier PA-C     • meloxicam  7.5 mg Oral Daily Joshua Grier PA-C     • methylPREDNISolone sodium succinate  30 mg Intravenous Q12H [address] CARMITA Stapleton     • morphine injection  2 mg Intravenous Q4H PRN Joshua Grier PA-C     • ondansetron  4 mg Intravenous Q6H PRN Joshua Grier PA-C     • pyridoxine  50 mg Oral Daily CARMITA Stapleton          Today, Patient Was Seen By: CARMITA Garcia    ** Please Note: Dictation voice to text software may have been used in the creation of this document.  **

## 2023-08-12 NOTE — PLAN OF CARE
Problem: Potential for Falls  Goal: Patient will remain free of falls  Description: INTERVENTIONS:  - Educate patient/family on patient safety including physical limitations  - Instruct patient to call for assistance with activity   - Consult OT/PT to assist with strengthening/mobility   - Keep Call bell within reach  - Keep bed low and locked with side rails adjusted as appropriate  - Keep care items and personal belongings within reach  - Initiate and maintain comfort rounds  - Make Fall Risk Sign visible to staff  - Offer Toileting every 2 Hours, in advance of need  - Initiate/Maintain bed alarm  - Obtain necessary fall risk management equipment: bed alarm  - Apply yellow socks and bracelet for high fall risk patients  - Consider moving patient to room near nurses station  8/11/2023 2205 by Eduard Morfin RN  Outcome: Progressing  8/11/2023 2205 by Eduard Morfin RN  Outcome: Progressing     Problem: MOBILITY - ADULT  Goal: Maintain or return to baseline ADL function  Description: INTERVENTIONS:  -  Assess patient's ability to carry out ADLs; assess patient's baseline for ADL function and identify physical deficits which impact ability to perform ADLs (bathing, care of mouth/teeth, toileting, grooming, dressing, etc.)  - Assess/evaluate cause of self-care deficits   - Assess range of motion  - Assess patient's mobility; develop plan if impaired  - Assess patient's need for assistive devices and provide as appropriate  - Encourage maximum independence but intervene and supervise when necessary  - Involve family in performance of ADLs  - Assess for home care needs following discharge   - Consider OT consult to assist with ADL evaluation and planning for discharge  - Provide patient education as appropriate  8/11/2023 2205 by Eduard Morfin RN  Outcome: Progressing  8/11/2023 2205 by Eduard Morfin RN  Outcome: Progressing  Goal: Maintains/Returns to pre admission functional level  Description: INTERVENTIONS:  - Perform BMAT or MOVE assessment daily.   - Set and communicate daily mobility goal to care team and patient/family/caregiver. - Collaborate with rehabilitation services on mobility goals if consulted  - Perform Range of Motion 3 times a day. - Reposition patient every 2 hours. - Dangle patient 3 times a day  - Stand patient 3 times a day  - Ambulate patient 3 times a day  - Out of bed to chair 3 times a day   - Out of bed for meals 3 times a day  - Out of bed for toileting  - Record patient progress and toleration of activity level   8/11/2023 2205 by David Edouard RN  Outcome: Progressing  8/11/2023 2205 by David Edouard RN  Outcome: Progressing     Problem: Prexisting or High Potential for Compromised Skin Integrity  Goal: Skin integrity is maintained or improved  Description: INTERVENTIONS:  - Identify patients at risk for skin breakdown  - Assess and monitor skin integrity  - Assess and monitor nutrition and hydration status  - Monitor labs   - Assess for incontinence   - Turn and reposition patient  - Assist with mobility/ambulation  - Relieve pressure over bony prominences  - Avoid friction and shearing  - Provide appropriate hygiene as needed including keeping skin clean and dry  - Evaluate need for skin moisturizer/barrier cream  - Collaborate with interdisciplinary team   - Patient/family teaching  - Consider wound care consult   8/11/2023 2205 by David Edouard RN  Outcome: Progressing  8/11/2023 2205 by David Edouard RN  Outcome: Progressing     Problem: Nutrition/Hydration-ADULT  Goal: Nutrient/Hydration intake appropriate for improving, restoring or maintaining nutritional needs  Description: Monitor and assess patient's nutrition/hydration status for malnutrition. Collaborate with interdisciplinary team and initiate plan and interventions as ordered. Monitor patient's weight and dietary intake as ordered or per policy. Utilize nutrition screening tool and intervene as necessary.  Determine patient's food preferences and provide high-protein, high-caloric foods as appropriate.      INTERVENTIONS:  - Monitor oral intake, urinary output, labs, and treatment plans  - Assess nutrition and hydration status and recommend course of action  - Evaluate amount of meals eaten  - Assist patient with eating if necessary   - Allow adequate time for meals  - Recommend/ encourage appropriate diets, oral nutritional supplements, and vitamin/mineral supplements  - Order, calculate, and assess calorie counts as needed  - Recommend, monitor, and adjust tube feedings and TPN/PPN based on assessed needs  - Assess need for intravenous fluids  - Provide specific nutrition/hydration education as appropriate  - Include patient/family/caregiver in decisions related to nutrition  8/11/2023 2205 by Rudi Hall RN  Outcome: Progressing  8/11/2023 2205 by Rudi Hall RN  Outcome: Progressing     Problem: INFECTION - ADULT  Goal: Absence or prevention of progression during hospitalization  Description: INTERVENTIONS:  - Assess and monitor for signs and symptoms of infection  - Monitor lab/diagnostic results  - Monitor all insertion sites, i.e. indwelling lines, tubes, and drains  - Monitor endotracheal if appropriate and nasal secretions for changes in amount and color  - Rembrandt appropriate cooling/warming therapies per order  - Administer medications as ordered  - Instruct and encourage patient and family to use good hand hygiene technique  - Identify and instruct in appropriate isolation precautions for identified infection/condition  8/11/2023 2205 by Rudi Hall RN  Outcome: Progressing  8/11/2023 2205 by Rudi Hall RN  Outcome: Progressing  Goal: Absence of fever/infection during neutropenic period  Description: INTERVENTIONS:  - Monitor WBC    8/11/2023 2205 by Rudi Hall RN  Outcome: Progressing  8/11/2023 2205 by Rudi Hall RN  Outcome: Progressing     Problem: SAFETY ADULT  Goal: Patient will remain free of falls  Description: INTERVENTIONS:  - Educate patient/family on patient safety including physical limitations  - Instruct patient to call for assistance with activity   - Consult OT/PT to assist with strengthening/mobility   - Keep Call bell within reach  - Keep bed low and locked with side rails adjusted as appropriate  - Keep care items and personal belongings within reach  - Initiate and maintain comfort rounds  - Make Fall Risk Sign visible to staff  - Offer Toileting every 2 Hours, in advance of need  - Initiate/Maintain bed alarm  - Obtain necessary fall risk management equipment: bed alarm  - Apply yellow socks and bracelet for high fall risk patients  - Consider moving patient to room near nurses station  8/11/2023 2205 by Ana Cadena RN  Outcome: Progressing  8/11/2023 2205 by Ana Cadena RN  Outcome: Progressing     Problem: MUSCULOSKELETAL - ADULT  Goal: Maintain or return mobility to safest level of function  Description: INTERVENTIONS:  - Assess patient's ability to carry out ADLs; assess patient's baseline for ADL function and identify physical deficits which impact ability to perform ADLs (bathing, care of mouth/teeth, toileting, grooming, dressing, etc.)  - Assess/evaluate cause of self-care deficits   - Assess range of motion  - Assess patient's mobility  - Assess patient's need for assistive devices and provide as appropriate  - Encourage maximum independence but intervene and supervise when necessary  - Involve family in performance of ADLs  - Assess for home care needs following discharge   - Consider OT consult to assist with ADL evaluation and planning for discharge  - Provide patient education as appropriate  8/11/2023 2205 by Ana Cadena RN  Outcome: Progressing  8/11/2023 2205 by Ana Cadena RN  Outcome: Progressing  Goal: Maintain proper alignment of affected body part  Description: INTERVENTIONS:  - Support, maintain and protect limb and body alignment  - Provide patient/ family with appropriate education  8/11/2023 2205 by Aundrea Nunes, RN  Outcome: Progressing  8/11/2023 2205 by Aundrea Nunes, RN  Outcome: Progressing

## 2023-08-13 LAB
ANION GAP SERPL CALCULATED.3IONS-SCNC: 5 MMOL/L
BASOPHILS # BLD AUTO: 0.02 THOUSANDS/ÂΜL (ref 0–0.1)
BASOPHILS NFR BLD AUTO: 0 % (ref 0–1)
BUN SERPL-MCNC: 27 MG/DL (ref 5–25)
CALCIUM SERPL-MCNC: 8.7 MG/DL (ref 8.4–10.2)
CHLORIDE SERPL-SCNC: 102 MMOL/L (ref 96–108)
CO2 SERPL-SCNC: 29 MMOL/L (ref 21–32)
CREAT SERPL-MCNC: 0.33 MG/DL (ref 0.6–1.3)
EOSINOPHIL # BLD AUTO: 0 THOUSAND/ÂΜL (ref 0–0.61)
EOSINOPHIL NFR BLD AUTO: 0 % (ref 0–6)
ERYTHROCYTE [DISTWIDTH] IN BLOOD BY AUTOMATED COUNT: 19.5 % (ref 11.6–15.1)
GFR SERPL CREATININE-BSD FRML MDRD: 134 ML/MIN/1.73SQ M
GLUCOSE SERPL-MCNC: 100 MG/DL (ref 65–140)
HCT VFR BLD AUTO: 32.8 % (ref 34.8–46.1)
HGB BLD-MCNC: 9.8 G/DL (ref 11.5–15.4)
IMM GRANULOCYTES # BLD AUTO: 0.32 THOUSAND/UL (ref 0–0.2)
IMM GRANULOCYTES NFR BLD AUTO: 2 % (ref 0–2)
LYMPHOCYTES # BLD AUTO: 3.55 THOUSANDS/ÂΜL (ref 0.6–4.47)
LYMPHOCYTES NFR BLD AUTO: 25 % (ref 14–44)
MCH RBC QN AUTO: 25.1 PG (ref 26.8–34.3)
MCHC RBC AUTO-ENTMCNC: 29.9 G/DL (ref 31.4–37.4)
MCV RBC AUTO: 84 FL (ref 82–98)
MONOCYTES # BLD AUTO: 0.64 THOUSAND/ÂΜL (ref 0.17–1.22)
MONOCYTES NFR BLD AUTO: 4 % (ref 4–12)
NEUTROPHILS # BLD AUTO: 9.97 THOUSANDS/ÂΜL (ref 1.85–7.62)
NEUTS SEG NFR BLD AUTO: 69 % (ref 43–75)
NRBC BLD AUTO-RTO: 0 /100 WBCS
PLATELET # BLD AUTO: 467 THOUSANDS/UL (ref 149–390)
PMV BLD AUTO: 7.8 FL (ref 8.9–12.7)
POTASSIUM SERPL-SCNC: 4.9 MMOL/L (ref 3.5–5.3)
RBC # BLD AUTO: 3.91 MILLION/UL (ref 3.81–5.12)
SODIUM SERPL-SCNC: 136 MMOL/L (ref 135–147)
WBC # BLD AUTO: 14.5 THOUSAND/UL (ref 4.31–10.16)

## 2023-08-13 PROCEDURE — G0407 INPT/TELE FOLLOW UP 25: HCPCS | Performed by: PSYCHIATRY & NEUROLOGY

## 2023-08-13 PROCEDURE — 99239 HOSP IP/OBS DSCHRG MGMT >30: CPT | Performed by: NURSE PRACTITIONER

## 2023-08-13 PROCEDURE — 99233 SBSQ HOSP IP/OBS HIGH 50: CPT

## 2023-08-13 PROCEDURE — 85025 COMPLETE CBC W/AUTO DIFF WBC: CPT

## 2023-08-13 PROCEDURE — 80048 BASIC METABOLIC PNL TOTAL CA: CPT

## 2023-08-13 RX ORDER — HEPARIN SODIUM 5000 [USP'U]/ML
5000 INJECTION, SOLUTION INTRAVENOUS; SUBCUTANEOUS EVERY 8 HOURS SCHEDULED
Status: CANCELLED | OUTPATIENT
Start: 2023-08-13

## 2023-08-13 RX ORDER — ALBUTEROL SULFATE 90 UG/1
2 AEROSOL, METERED RESPIRATORY (INHALATION) EVERY 4 HOURS PRN
Status: CANCELLED | OUTPATIENT
Start: 2023-08-13

## 2023-08-13 RX ORDER — HYDROCODONE BITARTRATE AND ACETAMINOPHEN 5; 325 MG/1; MG/1
1 TABLET ORAL EVERY 6 HOURS PRN
Status: CANCELLED | OUTPATIENT
Start: 2023-08-13

## 2023-08-13 RX ORDER — PYRIDOXINE HCL (VITAMIN B6) 50 MG
50 TABLET ORAL DAILY
Status: CANCELLED | OUTPATIENT
Start: 2023-08-14

## 2023-08-13 RX ORDER — DIPHENHYDRAMINE HYDROCHLORIDE AND LIDOCAINE HYDROCHLORIDE AND ALUMINUM HYDROXIDE AND MAGNESIUM HYDRO
10 KIT EVERY 4 HOURS PRN
Status: CANCELLED | OUTPATIENT
Start: 2023-08-13

## 2023-08-13 RX ORDER — METHYLPREDNISOLONE SODIUM SUCCINATE 40 MG/ML
30 INJECTION, POWDER, LYOPHILIZED, FOR SOLUTION INTRAMUSCULAR; INTRAVENOUS EVERY 12 HOURS SCHEDULED
Status: CANCELLED | OUTPATIENT
Start: 2023-08-13

## 2023-08-13 RX ORDER — LANOLIN ALCOHOL/MO/W.PET/CERES
400 CREAM (GRAM) TOPICAL
Status: CANCELLED | OUTPATIENT
Start: 2023-08-13

## 2023-08-13 RX ORDER — GABAPENTIN 300 MG/1
600 CAPSULE ORAL 3 TIMES DAILY
Status: CANCELLED | OUTPATIENT
Start: 2023-08-13

## 2023-08-13 RX ORDER — MELOXICAM 7.5 MG/1
7.5 TABLET ORAL DAILY
Status: CANCELLED | OUTPATIENT
Start: 2023-08-14

## 2023-08-13 RX ORDER — ONDANSETRON 2 MG/ML
4 INJECTION INTRAMUSCULAR; INTRAVENOUS EVERY 6 HOURS PRN
Status: CANCELLED | OUTPATIENT
Start: 2023-08-13

## 2023-08-13 RX ADMIN — HEPARIN SODIUM 5000 UNITS: 5000 INJECTION INTRAVENOUS; SUBCUTANEOUS at 13:00

## 2023-08-13 RX ADMIN — DIPHENHYDRAMINE HYDROCHLORIDE AND LIDOCAINE HYDROCHLORIDE AND ALUMINUM HYDROXIDE AND MAGNESIUM HYDRO 10 ML: KIT at 12:57

## 2023-08-13 RX ADMIN — HEPARIN SODIUM 5000 UNITS: 5000 INJECTION INTRAVENOUS; SUBCUTANEOUS at 21:09

## 2023-08-13 RX ADMIN — GABAPENTIN 600 MG: 300 CAPSULE ORAL at 16:23

## 2023-08-13 RX ADMIN — METHYLPREDNISOLONE SODIUM SUCCINATE 30 MG: 40 INJECTION, POWDER, FOR SOLUTION INTRAMUSCULAR; INTRAVENOUS at 21:09

## 2023-08-13 RX ADMIN — Medication 400 MG: at 21:09

## 2023-08-13 RX ADMIN — GABAPENTIN 600 MG: 300 CAPSULE ORAL at 08:43

## 2023-08-13 RX ADMIN — GABAPENTIN 600 MG: 300 CAPSULE ORAL at 21:09

## 2023-08-13 RX ADMIN — HYDROCODONE BITARTRATE AND ACETAMINOPHEN 1 TABLET: 5; 325 TABLET ORAL at 14:17

## 2023-08-13 RX ADMIN — MELOXICAM 7.5 MG: 7.5 TABLET ORAL at 08:43

## 2023-08-13 RX ADMIN — METHYLPREDNISOLONE SODIUM SUCCINATE 30 MG: 40 INJECTION, POWDER, FOR SOLUTION INTRAMUSCULAR; INTRAVENOUS at 08:43

## 2023-08-13 RX ADMIN — Medication 50 MG: at 08:43

## 2023-08-13 RX ADMIN — HEPARIN SODIUM 5000 UNITS: 5000 INJECTION INTRAVENOUS; SUBCUTANEOUS at 06:08

## 2023-08-13 RX ADMIN — HYDROCODONE BITARTRATE AND ACETAMINOPHEN 1 TABLET: 5; 325 TABLET ORAL at 21:08

## 2023-08-13 NOTE — EMTALA/ACUTE CARE TRANSFER
[address]   [address] [address]  Dept: [phone]      ACUTE CARE TRANSFER CONSENT    NAME Srinivasan Hector                                         1975                              MRN [id number]    I have been informed of my rights regarding examination, treatment, and transfer   by Ifeoma VIZCARRA    Benefits:  Nerve biopsy recommended by neurology able to be performed at receiving facility    Risks:        Consent for Transfer:  I acknowledge that my medical condition has been evaluated and explained to me by the treating physician or other qualified medical person and/or my attending physician, who has recommended that I be transferred to the service of    at  . The above potential benefits of such transfer, the potential risks associated with such transfer, and the probable risks of not being transferred have been explained to me, and I fully understand them. The doctor has explained that, in my case, the benefits of transfer outweigh the risks. I agree to be transferred. I authorize the performance of emergency medical procedures and treatments upon me in both transit and upon arrival at the receiving facility. Additionally, I authorize the release of any and all medical records to the receiving facility and request they be transported with me, if possible. I understand that the safest mode of transportation during a medical emergency is an ambulance and that the Hospital advocates the use of this mode of transport. Risks of traveling to the receiving facility by car, including absence of medical control, life sustaining equipment, such as oxygen, and medical personnel has been explained to me and I fully understand them. (CHATO CORRECT BOX BELOW)  [  ]  I consent to the stated transfer and to be transported by ambulance/helicopter.   [  ]  I consent to the stated transfer, but refuse transportation by ambulance and accept full responsibility for my transportation by car. I understand the risks of non-ambulance transfers and I exonerate the Hospital and its staff from any deterioration in my condition that results from this refusal.    X___________________________________________    DATE  23  TIME________  Signature of patient or legally responsible individual signing on patient behalf           RELATIONSHIP TO PATIENT_________________________          Provider Certification    NAME Srinivasan Hector                                         1975                              MRN [id number]    A medical screening exam was performed on the above named patient. Based on the examination:    Condition Necessitating Transfer requested by neurology, state patient needs nerve biopsy and rheumatology evaluation. 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    Patient Condition:  Good    Reason for Transfer:  Services not available at this campus    Transfer Requirements: Facility     · Space available and qualified personnel available for treatment as acknowledged by    · Agreed to accept transfer and to provide appropriate medical treatment as acknowledged by          · Appropriate medical records of the examination and treatment of the patient are provided at the time of transfer   [address] _______  · Transfer will be performed by qualified personnel from    and appropriate transfer equipment as required, including the use of necessary and appropriate life support measures.     Provider Certification: I have examined the patient and explained the following risks and benefits of being transferred/refusing transfer to the patient/family:         Based on these reasonable risks and benefits to the patient and/or the unborn child(jennifer), and based upon the information available at the time of the patient’s examination, I certify that the medical benefits reasonably to be expected from the provision of appropriate medical treatments at another medical facility outweigh the increasing risks, if any, to the individual’s medical condition, and in the case of labor to the unborn child, from effecting the transfer.     X______Heavenly VIZCARRA  _____________________________________ DATE 08/13/23        TIME_______1600      ORIGINAL - SEND TO MEDICAL RECORDS   COPY - SEND WITH PATIENT DURING TRANSFER

## 2023-08-13 NOTE — DISCHARGE SUMMARY
[address]  Discharge- Raul Hector 1975, 41 y.o. female MRN: [id number]  Unit/Bed#: -01 Encounter: [id number]  Primary Care Provider: Imani Hansen DO   Date and time admitted to hospital: 8/3/2023  3:40 PM    Neuropathy involving both lower extremities  Assessment & Plan  · Present on admission  · Patient noted to have numbness and tingling both hands and feet, with worsening mobility in her fingers and toes. Currently unable to wiggle toes and fingers on the left hand. Able to move thumb and second finger on right hand. This has been unchanged since arrival.  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 Neurologist recommending transfer to [facility] for rheumatological consult and nerve biopsy. Stated nerve biopsy could be performed by neurosurgeon or general surgery. Dr. Vianey Flores of neurology evaluated patient, spoke with neurologist Dr Nikki Mckenzie at [facility]. Plan is for patient to be transferred to [facility]. · Arrangements finalized, will transfer to [facility]. * Bilateral upper and lower extremity neuropathy  Assessment & Plan  · POA  · Presented with bilateral upper and lower extremity pain, weakness, and numbness  · Recently seen in ED and given Lyrica, however, had an adverse reaction to this medication  · MRI C-spine (8/3): No disc herniation, canal or foraminal stenosis. 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 · 8/7 patient reports that the pain in her upper and lower extremities has improved since the steroid was added, however, feels that the numbness and weakness in her hands is worse to the point that she cannot hold utensils to feed herself. She also notes that there is no improvement in the numbness in her feet.   States the foot numbness started about 2 weeks ago after an extended car ride that lasted 12 hours  · 8/7 discussed findings with neurology, will obtain MRI T-spine and L-spine with and without contrast and LP  · 8/7 MRI thoracic spine: No cord signal abnormality or pathologic enhancement. Unremarkable MRI of thoracic spine. Small right and minimal left pleural effusion. · 8/7 MRI lumbar spine: No abnormal signal or pathologic enhancement in the visualized cord. Mild degenerative changes without canal or foraminal stenosis. Hepatomegaly. · Assessment  unchanged since 8/8. Continues with burning sensation in hands and feet, unable to wiggle toes, limited mobility of first 2 fingers and right hand and unable to move fingers on left hand. · 8/8 IR performed LP for AIPD/CIPD per my discussion with neurology  · 8/11 CSF fluid studies per orders of neurology. Discussed with neurology via [place] text, [facility] fluid does not indicate AIDP etiology. Patient noted to have B6 deficiency which neurology feels may be causing some of symptoms. Will add vitamin B6 supplementation starting today  · 8/11 patient reports with worsening burning in her hands and feet today, will increase gabapentin and monitor effectiveness  · Continue Solu-Medrol  (have been titrating down)  · Neurology has been following closely, telemetry conference with neurology today. 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 Nurse established diagnosis would require rheumatology consultation and nerve biopsy. Recommending transfer to [facility]. Dr. Shara Washburn of neurology did the telemetry conference, discussed findings with neurologist at [facility]   · Transfer to [facility]    Rheumatoid arthritis [facility])  Assessment & Plan  · Not on any maintenance therapy at this time as the patient has several allergies to DMARDs  · Continue IV Solu-Medrol as above for possible acute RA flare, titrating downward  · Planning rheumatology follow-up in outpatient setting.   Patient reports that she had not seen a rheumatologist in about 17 years and that her PCP was managing her rheumatoid arthritis. She added that she does have a rheumatology appointment with a doctor at [facility] in [place] in January because that is the quickest she could get in. · 8/11, I secured outpatient appointment with Yasmany Light rheumatologist for 9/11 at 8 AM  · After neurology telemetry consult today plan is to transfer [facility]. Follow-up rheumatology at [facility]. · Transferred to [facility], arrangements finalized    Moderate protein-calorie malnutrition ([address])  Assessment & Plan  Malnutrition Findings:   Adult Malnutrition type: Chronic illness  Adult Degree of Malnutrition: Malnutrition of moderate degree  Malnutrition Characteristics: Fat loss, Muscle loss, Inadequate energy        ·  Present on admission  · Evidenced by less than 75% energy intake versus need for greater than 1 month exhibiting moderate clavicle region muscle loss and moderate buccal fat pad loss  · Continue to monitor daily weights  · Nutritional consult appreciate recommendations  · Continue gluten-free double portion diet and Ensure plant based protein shakes twice daily as supplements  · Has been tolerating oral diet         360 Statement: Malnutrition of Moderate degree related to disease/condition RA evidenced by <75% energy intake versus needs for > 1 month exhibiting moderate clavicle region muscle loss and moderate buccal fat pads loss,Weight loss to be validated when current bed wt obtained. Treating with Gluten Free diet double portions and trialing Ensure Plant Based Protein Shakes BID as po supplement. BMI Findings:  Adult BMI Classifications: Underweight < 18.5        Body mass index is 15.43 kg/m².        Hyponatremia  Assessment & Plan  · POA,history of acute on chronic hyponatremia  · Now resolved, serum sodium today 136  · Has required salt tabs in the past following surgical intervention  · In the setting of a patient who takes meloxicam for pain and chronic pain/nausea  · Nephrology following, appreciate recommendations  · Further monitoring of labs to be determined at receiving facility    Microcytic anemia  Assessment & Plan  · No active bleeding noted at this time  · Anemia panel showing combination of LANDEN and anemia of chronic disease  · Nephrology recommending heme iron supplementation  · Further monitoring of labs to be determined at receiving facility    Asthma  Assessment & Plan  · Without exacerbation  · On room air with nonlabored respirations  · Continue Ventolin inhaler as needed    Vitamin B6 deficiency  Assessment & Plan  · Initiated supplementation 8/10, will continue on transfer      Discharging Physician / Practitioner: Marcella Chavez, [address] [place] [address]  PCP: Vaughn Cox DO  Admission Date:   Admission Orders (From admission, onward)     Ordered        08/04/23 1225  Inpatient Admission  Once            08/03/23 2335  Place in Observation  Once                      Discharge Date: 08/14/23    Medical Problems     Resolved Problems  Date Reviewed: 8/13/2023   None         Consultations During Hospital Stay:  · Nephrology, IR, nutritionist, neurology    Procedures Performed:   · 8/7 lumbar puncture by IR  · 8/8 midline placement by IR    Significant Findings / Test Results:   · 8/3 CT spine: Acute on chronic left sphenoid sinusitis  · 8/3 CT head: No acute intracranial abnormality  · 8/3 MRI C-spine: No disc herniation, canal or foraminal stenosis. 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 Diffuse disease noted throughout the femoral-popliteal arteries without significant focal stenosis and evidence suggesting tibioperoneal disease on the left lower extremity. · 8/7 MRI thoracic spine: No cord signal abnormality or pathologic enhancement. Unremarkable MRI of the T-spine. Small right and minimal left pleural effusion. · 8/7 MRI lumbar spine: No abnormal signal or pathologic enhancement visualized cord, mild degenerative changes without canal or foraminal stenosis. Hepatomegaly. · 8/8 LP fluid: No bacteria noted  · 8/8 serum levels of arsenic, cadmium, lead, and mercury all normal  · 8/10 urine for kappa lambda light chains within normal limits  · 8/9 IgA 375, IgG 2270, IgM 559  · 8/8 positive VALDO  · 8/5 P-ANCA elevated at 1:640    Incidental Findings:   · None    Test Results Pending at Discharge (will require follow up):   · Urine for heavy metals     Outpatient Tests Requested:  · To be determined at time of discharge. Patient is being transferred to [facility]. Complications: None    Reason for Admission: Bilateral upper and lower extremity neuropathy. Hospital Course:     Neha To is a 41 y.o. female patient who originally presented to the hospital on 8/3/2023 due to complaints of leg pain and numbness x 2 weeks and bilateral arm pain and numbness x 3 days. Patient has a longstanding history of rheumatoid arthritis diagnosed at age 15 and noted that she has not had rheumatology follow-up in 17 years. PCP was managing her RA. She is noted that she was intolerant to biologics in the past as well as some DMARDs. She also noted that she was previously trialed on Lyrica for RA pain in the past, but had reaction to this as well. She was initiated on IV Solu-Medrol for presumed rheumatoid arthritis flare. MRI of the C-spine noted no disc herniation, canal, or foraminal stenosis on arrival.  Patient continued with numbness and tingling in her hands and feet as of 8/7 she reported that the pain in her upper extremities improved since the steroid was added, however, she was upset that she has persistent numbness and weakness in her hands and feet.   She noted that she could not wiggle her toes and that she could only move the first 2 digits on her right hand and unable to move fingers on her left hand. At that time the findings were discussed with neurology who requested MRI imaging of her T-spine and L-spine with and without contrast and an LP. MRI of the T-spine were unremarkable. MRI of lumbar spine noted no abnormal signal or pathologic enhancement in the visualized cord and mild degenerative changes without canal or foraminal stenosis. IR was consulted on 8/8 to perform an LP due to concern for AI PD/CIPD after discussion with neurology. Neurology to continue to follow throughout the week. CSF fluid did not indicate a IPD per Neurology. Patient was noted to have a B6 deficiency on 8/11 and was initiated on supplementation at that time. Symptoms remained unchanged, on 8/13 neurology did telemetry conference with patient. There was concern for possible p- ANCA associated vasculitis with neuropathy and it was recommended that she be transferred to [facility] for rheumatology consult and nerve biopsy. Discussion occurred between Dr. Eri Devries who evaluated the patient via tele neuro exam and Dr Chloe Armando of Neurology at [address] [place] [address]. Patient was discharged in stable condition at 8 PM. Please see full patient chart for additional information. Condition at Discharge: good     Discharge Day Visit / Exam:      Subjective: Patient continues with numbness and tingling in her hands and feet, unable to wiggle toes or wiggle fingers on left hand. Able to move thumb and second finger on right hand only. This has been unchanged over the last few days. Vitals: Blood Pressure: 107/73 (08/13/23 1500)  Pulse: 91 (08/13/23 1500)  Temperature: 98.5 °F (36.9 °C) (08/13/23 1500)  Temp Source: Oral (08/13/23 0714)  Respirations: 18 (08/13/23 1500)  Height: 5' 7" (170.2 cm) (08/09/23 1520)  Weight - Scale: 44.7 kg (98 lb 8.7 oz) (08/09/23 1520)  SpO2: 94 % (08/13/23 1500)    Exam:   Physical Exam  Vitals and nursing note reviewed.    Constitutional: General: She is not in acute distress. Appearance: Normal appearance. She is well-developed and underweight. She is not ill-appearing. HENT:      Head: Normocephalic and atraumatic. Nose: Nose normal.      Mouth/Throat:      Mouth: Mucous membranes are moist.   Eyes:      Extraocular Movements: Extraocular movements intact. Conjunctiva/sclera: Conjunctivae normal.      Pupils: Pupils are equal, round, and reactive to light. Cardiovascular:      Rate and Rhythm: Normal rate and regular rhythm. Pulses: Normal pulses. Heart sounds: Normal heart sounds. No murmur heard. Pulmonary:      Effort: Pulmonary effort is normal. No respiratory distress. Breath sounds: Normal breath sounds. No wheezing or rales. Abdominal:      General: Bowel sounds are normal. There is no distension. Palpations: Abdomen is soft. Tenderness: There is no abdominal tenderness. There is no guarding. Musculoskeletal:         General: Deformity present. No swelling. Cervical back: Normal range of motion and neck supple. Right lower leg: No edema. Left lower leg: No edema. Comments: Left hand with fingers in extended position and appear somewhat atrophied, limited motion. Right hand with contracted fingers, limited motion. Skin:     General: Skin is warm and dry. Capillary Refill: Capillary refill takes less than 2 seconds. Coloration: Skin is pale. Neurological:      General: No focal deficit present. Mental Status: She is alert and oriented to person, place, and time. Mental status is at baseline. Sensory: Sensory deficit present. Comments: Continues with numbness and tingling in hands and feet; reports a burning sensation when you touch her hands or feet. This is unchanged over the last few days; patient is able to bear weight but states that she cannot feel her feet. This is also unchanged.    Psychiatric:         Mood and Affect: Mood normal. Behavior: Behavior normal.         Thought Content: Thought content normal.         Judgment: Judgment normal.         Discussion with Family: Course of hospitalization including testing and treatments discussed with patient and  at bedside. They are appreciative of the care patient has been receiving. Are agreeable with the transfer to [facility] for nerve biopsy and rheumatology. All questions answered to their satisfaction. Discharge instructions/Information to patient and family:   See after visit summary for information provided to patient and family. Provisions for Follow-Up Care:  See after visit summary for information related to follow-up care and any pertinent home health orders. Disposition:     [address] Transfer to [facility]    For Discharges to [facility]:   · Not Applicable to this Patient - Not Applicable to this Patient    Planned Readmission: No     Discharge Statement:  I spent 50 minutes discharging the patient. This time was spent on the day of discharge. I had direct contact with the patient on the day of discharge. Greater than 50% of the total time was spent examining patient, answering all patient questions, arranging and discussing plan of care with patient as well as directly providing post-discharge instructions. Additional time then spent on discharge activities. Discharge Medications:  See after visit summary for reconciled discharge medications provided to patient and family.       ** Please Note: This note has been constructed using a voice recognition system **

## 2023-08-13 NOTE — NURSING NOTE
Patient resting in bed at present. Patient had neuro consult completed with Dr. Cristin Lopez. Patient to be transferred to [facility].

## 2023-08-13 NOTE — ASSESSMENT & PLAN NOTE
· Present on admission  · Patient noted to have numbness and tingling both hands and feet, with worsening mobility in her fingers and toes. Currently unable to wiggle toes and fingers on the left hand. Able to move thumb and second finger on right hand. This has been unchanged since arrival.  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 Neurologist recommending transfer to [place] for rheumatological consult and nerve biopsy. Stated nerve biopsy could be performed by neurosurgeon or general surgery. Dr. Hubert Levine of neurology evaluated patient, spoke with neurologist Dr Telma Ness at [place]. Plan is for patient to be transferred to [facility].   · Arrangements finalized, will transfer to [facility]

## 2023-08-13 NOTE — NURSING NOTE
Dressing changed to sacrum. Patient spouse assisted at bedside. Patient tolerated well. Offers no complaints at this time. Call bell and belongings within reach.

## 2023-08-13 NOTE — PROGRESS NOTES
[address]  Progress Note  Name: Herbie Hutchinson I  MRN: [id number]  Unit/Bed#: -01 I Date of Admission: 8/3/2023   Date of Service: 8/13/2023 I Hospital Day: 9    Assessment/Plan   Neuropathy involving both lower extremities  Assessment & Plan  · Present on admission  · Patient noted to have numbness and tingling both hands and feet, with worsening mobility in her fingers and toes. Currently unable to wiggle toes and fingers on the left hand. Able to move thumb and second finger on right hand. This has been unchanged since arrival.  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 Neurologist recommending transfer to [facility] for rheumatological consult and nerve biopsy. Stated nerve biopsy could be performed by neurosurgeon or general surgery. Dr. Quiana Nesbitt of neurology evaluated patient, spoke with neurologist Dr Adonis Arceo at [facility]. Plan is for patient to be transferred to [facility]. · We will transfer to [facility] when arrangements finalized by [facility]    * Bilateral upper and lower extremity neuropathy  Assessment & Plan  · POA  · Presented with bilateral upper and lower extremity pain, weakness, and numbness  · Recently seen in ED and given Lyrica, however, had an adverse reaction to this medication  · MRI C-spine (8/3): No disc herniation, canal or foraminal stenosis. 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 · 8/7 patient reports that the pain in her upper and lower extremities has improved since the steroid was added, however, feels that the numbness and weakness in her hands is worse to the point that she cannot hold utensils to feed herself. She also notes that there is no improvement in the numbness in her feet.   States the foot numbness started about 2 weeks ago after an extended car ride that lasted 12 hours  · 8/7 discussed findings with neurology, will obtain MRI T-spine and L-spine with and without contrast and LP  · 8/7 MRI thoracic spine: No cord signal abnormality or pathologic enhancement. Unremarkable MRI of thoracic spine. Small right and minimal left pleural effusion. · 8/7 MRI lumbar spine: No abnormal signal or pathologic enhancement in the visualized cord. Mild degenerative changes without canal or foraminal stenosis. Hepatomegaly. · Assessment  unchanged since 8/8. Continues with burning sensation in hands and feet, unable to wiggle toes, limited mobility of first 2 fingers and right hand and unable to move fingers on left hand. · 8/8 IR performed LP for AIPD/CIPD per my discussion with neurology  · 8/11 CSF fluid studies per orders of neurology. Discussed with neurology via [place] text, [facility] fluid does not indicate AIDP etiology. Patient noted to have B6 deficiency which neurology feels may be causing some of symptoms. Will add vitamin B6 supplementation starting today  · 8/11 patient reports with worsening burning in her hands and feet today, will increase gabapentin and monitor effectiveness  · Continue Solu-Medrol  (have been titrating down)  · Neurology has been following closely, telemetry conference with neurology today. 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 Nurse established diagnosis would require rheumatology consultation and nerve biopsy. Recommending transfer to [facility]. Dr. Luis Farley of neurology did the telemetry conference, discussed findings with neurologist at [facility]   · Patient is awaiting transfer to [facility]. Rheumatoid arthritis ([address])  Assessment & Plan  · Not on any maintenance therapy at this time as the patient has several allergies to DMARDs  · Continue IV Solu-Medrol as above for possible acute RA flare, titrating downward  · Planning rheumatology follow-up in outpatient setting.   Patient reports that she had not seen a rheumatologist in about 17 years and that her PCP was managing her rheumatoid arthritis. She added that she does have a rheumatology appointment with a doctor at [facility] in [place] in January because that is the quickest she could get in. · 8/11, I secured outpatient appointment with Yasmany Light rheumatologist for 9/11 at 8 AM  · After neurology telemetry consult today plan is to transfer [facility]. Follow-up rheumatology at [facility]. Moderate protein-calorie malnutrition ([address])  Assessment & Plan  Malnutrition Findings:   Adult Malnutrition type: Chronic illness  Adult Degree of Malnutrition: Malnutrition of moderate degree  Malnutrition Characteristics: Fat loss, Muscle loss, Inadequate energy        ·  Present on admission  · Evidenced by less than 75% energy intake versus need for greater than 1 month exhibiting moderate clavicle region muscle loss and moderate buccal fat pad loss  · Continue to monitor daily weights  · Nutritional consult appreciate recommendations  · Continue gluten-free double portion diet and Ensure plant based protein shakes twice daily as supplements  · Has been tolerating oral diet         360 Statement: Malnutrition of Moderate degree related to disease/condition RA evidenced by <75% energy intake versus needs for > 1 month exhibiting moderate clavicle region muscle loss and moderate buccal fat pads loss,Weight loss to be validated when current bed wt obtained. Treating with Gluten Free diet double portions and trialing Ensure Plant Based Protein Shakes BID as po supplement. BMI Findings:  Adult BMI Classifications: Underweight < 18.5        Body mass index is 15.43 kg/m².        Hyponatremia  Assessment & Plan  · POA,history of acute on chronic hyponatremia  · Now resolved, serum sodium today 136  · Has required salt tabs in the past following surgical intervention  · In the setting of a patient who takes meloxicam for pain and chronic pain/nausea  · Nephrology following, appreciate recommendations  · BMP a.m. Microcytic anemia  Assessment & Plan  · No active bleeding noted at this time  · Anemia panel showing combination of LANDEN and anemia of chronic disease  · Nephrology recommending heme iron supplementation  · CBC a.m. Asthma  Assessment & Plan  · Without exacerbation  · On room air with nonlabored respirations  · Continue Ventolin inhaler as needed    Vitamin B6 deficiency  Assessment & Plan  · Initiated supplementation 8/10, will continue           VTE Pharmacologic Prophylaxis:   Pharmacologic: Heparin  Mechanical VTE Prophylaxis in Place: Yes    Patient Centered Rounds: I have performed bedside rounds with nursing staff today. Discussions with Specialists or Other Care Team Provider: Neurology, IR, nursing, case management    Education and Discussions with Family / Patient: Treatment plan discussed with patient and  at bedside. They are aware we are awaiting transfer to [facility] per recommendations of neurology. They are in agreement with this plan. All questions answered to their satisfaction. Time Spent for Care: 42 minutes. More than 50% of total time spent on counseling and coordination of care as described above. Current Length of Stay: 9 day(s)    Current Patient Status: Inpatient   Certification Statement: The patient will continue to require additional inpatient hospital stay due to Has ongoing neuropathy in hands and feet, after neurology consult today recommending transfer to [facility]. Awaiting further transfer arrangements per [facility]. Discharge Plan: After being evaluated by neurology today via telemetry consult, it was determined that patient should be transferred to [facility] for concern of p-ANCA associated vasculitis with neuropathy. Recommended patient have nerve biopsy by neurosurgeon or general surgeon and rheumatology evaluation. Dr. Shalom Green of neurology has spoken with neurologist at [facility].   Awaiting transfer to [facility], [address] working on arrangements. Code Status: Level 1 - Full Code      Subjective:   Patient states that she is concerned that she still cannot wiggle her fingers or toes. Still having some burning sensation when you touch her hands or feet. Objective:     Vitals:   Temp (24hrs), Av.7 °F (36.5 °C), Min:97.6 °F (36.4 °C), Max:97.9 °F (36.6 °C)    Temp:  [97.6 °F (36.4 °C)-97.9 °F (36.6 °C)] 97.7 °F (36.5 °C)  HR:  [84-90] 84  Resp:  [18] 18  BP: ()/(68-80) 113/80  SpO2:  [95 %-96 %] 95 %  Body mass index is 15.43 kg/m². Input and Output Summary (last 24 hours): Intake/Output Summary (Last 24 hours) at 2023 1452  Last data filed at 2023 1222  Gross per 24 hour   Intake 720 ml   Output --   Net 720 ml       Physical Exam:     Physical Exam  Constitutional:       General: She is not in acute distress. Appearance: Normal appearance. She is not ill-appearing. HENT:      Head: Normocephalic and atraumatic. Nose: Nose normal.      Mouth/Throat:      Mouth: Mucous membranes are moist.   Eyes:      Extraocular Movements: Extraocular movements intact. Conjunctiva/sclera: Conjunctivae normal.      Pupils: Pupils are equal, round, and reactive to light. Cardiovascular:      Rate and Rhythm: Normal rate and regular rhythm. Pulses: Normal pulses. Heart sounds: Normal heart sounds. Pulmonary:      Effort: Pulmonary effort is normal. No respiratory distress. Breath sounds: Normal breath sounds. No wheezing or rales. Abdominal:      General: Bowel sounds are normal. There is no distension. Palpations: Abdomen is soft. Tenderness: There is no abdominal tenderness. There is no guarding. Musculoskeletal:         General: Normal range of motion. Skin:     General: Skin is warm and dry. Capillary Refill: Capillary refill takes less than 2 seconds. Neurological:      General: No focal deficit present.       Mental Status: She is alert and oriented to person, place, and time. Mental status is at baseline. Comments: Patient continues with numbness and tingling in hands and feet, continued burning sensation in hands and feet when you touch them. Psychiatric:         Mood and Affect: Mood normal.         Behavior: Behavior normal.         Thought Content: Thought content normal.         Judgment: Judgment normal.         Additional Data:     Labs:    Results from last 7 days   Lab Units 08/13/23  0609   WBC Thousand/uL 14.50*   HEMOGLOBIN g/dL 9.8*   HEMATOCRIT % 32.8*   PLATELETS Thousands/uL 467*   NEUTROS PCT % 69   LYMPHS PCT % 25   MONOS PCT % 4   EOS PCT % 0     Results from last 7 days   Lab Units 08/13/23  0609   POTASSIUM mmol/L 4.9   CHLORIDE mmol/L 102   CO2 mmol/L 29   BUN mg/dL 27*   CREATININE mg/dL 0.33*   CALCIUM mg/dL 8.7     Results from last 7 days   Lab Units 08/08/23  1015   INR  1.07       * I Have Reviewed All Lab Data Listed Above. * Additional Pertinent Lab Tests Reviewed:  [address] Admission Reviewed    Imaging:    Imaging Reports Reviewed Today Include: MRI C-spine, VAS lower extremity, RI T-spine, MRI L-spine  Imaging Personally Reviewed by Myself Includes:      Recent Cultures (last 7 days):     Results from last 7 days   Lab Units 08/08/23  1239   GRAM STAIN RESULT  No Polys or Bacteria seen       Last 24 Hours Medication List:   Current Facility-Administered Medications   Medication Dose Route Frequency Provider Last Rate   • albuterol  2 puff Inhalation Q4H PRN Gerardo Hernández PA-C     • diphenhydramine, lidocaine, Al/Mg hydroxide, simethicone  10 mL Swish & Spit Q4H PRN [place], DO     • gabapentin  600 mg Oral TID CARMITA Stapleton     • heparin (porcine)  5,000 Units Subcutaneous [facility] Gerardo Hernández PA-C     • HYDROcodone-acetaminophen  1 tablet Oral Q6H PRN Gerardo Hernández PA-C     • magnesium Oxide  400 mg Oral HS Gerardo Hernández PA-C     • meloxicam  7.5 mg Oral Daily Gerardo Hernández PA-C     • methylPREDNISolone sodium succinate  30 mg Intravenous Q12H [address] CARMITA Stapleton     • morphine injection  2 mg Intravenous Q4H PRN Julia Watson PA-C     • ondansetron  4 mg Intravenous Q6H PRN Julia Watson PA-C     • pyridoxine  50 mg Oral Daily CARMITA Stapleton          Today, Patient Was Seen By: CARMITA James    ** Please Note: Dictation voice to text software may have been used in the creation of this document.  **

## 2023-08-13 NOTE — PLAN OF CARE
Problem: Potential for Falls  Goal: Patient will remain free of falls  Description: INTERVENTIONS:  - Educate patient/family on patient safety including physical limitations  - Instruct patient to call for assistance with activity   - Consult OT/PT to assist with strengthening/mobility   - Keep Call bell within reach  - Keep bed low and locked with side rails adjusted as appropriate  - Keep care items and personal belongings within reach  - Initiate and maintain comfort rounds  - Make Fall Risk Sign visible to staff  - Offer Toileting every 2 Hours, in advance of need  - Initiate/Maintain bed alarm  - Obtain necessary fall risk management equipment: bed alarm  - Apply yellow socks and bracelet for high fall risk patients  - Consider moving patient to room near nurses station  Outcome: Progressing     Problem: MOBILITY - ADULT  Goal: Maintain or return to baseline ADL function  Description: INTERVENTIONS:  -  Assess patient's ability to carry out ADLs; assess patient's baseline for ADL function and identify physical deficits which impact ability to perform ADLs (bathing, care of mouth/teeth, toileting, grooming, dressing, etc.)  - Assess/evaluate cause of self-care deficits   - Assess range of motion  - Assess patient's mobility; develop plan if impaired  - Assess patient's need for assistive devices and provide as appropriate  - Encourage maximum independence but intervene and supervise when necessary  - Involve family in performance of ADLs  - Assess for home care needs following discharge   - Consider OT consult to assist with ADL evaluation and planning for discharge  - Provide patient education as appropriate  Outcome: Progressing  Goal: Maintains/Returns to pre admission functional level  Description: INTERVENTIONS:  - Perform BMAT or MOVE assessment daily.   - Set and communicate daily mobility goal to care team and patient/family/caregiver.    - Collaborate with rehabilitation services on mobility goals if consulted  - Perform Range of Motion 3 times a day. - Reposition patient every 2 hours. - Dangle patient 3 times a day  - Stand patient 3 times a day  - Ambulate patient 3 times a day  - Out of bed to chair 3 times a day   - Out of bed for meals 3 times a day  - Out of bed for toileting  - Record patient progress and toleration of activity level   Outcome: Progressing     Problem: Prexisting or High Potential for Compromised Skin Integrity  Goal: Skin integrity is maintained or improved  Description: INTERVENTIONS:  - Identify patients at risk for skin breakdown  - Assess and monitor skin integrity  - Assess and monitor nutrition and hydration status  - Monitor labs   - Assess for incontinence   - Turn and reposition patient  - Assist with mobility/ambulation  - Relieve pressure over bony prominences  - Avoid friction and shearing  - Provide appropriate hygiene as needed including keeping skin clean and dry  - Evaluate need for skin moisturizer/barrier cream  - Collaborate with interdisciplinary team   - Patient/family teaching  - Consider wound care consult   Outcome: Progressing     Problem: Nutrition/Hydration-ADULT  Goal: Nutrient/Hydration intake appropriate for improving, restoring or maintaining nutritional needs  Description: Monitor and assess patient's nutrition/hydration status for malnutrition. Collaborate with interdisciplinary team and initiate plan and interventions as ordered. Monitor patient's weight and dietary intake as ordered or per policy. Utilize nutrition screening tool and intervene as necessary. Determine patient's food preferences and provide high-protein, high-caloric foods as appropriate.      INTERVENTIONS:  - Monitor oral intake, urinary output, labs, and treatment plans  - Assess nutrition and hydration status and recommend course of action  - Evaluate amount of meals eaten  - Assist patient with eating if necessary   - Allow adequate time for meals  - Recommend/ encourage appropriate diets, oral nutritional supplements, and vitamin/mineral supplements  - Order, calculate, and assess calorie counts as needed  - Recommend, monitor, and adjust tube feedings and TPN/PPN based on assessed needs  - Assess need for intravenous fluids  - Provide specific nutrition/hydration education as appropriate  - Include patient/family/caregiver in decisions related to nutrition  Outcome: Progressing     Problem: INFECTION - ADULT  Goal: Absence or prevention of progression during hospitalization  Description: INTERVENTIONS:  - Assess and monitor for signs and symptoms of infection  - Monitor lab/diagnostic results  - Monitor all insertion sites, i.e. indwelling lines, tubes, and drains  - Monitor endotracheal if appropriate and nasal secretions for changes in amount and color  - [place] appropriate cooling/warming therapies per order  - Administer medications as ordered  - Instruct and encourage patient and family to use good hand hygiene technique  - Identify and instruct in appropriate isolation precautions for identified infection/condition  Outcome: Progressing  Goal: Absence of fever/infection during neutropenic period  Description: INTERVENTIONS:  - Monitor WBC    Outcome: Progressing     Problem: SAFETY ADULT  Goal: Patient will remain free of falls  Description: INTERVENTIONS:  - Educate patient/family on patient safety including physical limitations  - Instruct patient to call for assistance with activity   - Consult OT/PT to assist with strengthening/mobility   - Keep Call bell within reach  - Keep bed low and locked with side rails adjusted as appropriate  - Keep care items and personal belongings within reach  - Initiate and maintain comfort rounds  - Make Fall Risk Sign visible to staff  - Offer Toileting every 2 Hours, in advance of need  - Initiate/Maintain bed alarm  - Obtain necessary fall risk management equipment: bed alarm  - Apply yellow socks and bracelet for high fall risk patients  - Consider moving patient to room near nurses station  Outcome: Progressing     Problem: MUSCULOSKELETAL - ADULT  Goal: Maintain or return mobility to safest level of function  Description: INTERVENTIONS:  - Assess patient's ability to carry out ADLs; assess patient's baseline for ADL function and identify physical deficits which impact ability to perform ADLs (bathing, care of mouth/teeth, toileting, grooming, dressing, etc.)  - Assess/evaluate cause of self-care deficits   - Assess range of motion  - Assess patient's mobility  - Assess patient's need for assistive devices and provide as appropriate  - Encourage maximum independence but intervene and supervise when necessary  - Involve family in performance of ADLs  - Assess for home care needs following discharge   - Consider OT consult to assist with ADL evaluation and planning for discharge  - Provide patient education as appropriate  Outcome: Progressing  Goal: Maintain proper alignment of affected body part  Description: INTERVENTIONS:  - Support, maintain and protect limb and body alignment  - Provide patient/ family with appropriate education  Outcome: Progressing

## 2023-08-13 NOTE — PROGRESS NOTES
TeleConsultation - Neurology   Judit Hector 41 y.o. female MRN: [id number]   Encounter: [id number]      REQUIRED DOCUMENTATION:     1. This service was provided via Telemedicine. 2. Provider located in [facility]. 3. TeleMed provider: Bacilio Mccann MD.  4. Identify all parties in room with patient during tele consult:  RN  5. After connecting through [employer], patient was identified by name and date of birth and assistant checked wristband. Patient was then informed that this was a Telemedicine visit and that the exam was being conducted confidentially over secure lines. My office door was closed. No one else was in the room. Patient acknowledged consent and understanding of privacy and security of the Telemedicine visit, and gave us permission to have the assistant stay in the room in order to assist with the history and to conduct the exam.  I informed the patient that I have reviewed their record in Epic and presented the opportunity for them to ask any questions regarding the visit today. The patient agreed to participate. Subjective:  No change in weakness and numbness since presentation. She describes loss of sensation in bilateral hands, and feet, and lack of movement in fingers of right hand which is her dominant hand. This is the most concerning to her as it has made her unable to hold a pen or type on cellphone. Objective:  MSK: limited abduction in both shoulders (L>R), and left wrist. Fused bilateral ankles, left elbow (in flexion) and right wrist (flexion) with flexed MCPs. Neuro: considering the limitation in range of motion mentioned above, it is difficult to establish a clear pattern of weakness. However, it is clear that she has complete lack of finger flexion bilaterally, along with all right thumb movements except for weak extension. This is all new in the last three weeks as per her.         Assessment:  Subacute progressive worsening of lower and then upper extremities pain, numbness and weakness:    History of RA, this was thought to be a flare, and hence was treated with IV Solu Medrol  Labs show elevated P-ANCA (1:620) and MPO, low B6 (2.7), elevated gammaglobulinemia without monoclonal bands on immunofixation, and normal CSF analysis (normal protein and cells). Sed rate is 109    Pain has improved with treatment with gabapentin, but weakness and numbness have been persistent. B6 has been supplemented. 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 Although the most common pattern for this is mononeuritis multiplex, it is not uncommon for it to present in a peripheral neuropathy pattern. More over, it is difficult to establish if she has involvement of different seperate nerves considering the limited exam by the fact she has extensive joint deformities and already limited range of motion. Establishing this diagnosis will require rheumatological consultation, and a nerve biopsy, for which we recommend transfer to B. If proven, then plasmapheresis will be most appropriate as steroids have failed in improving her weakness.

## 2023-08-14 ENCOUNTER — HOSPITAL ENCOUNTER (INPATIENT)
Facility: HOSPITAL | Age: 48
LOS: 11 days | Discharge: HOME HEALTH | DRG: 982 | End: 2023-08-25
Attending: STUDENT IN AN ORGANIZED HEALTH CARE EDUCATION/TRAINING PROGRAM | Admitting: INTERNAL MEDICINE
Payer: MEDICARE

## 2023-08-14 VITALS
TEMPERATURE: 97.9 F | DIASTOLIC BLOOD PRESSURE: 77 MMHG | OXYGEN SATURATION: 96 % | HEIGHT: 67 IN | SYSTOLIC BLOOD PRESSURE: 112 MMHG | WEIGHT: 98.55 LBS | HEART RATE: 105 BPM | RESPIRATION RATE: 19 BRPM | BODY MASS INDEX: 15.47 KG/M2

## 2023-08-14 DIAGNOSIS — E53.1 VITAMIN B6 DEFICIENCY: Primary | ICD-10-CM

## 2023-08-14 DIAGNOSIS — M06.9 RHEUMATOID ARTHRITIS, INVOLVING UNSPECIFIED SITE, UNSPECIFIED WHETHER RHEUMATOID FACTOR PRESENT (HCC): ICD-10-CM

## 2023-08-14 DIAGNOSIS — I77.6 VASCULITIS (HCC): ICD-10-CM

## 2023-08-14 DIAGNOSIS — G57.93 NEUROPATHY INVOLVING BOTH LOWER EXTREMITIES: ICD-10-CM

## 2023-08-14 DIAGNOSIS — G62.9 NEUROPATHY: ICD-10-CM

## 2023-08-14 DIAGNOSIS — R80.9 PROTEINURIA: ICD-10-CM

## 2023-08-14 DIAGNOSIS — R31.29 MICROSCOPIC HEMATURIA: ICD-10-CM

## 2023-08-14 LAB
ANION GAP SERPL CALCULATED.3IONS-SCNC: 3 MMOL/L
ANION GAP SERPL CALCULATED.3IONS-SCNC: 5 MMOL/L
BASOPHILS # BLD AUTO: 0.02 THOUSANDS/ÂΜL (ref 0–0.1)
BASOPHILS # BLD AUTO: 0.03 THOUSANDS/ÂΜL (ref 0–0.1)
BASOPHILS NFR BLD AUTO: 0 % (ref 0–1)
BASOPHILS NFR BLD AUTO: 0 % (ref 0–1)
BUN SERPL-MCNC: 31 MG/DL (ref 5–25)
BUN SERPL-MCNC: 44 MG/DL (ref 5–25)
CALCIUM SERPL-MCNC: 8.6 MG/DL (ref 8.3–10.1)
CALCIUM SERPL-MCNC: 8.8 MG/DL (ref 8.4–10.2)
CHLORIDE SERPL-SCNC: 102 MMOL/L (ref 96–108)
CHLORIDE SERPL-SCNC: 104 MMOL/L (ref 96–108)
CO2 SERPL-SCNC: 29 MMOL/L (ref 21–32)
CO2 SERPL-SCNC: 33 MMOL/L (ref 21–32)
CREAT SERPL-MCNC: 0.38 MG/DL (ref 0.6–1.3)
CREAT SERPL-MCNC: 0.46 MG/DL (ref 0.6–1.3)
EOSINOPHIL # BLD AUTO: 0 THOUSAND/ÂΜL (ref 0–0.61)
EOSINOPHIL # BLD AUTO: 0 THOUSAND/ÂΜL (ref 0–0.61)
EOSINOPHIL NFR BLD AUTO: 0 % (ref 0–6)
EOSINOPHIL NFR BLD AUTO: 0 % (ref 0–6)
ERYTHROCYTE [DISTWIDTH] IN BLOOD BY AUTOMATED COUNT: 20 % (ref 11.6–15.1)
ERYTHROCYTE [DISTWIDTH] IN BLOOD BY AUTOMATED COUNT: 20.7 % (ref 11.6–15.1)
GFR SERPL CREATININE-BSD FRML MDRD: 120 ML/MIN/1.73SQ M
GFR SERPL CREATININE-BSD FRML MDRD: 128 ML/MIN/1.73SQ M
GLUCOSE SERPL-MCNC: 116 MG/DL (ref 65–140)
GLUCOSE SERPL-MCNC: 89 MG/DL (ref 65–140)
HCT VFR BLD AUTO: 30.5 % (ref 34.8–46.1)
HCT VFR BLD AUTO: 32.7 % (ref 34.8–46.1)
HGB BLD-MCNC: 9.4 G/DL (ref 11.5–15.4)
HGB BLD-MCNC: 9.9 G/DL (ref 11.5–15.4)
IMM GRANULOCYTES # BLD AUTO: 0.27 THOUSAND/UL (ref 0–0.2)
IMM GRANULOCYTES # BLD AUTO: 0.3 THOUSAND/UL (ref 0–0.2)
IMM GRANULOCYTES NFR BLD AUTO: 2 % (ref 0–2)
IMM GRANULOCYTES NFR BLD AUTO: 2 % (ref 0–2)
LYMPHOCYTES # BLD AUTO: 2.87 THOUSANDS/ÂΜL (ref 0.6–4.47)
LYMPHOCYTES # BLD AUTO: 4.79 THOUSANDS/ÂΜL (ref 0.6–4.47)
LYMPHOCYTES NFR BLD AUTO: 19 % (ref 14–44)
LYMPHOCYTES NFR BLD AUTO: 24 % (ref 14–44)
MCH RBC QN AUTO: 25.4 PG (ref 26.8–34.3)
MCH RBC QN AUTO: 25.5 PG (ref 26.8–34.3)
MCHC RBC AUTO-ENTMCNC: 30.3 G/DL (ref 31.4–37.4)
MCHC RBC AUTO-ENTMCNC: 30.8 G/DL (ref 31.4–37.4)
MCV RBC AUTO: 83 FL (ref 82–98)
MCV RBC AUTO: 84 FL (ref 82–98)
MONOCYTES # BLD AUTO: 0.44 THOUSAND/ÂΜL (ref 0.17–1.22)
MONOCYTES # BLD AUTO: 1.2 THOUSAND/ÂΜL (ref 0.17–1.22)
MONOCYTES NFR BLD AUTO: 3 % (ref 4–12)
MONOCYTES NFR BLD AUTO: 6 % (ref 4–12)
NEUTROPHILS # BLD AUTO: 11.36 THOUSANDS/ÂΜL (ref 1.85–7.62)
NEUTROPHILS # BLD AUTO: 13.44 THOUSANDS/ÂΜL (ref 1.85–7.62)
NEUTS SEG NFR BLD AUTO: 68 % (ref 43–75)
NEUTS SEG NFR BLD AUTO: 76 % (ref 43–75)
NRBC BLD AUTO-RTO: 0 /100 WBCS
NRBC BLD AUTO-RTO: 0 /100 WBCS
OLIGOCLONAL BANDS.IT SER+CSF QL: NORMAL
PLATELET # BLD AUTO: 452 THOUSANDS/UL (ref 149–390)
PLATELET # BLD AUTO: 454 THOUSANDS/UL (ref 149–390)
PMV BLD AUTO: 8.1 FL (ref 8.9–12.7)
PMV BLD AUTO: 8.3 FL (ref 8.9–12.7)
POTASSIUM SERPL-SCNC: 4.7 MMOL/L (ref 3.5–5.3)
POTASSIUM SERPL-SCNC: 4.8 MMOL/L (ref 3.5–5.3)
RBC # BLD AUTO: 3.68 MILLION/UL (ref 3.81–5.12)
RBC # BLD AUTO: 3.89 MILLION/UL (ref 3.81–5.12)
SODIUM SERPL-SCNC: 136 MMOL/L (ref 135–147)
SODIUM SERPL-SCNC: 140 MMOL/L (ref 135–147)
WBC # BLD AUTO: 14.97 THOUSAND/UL (ref 4.31–10.16)
WBC # BLD AUTO: 19.75 THOUSAND/UL (ref 4.31–10.16)

## 2023-08-14 PROCEDURE — 99223 1ST HOSP IP/OBS HIGH 75: CPT | Performed by: INTERNAL MEDICINE

## 2023-08-14 PROCEDURE — 80048 BASIC METABOLIC PNL TOTAL CA: CPT

## 2023-08-14 PROCEDURE — 86160 COMPLEMENT ANTIGEN: CPT | Performed by: STUDENT IN AN ORGANIZED HEALTH CARE EDUCATION/TRAINING PROGRAM

## 2023-08-14 PROCEDURE — 85025 COMPLETE CBC W/AUTO DIFF WBC: CPT | Performed by: INTERNAL MEDICINE

## 2023-08-14 PROCEDURE — 85025 COMPLETE CBC W/AUTO DIFF WBC: CPT

## 2023-08-14 PROCEDURE — 80048 BASIC METABOLIC PNL TOTAL CA: CPT | Performed by: INTERNAL MEDICINE

## 2023-08-14 RX ORDER — LANOLIN ALCOHOL/MO/W.PET/CERES
400 CREAM (GRAM) TOPICAL
Status: DISCONTINUED | OUTPATIENT
Start: 2023-08-14 | End: 2023-08-25 | Stop reason: HOSPADM

## 2023-08-14 RX ORDER — HYDROCODONE BITARTRATE AND ACETAMINOPHEN 5; 325 MG/1; MG/1
1 TABLET ORAL EVERY 6 HOURS PRN
Status: DISCONTINUED | OUTPATIENT
Start: 2023-08-14 | End: 2023-08-25 | Stop reason: HOSPADM

## 2023-08-14 RX ORDER — ALBUTEROL SULFATE 90 UG/1
2 AEROSOL, METERED RESPIRATORY (INHALATION) EVERY 4 HOURS PRN
Status: DISCONTINUED | OUTPATIENT
Start: 2023-08-14 | End: 2023-08-25 | Stop reason: HOSPADM

## 2023-08-14 RX ORDER — PYRIDOXINE HCL (VITAMIN B6) 50 MG
50 TABLET ORAL DAILY
Status: DISCONTINUED | OUTPATIENT
Start: 2023-08-15 | End: 2023-08-25 | Stop reason: HOSPADM

## 2023-08-14 RX ORDER — ONDANSETRON 2 MG/ML
4 INJECTION INTRAMUSCULAR; INTRAVENOUS EVERY 6 HOURS PRN
Status: DISCONTINUED | OUTPATIENT
Start: 2023-08-14 | End: 2023-08-25 | Stop reason: HOSPADM

## 2023-08-14 RX ORDER — MELOXICAM 7.5 MG/1
7.5 TABLET ORAL DAILY
Status: DISCONTINUED | OUTPATIENT
Start: 2023-08-15 | End: 2023-08-16

## 2023-08-14 RX ORDER — DIPHENHYDRAMINE HYDROCHLORIDE AND LIDOCAINE HYDROCHLORIDE AND ALUMINUM HYDROXIDE AND MAGNESIUM HYDRO
10 KIT EVERY 4 HOURS PRN
Status: DISCONTINUED | OUTPATIENT
Start: 2023-08-14 | End: 2023-08-25 | Stop reason: HOSPADM

## 2023-08-14 RX ORDER — GABAPENTIN 300 MG/1
600 CAPSULE ORAL 3 TIMES DAILY
Status: DISCONTINUED | OUTPATIENT
Start: 2023-08-14 | End: 2023-08-17

## 2023-08-14 RX ORDER — METHYLPREDNISOLONE SODIUM SUCCINATE 40 MG/ML
30 INJECTION, POWDER, LYOPHILIZED, FOR SOLUTION INTRAMUSCULAR; INTRAVENOUS EVERY 12 HOURS SCHEDULED
Status: DISCONTINUED | OUTPATIENT
Start: 2023-08-14 | End: 2023-08-17

## 2023-08-14 RX ORDER — HEPARIN SODIUM 5000 [USP'U]/ML
5000 INJECTION, SOLUTION INTRAVENOUS; SUBCUTANEOUS EVERY 8 HOURS SCHEDULED
Status: DISCONTINUED | OUTPATIENT
Start: 2023-08-14 | End: 2023-08-15

## 2023-08-14 RX ADMIN — HEPARIN SODIUM 5000 UNITS: 5000 INJECTION INTRAVENOUS; SUBCUTANEOUS at 05:08

## 2023-08-14 RX ADMIN — HYDROCODONE BITARTRATE AND ACETAMINOPHEN 1 TABLET: 5; 325 TABLET ORAL at 22:10

## 2023-08-14 RX ADMIN — HYDROCODONE BITARTRATE AND ACETAMINOPHEN 1 TABLET: 5; 325 TABLET ORAL at 09:31

## 2023-08-14 RX ADMIN — HYDROCODONE BITARTRATE AND ACETAMINOPHEN 1 TABLET: 5; 325 TABLET ORAL at 16:49

## 2023-08-14 RX ADMIN — Medication 50 MG: at 09:31

## 2023-08-14 RX ADMIN — METHYLPREDNISOLONE SODIUM SUCCINATE 30 MG: 40 INJECTION, POWDER, FOR SOLUTION INTRAMUSCULAR; INTRAVENOUS at 09:32

## 2023-08-14 RX ADMIN — GABAPENTIN 600 MG: 300 CAPSULE ORAL at 22:11

## 2023-08-14 RX ADMIN — HEPARIN SODIUM 5000 UNITS: 5000 INJECTION INTRAVENOUS; SUBCUTANEOUS at 22:11

## 2023-08-14 RX ADMIN — HEPARIN SODIUM 5000 UNITS: 5000 INJECTION INTRAVENOUS; SUBCUTANEOUS at 14:29

## 2023-08-14 RX ADMIN — GABAPENTIN 600 MG: 300 CAPSULE ORAL at 09:30

## 2023-08-14 RX ADMIN — METHYLPREDNISOLONE SODIUM SUCCINATE 30 MG: 40 INJECTION, POWDER, FOR SOLUTION INTRAMUSCULAR; INTRAVENOUS at 22:10

## 2023-08-14 RX ADMIN — MAGNESIUM OXIDE TAB 400 MG (241.3 MG ELEMENTAL MG) 400 MG: 400 (241.3 MG) TAB at 22:12

## 2023-08-14 RX ADMIN — MELOXICAM 7.5 MG: 7.5 TABLET ORAL at 09:30

## 2023-08-14 RX ADMIN — GABAPENTIN 600 MG: 300 CAPSULE ORAL at 16:49

## 2023-08-14 NOTE — TRANSPORTATION MEDICAL NECESSITY
Section I - General Information    Name of Patient: Chintan Morelos                 : 1975    Medicare #: [id number]  Transport Date: 23 (PCS is valid for round trips on this date and for all repetitive trips in the 60-day range as noted below.)  Origin: [address]:   [address] [place] [address] [place]   [address] [facility], [address]    Is the pt's stay covered under Medicare Part A (PPS/DRG)   [x]     Closest appropriate facility? If no, why is transport to more distant facility required? Yes  If hospice pt, is this transport related to pt's terminal illness? NA       Section II - Medical Necessity Questionnaire  Ambulance transportation is medically necessary only if other means of transport are contraindicated or would be potentially harmful to the patient. To meet this requirement, the patient must either be "bed confined" or suffer from a condition such that transport by means other than ambulance is contraindicated by the patient's condition. The following questions must be answered by the medical professional signing below for this form to be valid:    1)  Describe the MEDICAL CONDITION (physical and/or mental) of this patient  [address] that requires the patient to be transported in an ambulance and why transport by other means is contraindicated by the patient's condition:  Bilateral upper and lower extremity neuropathy    2) Is the patient "bed confined" as defined below? Yes  To be "be confined" the patient must satisfy all three of the following conditions: (1) unable to get up from bed without Assistance; AND (2) unable to ambulate; AND (3) unable to sit in a chair or wheelchair. 3) Can this patient safely be transported by car or wheelchair van (i.e., seated during transport without a medical attendant or monitoring)?    No    4) In addition to completing questions 1-3 above, please check any of the following conditions that apply*:   *Note: supporting documentation for any boxes checked must be maintained in the patient's medical records. If hosp-hosp transfer, describe services needed at 2nd facility not available at 1st facility? Unable to tolerate seated position for time needed to transport   Unable to sit in a chair or wheelchair due to decubitus ulcers or other wounds       Section III - Signature of Physician or Healthcare Professional  I certify that the above information is true and correct based on my evaluation of this patient, and represent that the patient requires transport by ambulance and that other forms of transport are contraindicated. I understand that this information will be used by the Centers for Medicare and Medicaid Services (CMS) to support the determination of medical necessity for ambulance services, and I represent that I have personal knowledge of the patient's condition at time of transport. []  If this box is checked, I also certify that the patient is physically or mentally incapable of signing the ambulance service's claim and that the institution with which I am affiliated has furnished care, services, or assistance to the patient. My signature below is made on behalf of the patient pursuant to 42 CFR §424.36(b)(4). In accordance with 42 CFR §424.37, the specific reason(s) that the patient is physically or mentally incapable of signing the claim form is as follows:  Jocelyne Bates of Physician* or Healthcare Professional______________________________________________________________  Signature Date 08/14/23 (For scheduled repetitive transports, this form is not valid for transports performed more than 60 days after this date)    Printed Name & Credentials of Physician or Healthcare Professional (MD, DO, RN, etc.)_______Naveen Mliler_________________________  *Form must be signed by patient's attending physician for scheduled, repetitive transports.  For non-repetitive, unscheduled ambulance transports, if unable to obtain the signature of the attending physician, any of the following may sign (choose appropriate option below)  [] Physician Assistant []  Clinical Nurse Specialist []  Registered Nurse  []  Nurse Practitioner  [x] Discharge Planner

## 2023-08-14 NOTE — WOUND OSTOMY CARE
Progress Note - Wound   Srinivasan Hector 41 y.o. female MRN: [id number]  Unit/Bed#: -01 Encounter: [id number]        Assessment:   Wound care weekly follow up for patient admitted with bilateral upper and lower neuropathy. Patient in bed,  performing hygiene care on patient. Her wheelchair is located in the room, offloading cushion from home at bedside. She is an assist to turn onto her side due to pain, at this time the patient is dependent for care. She is not incontinent. She is awake, alert, very soft spoken and cooperative with care. Paient is on a P-500 low air loss therapy surface. Patient is being transferred to the [facility] today. Discussed with patient and  to follow up with the wound center on discharge for sacral wound. Findings  1. Bilateral heel intact, elevated on pillow  2. POA-unstageable pressure injury to the left upper buttock at the sacral region. Per  the wound started a couple of days prior to admission. Wound bed is mix of beefy red blanchable tissue to the center and yellow claudia surrounding the center blanchable wound, periwound blanchable erythema. Other scarring noted to the buttocks from prior wounds. Changed treatment for sacral wound to Triad paste and Allevyn. Skin and Wound Care Plan:   1. Offloading cushion to chair when OOB  2. Elevate heels off of bed with pillows to offload  3. Turn and reposition patient  Q2 hours  4. Cleanse wound to the left upper buttocks at the sacral region with NSS, apply Triad paste, dime thickness to the wound bed, cover with Allevyn foam dressing, doc with T, date, change every other day and PRN    Wound:  Wound 08/07/23 Sacrum (Active)   Wound Image    08/14/23 1503   Wound Description Beefy red;Fragile; Yellow;Slough 08/14/23 1503   Pressure Injury Stage U 08/14/23 1503   Nia-wound Assessment Erythema 08/14/23 1503   Wound Length (cm) 1.5 cm 08/14/23 1503   Wound Width (cm) 1.5 cm 08/14/23 1503   Wound Depth (cm) 0.1 cm 08/07/23 1440   Wound Surface Area (cm^2) 2.25 cm^2 08/14/23 1503   Wound Volume (cm^3) 0.289 cm^3 08/07/23 1440   Calculated Wound Volume (cm^3) 0.29 cm^3 08/07/23 1440   Tunneling 0 cm 08/07/23 1440   Undermining 0 08/07/23 1440   Drainage Amount Scant 08/14/23 1503   Drainage Description Serosanguineous 08/14/23 1503   Treatments Cleansed 08/14/23 1503   Dressing Foam, Silicon (eg. Allevyn, etc); Other (Comment) 08/14/23 1503   Wound packed? No 08/14/23 1503   Dressing Changed Changed 08/14/23 1503   Patient Tolerance Tolerated well 08/14/23 1503   Dressing Status Clean;Dry; Intact 08/12/23 0900     Reviewed plan of care with primary RN   Recommendations written as orders  Wound care team to follow weekly while admitted  Questions or concerns [address] [facility] Nurse    Pankaj Slaughter BSN, RN, [facility]

## 2023-08-14 NOTE — NUTRITION
08/14/23 1210   Biochemical Data,Medical Tests, and Procedures   Biochemical Data/Medical Tests/Procedures Lab values reviewed; Meds reviewed   Labs (Comment) 8/14/23 BUN 31, creat 0.38, H&H 9.9/32.7   Meds (Comment) heparin, magnesium oxide, vitamin B6   Nutrition-Focused Physical Exam   Nutrition-Focused Physical Exam Findings RN skin assessment reviewed   Medical-Related Concerns PMH reviewed   Current PO Intake   Current Diet Order Regular, Gluten Free diet, thin liquids, double portions   Nutrition Supplements Ensure Plant-based  (BID)   Current Meal Intake %   Intake Supplements %   Estimated calorie intake compared to estimated need Nutrient needs are met. PES Statement   Problem Continue previous diagnosis  (improving)   Recommendations/Interventions   Malnutrition/BMI Present Yes  (as previously documented)   Summary Nutrition follow up assessment. Prescribed a Regular, Gluten Free diet, thin liquids, double portions. Meal completions %. Ensure Plant Based BID. Met with pt at bedside. She reports PO intake improving. Utilizing double portions PRN. Tolerating nutrition supplements as prescribed. Discussed food available to order with allergies. No additional nutrition questions remaining. RD continues following. Interventions/Recommendations Initiate daily weight;Continue current diet order;Supplement continue   Education Assessment   Education Education not indicated at this time   Patient Nutrition Goals   Goal Increase kcal/PRO intake;Meet PO needs; Adequate intake

## 2023-08-14 NOTE — PLAN OF CARE
Problem: Potential for Falls  Goal: Patient will remain free of falls  Description: INTERVENTIONS:  - Educate patient/family on patient safety including physical limitations  - Instruct patient to call for assistance with activity   - Consult OT/PT to assist with strengthening/mobility   - Keep Call bell within reach  - Keep bed low and locked with side rails adjusted as appropriate  - Keep care items and personal belongings within reach  - Initiate and maintain comfort rounds  - Make Fall Risk Sign visible to staff  - Offer Toileting every 2 Hours, in advance of need  - Initiate/Maintain bed alarm  - Obtain necessary fall risk management equipment: bed alarm  - Apply yellow socks and bracelet for high fall risk patients  - Consider moving patient to room near nurses station  Outcome: Progressing     Problem: MOBILITY - ADULT  Goal: Maintain or return to baseline ADL function  Description: INTERVENTIONS:  -  Assess patient's ability to carry out ADLs; assess patient's baseline for ADL function and identify physical deficits which impact ability to perform ADLs (bathing, care of mouth/teeth, toileting, grooming, dressing, etc.)  - Assess/evaluate cause of self-care deficits   - Assess range of motion  - Assess patient's mobility; develop plan if impaired  - Assess patient's need for assistive devices and provide as appropriate  - Encourage maximum independence but intervene and supervise when necessary  - Involve family in performance of ADLs  - Assess for home care needs following discharge   - Consider OT consult to assist with ADL evaluation and planning for discharge  - Provide patient education as appropriate  Outcome: Progressing  Goal: Maintains/Returns to pre admission functional level  Description: INTERVENTIONS:  - Perform BMAT or MOVE assessment daily.   - Set and communicate daily mobility goal to care team and patient/family/caregiver.    - Collaborate with rehabilitation services on mobility goals if consulted  - Perform Range of Motion 3 times a day. - Reposition patient every 2 hours. - Dangle patient 3 times a day  - Stand patient 3 times a day  - Ambulate patient 3 times a day  - Out of bed to chair 3 times a day   - Out of bed for meals 3 times a day  - Out of bed for toileting  - Record patient progress and toleration of activity level   Outcome: Progressing     Problem: Prexisting or High Potential for Compromised Skin Integrity  Goal: Skin integrity is maintained or improved  Description: INTERVENTIONS:  - Identify patients at risk for skin breakdown  - Assess and monitor skin integrity  - Assess and monitor nutrition and hydration status  - Monitor labs   - Assess for incontinence   - Turn and reposition patient  - Assist with mobility/ambulation  - Relieve pressure over bony prominences  - Avoid friction and shearing  - Provide appropriate hygiene as needed including keeping skin clean and dry  - Evaluate need for skin moisturizer/barrier cream  - Collaborate with interdisciplinary team   - Patient/family teaching  - Consider wound care consult   Outcome: Progressing     Problem: Nutrition/Hydration-ADULT  Goal: Nutrient/Hydration intake appropriate for improving, restoring or maintaining nutritional needs  Description: Monitor and assess patient's nutrition/hydration status for malnutrition. Collaborate with interdisciplinary team and initiate plan and interventions as ordered. Monitor patient's weight and dietary intake as ordered or per policy. Utilize nutrition screening tool and intervene as necessary. Determine patient's food preferences and provide high-protein, high-caloric foods as appropriate.      INTERVENTIONS:  - Monitor oral intake, urinary output, labs, and treatment plans  - Assess nutrition and hydration status and recommend course of action  - Evaluate amount of meals eaten  - Assist patient with eating if necessary   - Allow adequate time for meals  - Recommend/ encourage appropriate diets, oral nutritional supplements, and vitamin/mineral supplements  - Order, calculate, and assess calorie counts as needed  - Recommend, monitor, and adjust tube feedings and TPN/PPN based on assessed needs  - Assess need for intravenous fluids  - Provide specific nutrition/hydration education as appropriate  - Include patient/family/caregiver in decisions related to nutrition  Outcome: Progressing     Problem: INFECTION - ADULT  Goal: Absence or prevention of progression during hospitalization  Description: INTERVENTIONS:  - Assess and monitor for signs and symptoms of infection  - Monitor lab/diagnostic results  - Monitor all insertion sites, i.e. indwelling lines, tubes, and drains  - Monitor endotracheal if appropriate and nasal secretions for changes in amount and color  - [place] appropriate cooling/warming therapies per order  - Administer medications as ordered  - Instruct and encourage patient and family to use good hand hygiene technique  - Identify and instruct in appropriate isolation precautions for identified infection/condition  Outcome: Progressing  Goal: Absence of fever/infection during neutropenic period  Description: INTERVENTIONS:  - Monitor WBC    Outcome: Progressing     Problem: SAFETY ADULT  Goal: Patient will remain free of falls  Description: INTERVENTIONS:  - Educate patient/family on patient safety including physical limitations  - Instruct patient to call for assistance with activity   - Consult OT/PT to assist with strengthening/mobility   - Keep Call bell within reach  - Keep bed low and locked with side rails adjusted as appropriate  - Keep care items and personal belongings within reach  - Initiate and maintain comfort rounds  - Make Fall Risk Sign visible to staff  - Offer Toileting every 2 Hours, in advance of need  - Initiate/Maintain bed alarm  - Obtain necessary fall risk management equipment: bed alarm  - Apply yellow socks and bracelet for high fall risk patients  - Consider moving patient to room near nurses station  Outcome: Progressing     Problem: MUSCULOSKELETAL - ADULT  Goal: Maintain or return mobility to safest level of function  Description: INTERVENTIONS:  - Assess patient's ability to carry out ADLs; assess patient's baseline for ADL function and identify physical deficits which impact ability to perform ADLs (bathing, care of mouth/teeth, toileting, grooming, dressing, etc.)  - Assess/evaluate cause of self-care deficits   - Assess range of motion  - Assess patient's mobility  - Assess patient's need for assistive devices and provide as appropriate  - Encourage maximum independence but intervene and supervise when necessary  - Involve family in performance of ADLs  - Assess for home care needs following discharge   - Consider OT consult to assist with ADL evaluation and planning for discharge  - Provide patient education as appropriate  Outcome: Progressing  Goal: Maintain proper alignment of affected body part  Description: INTERVENTIONS:  - Support, maintain and protect limb and body alignment  - Provide patient/ family with appropriate education  Outcome: Progressing

## 2023-08-15 ENCOUNTER — APPOINTMENT (INPATIENT)
Dept: RADIOLOGY | Facility: HOSPITAL | Age: 48
DRG: 982 | End: 2023-08-15
Payer: MEDICARE

## 2023-08-15 PROBLEM — D72.829 LEUCOCYTOSIS: Status: ACTIVE | Noted: 2023-08-15

## 2023-08-15 PROBLEM — D64.9 ANEMIA: Status: ACTIVE | Noted: 2023-08-15

## 2023-08-15 PROBLEM — R26.2 AMBULATORY DYSFUNCTION: Status: ACTIVE | Noted: 2023-08-15

## 2023-08-15 LAB
BACTERIA UR QL AUTO: ABNORMAL /HPF
BILIRUB UR QL STRIP: NEGATIVE
C3 SERPL-MCNC: 95.4 MG/DL (ref 90–180)
C4 SERPL-MCNC: 11 MG/DL (ref 10–40)
CLARITY UR: CLEAR
COLOR UR: ABNORMAL
CREAT UR-MCNC: 45.3 MG/DL
GLUCOSE UR STRIP-MCNC: NEGATIVE MG/DL
HGB UR QL STRIP.AUTO: ABNORMAL
KETONES UR STRIP-MCNC: NEGATIVE MG/DL
LEUKOCYTE ESTERASE UR QL STRIP: NEGATIVE
NITRITE UR QL STRIP: NEGATIVE
NON-SQ EPI CELLS URNS QL MICRO: ABNORMAL /HPF
PH UR STRIP.AUTO: 7 [PH]
PROT UR STRIP-MCNC: NEGATIVE MG/DL
PROT UR-MCNC: 16 MG/DL
PROT/CREAT UR: 0.35 MG/G{CREAT} (ref 0–0.1)
RBC #/AREA URNS AUTO: ABNORMAL /HPF
SP GR UR STRIP.AUTO: 1.02 (ref 1–1.03)
UROBILINOGEN UR STRIP-ACNC: <2 MG/DL
WBC #/AREA URNS AUTO: ABNORMAL /HPF

## 2023-08-15 PROCEDURE — 86803 HEPATITIS C AB TEST: CPT | Performed by: STUDENT IN AN ORGANIZED HEALTH CARE EDUCATION/TRAINING PROGRAM

## 2023-08-15 PROCEDURE — 99222 1ST HOSP IP/OBS MODERATE 55: CPT | Performed by: PHYSICIAN ASSISTANT

## 2023-08-15 PROCEDURE — 86705 HEP B CORE ANTIBODY IGM: CPT | Performed by: STUDENT IN AN ORGANIZED HEALTH CARE EDUCATION/TRAINING PROGRAM

## 2023-08-15 PROCEDURE — NC001 PR NO CHARGE: Performed by: PSYCHIATRY & NEUROLOGY

## 2023-08-15 PROCEDURE — 99232 SBSQ HOSP IP/OBS MODERATE 35: CPT | Performed by: STUDENT IN AN ORGANIZED HEALTH CARE EDUCATION/TRAINING PROGRAM

## 2023-08-15 PROCEDURE — 81001 URINALYSIS AUTO W/SCOPE: CPT | Performed by: STUDENT IN AN ORGANIZED HEALTH CARE EDUCATION/TRAINING PROGRAM

## 2023-08-15 PROCEDURE — 84156 ASSAY OF PROTEIN URINE: CPT | Performed by: STUDENT IN AN ORGANIZED HEALTH CARE EDUCATION/TRAINING PROGRAM

## 2023-08-15 PROCEDURE — 71250 CT THORAX DX C-: CPT

## 2023-08-15 PROCEDURE — G1004 CDSM NDSC: HCPCS

## 2023-08-15 PROCEDURE — 99233 SBSQ HOSP IP/OBS HIGH 50: CPT | Performed by: PSYCHIATRY & NEUROLOGY

## 2023-08-15 PROCEDURE — 86704 HEP B CORE ANTIBODY TOTAL: CPT | Performed by: STUDENT IN AN ORGANIZED HEALTH CARE EDUCATION/TRAINING PROGRAM

## 2023-08-15 PROCEDURE — 82570 ASSAY OF URINE CREATININE: CPT | Performed by: STUDENT IN AN ORGANIZED HEALTH CARE EDUCATION/TRAINING PROGRAM

## 2023-08-15 PROCEDURE — 87340 HEPATITIS B SURFACE AG IA: CPT | Performed by: STUDENT IN AN ORGANIZED HEALTH CARE EDUCATION/TRAINING PROGRAM

## 2023-08-15 PROCEDURE — 86480 TB TEST CELL IMMUN MEASURE: CPT | Performed by: STUDENT IN AN ORGANIZED HEALTH CARE EDUCATION/TRAINING PROGRAM

## 2023-08-15 RX ORDER — CYANOCOBALAMIN 1000 UG/ML
1000 INJECTION, SOLUTION INTRAMUSCULAR; SUBCUTANEOUS ONCE
Status: COMPLETED | OUTPATIENT
Start: 2023-08-15 | End: 2023-08-15

## 2023-08-15 RX ORDER — OMEGA-3S/DHA/EPA/FISH OIL/D3 300MG-1000
400 CAPSULE ORAL DAILY
Status: DISCONTINUED | OUTPATIENT
Start: 2023-08-15 | End: 2023-08-25 | Stop reason: HOSPADM

## 2023-08-15 RX ORDER — HEPARIN SODIUM 5000 [USP'U]/ML
5000 INJECTION, SOLUTION INTRAVENOUS; SUBCUTANEOUS EVERY 8 HOURS SCHEDULED
Status: COMPLETED | OUTPATIENT
Start: 2023-08-15 | End: 2023-08-15

## 2023-08-15 RX ADMIN — HEPARIN SODIUM 5000 UNITS: 5000 INJECTION INTRAVENOUS; SUBCUTANEOUS at 06:21

## 2023-08-15 RX ADMIN — DIPHENHYDRAMINE HYDROCHLORIDE AND LIDOCAINE HYDROCHLORIDE AND ALUMINUM HYDROXIDE AND MAGNESIUM HYDRO 10 ML: KIT at 16:33

## 2023-08-15 RX ADMIN — GABAPENTIN 600 MG: 300 CAPSULE ORAL at 09:35

## 2023-08-15 RX ADMIN — HEPARIN SODIUM 5000 UNITS: 5000 INJECTION INTRAVENOUS; SUBCUTANEOUS at 13:33

## 2023-08-15 RX ADMIN — METHYLPREDNISOLONE SODIUM SUCCINATE 30 MG: 40 INJECTION, POWDER, FOR SOLUTION INTRAMUSCULAR; INTRAVENOUS at 21:12

## 2023-08-15 RX ADMIN — CYANOCOBALAMIN 1000 MCG: 1000 INJECTION, SOLUTION INTRAMUSCULAR at 12:20

## 2023-08-15 RX ADMIN — GABAPENTIN 600 MG: 300 CAPSULE ORAL at 16:32

## 2023-08-15 RX ADMIN — PYRIDOXINE HCL TAB 50 MG 50 MG: 50 TAB at 09:37

## 2023-08-15 RX ADMIN — HEPARIN SODIUM 5000 UNITS: 5000 INJECTION INTRAVENOUS; SUBCUTANEOUS at 21:12

## 2023-08-15 RX ADMIN — METHYLPREDNISOLONE SODIUM SUCCINATE 30 MG: 40 INJECTION, POWDER, FOR SOLUTION INTRAMUSCULAR; INTRAVENOUS at 09:35

## 2023-08-15 RX ADMIN — HYDROCODONE BITARTRATE AND ACETAMINOPHEN 1 TABLET: 5; 325 TABLET ORAL at 21:12

## 2023-08-15 RX ADMIN — HYDROCODONE BITARTRATE AND ACETAMINOPHEN 1 TABLET: 5; 325 TABLET ORAL at 14:30

## 2023-08-15 RX ADMIN — CHOLECALCIFEROL TAB 10 MCG (400 UNIT) 400 UNITS: 10 TAB at 12:19

## 2023-08-15 RX ADMIN — MELOXICAM 7.5 MG: 7.5 TABLET ORAL at 09:37

## 2023-08-15 RX ADMIN — MAGNESIUM OXIDE TAB 400 MG (241.3 MG ELEMENTAL MG) 400 MG: 400 (241.3 MG) TAB at 21:12

## 2023-08-15 RX ADMIN — GABAPENTIN 600 MG: 300 CAPSULE ORAL at 21:12

## 2023-08-15 NOTE — PROGRESS NOTES
Progress Note - Neurology   Debby Hector 41 y.o. female [id number]  [facility]    Assessment/Plan:    * Bilateral upper and lower extremity neuropathy  Assessment & Plan  41 y.o. female with malnutrition and juvenile rheumatoid arthritis who has undergone several joint fusions and has not seen a rheumatologist in >10 years as she has been intolerant of biologicals as well as DMARDs (no medications since 2008) who initially presented to [address] [place] [address] on 8/3/2023 due to bilateral distal LE pain/numbness/weakness x2 weeks and bilateral hand pain/numbness/weakness x1-2 days. Since November 2022, patient has had a steady decline in her ability to ambulate. She can walk a few steps with assistance, but has otherwise been wheelchair bound. In May, she had a viral infection and since May/Leandra has had a hoarse voice with dysphagia, making it difficult to eat (lost ~10 pounds since June 2023). 2 weeks prior to presentation, patient had sudden numbness/burning pain and weakness in the toes (at baseline, unable to move the ankles, but can wiggle the toes). Over the next week, the paresthesias progressed to bilateral patellas. The day of admission, patient developed severe burning pain in the hands and was no longer able to move her fingers (at baseline, right wrist is contracted, but can abduct/adduct the fingers and hold utensils with both hands). CT head, spinal cord imaging, and CSF studies unremarkable except for elevated IgG. She had several abnormal serum labs (see below). Initially, symptoms thought to be due to RA flare, so was treated with IV Solu-Medrol (started on 8/4). She received B6 supplementation. Pain has slightly improved with gabapentin, but weakness and numbness are persistent. However, she denies any progression of symptoms since being admitted. 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   Due to limited exam given extensive joint deformities and difficulty to determine if there are separate nerves involved, patient was transferred to [facility] for inpatient rheumatologic/neurologic evaluation and nerve biopsy. Workup:  · 8/3: CT head negative for acute intracranial abnormalities. · 8/3: MRI cervical spine wo contrast:   · No disc herniation, canal, or foraminal stenosis. 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 · 8/7: MRI thoracic spine w/wo contrast:   · No cord signal abnormality or pathologic enhancement. · Incidentally noted small right and minimal left pleural effusion. · 8/7: MRI lumbar spine w/wo contrast:   · No abnormal signal pathologic enhancement    · S/p LP on 8/8 with opening pressure 13.5 cm H2O  · CSF labs:  · Abnormal: glucose 79, RBC 67, IgG 2193  · WNL: gram stain, WBC 0, protein 27, 0 oligoclonal bands  · Pending: Lyme PCR    · Serum/Urine Labs:  · Abnormal initial labs:sodium 122, hypomagnesemia (1.6), TSH 5.229 with normal free T4, iron deficiency anemia  · Abnormal labs throughout admission: B12 340, B6 2.7, vitamin D 9.6, hypoalbuminemia (2.6), , HsCRP >90, elevated p-ANCA, elevated MPO antibody, positive VALDO (titer 320 and homogenous pattern with positive antichromatin antibodies), SPEP shows hypergammaglobulinemia with beta-gamma bridging but no monoclonal immunoglobulins on immunofixation, IgG 2270, IgM 559, elevated serum Ig Kappa and Ig Lambda free light chains (but normal urine Kappa/Lambda light chains)  · WNL: Folate 822, copper 120, thiamine 84, heavy metals screen, UPEP normal, C3/C4  · Pending: urine heavy metals, chronic hepatitis panel, Quantiferon TB    Plan:  - Neurosurgery consulted for nerve biopsy  · At this time, recommend waiting on nerve biopsy until rheumatology gives further input.   · Due to the complexity of the autoimmune process, would strongly consider transfer to [facility], however will await Rheumatology recommendations  - Rheumatology consulted; appreciate recommendations  - Continue IV Solu-Medrol at this time (currently receiving 30 mg Q12 hours; previously received 40 mg Q12 hours); management per rheumatology  - If patient is found to have p-ANCA associated vasculitis with neuropathy, will need plasmapheresis due to steroids not improving weakness  - Continue B6 supplementation  - Recommend B12 and vitamin D supplementation  - Continue gabapentin 600 mg TID (has been titrated up from 300 mg TID)  - PT/OT  - Frequent neuro checks. Continue to monitor and notify neurology with any changes. - Medical management and supportive care per primary team. Correction of any metabolic or infectious disturbances      Recommendations for outpatient neurological follow up have yet to be determined. Subjective:   Had an extensive discussion with the patient regarding baseline rheumatoid arthritis and recent worsening symptoms. Patient was diagnosed with juvenile rheumatoid arthritis when she was 15years old. She has trialed several biologic medications as well as DMARDs, with most recent medication of Remicade, however no improvement with any of these medications and occasionally had severe side effects. Her last medication was in 2008 when her daughter was born. After this, she stopped following with rheumatology. She has had several surgical joint infusions including the right shoulder, left elbow, and bilateral ankles. The right wrist is chronically contracted. At baseline she can hold utensils in both hands and can slightly abduct/adduct the fingers. She can also usually wiggle her toes bilaterally. Starting in November 2022, patient started noticing a decline in her gait. At baseline, she can walk a few steps with assistance from wheelchair or holding onto objects, but otherwise is wheelchair-bound. Her gait worsened in November. She underwent physical therapy without significant improvement. She now requires more help with ambulation. In May 2023, patient contracted an infection and following this she had dysphagia and a hoarse voice. She underwent swallow study which showed mild pharyngeal dysphagia. She was referred to vocal cord specialist and GI specialist.    In mid July, patient suddenly developed burning pain, numbness, and weakness in both feet. She was seen at [facility] and prescribed Lyrica, but this caused her tongue to swell. The paresthesias progressed to bilateral patellas over the next week. She then developed burning pain, numbness, and weakness in both hands. That day the hand symptoms began, she presented to [address] [place] [address]. Thus far she has undergone CT head, MRI C/T/L spine, and lumbar puncture. Imaging has been unremarkable and CSF results are bland except for elevated IgG. She was started on IV steroids as well as increasing doses of gabapentin which has helped the burning pain slightly, but continues to have weakness in the hands and feet. Denies any progression of the weakness or paresthesias while admitted. On exam today, patient states that she is still able to walk a few steps with assistance, which is her baseline, but she is unable to wiggle her toes or move her fingers except for trace movement in bilateral thumbs. Continues to have paresthesias in bilateral hands and distal to bilateral patellas. The burning pain is still present when her extremities are touched, but is better than when she first was admitted.           Past Medical History:   Diagnosis Date   • Asthma    • RA (rheumatoid arthritis) ([address])      Past Surgical History:   Procedure Laterality Date   • ANKLE SURGERY      2 surgerys   •  SECTION     • ELBOW SURGERY     • IR LUMBAR PUNCTURE  2023   • IR MIDLINE PLACEMENT  2023   • JOINT REPLACEMENT      knees bilateral   • TOTAL SHOULDER REPLACEMENT       Family History   Problem Relation Age of Onset   • No Known Problems Mother    • No Known Problems Father      Social History     Socioeconomic History   • Marital status: /Civil Union     Spouse name: Not on file   • Number of children: Not on file   • Years of education: Not on file   • Highest education level: Not on file   Occupational History   • Not on file   Tobacco Use   • Smoking status: Never   • Smokeless tobacco: Never   Vaping Use   • Vaping Use: Never used   Substance and Sexual Activity   • Alcohol use: Not Currently     Comment: none   • Drug use: Never     Comment: none   • Sexual activity: Not Currently     Birth control/protection: None     Comment: none   Other Topics Concern   • Not on file   Social History Narrative   • Not on file     Social Determinants of Health     Financial Resource Strain: Not on file   Food Insecurity: No Food Insecurity (8/8/2023)    Hunger Vital Sign    • Worried About Running Out of Food in the Last Year: Never true    • Ran Out of Food in the Last Year: Never true   Transportation Needs: No Transportation Needs (8/8/2023)    PRAPARE - Transportation    • Lack of Transportation (Medical): No    • Lack of Transportation (Non-Medical): No   Physical Activity: Not on file   Stress: Not on file   Social Connections: Not on file   Intimate Partner Violence: Not on file   Housing Stability: Low Risk  (8/8/2023)    Housing Stability Vital Sign    • Unable to Pay for Housing in the Last Year: No    • Number of Places Lived in the Last Year: 1    • Unstable Housing in the Last Year: No     E-Cigarette/Vaping   • E-Cigarette Use Never User      E-Cigarette/Vaping Substances   • Nicotine No    • THC No    • CBD No    • Flavoring No    • Other No    • Unknown No          Medications:   All current active meds have been reviewed and current meds:  Scheduled Meds:  Current Facility-Administered Medications   Medication Dose Route Frequency Provider Last Rate   • albuterol  2 puff Inhalation Q4H PRN Hetul Vargas, DO     • diphenhydramine, lidocaine, Al/Mg hydroxide, simethicone  10 mL Swish & Spit Q4H PRN Hetul Vargas, DO     • gabapentin  600 mg Oral TID Hetul Vargas, DO     • heparin (porcine)  5,000 Units Subcutaneous Q8H [address] Hetul Vargas, DO     • HYDROcodone-acetaminophen  1 tablet Oral Q6H PRN Hetul Vargas, DO     • magnesium Oxide  400 mg Oral HS Hetul Vargas, DO     • meloxicam  7.5 mg Oral Daily Hetul Vargas, DO     • methylPREDNISolone sodium succinate  30 mg Intravenous Q12H [address] Hetul Vargas, DO     • morphine injection  2 mg Intravenous Q4H PRN Hetul Vargas, DO     • ondansetron  4 mg Intravenous Q6H PRN Hetul Vargas, DO     • pyridoxine  50 mg Oral Daily Hetul Vargas, DO       Continuous Infusions:   PRN Meds:.•  albuterol  •  diphenhydramine, lidocaine, Al/Mg hydroxide, simethicone  •  HYDROcodone-acetaminophen  •  morphine injection  •  ondansetron       ROS:   Review of Systems   Musculoskeletal: Positive for gait problem. Neurological: Positive for weakness and numbness. All other systems reviewed and are negative. Vitals:   /80   Temp 97.6 °F (36.4 °C)   Ht 5' 7" (1.702 m)   LMP 07/26/2023 (Approximate)   BMI 15.43 kg/m²     Physical Exam:   Physical Exam  Vitals and nursing note reviewed. Constitutional:       Appearance: Normal appearance. Comments: Pleasant female lying comfortably in bed in no acute distress. Frail appearing with BMI 15.43. HENT:      Head: Normocephalic and atraumatic. Mouth/Throat:      Mouth: Mucous membranes are moist.      Pharynx: Oropharynx is clear. Eyes:      Extraocular Movements: Extraocular movements intact. Conjunctiva/sclera: Conjunctivae normal.      Pupils: Pupils are equal, round, and reactive to light.    Pulmonary:      Effort: Pulmonary effort is normal.   Musculoskeletal:      Comments: Decreased ROM in all extremities secondary to several joint fusions, both surgical and anatomical (right shoulder, left elbow, right wrist, bilateral ankles). Skin:     General: Skin is warm and dry. Neurological:      Mental Status: She is alert and oriented to person, place, and time. Deep Tendon Reflexes:      Reflex Scores:       Bicep reflexes are 2+ on the right side and 1+ on the left side. Brachioradialis reflexes are 2+ on the right side and 1+ on the left side. Patellar reflexes are 2+ on the right side and 1+ on the left side. Achilles reflexes are 0 on the right side and 0 on the left side. Comments: See full neuro exam below       Neurologic Exam     Mental Status   Oriented to person, place, and time. Patient awake and alert. Oriented to person, place, month, and year. No dysarthria or aphasia. Able to follow simple midline and appendicular commands. Cranial Nerves     CN III, IV, VI   Pupils are equal, round, and reactive to light. Pupils 3 mm, round, reactive to light bilaterally. EOMs intact without nystagmus. No visual field deficits. Facial sensation to light touch intact throughout. Facial expressions full and symmetric. Tongue midline. Soft palate raises symmetrically. Hearing grossly intact. Motor Exam   Right wrist is chronically contracted  Surgically fused right shoulder and left elbow. Surgically fused bilateral ankles    RUE: Decreased ROM of deltoid, but approximately 4+/5 deltoid, 5/5 biceps, 4-/5 triceps, 0/5 wrist extension, wrist is chronically contracted, 1-2/5 thumb flexion/extension and trace index finger flexion. No movement in the 3rd, 4th, or 5th finger. LUE: 4+/5 deltoid, 5/5 biceps/triceps, trace wrist flexion. Trace movement in the right thumb. No movement in the 2nd, 3rd, 4th, or 5th fingers.     RLE: 5-/5 iliopsoas, 4/5 knee flexion, 5/5 knee extension, no movement in achilles due to chronic ankle fusion, no movement in any toes (this is new since late July per patient)    LLE: 5-/5 iliopsoas, 4/5 knee flexion, 5/5 knee extension, no movement in achilles due to chronic ankle fusion, no movement in any toes (this is new since late July per patient)     Sensory Exam   Light touch and temperature diminished distal to bilateral wrists and distal to bilateral patellas. Patient has a "burning" sensation when distal extremities are touched. Pinprick absent distal to mid hand bilaterally. Pinprick is absent distal to mid shin, diminished from mid shin to patella, and normal proximal to the patella. Vibration intact throughout except diminished in left great toe     Gait, Coordination, and Reflexes     Gait  Gait: (deferred for patient's safety)    Reflexes   Right brachioradialis: 2+  Left brachioradialis: 1+  Right biceps: 2+  Left biceps: 1+  Right patellar: 2+  Left patellar: 1+  Right achilles: 0  Left achilles: 0  Unable to assess coordination due to weakness  No resting or action tremor  Unable to assess for clonus due to ankle fusion  Mute toes bilaterally           Labs: I have personally reviewed pertinent reports.    Recent Results (from the past 24 hour(s))   CBC and differential    Collection Time: 08/14/23 10:26 PM   Result Value Ref Range    WBC 19.75 (H) 4.31 - 10.16 Thousand/uL    RBC 3.68 (L) 3.81 - 5.12 Million/uL    Hemoglobin 9.4 (L) 11.5 - 15.4 g/dL    Hematocrit 30.5 (L) 34.8 - 46.1 %    MCV 83 82 - 98 fL    MCH 25.5 (L) 26.8 - 34.3 pg    MCHC 30.8 (L) 31.4 - 37.4 g/dL    RDW 20.7 (H) 11.6 - 15.1 %    MPV 8.1 (L) 8.9 - 12.7 fL    Platelets 180 (H) 917 - 390 Thousands/uL    nRBC 0 /100 WBCs    Neutrophils Relative 68 43 - 75 %    Immat GRANS % 2 0 - 2 %    Lymphocytes Relative 24 14 - 44 %    Monocytes Relative 6 4 - 12 %    Eosinophils Relative 0 0 - 6 %    Basophils Relative 0 0 - 1 %    Neutrophils Absolute 13.44 (H) 1.85 - 7.62 Thousands/µL    Immature Grans Absolute 0.30 (H) 0.00 - 0.20 Thousand/uL    Lymphocytes Absolute 4.79 (H) 0.60 - 4.47 Thousands/µL    Monocytes Absolute 1.20 0.17 - 1.22 Thousand/µL    Eosinophils Absolute 0.00 0.00 - 0.61 Thousand/µL    Basophils Absolute 0.02 0.00 - 0.10 Thousands/µL   Basic metabolic panel    Collection Time: 08/14/23 10:26 PM   Result Value Ref Range    Sodium 140 135 - 147 mmol/L    Potassium 4.8 3.5 - 5.3 mmol/L    Chloride 104 96 - 108 mmol/L    CO2 33 (H) 21 - 32 mmol/L    ANION GAP 3 mmol/L    BUN 44 (H) 5 - 25 mg/dL    Creatinine 0.46 (L) 0.60 - 1.30 mg/dL    Glucose 89 65 - 140 mg/dL    Calcium 8.6 8.3 - 10.1 mg/dL    eGFR 120 ml/min/1.73sq m       Imaging: I have personally reviewed pertinent imaging in PACS, including CT head, MRI C/T/L spine,  and I have personally reviewed PACS reports. EKG, Pathology, and Other Studies: I have personally reviewed pertinent reports. VTE Prophylaxis: Sequential compression device (Venodyne)  and Heparin      Counseling / Coordination of Care  I have spent a total time of 78 minutes on 08/15/23 in caring for this patient including Diagnostic results, Prognosis, Risks and benefits of tx options, Instructions for management, Patient and family education, Importance of tx compliance, Risk factor reductions, Impressions, Counseling / Coordination of care, Documenting in the medical record, Reviewing / ordering tests, medicine, procedures  , Obtaining or reviewing history  , Communicating with other healthcare professionals  and long discussion with the patient regarding symptoms for the past year and worsening over the last several weeks.

## 2023-08-15 NOTE — ASSESSMENT & PLAN NOTE
• Not on any maintenance therapy at this time as the patient has several allergies to DMARDs  • Continue IV Solu-Medrol as above for possible acute RA flare, titrating downward  • Planning rheumatology follow-up in outpatient setting. Patient reports that she had not seen a rheumatologist in about 17 years and that her PCP was managing her rheumatoid arthritis. She added that she does have a rheumatology appointment with a doctor at [facility] in [place] in January because that is the quickest she could get in.

## 2023-08-15 NOTE — ASSESSMENT & PLAN NOTE
• POA  • Presented with bilateral upper and lower extremity pain, weakness, and numbness  • Patient recently had a ED visit and was started on Lyrica but was reported to have allergic reaction. • MRI C-spine (8/3): No disc herniation, canal or foraminal stenosis. 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 • 8/7 patient reports that the pain in her upper and lower extremities has improved since the steroid was added, however, feels that the numbness and weakness in her hands is worse to the point that she cannot hold utensils to feed herself. She also notes that there is no improvement in the numbness in her feet. States the foot numbness started about 2 weeks ago after an extended car ride that lasted 12 hours  • Patient reports partial improvement with steroids in terms of pain control however she still has significant numbness weakness symptoms. • Assessment  unchanged since 8/8. Continues with burning sensation in hands and feet, unable to wiggle toes, limited mobility of first 2 fingers and right hand and unable to move fingers on left hand. • Patient had LP performed by IR on the eighth with negative results as per neurology. • Work-up illustrated evidence of B6 deficiency for which supplementation was ordered. ..  • Patient on the 11th was noted to have worsening symptoms which prompted increasing dosing of Neurontin. • Currently on Solu-Medrol  • Neurology has been following closely, telemetry conference with neurology today. 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 Neurology requested transfer here for  rheumatology consultation and nerve biopsy.     • Consult neurosurgery for nerve biopsy  • Consult rheumatology  • Consult neurology

## 2023-08-15 NOTE — NURSING NOTE
[address]50 Ambulance here to  pt. Pt left via stretcher with belongings.  has belongings and is following Ambulance to [facility] P7 room#734. Pt sent with Right Midline intact. Report given to ambulance crew.

## 2023-08-15 NOTE — ASSESSMENT & PLAN NOTE
Concern for vasculitis   - pt presents with progressively worsening N/T, pain, and weakness to tod hands and feet   - hx of RA    - initially felt to be an RA exacerbation, but no improvement with steroids   - extensive workup completed, MRI C/T/L spine negative, LP bland with positive IgG   - p-ANCA positive   - neurology following and requesting nerve bx     Imaging:   · 8/7 MRI L-spine:   No abnormal signal or pathologic enhancement in the visualized cord. Mild degenerative change without canal or foraminal stenosis. · 8/7 MRI T-spine: No cord signal abnormality or pathologic enhancement. Unremarkable MRI of the thoracic spine. Small right and minimal left pleural effusion  · 8/3 MRI c-spine: No disc herniation, canal or foraminal stenosis. 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 Darcy Salazar if still requested by neurology   · However, after further discussion with neuro, they would like to hold off at this time and obtain further workup with assistance of rheum   · At this time, will defer further medically management and workup to primary team, neurology, and rheumatology   · The neurosurgery team will be available for nerve bx should that still be requested per neuro   · DVT ppx: SCDs, SQH   · Pain control per primary team   · Medical management per primary team   · PT/OT   · Social work following for assistance with dispo once medically cleared     Neurosurgery will follow from the periphery and be available for bx should this be requested from primary team/neuro. Please reach out with any further questions or concerns.

## 2023-08-15 NOTE — CONSULTS
Duplicate neurology consult request.  Please refer to initial neurology Telemedicine consult note by Dr. Gab Noel from 8/5/2023. Progress notes to follow.

## 2023-08-15 NOTE — ASSESSMENT & PLAN NOTE
Malnutrition Findings:   Adult Malnutrition type: Chronic illness  Adult Degree of Malnutrition: Malnutrition of moderate degree  Malnutrition Characteristics: Muscle loss, Fat loss                  360 Statement: Malnutrition of moderate degree in the context of chronic illness as evidenced by moderate depletion of the temples, visible clavicle and moderate buccal fat pad loss. Treated with oral diet and oral nutrition supplement. BMI Findings:  Adult BMI Classifications: Underweight < 18.5        Body mass index is 15.43 kg/m².

## 2023-08-15 NOTE — PLAN OF CARE
Problem: MOBILITY - ADULT  Goal: Maintain or return to baseline ADL function  Description: INTERVENTIONS:  -  Assess patient's ability to carry out ADLs; assess patient's baseline for ADL function and identify physical deficits which impact ability to perform ADLs (bathing, care of mouth/teeth, toileting, grooming, dressing, etc.)  - Assess/evaluate cause of self-care deficits   - Assess range of motion  - Assess patient's mobility; develop plan if impaired  - Assess patient's need for assistive devices and provide as appropriate  - Encourage maximum independence but intervene and supervise when necessary  - Involve family in performance of ADLs  - Assess for home care needs following discharge   - Consider OT consult to assist with ADL evaluation and planning for discharge  - Provide patient education as appropriate  Outcome: Progressing  Goal: Maintains/Returns to pre admission functional level  Description: INTERVENTIONS:  - Perform BMAT or MOVE assessment daily.   - Set and communicate daily mobility goal to care team and patient/family/caregiver. - Collaborate with rehabilitation services on mobility goals if consulted  - Perform Range of Motion 2 times a day. - Reposition patient every 2 hours.   - Dangle patient 2 times a day  - Stand patient 2 times a day  - Ambulate patient 2 times a day  - Out of bed to chair 2 times a day   - Out of bed for meals 2 times a day  - Out of bed for toileting  - Record patient progress and toleration of activity level   Outcome: Progressing     Problem: PAIN - ADULT  Goal: Verbalizes/displays adequate comfort level or baseline comfort level  Description: Interventions:  - Encourage patient to monitor pain and request assistance  - Assess pain using appropriate pain scale  - Administer analgesics based on type and severity of pain and evaluate response  - Implement non-pharmacological measures as appropriate and evaluate response  - Consider cultural and social influences on pain and pain management  - Notify physician/advanced practitioner if interventions unsuccessful or patient reports new pain  Outcome: Progressing     Problem: INFECTION - ADULT  Goal: Absence or prevention of progression during hospitalization  Description: INTERVENTIONS:  - Assess and monitor for signs and symptoms of infection  - Monitor lab/diagnostic results  - Monitor all insertion sites, i.e. indwelling lines, tubes, and drains  - Monitor endotracheal if appropriate and nasal secretions for changes in amount and color  - [place] appropriate cooling/warming therapies per order  - Administer medications as ordered  - Instruct and encourage patient and family to use good hand hygiene technique  - Identify and instruct in appropriate isolation precautions for identified infection/condition  Outcome: Progressing  Goal: Absence of fever/infection during neutropenic period  Description: INTERVENTIONS:  - Monitor WBC    Outcome: Progressing     Problem: SAFETY ADULT  Goal: Maintain or return to baseline ADL function  Description: INTERVENTIONS:  -  Assess patient's ability to carry out ADLs; assess patient's baseline for ADL function and identify physical deficits which impact ability to perform ADLs (bathing, care of mouth/teeth, toileting, grooming, dressing, etc.)  - Assess/evaluate cause of self-care deficits   - Assess range of motion  - Assess patient's mobility; develop plan if impaired  - Assess patient's need for assistive devices and provide as appropriate  - Encourage maximum independence but intervene and supervise when necessary  - Involve family in performance of ADLs  - Assess for home care needs following discharge   - Consider OT consult to assist with ADL evaluation and planning for discharge  - Provide patient education as appropriate  Outcome: Progressing  Goal: Maintains/Returns to pre admission functional level  Description: INTERVENTIONS:  - Perform BMAT or MOVE assessment daily.   - Set and communicate daily mobility goal to care team and patient/family/caregiver. - Collaborate with rehabilitation services on mobility goals if consulted  - Perform Range of Motion 2 times a day. - Reposition patient every 2 hours.   - Dangle patient 2 times a day  - Stand patient 2 times a day  - Ambulate patient 2 times a day  - Out of bed to chair 2 times a day   - Out of bed for meals 2 times a day  - Out of bed for toileting  - Record patient progress and toleration of activity level   Outcome: Progressing  Goal: Patient will remain free of falls  Description: INTERVENTIONS:  - Educate patient/family on patient safety including physical limitations  - Instruct patient to call for assistance with activity   - Consult OT/PT to assist with strengthening/mobility   - Keep Call bell within reach  - Keep bed low and locked with side rails adjusted as appropriate  - Keep care items and personal belongings within reach  - Initiate and maintain comfort rounds  - Make Fall Risk Sign visible to staff  - Offer Toileting every 2 Hours, in advance of need  - Initiate/Maintain bed alarm  - Apply yellow socks and bracelet for high fall risk patients  - Consider moving patient to room near nurses station  Outcome: Progressing     Problem: DISCHARGE PLANNING  Goal: Discharge to home or other facility with appropriate resources  Description: INTERVENTIONS:  - Identify barriers to discharge w/patient and caregiver  - Arrange for needed discharge resources and transportation as appropriate  - Identify discharge learning needs (meds, wound care, etc.)  - Arrange for interpretive services to assist at discharge as needed  - Refer to Case Management Department for coordinating discharge planning if the patient needs post-hospital services based on physician/advanced practitioner order or complex needs related to functional status, cognitive ability, or social support system  Outcome: Progressing     Problem: Knowledge Deficit  Goal: Patient/family/caregiver demonstrates understanding of disease process, treatment plan, medications, and discharge instructions  Description: Complete learning assessment and assess knowledge base.   Interventions:  - Provide teaching at level of understanding  - Provide teaching via preferred learning methods  Outcome: Progressing     Problem: Prexisting or High Potential for Compromised Skin Integrity  Goal: Skin integrity is maintained or improved  Description: INTERVENTIONS:  - Identify patients at risk for skin breakdown  - Assess and monitor skin integrity  - Assess and monitor nutrition and hydration status  - Monitor labs   - Assess for incontinence   - Turn and reposition patient  - Assist with mobility/ambulation  - Relieve pressure over bony prominences  - Avoid friction and shearing  - Provide appropriate hygiene as needed including keeping skin clean and dry  - Evaluate need for skin moisturizer/barrier cream  - Collaborate with interdisciplinary team   - Patient/family teaching  - Consider wound care consult   Outcome: Progressing

## 2023-08-15 NOTE — CASE MANAGEMENT
Case Management Assessment & Discharge Planning Note    Patient name Adria Aly  Location [facility] 734/[facility] [id number] MRN [id number]  : 1975 Date 8/15/2023       Current Admission Date: 2023  Current Admission Diagnosis:Bilateral upper and lower extremity neuropathy   Patient Active Problem List    Diagnosis Date Noted   • Neuropathy involving both lower extremities    • Vitamin B6 deficiency 08/10/2023   • Moderate protein-calorie malnutrition ([address]) 2023   • Bilateral upper and lower extremity neuropathy 2023   • Microcytic anemia 2023   • 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   • Asthma 2023   • Psoriasis 2023   • DJD (degenerative joint disease) 2022   • Hyponatremia 2020   • Status post left hip replacement 2020   • Osteoarthritis of left hip 2020   • Ankle arthritis 2017   • Acquired bilateral club feet 2017   • Abnormal electrocardiogram 2016   • Abnormal findings on diagnostic imaging of heart and coronary circulation 2016   • Cardiac murmur 2016   • Cardiomyopathy ([address]) 2016   • Nonrheumatic mitral valve insufficiency 2016   • Blue color skin 2014   • Rheumatoid arthritis ([address]) 2014      LOS (days): 1  Geometric Mean LOS (GMLOS) (days): 2.10  Days to GMLOS:1.4     OBJECTIVE:    Risk of Unplanned Readmission Score: 10.88         Current admission status: Inpatient       Preferred Pharmacy:   [address] [place] [address] [address] [facility] [address], [address] [place] [address]. DR. [id number]  [address] [facility]. [address]. Johnathan [place]ARSON [id number]  Phone: [phone] Fax: [phone]    [facility], [address] [place] [address]  [address] [place] [id number]  Phone: [phone] Fax: [phone]    Primary Care Provider: Osman Valladares DO    Primary Insurance: MEDICARE  Secondary Insurance:     ASSESSMENT:  [place] Proxies    There are no active Health Care Proxies on file. Readmission Root Cause  30 Day Readmission: No    Patient Information  Admitted from[de-identified] Home  Mental Status: Alert  During Assessment patient was accompanied by: Daughter  Assessment information provided by[de-identified] Patient  Support Systems: Spouse/significant other, Self, Family members  [place] [facility]idence[facility]  Type of Current Residence: [facility]  Living Arrangements: Lives w/ Spouse/significant other, Lives w/ Daughter  Is patient a ?: No      Patient Information Continued  Income Source: SSI/SSD  Does patient have prescription coverage?: Yes  Within the past 12 months, you worried that your food would run out before you got the money to buy more.: Never true  Within the past 12 months, the food you bought just didn't last and you didn't have money to get more.: Never true  Food insecurity resource given?: N/A  Does patient receive dialysis treatments?: No  Does patient have a history of substance abuse?: No  Does patient have a history of Mental Health Diagnosis?: No    Means of Transportation  Means of Transport to Appts[de-identified] Family transport  In the past 12 months, has lack of transportation kept you from medical appointments or from getting medications?: No  In the past 12 months, has lack of transportation kept you from meetings, work, or from getting things needed for daily living?: No  Was application for public transport provided?: N/A        DISCHARGE DETAILS:    Additional Comments: Pt is transfer from [email or web address]. Pt was opened on 8/8 by TEENA P.H. @ 15:42.  No changes as pt has been IP since 8/3/23

## 2023-08-15 NOTE — ASSESSMENT & PLAN NOTE
At baseline, patient is wheelchair-bound outside the house and can only walk few steps with assistance at home. Now, with declining functional status. PT/OT.

## 2023-08-15 NOTE — CONSULTS
[address] [facility]  Consult  Name: Saritha Hector 41 y.o. female I MRN: [id number]  [facility] Date of Admission: 8/14/2023   Date of Service: 8/15/2023 I Hospital Day: 1    Inpatient consult to Neurosurgery  Consult performed by: Anjana Orta PA-C  Consult ordered by: Reta De La Cruz DO        Assessment/Plan   * Bilateral upper and lower extremity neuropathy  Assessment & Plan  Concern for vasculitis   - pt presents with progressively worsening N/T, pain, and weakness to tod hands and feet   - hx of RA    - initially felt to be an RA exacerbation, but no improvement with steroids   - extensive workup completed, MRI C/T/L spine negative, LP bland with positive IgG   - p-ANCA positive   - neurology following and requesting nerve bx     Imaging:   · 8/7 MRI L-spine:   No abnormal signal or pathologic enhancement in the visualized cord. Mild degenerative change without canal or foraminal stenosis. · 8/7 MRI T-spine: No cord signal abnormality or pathologic enhancement. Unremarkable MRI of the thoracic spine. Small right and minimal left pleural effusion  · 8/3 MRI c-spine: No disc herniation, canal or foraminal stenosis.  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 April Carrillo if still requested by neurology   · However, after further discussion with neuro, they would like to hold off at this time and obtain further workup with assistance of rheum   · At this time, will defer further medically management and workup to primary team, neurology, and rheumatology   · The neurosurgery team will be available for nerve bx should that still be requested per neuro · DVT ppx: SCDs, SQH   · Pain control per primary team   · Medical management per primary team   · PT/OT   · Social work following for assistance with dispo once medically cleared     Neurosurgery will follow from the periphery and be available for bx should this be requested from primary team/neuro. Please reach out with any further questions or concerns. History of Present Illness   HPI: Bahman Pittman is a 41y.o. 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 ER on 8/3 with progressively worsening paresthesia, pain, and weakness to tod hand and feet. At baseline, the pt is has a chronically contracted R wrist and fingers. She states he is able to use both hands to hold utensils and dress/bathe herself. She can type on a computer and her phone and slightly adduct/abduct her fingers. She can also wiggle her toes tod, but can never DF or PF tod ankle. Since Nov of 2022, the pt has noticed a steady decline in her gait and overall functioning. She trailed PT as an outpatient at this time but was unfortunately discahregd from PT as she was not meeting appropriate goals. Pt states she never followed up with her PCP or rheumatologist in regard to this decline. Since Nov 2022, she has been requiring use a wheelchair more frequently. At this point, she can ambulate a few steps around her house but must be holding onto her /another person or furniture/the walls in her house. Whenever she leaves the house she uses a wheelchair full time. Approximately 3 weeks ago, in mid-July 2023, th ept was on a long road trip and in the car for 12+ hours.  After this trip, she noticed she began to have a "fuzzy" sensation to tod feet that progressed to a full numbness with some burning pain. She went to the [address] [place] [address] for these sx as she has had all of her joint surgeries at this facility. They started her on lyrica and advised she follow-up as an outpatient. Unfortunately, the pt had an anaphylaxis type reaction to this medication and stopped taking it. About 1 week ago, the pt reported acute onset of this pain and paresthesia to tod hand and finger. She also noted increased weakness and inability to use her hands as she previously could, specifically unable to type on her phone or hold utensils. She also reported that the numbness in her feet had moved up tod legs to her knees. She then sought care in the ER close to home at [facility]. She was admitted and extensive workup was completed including MRI of the neuro axis and LP. Imaging was unremarkable and LP revealed only elevated IgG. Pt was treated with a course of steroids without improvement. However, since her arrival in the ER on 8/3, her sx have not worsened and remain stable. Neurology tele-consulted at [facility] and concern for a possible p-ANCA vasculitis and pt was transferred to [facility] for formal neurology evaluation, rheum consult, and nsgy consult for possible nerve bx. Today, pt reports stable symptoms. She states she was able to ambulate to the bathroom with assistance/taking a couple steps. However, she continued to be unable to move her fingers or toes. She also continues to report a burning type pain to tod hands and feet, worse with any slight touch. Pt overall feels her biggest concern are these new paresthesia and numbness. She feels her strength is overall unchanged. She stated the gabapentin she has been managed on helps only slightly. Review of Systems   Constitutional: Positive for appetite change. Negative for chills and fever. Respiratory: Negative for cough and shortness of breath. Cardiovascular: Negative for chest pain.    Gastrointestinal: Negative for nausea and vomiting. Musculoskeletal: Positive for arthralgias and gait problem. Negative for back pain, joint swelling, myalgias, neck pain and neck stiffness. Skin: Negative for rash and wound. Neurological: Positive for weakness and numbness. Negative for dizziness, tremors, seizures, syncope, facial asymmetry, speech difficulty, light-headedness and headaches. Burning pain to tod hands and feet    Psychiatric/Behavioral: Negative for agitation, confusion and decreased concentration. Historical Information   Past Medical History:   Diagnosis Date   • Asthma    • RA (rheumatoid arthritis) ([address])      Past Surgical History:   Procedure Laterality Date   • ANKLE SURGERY      2 surgerys   •  SECTION     • ELBOW SURGERY     • IR LUMBAR PUNCTURE  2023   • IR MIDLINE PLACEMENT  2023   • JOINT REPLACEMENT      knees bilateral   • TOTAL SHOULDER REPLACEMENT       Social History     Substance and Sexual Activity   Alcohol Use Not Currently    Comment: none     Social History     Substance and Sexual Activity   Drug Use Never    Comment: none     Social History     Tobacco Use   Smoking Status Never   Smokeless Tobacco Never     Family History   Problem Relation Age of Onset   • No Known Problems Mother    • No Known Problems Father      Meds/Allergies   all current active meds have been reviewed  Allergies   Allergen Reactions   • Corn Oil - Food Allergy - Food Allergy Shortness Of Breath   • Gluten Meal - Food Allergy Shortness Of Breath   • Isoflavones (Soy) Shortness Of Breath   • Motrin [Ibuprofen] Throat Swelling   • Oseltamivir Shortness Of Breath   • Yeast - Food Allergy Shortness Of Breath   • Tilactase    • Medical Tape Rash     If on for too long; please use paper tape   • Sulfa Antibiotics Hives       Objective   I/O     None          Physical Exam  Constitutional:       General: She is not in acute distress. Appearance: She is ill-appearing.  She is not toxic-appearing. Comments: Alert, thin, frail female   Lying comfortably in bed   No acute distress    HENT:      Head: Normocephalic and atraumatic. Right Ear: External ear normal.      Left Ear: External ear normal.      Nose: Nose normal.      Mouth/Throat:      Mouth: Mucous membranes are moist.   Eyes:      General: No scleral icterus. Right eye: No discharge. Left eye: No discharge. Extraocular Movements: Extraocular movements intact. Conjunctiva/sclera: Conjunctivae normal.      Pupils: Pupils are equal, round, and reactive to light. Cardiovascular:      Rate and Rhythm: Normal rate. Pulmonary:      Effort: Pulmonary effort is normal. No respiratory distress. Comments: No resp distress on room air   Abdominal:      General: Abdomen is flat. Palpations: Abdomen is soft. Musculoskeletal:         General: Deformity present. Cervical back: Normal range of motion. No rigidity or tenderness. Comments: Decreased ROM noted at several joints to to both anatomical and surgical fusion   - specifically at tod shoulder, R wrist, tod ankles     Baseline contracted R wrist   Ulnar deviation of tod fingers to tod hands     R foot is out turned at baseline    Neurological:      Mental Status: She is alert and oriented to person, place, and time. Cranial Nerves: No cranial nerve deficit. Sensory: Sensory deficit present. Motor: Weakness present.       Coordination: Coordination normal.      Gait: Gait abnormal.      Deep Tendon Reflexes: Reflexes normal.      Comments: GCS 15   A&Ox3   Appropriately answering questions and following commands   No dysarthria or aphasia   No appreciated CN deficits   Baseline contraction and decreased ROM as noted above 2/2 hx of longstanding untreated RA     - RUE: delt add 4/5, delt abd 3/5, bicep/tricep 3/5, wrist flex/ex 2/5,  1/5, IO 1/5   - LUE: delt add 4/5, delt abd 3/5, bicep/tricep 3/5, wrist flex/ex 3/5,  1/5, IO 1/5     - RLE: hip 4/5, knee flex 4/5, knee ext 3/5, DF/PF 0/5, EHL 0/5, IO 0/5   - LLE: hip 4/5, knee flex 4/5, knee ext 3/5, DF/PF 0/5, EHL 0/5, IO 0/5     Paresthesia/hyperesthesias noted throughout tod hands and tod feet, decreased sensation to LT and PP to tod hands and feet extending into tod wrist and approx mid lower leg tod    Otherwise sensation is equal and intact throughout more proximally     No drift or ataxia appreciated tod   Reflexes 1 to 2+ throughout, no babinski noted   Unable to assess for clonus given ankle fusion tod      Psychiatric:         Mood and Affect: Mood normal.         Behavior: Behavior normal.         Thought Content: Thought content normal.         Judgment: Judgment normal.       Neurologic Exam     Mental Status   Oriented to person, place, and time. Cranial Nerves     CN III, IV, VI   Pupils are equal, round, and reactive to light. Vitals:Blood pressure 111/80, temperature 97.6 °F (36.4 °C), height 5' 7" (1.702 m), last menstrual period 07/26/2023. ,Body mass index is 15.43 kg/m². Lab Results:   Results from last 7 days   Lab Units 08/14/23 2226 08/14/23  0507 08/13/23  0609   WBC Thousand/uL 19.75* 14.97* 14.50*   HEMOGLOBIN g/dL 9.4* 9.9* 9.8*   HEMATOCRIT % 30.5* 32.7* 32.8*   PLATELETS Thousands/uL 452* 454* 467*   NEUTROS PCT % 68 76* 69   MONOS PCT % 6 3* 4   EOS PCT % 0 0 0     Results from last 7 days   Lab Units 08/14/23 2226 08/14/23  0507 08/13/23  0609   POTASSIUM mmol/L 4.8 4.7 4.9   CHLORIDE mmol/L 104 102 102   CO2 mmol/L 33* 29 29   BUN mg/dL 44* 31* 27*   CREATININE mg/dL 0.46* 0.38* 0.33*   CALCIUM mg/dL 8.6 8.8 8.7                 No results found for: "TROPONINT"  ABG:No results found for: "PHART", "OKB8EKV", "PO2ART", "SEW6IHC", "E0LQKUYD", "BEART", "SOURCE"    Imaging Studies: I have personally reviewed pertinent reports.    and I have personally reviewed pertinent films in PACS  CT chest wo contrast    Result Date: 8/15/2023  Narrative: CT CHEST WITHOUT IV CONTRAST INDICATION:   Hx of RA, untreated comming with upper and lower extremity numbness. concern for ANCA vasculitis. Rheumatologic workup in place. COMPARISON: 12/29/2016 TECHNIQUE: CT examination of the chest was performed without intravenous contrast. Multiplanar 2D reformatted images were created from the source data. This examination, like all CT scans performed in the [facility], was performed utilizing techniques to minimize radiation dose exposure, including the use of iterative reconstruction and automated exposure control. Radiation dose length product (DLP) for this visit:  194.01 mGy-cm FINDINGS: LUNGS: Bands of opacity in both lower lobes have appearance favoring atelectasis or scarring. These are more prominent than on the prior scan from 2016. Small amount of mucus appearing debris in the right mainstem bronchus. Otherwise, airways are clear. No dominant lung masses or suspicious nodularity appreciated. PLEURA:  Unremarkable. HEART/GREAT VESSELS: Heart is unremarkable for patient's age. No thoracic aortic aneurysm. MEDIASTINUM AND GURDEEP:  Unremarkable. CHEST WALL AND LOWER NECK: Chest wall appears mildly edematous. Etiology uncertain. There is some artifact from a right shoulder prosthesis which limits portions of the image set. VISUALIZED STRUCTURES IN THE UPPER ABDOMEN:  Unremarkable. OSSEOUS STRUCTURES: As above, there is a right shoulder prosthesis. There is severe arthritis of the left shoulder. There appears to be some arthritis of sternoclavicular joints, right side more so than left. There is dextroscoliosis of the thoracolumbar  junction. Impression: Platelike atelectasis or scarring in the lung bases which is more prominent than on the prior scan. 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 Right shoulder prosthesis present which produces some artifact.  Workstation performed: [id number]     IR lumbar puncture    Result Date: 8/8/2023  Narrative: PROCEDURE: Fluoroscopic-guided diagnostic lumbar puncture INDICATION: Polyneuropathy, extremity weakness Provider: Patrick Lovett PA-C FLUOROSCOPY TIME: 1.9 minutes (accession [id number]), NA (accession [id number]) . COMPLICATIONS: None immediate. MEDICATIONS: 1% lidocaine subcutaneous. PROCEDURE: A suitable location for puncture was identified with fluoroscopy in the prone position at the L3-4 level. A 20-gauge spinal needle was advanced into the subarachnoid space using fluoroscopic guidance. Opening pressure of approximately 13 to 14 cm of H2O approximately 14 mL's of clear cerebrospinal fluid was then removed and sent for analysis. The needle was removed and the puncture site was covered with a sterile dressing. A midline catheter was then placed on the right side using ultrasound guidance by the interventional radiology team, please see documented note, 4 Bulgarian 20 cm right basilic vein Patient tolerated procedure without immediate complication FINDINGS: 1. Fluoroscopy confirmed good positioning within the subarachnoid space, with prompt return of cerebrospinal fluid. 2.  Opening pressure was measured at 13.5 cm H20. Impression: Fluoroscopic-guided diagnostic lumbar puncture. Ultrasound guided midline catheter placement Supervising Physician: Dr. Hitesh Francis personally present for the entire procedure Workstation performed: [id number]     IR midline placement    Result Date: 8/8/2023  Narrative: PROCEDURE: Fluoroscopic-guided diagnostic lumbar puncture INDICATION: Polyneuropathy, extremity weakness Provider: Patrick Lovett PA-C FLUOROSCOPY TIME: 1.9 minutes (accession [id number]), NA (accession [id number]) . COMPLICATIONS: None immediate. MEDICATIONS: 1% lidocaine subcutaneous. PROCEDURE: A suitable location for puncture was identified with fluoroscopy in the prone position at the L3-4 level.  A 20-gauge spinal needle was advanced into the subarachnoid space using fluoroscopic guidance. Opening pressure of approximately 13 to 14 cm of H2O approximately 14 mL's of clear cerebrospinal fluid was then removed and sent for analysis. The needle was removed and the puncture site was covered with a sterile dressing. A midline catheter was then placed on the right side using ultrasound guidance by the interventional radiology team, please see documented note, 4 [place]uran 20 cm right basilic vein Patient tolerated procedure without immediate complication FINDINGS: 1. Fluoroscopy confirmed good positioning within the subarachnoid space, with prompt return of cerebrospinal fluid. 2.  Opening pressure was measured at 13.5 cm H20. Impression: Fluoroscopic-guided diagnostic lumbar puncture. Ultrasound guided midline catheter placement Supervising Physician: Dr. Linares Felt personally present for the entire procedure Workstation performed: [id number]     MRI lumbar spine w wo contrast    Result Date: 8/7/2023  Narrative: MRI LUMBAR SPINE WITH AND WITHOUT CONTRAST INDICATION: Bilateral arm and leg numbness. COMPARISON:  None. TECHNIQUE:  Multiplanar, multisequence imaging of the lumbar spine was performed before and after gadolinium administration. . IV Contrast:  4 mL of Gadobutrol injection (SINGLE-DOSE) IMAGE QUALITY:  Diagnostic FINDINGS: VERTEBRAL BODIES:  There are 5 lumbar type vertebral bodies. There is dextroscoliosis with apex at L1-2. Normal marrow signal is identified within the visualized bony structures. No discrete marrow lesion. SACRUM:  Normal signal within the sacrum. No evidence of insufficiency or stress fracture. DISTAL CORD AND CONUS:  Normal size and signal within the distal cord and conus. PARASPINAL SOFT TISSUES:  Paraspinal soft tissues are unremarkable. LOWER THORACIC DISC SPACES:  Normal disc height and signal.  No disc herniation, canal stenosis or foraminal narrowing. LUMBAR DISC SPACES: L1-L2: No disc bulge. Mild facet arthropathy.  No canal or foraminal stenosis. L2-L3: No disc bulge. Mild facet arthropathy. No canal or foraminal stenosis. L3-L4: No disc bulge. Mild facet arthropathy. No canal or foraminal stenosis. L4-L5: There is degenerative disc dehydration without significant disc height loss. No significant disc bulge. Mild to moderate facet arthropathy. No canal or foraminal stenosis. L5-S1: No disc bulge. Mild facet arthropathy. No canal or foraminal stenosis. POSTCONTRAST IMAGING:  No abnormal enhancement. OTHER FINDINGS: Hepatomegaly     Impression: 1. No abnormal signal or pathologic enhancement in the visualized cord. 2.  Mild degenerative change without canal or foraminal stenosis. 3.  Hepatomegaly. Workstation performed: [id number]     MRI thoracic spine w wo contrast    Result Date: 8/7/2023  Narrative: MRI THORACIC SPINE WITH AND WITHOUT CONTRAST INDICATION: no. COMPARISON:  None. TECHNIQUE:  Multiplanar, multisequence imaging of the thoracic spine was performed before and after gadolinium administration. . IV Contrast:  4 mL of Gadobutrol injection (SINGLE-DOSE) IMAGE QUALITY:  Diagnostic. FINDINGS: ALIGNMENT:  Normal alignment of the thoracic spine. No compression fracture. No subluxation. No evidence of scoliosis. MARROW SIGNAL:  Normal marrow signal is identified within the visualized bony structures. No discrete marrow lesion. THORACIC CORD:  Normal signal within the thoracic cord. PREVERTEBRAL AND PARASPINAL SOFT TISSUES:   Normal. THORACIC DEGENERATIVE CHANGE:  No disc herniation, canal stenosis or foraminal narrowing. No degenerative changes. POSTCONTRAST:  No abnormal enhancement. OTHER FINDINGS: Small right and minimal left pleural effusion. Impression: 1. No cord signal abnormality or pathologic enhancement. Unremarkable MRI of the thoracic spine. 2.  Small right and minimal left pleural effusion.  Workstation performed: [id number]     VAS lower limb arterial duplex, complete bilateral    Result Date: 8/5/2023  Narrative:  [facility] REPORT CLINICAL: Indications:  Patient presents with cold, painful, numb lower extremities x several days, worsening. Operative History: No prior heart or vascular surgery Risk Factors: Patient has history of rheumatoid arthritis  FINDINGS:  Segment                Right                  Left                                          Waveform   PSV (cm/s)  Waveform   PSV (cm/s)  Post. Tibial           Triphasic              Triphasic              Ant. Tibial            Triphasic              Triphasic              Common Femoral Artery                     51                     48  Prox Profunda                             26                     26  Prox SFA                                  38                     34  Mid SFA                                   47                     55  Dist SFA                                  26                     37  Proximal Pop                              27                     26  Distal Pop                                39                     37  Tibioperoneal                             39                     50  Dist Post Tibial                          40                     59  Prox. Ant. Tibial                         27                         Dist. Ant. Tibial                         44                     38     CONCLUSION:  Impression: RIGHT LOWER LIMB: Diffuse disease noted throughout the femoral-popliteal arteries without significant focal stenosis. Evidence suggestive of tibioperoneal disease. Ankle/Brachial index:   1.16, normal. (Prior 1.1) PVR/ PPG tracings are normal. Metatarsal pressure of 107 mmHg Great toe pressure of 82 mmHg, within the healing range  LEFT LOWER LIMB: Diffuse disease noted throughout the femoral-popliteal arteries without significant focal stenosis.  Evidence suggestive of tibioperoneal disease Ankle/Brachial index:   1.20, normal. (Prior 1.0) PVR/ PPG tracings are normal. Metatarsal pressure of 117 mmHg Great toe pressure of 86 mmHg, within the healing range  Compared to previous GRAZYNA study on 4/13/2014, there is no significant change. No prior duplex for comparison. SIGNATURE: Electronically Signed by: Billie Bennett MD on 2023-08-05 07:32:56 PM    MRI cervical spine wo contrast    Result Date: 8/3/2023  Narrative: MRI CERVICAL SPINE WITHOUT CONTRAST INDICATION: Chronic neck pain. Paresthesia.) COMPARISON: CT from earlier today. TECHNIQUE:  Multiplanar, multisequence imaging of the cervical spine was performed. . IMAGE QUALITY:  Diagnostic FINDINGS: ALIGNMENT: There is straightening of the normal cervical lordosis. Minimal anterolisthesis at C5-C6, C6-7 and C7-T1. C7-T1 is unchanged. MARROW SIGNAL: There is bilateral facet ankylosis from C2-C6. Subchondral edema and sclerosis in the C6-7 facets related to degenerative facet arthropathy. No suspicious marrow signal normality. CERVICAL AND VISUALIZED THORACIC CORD:  Normal signal within the visualized cord. PREVERTEBRAL AND PARASPINAL SOFT TISSUES:  Normal. VISUALIZED POSTERIOR FOSSA:  The visualized posterior fossa demonstrates no abnormal signal. CERVICAL DISC SPACES: C2-C3: No disc herniation, canal or foraminal stenosis. C3-C4: No disc herniation, canal or foraminal stenosis. C4-C5: No disc herniation, canal or foraminal stenosis. C5-C6: Minimal anterolisthesis. No disc herniation, canal or foraminal stenosis. C6-C7: Small disc bulge and facet arthropathy, left worse than right. No significant canal or foraminal stenosis. C7-T1: Minimal anterolisthesis. No disc herniation, canal or foraminal stenosis. UPPER THORACIC DISC SPACES:  Normal. OTHER FINDINGS:  None. Impression: No disc herniation, canal or foraminal stenosis. 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 Workstation performed: [id number]     CT spine cervical without contrast    Result Date: 8/3/2023  Narrative: CT CERVICAL SPINE - WITHOUT CONTRAST INDICATION:   Neck pain, chronic neck pain, parasthesia. COMPARISON:  None. TECHNIQUE:  CT examination of the cervical spine was performed without intravenous contrast.  Contiguous axial images were obtained. Multiplanar 2D reformatted images were created from the source data. Radiation dose length product (DLP) for this visit:  201.66 mGy-cm . This examination, like all CT scans performed in the [facility], was performed utilizing techniques to minimize radiation dose exposure, including the use of iterative  reconstruction and automated exposure control. IMAGE QUALITY:  Diagnostic. FINDINGS: ALIGNMENT:  Normal alignment of the cervical spine. No subluxation. VERTEBRAE:  No fracture. DEGENERATIVE CHANGES: Mild multilevel cervical degenerative changes are noted without critical central canal stenosis. PREVERTEBRAL AND PARASPINAL SOFT TISSUES: Acute on chronic left sphenoid sinusitis with an air-fluid level and periosteal thickening. THORACIC INLET:  Normal.     Impression: Acute on chronic left sphenoid sinusitis. Workstation performed: [id number]     CT head without contrast    Result Date: 8/3/2023  Narrative: CT BRAIN - WITHOUT CONTRAST INDICATION:   hand numbness. COMPARISON:  None. TECHNIQUE:  CT examination of the brain was performed. Multiplanar 2D reformatted images were created from the source data. Radiation dose length product (DLP) for this visit:  819.14 mGy-cm . This examination, like all CT scans performed in the [facility], was performed utilizing techniques to minimize radiation dose exposure, including the use of iterative  reconstruction and automated exposure control. IMAGE QUALITY:  Diagnostic. FINDINGS: PARENCHYMA:  No intracranial mass, mass effect or midline shift. No CT signs of acute infarction. No acute parenchymal hemorrhage. VENTRICLES AND EXTRA-AXIAL SPACES:  Normal for the patient's age. VISUALIZED ORBITS: Normal visualized orbits.  PARANASAL SINUSES: Mild mucosal thickening of the visualized paranasal sinuses. CALVARIUM AND EXTRACRANIAL SOFT TISSUES:  Normal.     Impression: No acute intracranial abnormality. Workstation performed: [id number]     X-ray chest 1 view frontal    Result Date: 7/28/2023  Narrative: EXAM TYPE: CHEST SINGLE VIEW, FRONTAL EXAM DATE AND TIME: 7/28/2023 7:16 PM EDT INDICATION: tachycardia COMPARISON: Chest radiograph 11/30/2016 TECHNIQUE: A single view of the thorax was obtained and reviewed. Special views: None. Impression: Lungs: No focal consolidation or pulmonary edema. Left lower lobe atelectasis. Pleura: Small bilateral pleural effusions right greater than left. No pneumothorax. Heart/mediastinum: Normal heart size. Devices: Reverse right shoulder arthroplasty. EKG, Pathology, and Other Studies: I have personally reviewed pertinent reports. VTE Prophylaxis: Sequential compression device (Venodyne)  and Heparin    Code Status: Level 1 - Full Code  Advance Directive and Living Will:      Power of :    POLST:      Counseling / Coordination of Care  I spent 1 hour with the patient.

## 2023-08-15 NOTE — ASSESSMENT & PLAN NOTE
• POA- Presented with bilateral upper and lower extremity pain, weakness, and numbness  • MRI cervical,thoracic and lumbar spinal without abnormalities or pathologic enhancement. • LP with normal WBCs, protein, negative Gram stain, elevated IgG. Lyme PCR pending. • Work-up illustrated evidence of B6 deficiency for which supplementation was ordered. • Autoimmune work-up with elevated inflammatory marker, elevated p-ANCA MPO antibodies and positive VALDO. • Suspicion for P-ANCA vasculitis. • Neurology following closely, recommended neurosurgical consultation for nerve biopsy. Appreciated. • Continue Solu-Medrol 30 mg twice daily. • Continue gabapentin 600 mg 3 times daily. • Discussed with rheumatology Dr Tiana Gonzalez who recommended the following:  •  CT chest without contrast, UA, urine protein creatinine ratio, chronic hepatitis panel and QuantiFERON test.  • Patient may need rituximab but would like to await preliminary nerve biopsy results. • In the meantime continue steroids.

## 2023-08-15 NOTE — ASSESSMENT & PLAN NOTE
• Patient with longstanding history of rheumatoid arthritis, has undergone several joint fusions. she was intolerant to multiple DMARDs/Biologics and currently not on any maintenance therapy. • Continue IV Solu-Medrol as above. • Rheumatology evaluation appreciated.

## 2023-08-15 NOTE — ASSESSMENT & PLAN NOTE
41 y.o. female with malnutrition and juvenile rheumatoid arthritis who has undergone several joint fusions and has not seen a rheumatologist in >10 years as she has been intolerant to biologicals as well as DMARDs (no medications since 2008) who initially presented to [address] [place] [address] on 8/3/2023 due to bilateral distal LE pain/numbness/weakness x2 weeks and bilateral hand pain/numbness/weakness x1-2 days. Since November 2022, patient has had a steady decline in her ability to ambulate. She can walk a few steps with assistance, but has otherwise been wheelchair bound. In May, she had a viral infection and since May/Leandra has had a hoarse voice with dysphagia, making it difficult to eat (lost ~10 pounds since June 2023). She was told there were ulcers on her vocal cords and video barium swallow showed mild pharyngeal dysphagia. 2 weeks prior to presentation, patient had sudden numbness/burning pain and weakness in the toes (at baseline, unable to move the ankles, but can wiggle the toes). Over the next week, the paresthesias progressed to bilateral patellas. The day of admission, patient developed severe burning pain in the hands and was no longer able to move her fingers (at baseline, right wrist is contracted, but can abduct/adduct the fingers and hold utensils with both hands). CT head, spinal cord imaging, and CSF studies unremarkable except for elevated IgG. She had several abnormal serum labs (see below). Initially, symptoms thought to be due to RA flare, so was treated with IV Solu-Medrol (started on 8/4). She received B6 supplementation. Pain has slightly improved with gabapentin, but weakness and numbness are persistent. However, she denies any progression of symptoms since being admitted. Due to rapidly progressive and persistent weakness/paresthesias, there has been concern for rheumatologic cause, such as p-ANCA associated vasculitis with neuropathy.   There has also been concern for progression of RA and nutritional deficiencies. Due to limited exam given extensive joint deformities and difficulty to determine if there are separate nerves involved, patient was transferred to [facility] for inpatient rheumatologic/neurologic evaluation and nerve/muscle biopsy (completed on 8/16). Workup:  · 8/3: CT head negative for acute intracranial abnormalities. · 8/3: MRI cervical spine wo contrast:   · No disc herniation, canal, or foraminal stenosis. 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 · 8/7: MRI thoracic spine w/wo contrast:   · No cord signal abnormality or pathologic enhancement. · Incidentally noted small right and minimal left pleural effusion. · 8/7: MRI lumbar spine w/wo contrast:   · No abnormal signal pathologic enhancement  · 8/15: CT chest wo contrast  · Platelike atelectasis or scarring in the lung bases, which is more prominent than on prior scan  · Arthritis of the left shoulder and sternoclavicular joints. Right shoulder prosthesis present which produces some artifact.     · S/p LP on 8/8 with opening pressure 13.5 cm H2O  · CSF labs:  · Abnormal: glucose 79, RBC 67, IgG 2193  · WNL: gram stain, WBC 0, protein 27, 0 oligoclonal bands  · Pending: Lyme PCR    · Serum/Urine Labs:  · Abnormal initial labs:sodium 122, hypomagnesemia (1.6), TSH 5.229 with normal free T4, iron deficiency anemia  · Abnormal labs throughout admission: B12 340, B6 2.7, vitamin D 9.6, hypoalbuminemia (2.6), , HsCRP >90, elevated p-ANCA, elevated MPO antibody, positive VALDO (titer 320 and homogenous pattern with positive antichromatin antibodies), SPEP shows hypergammaglobulinemia with beta-gamma bridging but no monoclonal immunoglobulins on immunofixation, IgG 2270, IgM 559, elevated serum Ig Kappa and Ig Lambda free light chains (but normal urine Kappa/Lambda light chains)  · WNL: Folate 822, copper 120, thiamine 84, heavy metals screen, UPEP normal, C3/C4, chronic hepatitis panel  · Pending: urine heavy metals, Quantiferon TB    Plan:  - Patient underwent nerve and muscle biopsy on 8/16; specimens were sent to [facility] in [place]. Final pathology report pending, however preliminary report concerning for vasculitis given significant inflammation and abnormal appearing vessels  - Rheumatology consulted; appreciate recommendations  · Per SLIM, rheumatology has recommended inpatient EMG if possible and CT chest (results above)  · Continue IV Solu-Medrol at this time (currently receiving 30 mg Q12 hours; previously received 40 mg Q12 hours); management per rheumatology  · Patient may need rituximab, but waiting on nerve/muscle biopsy results  · Formal Rheumatology consult is pending; hopefully will be able to see the patient on Monday  - Attending Neurologist spoke to the neuromuscular specialist Dr. Vaughn Barber regarding the case. She could potentially do the EMG this week, however the patient does not want the EMG at this time. Unclear if EMG would . Will further discuss the need for PLEX with our neuromuscular specialist, however on literature review there does not seem to be data regarding PLEX specifically treating neuropathy  - Continue B6 supplementation  - Continue B12 and Vitamin D supplementation  - Continue gabapentin 600 mg TID (has been titrated up from 300 mg TID)  - PT/OT  - Frequent neuro checks. Continue to monitor and notify neurology with any changes.    - Medical management and supportive care per primary team. Correction of any metabolic or infectious disturbances

## 2023-08-15 NOTE — PROGRESS NOTES
[address] [facility]  Progress Note  Name: Gloria Delgadillo I  MRN: [id number]  Unit/Bed#: [facility]PHP 734-01 I Date of Admission: 8/14/2023   Date of Service: 8/15/2023 I Hospital Day: 1    Assessment/Plan   * Bilateral upper and lower extremity neuropathy  Assessment & Plan  • POA- Presented with bilateral upper and lower extremity pain, weakness, and numbness  • MRI cervical,thoracic and lumbar spinal without abnormalities or pathologic enhancement. • LP with normal WBCs, protein, negative Gram stain, elevated IgG. Lyme PCR pending. • Work-up illustrated evidence of B6 deficiency for which supplementation was ordered. • Autoimmune work-up with elevated inflammatory marker, elevated p-ANCA MPO antibodies and positive VALDO. • Suspicion for P-ANCA vasculitis. • Neurology following closely, recommended neurosurgical consultation for nerve biopsy. Appreciated. • Continue Solu-Medrol 30 mg twice daily. • Continue gabapentin 600 mg 3 times daily. • Discussed with rheumatology Dr Keyur Elias who recommended the following:  •  CT chest without contrast, UA, urine protein creatinine ratio, chronic hepatitis panel and QuantiFERON test.  • Patient may need rituximab but would like to await preliminary nerve biopsy results. • In the meantime continue steroids. Rheumatoid arthritis ([address])  Assessment & Plan  • Patient with longstanding history of rheumatoid arthritis, has undergone several joint fusions. she was intolerant to multiple DMARDs/Biologics and currently not on any maintenance therapy. • Continue IV Solu-Medrol as above. • Rheumatology evaluation appreciated. Ambulatory dysfunction  Assessment & Plan  At baseline, patient is wheelchair-bound outside the house and can only walk few steps with assistance at home. Now, with declining functional status. PT/OT. Leucocytosis  Assessment & Plan  Secondary to steroid use.   Afebrile, nontoxic without evidence of infection. Monitor. Neuropathy involving both lower extremities  Assessment & Plan  As above        Vitamin B6 deficiency  Assessment & Plan  Replacement ordered. Moderate protein-calorie malnutrition ([address])  Assessment & Plan  Malnutrition Findings:   Adult Malnutrition type: Chronic illness  Adult Degree of Malnutrition: Malnutrition of moderate degree  Malnutrition Characteristics: Muscle loss, Fat loss                  360 Statement: Malnutrition of moderate degree in the context of chronic illness as evidenced by moderate depletion of the temples, visible clavicle and moderate buccal fat pad loss. Treated with oral diet and oral nutrition supplement. BMI Findings:  Adult BMI Classifications: Underweight < 18.5        Body mass index is 15.43 kg/m². Asthma  Assessment & Plan  Continue with current regimen. VTE Pharmacologic Prophylaxis:   Moderate Risk (Score 3-4) - Pharmacological DVT Prophylaxis Ordered: heparin. Patient Centered Rounds: I performed bedside rounds with nursing staff today. Discussions with Specialists or Other Care Team Provider: Rheumatology,      Education and Discussions with Family / Patient: Patient declined call to . Total Time Spent on Date of Encounter in care of patient: 35 minutes This time was spent on one or more of the following: performing physical exam; counseling and coordination of care; obtaining or reviewing history; documenting in the medical record; reviewing/ordering tests, medications or procedures; communicating with other healthcare professionals and discussing with patient's family/caregivers. Current Length of Stay: 1 day(s)  Current Patient Status: Inpatient   Certification Statement: The patient will continue to require additional inpatient hospital stay due to workup for possible vasculitis.   Discharge Plan: Anticipate discharge in 48-72 hrs to discharge location to be determined pending rehab evaluations. Code Status: Level 1 - Full Code    Subjective:   Patient was seen and examined at bedside. She reports that her symptoms remains the same and she noticed no improvement with steroids. She reports that prior to admission she was able to ambulate at home with assistance and was wheelchair-bound outside the house. She was able to use her right hand to feed herself and work on the computer. She has been declining over the past couple of months. Objective:     Vitals:   Temp (24hrs), Av.8 °F (36.6 °C), Min:97.6 °F (36.4 °C), Max:98 °F (36.7 °C)    Temp:  [97.6 °F (36.4 °C)-98 °F (36.7 °C)] 97.6 °F (36.4 °C)  BP: (101-111)/(66-80) 111/80  Body mass index is 15.43 kg/m². Input and Output Summary (last 24 hours):   No intake or output data in the 24 hours ending 08/15/23 1746    Physical Exam:   Physical Exam  Vitals and nursing note reviewed. Constitutional:       Appearance: She is not ill-appearing. HENT:      Head: Normocephalic and atraumatic. Cardiovascular:      Rate and Rhythm: Normal rate. Heart sounds: Normal heart sounds. No murmur heard. Pulmonary:      Effort: No respiratory distress. Breath sounds: Normal breath sounds. No wheezing. Abdominal:      Palpations: Abdomen is soft. Musculoskeletal:         General: Deformity present. Comments: Fused joint, decreased ROM. Multiple joint deformities. Skin:     General: Skin is warm. Neurological:      Mental Status: She is alert.           Additional Data:     Labs:  Results from last 7 days   Lab Units 23  2226   WBC Thousand/uL 19.75*   HEMOGLOBIN g/dL 9.4*   HEMATOCRIT % 30.5*   PLATELETS Thousands/uL 452*   NEUTROS PCT % 68   LYMPHS PCT % 24   MONOS PCT % 6   EOS PCT % 0     Results from last 7 days   Lab Units 23  2226   SODIUM mmol/L 140   POTASSIUM mmol/L 4.8   CHLORIDE mmol/L 104   CO2 mmol/L 33*   BUN mg/dL 44*   CREATININE mg/dL 0.46*   ANION GAP mmol/L 3   CALCIUM mg/dL 8.6   GLUCOSE RANDOM mg/dL 89                 Results from last 7 days   Lab Units 08/12/23  0605   PROCALCITONIN ng/ml 0.09       Lines/Drains:  Invasive Devices     Peripheral Intravenous Line  Duration           Long-Dwell Peripheral IV (Midline) 53/98/60 Right Basilic 7 days                      Imaging: Reviewed radiology reports from this admission including: MRI spine    Recent Cultures (last 7 days):         Last 24 Hours Medication List:   Current Facility-Administered Medications   Medication Dose Route Frequency Provider Last Rate   • albuterol  2 puff Inhalation Q4H PRN Hetul Vargas, DO     • cholecalciferol  400 Units Oral Daily Savanah Sheffield PA-C     • [START ON 8/16/2023] cyanocobalamin  1,000 mcg Oral Daily Savanah Sheffield PA-C     • diphenhydramine, lidocaine, Al/Mg hydroxide, simethicone  10 mL Swish & Spit Q4H PRN Hetul Vargas, DO     • gabapentin  600 mg Oral TID Hetul Vargas, DO     • heparin (porcine)  5,000 Units Subcutaneous Q8H [address] Hetul Vargas, DO     • HYDROcodone-acetaminophen  1 tablet Oral Q6H PRN Hetul Vargas, DO     • magnesium Oxide  400 mg Oral HS Hetul Vargas, DO     • meloxicam  7.5 mg Oral Daily Hetul Vargas, DO     • methylPREDNISolone sodium succinate  30 mg Intravenous Q12H [address] Hetul Vargas, DO     • morphine injection  2 mg Intravenous Q4H PRN Hetul Vargas, DO     • ondansetron  4 mg Intravenous Q6H PRN Hetul Vargas, DO     • pyridoxine  50 mg Oral Daily Hetul Vargas, DO          Today, Patient Was Seen By: Severiano Jaimes MD    **Please Note: This note may have been constructed using a voice recognition system. **

## 2023-08-15 NOTE — ASSESSMENT & PLAN NOTE
• Present on admission while at [facility]  • Neurology requesting transfer here for rheumatology assessment for possible p-ANCA associated vasculitis with nephropathy. Neurologist recommending transfer to [facility] for rheumatological consult and nerve biopsy.

## 2023-08-15 NOTE — MALNUTRITION/BMI
This medical record reflects one or more clinical indicators suggestive of malnutrition. Malnutrition Findings:   Adult Malnutrition type: Chronic illness  Adult Degree of Malnutrition: Malnutrition of moderate degree  Malnutrition Characteristics: Muscle loss, Fat loss                  360 Statement: Malnutrition of moderate degree in the context of chronic illness as evidenced by moderate depletion of the temples, visible clavicle and moderate buccal fat pad loss. Treated with oral diet and oral nutrition supplement. BMI Findings:  Adult BMI Classifications: Underweight < 18.5        Body mass index is 15.43 kg/m². See Nutrition note dated 8/15/23 for additional details. Completed nutrition assessment is viewable in the nutrition documentation.

## 2023-08-15 NOTE — H&P
[facility]  H&P  Name: Nila Hector 41 y.o. female I MRN: [id number]  Unit/Bed#: [facility] 734-01 I Date of Admission: 8/14/2023   Date of Service: 8/14/2023 I Hospital Day: 0      Assessment/Plan   * Bilateral upper and lower extremity neuropathy  Assessment & Plan  • POA  • Presented with bilateral upper and lower extremity pain, weakness, and numbness  • Patient recently had a ED visit and was started on Lyrica but was reported to have allergic reaction. • MRI C-spine (8/3): No disc herniation, canal or foraminal stenosis. 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 • 8/7 patient reports that the pain in her upper and lower extremities has improved since the steroid was added, however, feels that the numbness and weakness in her hands is worse to the point that she cannot hold utensils to feed herself. She also notes that there is no improvement in the numbness in her feet. States the foot numbness started about 2 weeks ago after an extended car ride that lasted 12 hours  • Patient reports partial improvement with steroids in terms of pain control however she still has significant numbness weakness symptoms. • Assessment  unchanged since 8/8. Continues with burning sensation in hands and feet, unable to wiggle toes, limited mobility of first 2 fingers and right hand and unable to move fingers on left hand. • Patient had LP performed by IR on the eighth with negative results as per neurology. • Work-up illustrated evidence of B6 deficiency for which supplementation was ordered. ..  • Patient on the 11th was noted to have worsening symptoms which prompted increasing dosing of Neurontin. • Currently on Solu-Medrol  • Neurology has been following closely, telemetry conference with neurology today. 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 Neurology requested transfer here for  rheumatology consultation and nerve biopsy. • Consult neurosurgery for nerve biopsy  • Consult rheumatology  • Consult neurology       Neuropathy involving both lower extremities  Assessment & Plan  • Present on admission while at [place]  • Neurology requesting transfer here for rheumatology assessment for possible p-ANCA associated vasculitis with nephropathy. Neurologist recommending transfer to [facility] for rheumatological consult and nerve biopsy. Vitamin B6 deficiency  Assessment & Plan  Supplementation    Rheumatoid arthritis ([address])  Assessment & Plan  • Not on any maintenance therapy at this time as the patient has several allergies to DMARDs  • Continue IV Solu-Medrol as above for possible acute RA flare, titrating downward  • Planning rheumatology follow-up in outpatient setting. Patient reports that she had not seen a rheumatologist in about 17 years and that her PCP was managing her rheumatoid arthritis. She added that she does have a rheumatology appointment with a doctor at [facility] in [place] in January because that is the quickest she could get in. Asthma  Assessment & Plan  Continue with current regimen. VTE Prophylaxis: Enoxaparin (Lovenox)  / sequential compression device   Code Status: fc  POLST: There is no POLST form on file for this patient (pre-hospital)      Anticipated Length of Stay:  Patient will be admitted on an Inpatient basis with an anticipated length of stay of  > 2 midnights. Justification for Hospital Stay: Need to monitor upper and lower extremity symptoms    Total Time for Visit, including Counseling / Coordination of Care: 85.  Greater than 50% of this total time spent on direct patient counseling and coordination of care. Chief Complaint:   Weakness and numbness in bilateral upper and lower extremities. History of Present Illness:    Jaya Joshua is a 41 y.o. female who presents with reported upper and lower extremity neuropathy.   Patient with extensive work-up at [facility] including MRI of spine and LP that were negative. There was a concern articulate about possible P ANCA associated disease. She presents to [facility] with bilateral lower extremity pain and numbness x2 weeks. Patient was noted to have bilateral arm pain and numbness for at least 3 days now. She has been having longstanding history of rheumatoid arthritis diagnosed at the age of 15 and was followed previously by rheumatology. She reports being lost to follow-up with them 17 years ago and has been noted to be intolerant to biological agents as well as DMARDs. She presents to the hospital with progressive pain and numbness of her bilateral lower extremities in addition to arm pain and numbness has been ongoing for the past 3 days. She previously was seen at [facility] in the ER and was started on Lyrica but was noted to have an allergic reaction requiring Benadryl. She presents initially to [facility]  She had been initiated on intravenous Solu-Medrol for presumed rheumatoid arthritis flare. MRI and C-spine showed no evidence for herniation canal or foraminal stenosis on arrival.  She presents with numbness and tingling of her hands and feet since the seventh. She reports that the pain is localized in the upper extremity which has improved with steroids however she still has persistent numbness and weakness of the hands and feet which prompted the initiation of transfer here. She also notes that she could not wiggle her toes and that she only moves the first 2 digits of her right hand and unable to move her fingers on her left hand. MRI of the T-spine was noted to be unremarkable. MRI of the lumbar spine noted no abnormal signal.  She reportedly had an LP with negative CSF as per neurology. Patient was also noted to have significant B6 deficiency on the 11th which prompted initiation of supplementation. However her symptoms have not improved.   Decision was made to transfer here for further higher level care. Review of Systems:    Review of Systems    Past Medical and Surgical History:     Past Medical History:   Diagnosis Date   • Asthma    • RA (rheumatoid arthritis) ([address])        Past Surgical History:   Procedure Laterality Date   • ANKLE SURGERY      2 surgerys   •  SECTION     • ELBOW SURGERY     • IR LUMBAR PUNCTURE  2023   • IR MIDLINE PLACEMENT  2023   • JOINT REPLACEMENT      knees bilateral   • TOTAL SHOULDER REPLACEMENT         Meds/Allergies:    Prior to Admission medications    Medication Sig Start Date End Date Taking? Authorizing Provider   Magnesium Oxide 400 MG CAPS Take 1 capsule by mouth    Historical Provider, MD   meloxicam (MOBIC) 7.5 mg tablet Take 7.5 mg by mouth daily    Historical Provider, MD   methylPREDNISolone 4 MG tablet therapy pack Use as directed on package  Patient not taking: Reported on 2023   CARMITA Gloria   prednisoLONE (ORAPRED) 15 mg/5 mL oral solution 10 ml by mouth on days 1-4; 5 ml on days 5-8 and 2.5 ml on day 9-12  Patient not taking: Reported on 8/3/2023 6/22/23   DO David Smitholin  (22 Base) MCG/ACT inhaler  3/28/23   Historical Provider, MD     I have reviewed home medications with patient personally. Allergies:    Allergies   Allergen Reactions   • Corn Oil - Food Allergy - Food Allergy Shortness Of Breath   • Gluten Meal - Food Allergy Shortness Of Breath   • Isoflavones (Soy) Shortness Of Breath   • Oseltamivir Shortness Of Breath   • Yeast - Food Allergy Shortness Of Breath   • Tilactase    • Medical Tape Rash     If on for too long; please use paper tape   • Sulfa Antibiotics Hives       Social History:     Marital Status: /Civil Union   Patient Pre-hospital Living Situation: Home  Patient Pre-hospital Level of Mobility: Ambulatory  Patient Pre-hospital Diet Restrictions: Denies  Substance Use History:   Social History     Substance and Sexual Activity   Alcohol Use Not Currently     Social History     Tobacco Use   Smoking Status Never   Smokeless Tobacco Never     Social History     Substance and Sexual Activity   Drug Use Never       Family History:    Family History   Problem Relation Age of Onset   • No Known Problems Mother    • No Known Problems Father        Physical Exam:     Vitals:   Blood Pressure: 101/66 (08/14/23 2050)  Temperature: 98 °F (36.7 °C) (08/14/23 2050)    Physical Exam  Constitutional:       Appearance: Normal appearance. HENT:      Head: Normocephalic and atraumatic. Cardiovascular:      Rate and Rhythm: Normal rate and regular rhythm. Heart sounds: No murmur heard. No friction rub. Pulmonary:      Effort: Pulmonary effort is normal.      Breath sounds: Normal breath sounds. Abdominal:      General: Abdomen is flat. Palpations: Abdomen is soft. Skin:     General: Skin is warm and dry. Coloration: Skin is not jaundiced. Neurological:      General: No focal deficit present. Mental Status: She is alert and oriented to person, place, and time. Psychiatric:         Mood and Affect: Mood normal.           Additional Data:     Lab Results: I have personally reviewed pertinent reports. Results from last 7 days   Lab Units 08/14/23  0507   WBC Thousand/uL 14.97*   HEMOGLOBIN g/dL 9.9*   HEMATOCRIT % 32.7*   PLATELETS Thousands/uL 454*   NEUTROS PCT % 76*   LYMPHS PCT % 19   MONOS PCT % 3*   EOS PCT % 0     Results from last 7 days   Lab Units 08/14/23  0507 08/09/23  0503 08/08/23  1239   SODIUM mmol/L 136   < >  --    POTASSIUM mmol/L 4.7   < >  --    CHLORIDE mmol/L 102   < >  --    CO2 mmol/L 29   < >  --    BUN mg/dL 31*   < >  --    CREATININE mg/dL 0.38*   < >  --    ANION GAP mmol/L 5   < >  --    CALCIUM mg/dL 8.8   < >  --    ALBUMIN g/dL  --   --  2.9*   GLUCOSE RANDOM mg/dL 116   < >  --     < > = values in this interval not displayed.      Results from last 7 days   Lab Units 08/08/23  1015   INR  1.07 Results from last 7 days   Lab Units 08/12/23  0605   PROCALCITONIN ng/ml 0.09       Imaging: I have personally reviewed pertinent reports. No orders to display           ** Please Note: This note has been constructed using a voice recognition system.  **

## 2023-08-16 ENCOUNTER — ANESTHESIA (INPATIENT)
Dept: PERIOP | Facility: HOSPITAL | Age: 48
DRG: 982 | End: 2023-08-16
Payer: MEDICARE

## 2023-08-16 ENCOUNTER — ANESTHESIA EVENT (INPATIENT)
Dept: PERIOP | Facility: HOSPITAL | Age: 48
DRG: 982 | End: 2023-08-16
Payer: MEDICARE

## 2023-08-16 PROBLEM — R65.10 SIRS (SYSTEMIC INFLAMMATORY RESPONSE SYNDROME) (HCC): Status: ACTIVE | Noted: 2023-08-16

## 2023-08-16 LAB
ALBUMIN SERPL BCP-MCNC: 2.6 G/DL (ref 3.5–5)
ALP SERPL-CCNC: 58 U/L (ref 46–116)
ALT SERPL W P-5'-P-CCNC: 32 U/L (ref 12–78)
ANION GAP SERPL CALCULATED.3IONS-SCNC: 3 MMOL/L
APTT PPP: 30 SECONDS (ref 23–37)
AST SERPL W P-5'-P-CCNC: 12 U/L (ref 5–45)
BASOPHILS # BLD AUTO: 0.03 THOUSANDS/ÂΜL (ref 0–0.1)
BASOPHILS NFR BLD AUTO: 0 % (ref 0–1)
BILIRUB SERPL-MCNC: 0.43 MG/DL (ref 0.2–1)
BUN SERPL-MCNC: 31 MG/DL (ref 5–25)
CALCIUM ALBUM COR SERPL-MCNC: 9.8 MG/DL (ref 8.3–10.1)
CALCIUM SERPL-MCNC: 8.7 MG/DL (ref 8.3–10.1)
CHLORIDE SERPL-SCNC: 106 MMOL/L (ref 96–108)
CO2 SERPL-SCNC: 27 MMOL/L (ref 21–32)
CREAT SERPL-MCNC: 0.49 MG/DL (ref 0.6–1.3)
EOSINOPHIL # BLD AUTO: 0 THOUSAND/ÂΜL (ref 0–0.61)
EOSINOPHIL NFR BLD AUTO: 0 % (ref 0–6)
ERYTHROCYTE [DISTWIDTH] IN BLOOD BY AUTOMATED COUNT: 21.4 % (ref 11.6–15.1)
GFR SERPL CREATININE-BSD FRML MDRD: 118 ML/MIN/1.73SQ M
GLUCOSE SERPL-MCNC: 125 MG/DL (ref 65–140)
HBV CORE AB SER QL: NORMAL
HBV CORE IGM SER QL: NORMAL
HBV SURFACE AG SER QL: NORMAL
HCT VFR BLD AUTO: 32.8 % (ref 34.8–46.1)
HCV AB SER QL: NORMAL
HGB BLD-MCNC: 9.9 G/DL (ref 11.5–15.4)
IMM GRANULOCYTES # BLD AUTO: 0.28 THOUSAND/UL (ref 0–0.2)
IMM GRANULOCYTES NFR BLD AUTO: 2 % (ref 0–2)
INR PPP: 0.98 (ref 0.84–1.19)
LYMPHOCYTES # BLD AUTO: 3.19 THOUSANDS/ÂΜL (ref 0.6–4.47)
LYMPHOCYTES NFR BLD AUTO: 17 % (ref 14–44)
MAGNESIUM SERPL-MCNC: 2.5 MG/DL (ref 1.6–2.6)
MCH RBC QN AUTO: 25.6 PG (ref 26.8–34.3)
MCHC RBC AUTO-ENTMCNC: 30.2 G/DL (ref 31.4–37.4)
MCV RBC AUTO: 85 FL (ref 82–98)
MONOCYTES # BLD AUTO: 0.73 THOUSAND/ÂΜL (ref 0.17–1.22)
MONOCYTES NFR BLD AUTO: 4 % (ref 4–12)
NEUTROPHILS # BLD AUTO: 14.5 THOUSANDS/ÂΜL (ref 1.85–7.62)
NEUTS SEG NFR BLD AUTO: 77 % (ref 43–75)
NRBC BLD AUTO-RTO: 0 /100 WBCS
PHOSPHATE SERPL-MCNC: 4.2 MG/DL (ref 2.7–4.5)
PLATELET # BLD AUTO: 224 THOUSANDS/UL (ref 149–390)
PLATELET # BLD AUTO: 476 THOUSANDS/UL (ref 149–390)
PMV BLD AUTO: 8.1 FL (ref 8.9–12.7)
PMV BLD AUTO: 8.4 FL (ref 8.9–12.7)
POTASSIUM SERPL-SCNC: 4.8 MMOL/L (ref 3.5–5.3)
PROT SERPL-MCNC: 7.1 G/DL (ref 6.4–8.4)
PROTHROMBIN TIME: 13.2 SECONDS (ref 11.6–14.5)
RBC # BLD AUTO: 3.87 MILLION/UL (ref 3.81–5.12)
SODIUM SERPL-SCNC: 136 MMOL/L (ref 135–147)
WBC # BLD AUTO: 18.73 THOUSAND/UL (ref 4.31–10.16)

## 2023-08-16 PROCEDURE — 88300 SURGICAL PATH GROSS: CPT | Performed by: PATHOLOGY

## 2023-08-16 PROCEDURE — 99222 1ST HOSP IP/OBS MODERATE 55: CPT | Performed by: SURGERY

## 2023-08-16 PROCEDURE — 83735 ASSAY OF MAGNESIUM: CPT | Performed by: PSYCHIATRY & NEUROLOGY

## 2023-08-16 PROCEDURE — 85610 PROTHROMBIN TIME: CPT | Performed by: PSYCHIATRY & NEUROLOGY

## 2023-08-16 PROCEDURE — 85730 THROMBOPLASTIN TIME PARTIAL: CPT | Performed by: PSYCHIATRY & NEUROLOGY

## 2023-08-16 PROCEDURE — 99233 SBSQ HOSP IP/OBS HIGH 50: CPT | Performed by: STUDENT IN AN ORGANIZED HEALTH CARE EDUCATION/TRAINING PROGRAM

## 2023-08-16 PROCEDURE — 64795 BIOPSY OF NERVE: CPT | Performed by: STUDENT IN AN ORGANIZED HEALTH CARE EDUCATION/TRAINING PROGRAM

## 2023-08-16 PROCEDURE — 84100 ASSAY OF PHOSPHORUS: CPT | Performed by: PSYCHIATRY & NEUROLOGY

## 2023-08-16 PROCEDURE — 01BG0ZX EXCISION OF TIBIAL NERVE, OPEN APPROACH, DIAGNOSTIC: ICD-10-PCS | Performed by: STUDENT IN AN ORGANIZED HEALTH CARE EDUCATION/TRAINING PROGRAM

## 2023-08-16 PROCEDURE — 20205 DEEP MUSCLE BIOPSY: CPT | Performed by: SURGERY

## 2023-08-16 PROCEDURE — 85049 AUTOMATED PLATELET COUNT: CPT | Performed by: PHYSICIAN ASSISTANT

## 2023-08-16 PROCEDURE — 99232 SBSQ HOSP IP/OBS MODERATE 35: CPT | Performed by: STUDENT IN AN ORGANIZED HEALTH CARE EDUCATION/TRAINING PROGRAM

## 2023-08-16 PROCEDURE — 80053 COMPREHEN METABOLIC PANEL: CPT | Performed by: PSYCHIATRY & NEUROLOGY

## 2023-08-16 PROCEDURE — 85025 COMPLETE CBC W/AUTO DIFF WBC: CPT | Performed by: PSYCHIATRY & NEUROLOGY

## 2023-08-16 RX ORDER — LIDOCAINE HYDROCHLORIDE 10 MG/ML
INJECTION, SOLUTION EPIDURAL; INFILTRATION; INTRACAUDAL; PERINEURAL AS NEEDED
Status: DISCONTINUED | OUTPATIENT
Start: 2023-08-16 | End: 2023-08-16 | Stop reason: HOSPADM

## 2023-08-16 RX ORDER — DOCUSATE SODIUM 100 MG/1
100 CAPSULE, LIQUID FILLED ORAL 2 TIMES DAILY
Status: DISCONTINUED | OUTPATIENT
Start: 2023-08-16 | End: 2023-08-25 | Stop reason: HOSPADM

## 2023-08-16 RX ORDER — ONDANSETRON 2 MG/ML
4 INJECTION INTRAMUSCULAR; INTRAVENOUS ONCE AS NEEDED
Status: DISCONTINUED | OUTPATIENT
Start: 2023-08-16 | End: 2023-08-16 | Stop reason: HOSPADM

## 2023-08-16 RX ORDER — SENNOSIDES 8.6 MG
1 TABLET ORAL DAILY
Status: DISCONTINUED | OUTPATIENT
Start: 2023-08-17 | End: 2023-08-25 | Stop reason: HOSPADM

## 2023-08-16 RX ORDER — BISACODYL 10 MG
10 SUPPOSITORY, RECTAL RECTAL DAILY PRN
Status: DISCONTINUED | OUTPATIENT
Start: 2023-08-16 | End: 2023-08-25 | Stop reason: HOSPADM

## 2023-08-16 RX ORDER — SODIUM CHLORIDE 9 MG/ML
INJECTION, SOLUTION INTRAVENOUS CONTINUOUS PRN
Status: DISCONTINUED | OUTPATIENT
Start: 2023-08-16 | End: 2023-08-16

## 2023-08-16 RX ORDER — CEFAZOLIN SODIUM 2 G/50ML
2000 SOLUTION INTRAVENOUS EVERY 8 HOURS
Status: COMPLETED | OUTPATIENT
Start: 2023-08-16 | End: 2023-08-17

## 2023-08-16 RX ORDER — LIDOCAINE HYDROCHLORIDE 10 MG/ML
INJECTION, SOLUTION EPIDURAL; INFILTRATION; INTRACAUDAL; PERINEURAL AS NEEDED
Status: DISCONTINUED | OUTPATIENT
Start: 2023-08-16 | End: 2023-08-16

## 2023-08-16 RX ORDER — CEFAZOLIN SODIUM 2 G/50ML
SOLUTION INTRAVENOUS AS NEEDED
Status: DISCONTINUED | OUTPATIENT
Start: 2023-08-16 | End: 2023-08-16

## 2023-08-16 RX ORDER — PROPOFOL 10 MG/ML
INJECTION, EMULSION INTRAVENOUS CONTINUOUS PRN
Status: DISCONTINUED | OUTPATIENT
Start: 2023-08-16 | End: 2023-08-16

## 2023-08-16 RX ORDER — MIDAZOLAM HYDROCHLORIDE 2 MG/2ML
INJECTION, SOLUTION INTRAMUSCULAR; INTRAVENOUS AS NEEDED
Status: DISCONTINUED | OUTPATIENT
Start: 2023-08-16 | End: 2023-08-16

## 2023-08-16 RX ORDER — HEPARIN SODIUM 5000 [USP'U]/ML
5000 INJECTION, SOLUTION INTRAVENOUS; SUBCUTANEOUS EVERY 8 HOURS SCHEDULED
Status: DISCONTINUED | OUTPATIENT
Start: 2023-08-17 | End: 2023-08-25 | Stop reason: HOSPADM

## 2023-08-16 RX ORDER — SODIUM CHLORIDE 9 MG/ML
75 INJECTION, SOLUTION INTRAVENOUS CONTINUOUS
Status: DISCONTINUED | OUTPATIENT
Start: 2023-08-16 | End: 2023-08-18

## 2023-08-16 RX ORDER — FENTANYL CITRATE/PF 50 MCG/ML
25 SYRINGE (ML) INJECTION
Status: DISCONTINUED | OUTPATIENT
Start: 2023-08-16 | End: 2023-08-16 | Stop reason: HOSPADM

## 2023-08-16 RX ADMIN — PROPOFOL 50 MCG/KG/MIN: 10 INJECTION, EMULSION INTRAVENOUS at 13:58

## 2023-08-16 RX ADMIN — CEFAZOLIN SODIUM 2000 MG: 2 SOLUTION INTRAVENOUS at 21:24

## 2023-08-16 RX ADMIN — LIDOCAINE HYDROCHLORIDE 50 MG: 10 INJECTION, SOLUTION EPIDURAL; INFILTRATION; INTRACAUDAL; PERINEURAL at 13:55

## 2023-08-16 RX ADMIN — MAGNESIUM OXIDE TAB 400 MG (241.3 MG ELEMENTAL MG) 400 MG: 400 (241.3 MG) TAB at 21:25

## 2023-08-16 RX ADMIN — METHYLPREDNISOLONE SODIUM SUCCINATE 30 MG: 40 INJECTION, POWDER, FOR SOLUTION INTRAMUSCULAR; INTRAVENOUS at 21:25

## 2023-08-16 RX ADMIN — CYANOCOBALAMIN TAB 500 MCG 1000 MCG: 500 TAB at 08:35

## 2023-08-16 RX ADMIN — GABAPENTIN 600 MG: 300 CAPSULE ORAL at 08:36

## 2023-08-16 RX ADMIN — PYRIDOXINE HCL TAB 50 MG 50 MG: 50 TAB at 08:36

## 2023-08-16 RX ADMIN — CEFAZOLIN SODIUM 2000 MG: 2 SOLUTION INTRAVENOUS at 13:59

## 2023-08-16 RX ADMIN — MIDAZOLAM 2 MG: 1 INJECTION INTRAMUSCULAR; INTRAVENOUS at 13:55

## 2023-08-16 RX ADMIN — GABAPENTIN 600 MG: 300 CAPSULE ORAL at 21:25

## 2023-08-16 RX ADMIN — HYDROCODONE BITARTRATE AND ACETAMINOPHEN 1 TABLET: 5; 325 TABLET ORAL at 12:03

## 2023-08-16 RX ADMIN — SODIUM CHLORIDE 75 ML/HR: 0.9 INJECTION, SOLUTION INTRAVENOUS at 18:26

## 2023-08-16 RX ADMIN — HYDROCODONE BITARTRATE AND ACETAMINOPHEN 1 TABLET: 5; 325 TABLET ORAL at 21:38

## 2023-08-16 RX ADMIN — DOCUSATE SODIUM 100 MG: 100 CAPSULE, LIQUID FILLED ORAL at 17:37

## 2023-08-16 RX ADMIN — MELOXICAM 7.5 MG: 7.5 TABLET ORAL at 08:36

## 2023-08-16 RX ADMIN — SODIUM CHLORIDE: 0.9 INJECTION, SOLUTION INTRAVENOUS at 13:51

## 2023-08-16 RX ADMIN — HYDROCODONE BITARTRATE AND ACETAMINOPHEN 1 TABLET: 5; 325 TABLET ORAL at 05:57

## 2023-08-16 RX ADMIN — CHOLECALCIFEROL TAB 10 MCG (400 UNIT) 400 UNITS: 10 TAB at 08:36

## 2023-08-16 RX ADMIN — METHYLPREDNISOLONE SODIUM SUCCINATE 30 MG: 40 INJECTION, POWDER, FOR SOLUTION INTRAMUSCULAR; INTRAVENOUS at 08:37

## 2023-08-16 RX ADMIN — GABAPENTIN 600 MG: 300 CAPSULE ORAL at 17:36

## 2023-08-16 NOTE — PROGRESS NOTES
This is a 41year old female with juvenile RA and progressive distal LE pain/numbness/weakness x2 weeks and bilateral hand pain/numbness/weakness x1-2 days, request for nerve biopsy. I met with the patient at bedside this morning to discuss the sural nerve biopsy procedure. She has prior bilateral ankle fusions, however the biopsy site is posterior to her prior incisions. We discussed that the sural nerve is a purely sensory nerve and that s/p biopsy she should expect patchy to complete numbness around her lateral ankle. She already has diminished sensation bilaterally. We discussed that risks were minimal but did including bleeding, infection (which is a more significant risk in people with prior wound healing issues or people take medications that can hamper wound healing), as well as the risk of the biopsy being non-diagnostic. Will plan for left sided biopsy given the right ankle has some bandaging needed for pressure sores that overlap the biopsy site. After discussion of the risks and benefits she was agreeable to proceed, she is unable to sign for herself but consent was witnessed by her nurse at bedside.

## 2023-08-16 NOTE — CONSULTS
Consultation - Red Surgery  Reymundo Hector 41 y.o. female MRN: [id number]  Unit/Bed#: [facility] 734-01 Encounter: [id number]        Assessment/Plan     Assessment:  Neuropathy with concern for ANCA-associated vasculitis. Plan:  Muscle biopsy today. Rest of plan per primary. History of Present Illness     HPI:  Sherrell Garg is a 41 y.o. female with a history of juvenile rheumatoid arthritis previously on steroids for many years, cold therapy, methotrexate, Plaquenil, and Remicade she was followed by rheumatology until  when she decided to stop seeing them. Who presented on  with bilateral lower extremity pain and numbness x2 weeks and bilateral arm pain and numbness x3 days. We are consulted due to concern for an ANCA associated vasculitis. Patient reports that in May, she had a viral infection and since May/ has had a hoarse voice with dysphagia, 2 weeks prior to presentation, patient had sudden numbness/burning pain and weakness in the toes (at baseline, unable to move the ankles, but can wiggle the toes). The day of admission, patient developed severe burning pain in the hands and was no longer able to move her fingers. Review of Systems   Constitutional: Positive for appetite change. HENT: Positive for voice change. Gastrointestinal: Negative. Musculoskeletal: Positive for arthralgias. Unable to move fingers or hands. Skin: Positive for color change. Neurological: Positive for weakness and numbness.        Historical Information   Past Medical History:   Diagnosis Date   • Asthma    • RA (rheumatoid arthritis) ([address])      Past Surgical History:   Procedure Laterality Date   • ANKLE SURGERY      2 surgerys   •  SECTION     • ELBOW SURGERY     • IR LUMBAR PUNCTURE  2023   • IR MIDLINE PLACEMENT  2023   • JOINT REPLACEMENT      knees bilateral   • TOTAL SHOULDER REPLACEMENT       Social History   Social History     Substance and Sexual Activity   Alcohol Use Not Currently    Comment: none     Social History     Substance and Sexual Activity   Drug Use Never    Comment: none     Social History     Tobacco Use   Smoking Status Never   Smokeless Tobacco Never     Family History: non-contributory    Meds/Allergies   all medications and allergies reviewed  Allergies   Allergen Reactions   • Corn Oil - Food Allergy - Food Allergy Shortness Of Breath   • Gluten Meal - Food Allergy Shortness Of Breath   • Infliximab Hypertension and Throat Swelling   • Isoflavones (Soy) Shortness Of Breath   • Motrin [Ibuprofen] Throat Swelling   • Oseltamivir Shortness Of Breath   • Yeast - Food Allergy Shortness Of Breath   • Lyrica [Pregabalin] Tongue Swelling     Possible allergy. Taken previously without reaction, but had significant tongue swelling when taken with acetaminophen (7/2023).    • Tilactase    • Medical Tape Rash     If on for too long; please use paper tape   • Sulfa Antibiotics Hives       Objective   First Vitals:   Blood Pressure: 101/66 (08/14/23 2050)  Pulse: 104 (08/15/23 2121)  Temperature: 98 °F (36.7 °C) (08/14/23 2050)  Respirations: 18 (08/15/23 2214)  Height: 5' 7" (170.2 cm) (08/14/23 2306)  SpO2: 97 % (08/15/23 2121)    Current Vitals:   Blood Pressure: 111/80 (08/16/23 0705)  Pulse: 82 (08/16/23 0705)  Temperature: (!) 97.3 °F (36.3 °C) (08/16/23 0705)  Respirations: 16 (08/16/23 0705)  Height: 5' 7" (170.2 cm) (08/14/23 2306)  SpO2: 96 % (08/16/23 0705)      Intake/Output Summary (Last 24 hours) at 8/16/2023 1103  Last data filed at 8/15/2023 2200  Gross per 24 hour   Intake --   Output 400 ml   Net -400 ml       Invasive Devices     Peripheral Intravenous Line  Duration           Long-Dwell Peripheral IV (Midline) 64/59/70 Right Basilic 7 days                Physical Exam:  General: No acute distress  Neuro: Awake, Alert and Oriented x 3  HEENT:  Normocephalic, atraumatic, moist mucous membranes  CV: S1, S2 normal intensity, no rubs, gallops, murmurs, regular rate and rhythm  Lungs: Normal work of breathing, no increased respiratory effort, CTAB  Abdomen: Soft, non-tender, non-distended. Incision(s) clean, dry and intact. Extremities: No edema, clubbing or cyanosis  Skin: Warm, dry      Lab Results: I have personally reviewed pertinent lab results. Imaging: I have personally reviewed pertinent reports. EKG, Pathology, and Other Studies: I have personally reviewed pertinent reports.       Code Status: Level 1 - Full Code  Advance Directive and Living Will:      Power of :    POLST:        Valerie Acevedo MD  PGY-1

## 2023-08-16 NOTE — PROGRESS NOTES
Progress Note - Neurology   Debby Hector 41 y.o. female [id number]  [facility]    Assessment/Plan:    * Bilateral upper and lower extremity neuropathy  Assessment & Plan  41 y.o. female with malnutrition and juvenile rheumatoid arthritis who has undergone several joint fusions and has not seen a rheumatologist in >10 years as she has been intolerant to biologicals as well as DMARDs (no medications since 2008) who initially presented to [facility] on 8/3/2023 due to bilateral distal LE pain/numbness/weakness x2 weeks and bilateral hand pain/numbness/weakness x1-2 days. Since November 2022, patient has had a steady decline in her ability to ambulate. She can walk a few steps with assistance, but has otherwise been wheelchair bound. In May, she had a viral infection and since May/Leandra has had a hoarse voice with dysphagia, making it difficult to eat (lost ~10 pounds since June 2023). She was told there were ulcers on her vocal cords and video barium swallow showed mild pharyngeal dysphagia. 2 weeks prior to presentation, patient had sudden numbness/burning pain and weakness in the toes (at baseline, unable to move the ankles, but can wiggle the toes). Over the next week, the paresthesias progressed to bilateral patellas. The day of admission, patient developed severe burning pain in the hands and was no longer able to move her fingers (at baseline, right wrist is contracted, but can abduct/adduct the fingers and hold utensils with both hands). CT head, spinal cord imaging, and CSF studies unremarkable except for elevated IgG. She had several abnormal serum labs (see below). Initially, symptoms thought to be due to RA flare, so was treated with IV Solu-Medrol (started on 8/4). She received B6 supplementation. Pain has slightly improved with gabapentin, but weakness and numbness are persistent. However, she denies any progression of symptoms since being admitted.     Due to rapidly progressive and persistent weakness/paresthesias, there has been concern for rheumatologic cause, such as p-ANCA associated vasculitis with neuropathy. There has also been concern for progression of RA and nutritional deficiencies. Due to limited exam given extensive joint deformities and difficulty to determine if there are separate nerves involved, patient was transferred to [facility] for inpatient rheumatologic/neurologic evaluation and nerve biopsy. Workup:  · 8/3: CT head negative for acute intracranial abnormalities. · 8/3: MRI cervical spine wo contrast:   · No disc herniation, canal, or foraminal stenosis. 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 · 8/7: MRI thoracic spine w/wo contrast:   · No cord signal abnormality or pathologic enhancement. · Incidentally noted small right and minimal left pleural effusion. · 8/7: MRI lumbar spine w/wo contrast:   · No abnormal signal pathologic enhancement  · 8/15: CT chest wo contrast  · Platelike atelectasis or scarring in the lung bases, which is more prominent than on prior scan  · Arthritis of the left shoulder and sternoclavicular joints. Right shoulder prosthesis present which produces some artifact.     · S/p LP on 8/8 with opening pressure 13.5 cm H2O  · CSF labs:  · Abnormal: glucose 79, RBC 67, IgG 2193  · WNL: gram stain, WBC 0, protein 27, 0 oligoclonal bands  · Pending: Lyme PCR    · Serum/Urine Labs:  · Abnormal initial labs:sodium 122, hypomagnesemia (1.6), TSH 5.229 with normal free T4, iron deficiency anemia  · Abnormal labs throughout admission: B12 340, B6 2.7, vitamin D 9.6, hypoalbuminemia (2.6), , HsCRP >90, elevated p-ANCA, elevated MPO antibody, positive VALDO (titer 320 and homogenous pattern with positive antichromatin antibodies), SPEP shows hypergammaglobulinemia with beta-gamma bridging but no monoclonal immunoglobulins on immunofixation, IgG 2270, IgM 559, elevated serum Ig Kappa and Ig Lambda free light chains (but normal urine Kappa/Lambda light chains)  · WNL: Folate 822, copper 120, thiamine 84, heavy metals screen, UPEP normal, C3/C4, chronic hepatitis panel  · Pending: urine heavy metals, Quantiferon TB    Plan:  - Patient will undergo nerve and muscle biopsy today (8/16) with Neurosurgery and General Surgery respectively; will await biopsy results  - Rheumatology consulted; appreciate recommendations  · Per SLIM, rheumatology has recommended inpatient EMG if possible and CT chest (results above)  · Continue IV Solu-Medrol at this time (currently receiving 30 mg Q12 hours; previously received 40 mg Q12 hours); management per rheumatology  · Formal Rheumatology note is pending  - Attending Neurologist spoke to the neuromuscular specialist Dr. Kavita Fang regarding the case. She could potentially do the EMG this week, however the patient does not want the EMG at this time. Unclear if EMG would . Will further discuss the need for PLEX with our neuromuscular specialist, however on literature review there does not seem to be data regarding PLEX specifically treating neuropathy  - Continue B6 supplementation  - Continue B12 and Vitamin D supplementation  - Continue gabapentin 600 mg TID (has been titrated up from 300 mg TID)  - PT/OT  - Frequent neuro checks. Continue to monitor and notify neurology with any changes. - Medical management and supportive care per primary team. Correction of any metabolic or infectious disturbances      Recommendations for outpatient neurological follow up have yet to be determined. Subjective:   Patient seen and examined at bedside. Patient continues to have numbness/tingling/pain in bilateral distal lower extremities and bilateral hands. She also continues to have the same weakness in bilateral hands and feet.   Denies any improvement or worsening of her symptoms. Denies any headache, visual disturbances, numbness, tingling, chest pain, shortness of breath, or abdominal pain. Past Medical History:   Diagnosis Date   • Asthma    • RA (rheumatoid arthritis) ([address])      Past Surgical History:   Procedure Laterality Date   • ANKLE SURGERY      2 surgerys   •  SECTION     • ELBOW SURGERY     • IR LUMBAR PUNCTURE  2023   • IR MIDLINE PLACEMENT  2023   • JOINT REPLACEMENT      knees bilateral   • TOTAL SHOULDER REPLACEMENT       Family History   Problem Relation Age of Onset   • No Known Problems Mother    • No Known Problems Father      Social History     Socioeconomic History   • Marital status: /Civil Union     Spouse name: Not on file   • Number of children: Not on file   • Years of education: Not on file   • Highest education level: Not on file   Occupational History   • Not on file   Tobacco Use   • Smoking status: Never   • Smokeless tobacco: Never   Vaping Use   • Vaping Use: Never used   Substance and Sexual Activity   • Alcohol use: Not Currently     Comment: none   • Drug use: Never     Comment: none   • Sexual activity: Not Currently     Birth control/protection: None     Comment: none   Other Topics Concern   • Not on file   Social History Narrative   • Not on file     Social Determinants of Health     Financial Resource Strain: Not on file   Food Insecurity: No Food Insecurity (8/15/2023)    Hunger Vital Sign    • Worried About Running Out of Food in the Last Year: Never true    • Ran Out of Food in the Last Year: Never true   Transportation Needs: No Transportation Needs (8/15/2023)    PRAPARE - Transportation    • Lack of Transportation (Medical): No    • Lack of Transportation (Non-Medical):  No   Physical Activity: Not on file   Stress: Not on file   Social Connections: Not on file   Intimate Partner Violence: Not on file   Housing Stability: Low Risk  (2023)    Housing Stability Vital Sign • Unable to Pay for Housing in the Last Year: No    • Number of Places Lived in the Last Year: 1    • Unstable Housing in the Last Year: No     E-Cigarette/Vaping   • E-Cigarette Use Never User      E-Cigarette/Vaping Substances   • Nicotine No    • THC No    • CBD No    • Flavoring No    • Other No    • Unknown No          Medications: All current active meds have been reviewed and current meds:  Scheduled Meds:  Current Facility-Administered Medications   Medication Dose Route Frequency Provider Last Rate   • albuterol  2 puff Inhalation Q4H PRN Titus Guptaa, DO     • ceFAZolin (ANCEF) 1,000 mg in sodium chloride 0.9 % 1,000 mL irrigation bottle   Irrigation Once Deyanne Carota     • cholecalciferol  400 Units Oral Daily Rinku Hewitt PA-C     • cyanocobalamin  1,000 mcg Oral Daily Rinku Hewitt PA-C     • diphenhydramine, lidocaine, Al/Mg hydroxide, simethicone  10 mL Swish & Spit Q4H PRN Titus Vargas, DO     • gabapentin  600 mg Oral TID Titus Vargas, DO     • HYDROcodone-acetaminophen  1 tablet Oral Q6H PRN Titus Vargas, DO     • magnesium Oxide  400 mg Oral HS Hetjohn Guptaa, DO     • meloxicam  7.5 mg Oral Daily Titus Vargas, DO     • methylPREDNISolone sodium succinate  30 mg Intravenous Q12H [facility] Ruddyul Vargas, DO     • morphine injection  2 mg Intravenous Q4H PRN Titus Guptaa, DO     • ondansetron  4 mg Intravenous Q6H PRN Titus Guptaa, DO     • pyridoxine  50 mg Oral Daily Titus Guptaa, DO       Continuous Infusions:   PRN Meds:.•  albuterol  •  diphenhydramine, lidocaine, Al/Mg hydroxide, simethicone  •  HYDROcodone-acetaminophen  •  morphine injection  •  ondansetron       ROS:   Review of Systems   Musculoskeletal: Positive for gait problem. Neurological: Positive for weakness and numbness. All other systems reviewed and are negative.             Vitals:   /80   Pulse 82   Temp (!) 97.3 °F (36.3 °C)   Resp 16   Ht 5' 7" (1.702 m)   LMP 07/26/2023 (Approximate)   SpO2 96%   BMI 15.43 kg/m²     Physical Exam:   Physical Exam  Vitals and nursing note reviewed. Constitutional:       Appearance: Normal appearance. Comments: Chronically ill-appearing female lying comfortably in bed in no acute distress  BMI 15.43   HENT:      Head: Normocephalic and atraumatic. Mouth/Throat:      Mouth: Mucous membranes are moist.      Pharynx: Oropharynx is clear. Eyes:      Extraocular Movements: Extraocular movements intact. Conjunctiva/sclera: Conjunctivae normal.      Pupils: Pupils are equal, round, and reactive to light. Pulmonary:      Effort: Pulmonary effort is normal.   Musculoskeletal:      Comments: Decreased strength in bilateral hands and bilateral feet  Several joint effusions ROM (right shoulder, left elbow, right wrist, bilateral ankles)  Right hand is chronically contracted   Skin:     General: Skin is dry. Comments: Bilateral feet are cold  Dry and cracked skin on bilateral feet with color changes   Neurological:      Mental Status: She is alert and oriented to person, place, and time. Deep Tendon Reflexes:      Reflex Scores:       Bicep reflexes are 2+ on the right side and 1+ on the left side. Brachioradialis reflexes are 2+ on the right side and 1+ on the left side. Patellar reflexes are 2+ on the right side and 1+ on the left side. Achilles reflexes are 0 on the right side and 0 on the left side. Comments: See full neuro exam below       Neurologic Exam     Mental Status   Oriented to person, place, and time. Patient awake and alert. Oriented to person, place, month, and year. No dysarthria or aphasia. Able to follow simple midline and appendicular commands. Cranial Nerves     CN III, IV, VI   Pupils are equal, round, and reactive to light. Pupils 3 mm, round, reactive to light bilaterally. EOMs intact with bilateral end gaze nystagmus  No visual field deficits. Facial sensation to light touch intact throughout.   Facial expressions full and symmetric. Tongue midline. Hearing grossly intact. Motor Exam   Right wrist is chronically contracted  Several joint effusions limiting ROM (right shoulder, left elbow, right wrist, bilateral ankles)    RUE: Decreased ROM of right deltoid secondary to fusion, but approximately 4+/5.  5/5 biceps, 4-/5 triceps, 0/5 wrist extension, wrist is chronically contracted and hand is atrophied. Trace movement in the right thumb and index finger. No movement in the 3rd-5th fingers. LUE: 4/5 deltoid, 5/5 biceps/triceps, trace movement wrist extension, trace movement thumb and index finger. No movement in the 3rd-5th fingers    RLE: 5-/5 iliopsoas, 4/5 knee flexion, 5/5 knee extension, 0/5 dorsiflexion/plantarflexion (chronic), unable to wiggle toes (new since late July)    LLE: 5-/5 iliopsoas, 4/5 knee flexion, 5/5 knee extension, 0/5 dorsiflexion/plantarflexion (chronic), unable to wiggle toes (new since late July)     Sensory Exam   Sensation to light touch diminished distal to bilateral wrists and distal to bilateral patellas. When distal extremities are touched, patient has subjective "burning pain."  Temperature is absent distal to mid shins and diminished from mid shin to patella bilterally. Normal temperature sensation proximal to patellas. Diminished temperature in bilateral hands distal to wrists. Vibration intact throughout, but diminished in the left great toe and left hand. Gait, Coordination, and Reflexes     Reflexes   Right brachioradialis: 2+  Left brachioradialis: 1+  Right biceps: 2+  Left biceps: 1+  Right patellar: 2+  Left patellar: 1+  Right achilles: 0  Left achilles: 0  Unable to assess coordination due to weakness  No resting or action tremor  Unable to assess for clonus due to fused ankles  Mute toes bilaterally           Labs: I have personally reviewed pertinent reports.    Recent Results (from the past 24 hour(s))   Chronic Hepatitis Panel    Collection Time: 08/15/23  4:42 PM   Result Value Ref Range    Hepatitis B Surface Ag Non-reactive Non-Reactive    Hepatitis C Ab Non-reactive Non-Reactive    Hep B C IgM Non-reactive Non-Reactive    Hep B Core Total Ab Non-reactive Non-Reactive   Urinalysis with microscopic    Collection Time: 08/15/23 10:27 PM   Result Value Ref Range    Color, UA Light Yellow     Clarity, UA Clear     Specific Gravity, UA 1.020 1.003 - 1.030    pH, UA 7.0 4.5, 5.0, 5.5, 6.0, 6.5, 7.0, 7.5, 8.0    Leukocytes, UA Negative Negative    Nitrite, UA Negative Negative    Protein, UA Negative Negative mg/dl    Glucose, UA Negative Negative mg/dl    Ketones, UA Negative Negative mg/dl    Urobilinogen, UA <2.0 <2.0 mg/dl mg/dl    Bilirubin, UA Negative Negative    Occult Blood, UA Small (A) Negative    RBC, UA 10-20 (A) None Seen, 1-2 /hpf    WBC, UA 1-2 None Seen, 1-2 /hpf    Epithelial Cells Occasional None Seen, Occasional /hpf    Bacteria, UA None Seen None Seen, Occasional /hpf   Protein / creatinine ratio, urine    Collection Time: 08/15/23 10:27 PM   Result Value Ref Range    Creatinine, Ur 45.3 mg/dL    Protein Urine Random 16 mg/dL    Prot/Creat Ratio, Ur 0.35 (H) 0.00 - 0.10   CBC and differential    Collection Time: 08/16/23  5:54 AM   Result Value Ref Range    WBC 18.73 (H) 4.31 - 10.16 Thousand/uL    RBC 3.87 3.81 - 5.12 Million/uL    Hemoglobin 9.9 (L) 11.5 - 15.4 g/dL    Hematocrit 32.8 (L) 34.8 - 46.1 %    MCV 85 82 - 98 fL    MCH 25.6 (L) 26.8 - 34.3 pg    MCHC 30.2 (L) 31.4 - 37.4 g/dL    RDW 21.4 (H) 11.6 - 15.1 %    MPV 8.4 (L) 8.9 - 12.7 fL    Platelets 446 (H) 887 - 390 Thousands/uL    nRBC 0 /100 WBCs    Neutrophils Relative 77 (H) 43 - 75 %    Immat GRANS % 2 0 - 2 %    Lymphocytes Relative 17 14 - 44 %    Monocytes Relative 4 4 - 12 %    Eosinophils Relative 0 0 - 6 %    Basophils Relative 0 0 - 1 %    Neutrophils Absolute 14.50 (H) 1.85 - 7.62 Thousands/µL    Immature Grans Absolute 0.28 (H) 0.00 - 0.20 Thousand/uL    Lymphocytes Absolute 3.19 0.60 - 4.47 Thousands/µL    Monocytes Absolute 0.73 0.17 - 1.22 Thousand/µL    Eosinophils Absolute 0.00 0.00 - 0.61 Thousand/µL    Basophils Absolute 0.03 0.00 - 0.10 Thousands/µL   Comprehensive metabolic panel    Collection Time: 08/16/23  5:54 AM   Result Value Ref Range    Sodium 136 135 - 147 mmol/L    Potassium 4.8 3.5 - 5.3 mmol/L    Chloride 106 96 - 108 mmol/L    CO2 27 21 - 32 mmol/L    ANION GAP 3 mmol/L    BUN 31 (H) 5 - 25 mg/dL    Creatinine 0.49 (L) 0.60 - 1.30 mg/dL    Glucose 125 65 - 140 mg/dL    Calcium 8.7 8.3 - 10.1 mg/dL    Corrected Calcium 9.8 8.3 - 10.1 mg/dL    AST 12 5 - 45 U/L    ALT 32 12 - 78 U/L    Alkaline Phosphatase 58 46 - 116 U/L    Total Protein 7.1 6.4 - 8.4 g/dL    Albumin 2.6 (L) 3.5 - 5.0 g/dL    Total Bilirubin 0.43 0.20 - 1.00 mg/dL    eGFR 118 ml/min/1.73sq m   APTT    Collection Time: 08/16/23  5:54 AM   Result Value Ref Range    PTT 30 23 - 37 seconds   Protime-INR    Collection Time: 08/16/23  5:54 AM   Result Value Ref Range    Protime 13.2 11.6 - 14.5 seconds    INR 0.98 0.84 - 1.19   Magnesium    Collection Time: 08/16/23  5:54 AM   Result Value Ref Range    Magnesium 2.5 1.6 - 2.6 mg/dL   Phosphorus    Collection Time: 08/16/23  5:54 AM   Result Value Ref Range    Phosphorus 4.2 2.7 - 4.5 mg/dL       Imaging: I have personally reviewed pertinent imaging in PACS, including CT head, MRI brain/C/T/L spine,  and I have personally reviewed PACS reports. EKG, Pathology, and Other Studies: I have personally reviewed pertinent reports.        VTE Prophylaxis: Sequential compression device (Venodyne)  and Reason for no pharmacologic prophylaxis currently on hold for nerve biopsy      Counseling / Coordination of Care  I have spent a total time of 33 minutes on 08/16/23 in caring for this patient including Diagnostic results, Prognosis, Risks and benefits of tx options, Instructions for management, Patient and family education, Importance of tx compliance, Risk factor reductions, Impressions, Counseling / Coordination of care, Documenting in the medical record, Reviewing / ordering tests, medicine, procedures  , Obtaining or reviewing history  , Communicating with other healthcare professionals  and Coordinating care with general surgery and Neurosurgery.

## 2023-08-16 NOTE — PROGRESS NOTES
[address] [facility]  Progress Note  Name: Eva Trejo I  MRN: [id number]  Unit/Bed#: [facility] 734-01 I Date of Admission: 8/14/2023   Date of Service: 8/16/2023 I Hospital Day: 2    Assessment/Plan   * Bilateral upper and lower extremity neuropathy  Assessment & Plan  • POA- Presented with bilateral upper and lower extremity pain, weakness, and numbness  • MRI cervical,thoracic and lumbar spinal without abnormalities or pathologic enhancement. • LP with normal WBCs, protein, negative Gram stain, elevated IgG. Lyme PCR pending. • Work-up illustrated evidence of B6 deficiency for which supplementation was ordered. • Autoimmune work-up with elevated inflammatory marker, elevated p-ANCA MPO antibodies and positive ANKIT. • Suspicion for P-ANCA vasculitis. • Neurology and neurosurgery following. Plan for nerve ankit muscle biopsy today. • Continue Solu-Medrol 30 mg twice daily. • Continue gabapentin 600 mg 3 times daily. • Discussed with rheumatology Dr Debbie Weiner who recommended the following:  • CT chest without contrast which showed Platelike atelectasis or scarring in the lung bases. • UA, urine protein creatinine ratio showing microscopic hematuria and small proteins. • chronic hepatitis panel negative. QuantiFERON test pending. • Patient may need rituximab but would like to await preliminary nerve biopsy results. • In the meantime continue steroids. • Formal consult to follow. Rheumatoid arthritis ([address])  Assessment & Plan  • Patient with longstanding history of rheumatoid arthritis, has undergone several joint fusions. she was intolerant to multiple DMARDs/Biologics and currently not on any maintenance therapy. • Continue IV Solu-Medrol as above. • Rheumatology evaluation appreciated. SIRS (systemic inflammatory response syndrome) (HCC)  Assessment & Plan  As evidenced by tachycardia and leukocytosis. Tachycardia now resolved.   Leukocytosis secondary to steroid use. Patient appears clinically non toxic and afebrile. Monitor. Anemia  Assessment & Plan  Likely anemia of chronic disease. Monitor hemoglobin and transfuse as needed. Ambulatory dysfunction  Assessment & Plan  At baseline, patient is wheelchair-bound outside the house and can only walk few steps with assistance at home. Now, with declining functional status. PT/OT. Leucocytosis  Assessment & Plan  Secondary to steroid use. Afebrile, nontoxic without evidence of infection. Monitor. Neuropathy involving both lower extremities  Assessment & Plan  As above        Vitamin B6 deficiency  Assessment & Plan  Replacement ordered. Moderate protein-calorie malnutrition ([address])  Assessment & Plan  Malnutrition Findings:   Adult Malnutrition type: Chronic illness  Adult Degree of Malnutrition: Malnutrition of moderate degree  Malnutrition Characteristics: Muscle loss, Fat loss                  360 Statement: Malnutrition of moderate degree in the context of chronic illness as evidenced by moderate depletion of the temples, visible clavicle and moderate buccal fat pad loss. Treated with oral diet and oral nutrition supplement. BMI Findings:  Adult BMI Classifications: Underweight < 18.5        Body mass index is 15.43 kg/m². Asthma  Assessment & Plan  Continue with current regimen. VTE Pharmacologic Prophylaxis:   held for anticipated procedure     Patient Centered Rounds: I performed bedside rounds with nursing staff today. Discussions with Specialists or Other Care Team Provider: Neurology, rheumatology. Education and Discussions with Family / Patient: Updated  () at bedside.     Total Time Spent on Date of Encounter in care of patient: 35 minutes This time was spent on one or more of the following: performing physical exam; counseling and coordination of care; obtaining or reviewing history; documenting in the medical record; reviewing/ordering tests, medications or procedures; communicating with other healthcare professionals and discussing with patient's family/caregivers. Current Length of Stay: 2 day(s)  Current Patient Status: Inpatient   Certification Statement: The patient will continue to require additional inpatient hospital stay due to workup for neuropathy   Discharge Plan: Anticipate discharge in 48-72 hrs to home with home services. Code Status: Level 1 - Full Code    Subjective:   Patient was seen and examined at bedside. She reports feeling overall the same. Continues to endorse numbness and pain. No chest pain, nausea vomiting, diarrhea. Objective:     Vitals:   Temp (24hrs), Av.7 °F (36.5 °C), Min:97.3 °F (36.3 °C), Max:97.9 °F (36.6 °C)    Temp:  [97.3 °F (36.3 °C)-97.9 °F (36.6 °C)] 97.3 °F (36.3 °C)  HR:  [] 82  Resp:  [16-18] 16  BP: (111)/(80) 111/80  SpO2:  [96 %-97 %] 96 %  Body mass index is 15.43 kg/m². Input and Output Summary (last 24 hours): Intake/Output Summary (Last 24 hours) at 2023 1243  Last data filed at 8/15/2023 2200  Gross per 24 hour   Intake --   Output 400 ml   Net -400 ml       Physical Exam:   Physical Exam  Vitals and nursing note reviewed. Constitutional:       General: She is not in acute distress. Appearance: She is well-developed. Comments: Cachectic   HENT:      Head: Normocephalic and atraumatic. Eyes:      Conjunctiva/sclera: Conjunctivae normal.   Cardiovascular:      Rate and Rhythm: Normal rate and regular rhythm. Heart sounds: No murmur heard. Pulmonary:      Effort: Pulmonary effort is normal. No respiratory distress. Breath sounds: Normal breath sounds. Abdominal:      Palpations: Abdomen is soft. Tenderness: There is no abdominal tenderness. Musculoskeletal:         General: Deformity present. Cervical back: Neck supple. Skin:     General: Skin is warm and dry.       Capillary Refill: Capillary refill takes less than 2 seconds. Neurological:      Mental Status: She is alert.    Psychiatric:         Mood and Affect: Mood normal.         Additional Data:     Labs:  Results from last 7 days   Lab Units 08/16/23  0554   WBC Thousand/uL 18.73*   HEMOGLOBIN g/dL 9.9*   HEMATOCRIT % 32.8*   PLATELETS Thousands/uL 476*   NEUTROS PCT % 77*   LYMPHS PCT % 17   MONOS PCT % 4   EOS PCT % 0     Results from last 7 days   Lab Units 08/16/23  0554   SODIUM mmol/L 136   POTASSIUM mmol/L 4.8   CHLORIDE mmol/L 106   CO2 mmol/L 27   BUN mg/dL 31*   CREATININE mg/dL 0.49*   ANION GAP mmol/L 3   CALCIUM mg/dL 8.7   ALBUMIN g/dL 2.6*   TOTAL BILIRUBIN mg/dL 0.43   ALK PHOS U/L 58   ALT U/L 32   AST U/L 12   GLUCOSE RANDOM mg/dL 125     Results from last 7 days   Lab Units 08/16/23  0554   INR  0.98             Results from last 7 days   Lab Units 08/12/23  0605   PROCALCITONIN ng/ml 0.09       Lines/Drains:  Invasive Devices     Peripheral Intravenous Line  Duration           Long-Dwell Peripheral IV (Midline) 98/11/21 Right Basilic 8 days                      Imaging: Reviewed radiology reports from this admission including: chest CT scan    Recent Cultures (last 7 days):         Last 24 Hours Medication List:   Current Facility-Administered Medications   Medication Dose Route Frequency Provider Last Rate   • albuterol  2 puff Inhalation Q4H PRN Hetul Vargas, DO     • ceFAZolin (ANCEF) 1,000 mg in sodium chloride 0.9 % 1,000 mL irrigation bottle   Irrigation Once Futurederm     • cholecalciferol  400 Units Oral Daily Abner Matthew PA-C     • cyanocobalamin  1,000 mcg Oral Daily Abner Matthew PA-C     • diphenhydramine, lidocaine, Al/Mg hydroxide, simethicone  10 mL Swish & Spit Q4H PRN Hetul Vargas, DO     • gabapentin  600 mg Oral TID Hetul Vargas, DO     • HYDROcodone-acetaminophen  1 tablet Oral Q6H PRN Hetul Vargas, DO     • magnesium Oxide  400 mg Oral HS Hetul Vargas, DO     • meloxicam  7.5 mg Oral Daily Hetul Vargas, DO     • methylPREDNISolone sodium succinate  30 mg Intravenous Q12H [address] Hetul Vargas, DO     • morphine injection  2 mg Intravenous Q4H PRN Hetul Vargas, DO     • ondansetron  4 mg Intravenous Q6H PRN Hetul Vargas, DO     • pyridoxine  50 mg Oral Daily Hetul Vargas, DO          Today, Patient Was Seen By: Matthew Rodriguez MD    **Please Note: This note may have been constructed using a voice recognition system. **

## 2023-08-16 NOTE — ANESTHESIA PREPROCEDURE EVALUATION
HPI  "41year old female with juvenile RA and progressive distal LE pain/numbness/weakness x2 weeks and bilateral hand pain/numbness/weakness x1-2 days, request for nerve biopsy"    Procedure:  Sural nerve biopsy (Left: Leg)    Relevant Problems   CARDIO   (+) Cardiac murmur   (+) Nonrheumatic mitral valve insufficiency      HEMATOLOGY   (+) Anemia   (+) Microcytic anemia      MUSCULOSKELETAL   (+) DJD (degenerative joint disease)   (+) Osteoarthritis of left hip   (+) Rheumatoid arthritis (HCC)      PULMONARY   (+) Asthma        Physical Exam    Airway    Mallampati score: IV  TM Distance: <3 FB  Neck ROM: limited     Dental   No notable dental hx     Cardiovascular  Rhythm: regular, No peripheral edema,     Pulmonary  Breath sounds clear to auscultation, No rhonchi,     Other Findings        Anesthesia Plan  ASA Score- 2     Anesthesia Type- IV sedation with anesthesia with ASA Monitors. Additional Monitors:   Airway Plan:           Plan Factors-    Chart reviewed. EKG reviewed. Existing labs reviewed. Patient summary reviewed. Patient is not a current smoker. Induction- intravenous. Postoperative Plan- Plan for postoperative opioid use. Informed Consent- Anesthetic plan and risks discussed with patient. I personally reviewed this patient with the CRNA. Discussed and agreed on the Anesthesia Plan with the CRNA. Aremn Amaro

## 2023-08-16 NOTE — OP NOTE
OPERATIVE REPORT  PATIENT NAME: Bony Hector    :  1975  MRN: [id number]  Pt Location: [facility] OR ROOM 17    SURGERY DATE: 2023    Surgeon(s) and Role:  Panel 1:     * Ghislaine Cortes - Primary  Panel 2:     Blue Agarwal DO - Primary     * oHlley Zelaya DO - Assisting    Preop Diagnosis:  Neuropathy involving both lower extremities [G57.93]    Post-Op Diagnosis Codes:     * Neuropathy involving both lower extremities [G57.93]    Procedure(s):  Left - Sural nerve biopsy  Left - Muscle biopsy    Specimen(s):  ID Type Source Tests Collected by Time Destination   1 : Bx left quadriceps muscle Tissue Muscle TISSUE EXAM Sho Agarwal DO 2023 1430    2 : sural nerve Tissue Nerve TISSUE EXAM Ghislaine Cortes 2023 1430        Estimated Blood Loss:   Minimal    Drains:  * No LDAs found *    Anesthesia Type:   General    Operative Indications:  Neuropathy involving both lower extremities [G57.93]      Operative Findings:  Approxiamtely 2.5 cm of quadriceps sent for pathology     Complications:   None    Procedure and Technique:  Patient was met and identified in the preop holding area by name and patient identification bracelet. She was brought to the operating room and placed in the supine position with a bump placed under the left hip and left knee bent. Procedure was performed with conscious sedation and local anesthesia. Preoperative abx were administered. Leg was prepped and draped in usual sterile fashion. A time out was called and all parties were in agreement. Local of 1% lidocaine w/ epinephrine was infiltrated into the area and a 5 cm longitudinal incision was made over the quadriceps. This was extended down through the subcutaneous tissue to the fascia. The fascia was sharply excised and the fascia was opened with Metzenbaum scissors. The quadriceps was identified and a 2.5cm of muscle was clamped, ligated, and transected sharply with scissors.  The muscle biopsy was then set aside for pathology. The muscle appeared friable with minimal bleeding after transection. Hemostasis was achieved with direct pressure. Subcutaneous tissues were closed with 3-0 vicryl interrupted suture. Skin was closed with a running subcuticular suture and skin glue. The procedure was performed concurrently with Neurosurgery who performed a sural nerve biopsy. Please see their separate note for further details. Patient was transferred back to PACU in stable condition. Instrument, needle, and sponge counts were correct and confirmed by RF wanding. Dr. Agarwal was present for the entire procedure.        Patient Disposition:  PACU         SIGNATURE: Harjinder Benedict DO  DATE: August 16, 2023  TIME: 3:34 PM

## 2023-08-16 NOTE — ANESTHESIA POSTPROCEDURE EVALUATION
Post-Op Assessment Note    CV Status:  Stable  Pain Score: 0    Pain management: adequate     Mental Status:  Alert and awake   Hydration Status:  Euvolemic   PONV Controlled:  Controlled   Airway Patency:  Patent      Post Op Vitals Reviewed: Yes      Staff: CRNA         No notable events documented.     BP   112/73   Temp  97.2   Pulse  89   Resp   14   SpO2 98

## 2023-08-16 NOTE — ASSESSMENT & PLAN NOTE
• POA- Presented with bilateral upper and lower extremity pain, weakness, and numbness  • MRI cervical,thoracic and lumbar spinal without abnormalities or pathologic enhancement. • LP with normal WBCs, protein, negative Gram stain, elevated IgG. Lyme PCR pending. • Work-up illustrated evidence of B6 deficiency for which supplementation was ordered. • Autoimmune work-up with elevated inflammatory marker, elevated p-ANCA MPO antibodies and positive ANKIT. • Suspicion for P-ANCA vasculitis. • Neurology and neurosurgery following. Plan for nerve ankit muscle biopsy today. • Continue Solu-Medrol 30 mg twice daily. • Continue gabapentin 600 mg 3 times daily. • Discussed with rheumatology Dr Nisreen Madera who recommended the following:  • CT chest without contrast which showed Platelike atelectasis or scarring in the lung bases. • UA, urine protein creatinine ratio showing microscopic hematuria and small proteins. • chronic hepatitis panel negative. QuantiFERON test pending. • Patient may need rituximab but would like to await preliminary nerve biopsy results. • In the meantime continue steroids. • Formal consult to follow.

## 2023-08-16 NOTE — ASSESSMENT & PLAN NOTE
As evidenced by tachycardia and leukocytosis. Tachycardia now resolved. Leukocytosis secondary to steroid use. Patient appears clinically non toxic and afebrile. Monitor.

## 2023-08-16 NOTE — OP NOTE
OPERATIVE REPORT  PATIENT NAME: Sherman Hetcor    :  1975  MRN: [id number]  Pt Location:  OR ROOM 17    SURGERY DATE: 2023    Surgeon(s) and Role:     Mala Renae    Preop Diagnosis:  Neuropathy involving both lower extremities [G57.93]    Post-Op Diagnosis Codes:     * Neuropathy involving both lower extremities [G57.93]    Procedure(s):  Left - Sural nerve biopsy    Specimen(s):  * No specimens in log *    Estimated Blood Loss:   Minimal    Drains:  * No LDAs found *    Anesthesia Type:   General    Operative Indications:  Neuropathy involving both lower extremities [G57.93]    Operative Findings:  Approximately 3cm of nerve sent for pathology    Complications:   None    Procedure and Technique:  The patient was brought to the OR and transferred over to the OR bed. The patient was placed under sedation. A bump was placed under the left hip and the left knee was bent and the foot slightly everted. The ankle was prepped and draped. An approximately 5cm incision midway between the lateral malleolus and the achilles tendon was planned. Local anesthetic was injected. The skin was opened sharply with a 15 blade and the dissection carried down to the fat layer. A retractor was placed in the incision and Metzenbaum scissors were used to carefully dissect down to the nerve. The nerve was then dissected proximally and distally until approximately 3cm were dissected. The nerve was then cut sharply proximally then distally and set aside for pathology. The site was copiosuly irrigated. The dermal layer was closed with 3-0 vicryls and the skin was closed with a running 3-0 nylon. Concurrently the general surgery team obtained a quadriceps musle biopsy, please see their accompanying note for details. Once complete the drapes were removed. She was transferred to a stretcher and then went to the PACU in stable condition. I was present for the entire procedure.     Patient Disposition:  PACU SIGNATURE: Rosalind Taylor  DATE: August 16, 2023  TIME: 1:00 PM

## 2023-08-16 NOTE — PLAN OF CARE
Problem: MOBILITY - ADULT  Goal: Maintain or return to baseline ADL function  Description: INTERVENTIONS:  -  Assess patient's ability to carry out ADLs; assess patient's baseline for ADL function and identify physical deficits which impact ability to perform ADLs (bathing, care of mouth/teeth, toileting, grooming, dressing, etc.)  - Assess/evaluate cause of self-care deficits   - Assess range of motion  - Assess patient's mobility; develop plan if impaired  - Assess patient's need for assistive devices and provide as appropriate  - Encourage maximum independence but intervene and supervise when necessary  - Involve family in performance of ADLs  - Assess for home care needs following discharge   - Consider OT consult to assist with ADL evaluation and planning for discharge  - Provide patient education as appropriate  Outcome: Progressing  Goal: Maintains/Returns to pre admission functional level  Description: INTERVENTIONS:  - Perform BMAT or MOVE assessment daily.   - Set and communicate daily mobility goal to care team and patient/family/caregiver. - Collaborate with rehabilitation services on mobility goals if consulted  - Perform Range of Motion 2 times a day. - Reposition patient every 2 hours.   - Dangle patient 3 times a day  - Stand patient 3 times a day  - Ambulate patient 3 times a day  - Out of bed to chair 3 times a day   - Out of bed for meals 3 times a day  - Out of bed for toileting  - Record patient progress and toleration of activity level   Outcome: Progressing     Problem: PAIN - ADULT  Goal: Verbalizes/displays adequate comfort level or baseline comfort level  Description: Interventions:  - Encourage patient to monitor pain and request assistance  - Assess pain using appropriate pain scale  - Administer analgesics based on type and severity of pain and evaluate response  - Implement non-pharmacological measures as appropriate and evaluate response  - Consider cultural and social influences on pain and pain management  - Notify physician/advanced practitioner if interventions unsuccessful or patient reports new pain  Outcome: Progressing     Problem: INFECTION - ADULT  Goal: Absence or prevention of progression during hospitalization  Description: INTERVENTIONS:  - Assess and monitor for signs and symptoms of infection  - Monitor lab/diagnostic results  - Monitor all insertion sites, i.e. indwelling lines, tubes, and drains  - Monitor endotracheal if appropriate and nasal secretions for changes in amount and color  - [place] appropriate cooling/warming therapies per order  - Administer medications as ordered  - Instruct and encourage patient and family to use good hand hygiene technique  - Identify and instruct in appropriate isolation precautions for identified infection/condition  Outcome: Progressing  Goal: Absence of fever/infection during neutropenic period  Description: INTERVENTIONS:  - Monitor WBC    Outcome: Progressing     Problem: SAFETY ADULT  Goal: Maintain or return to baseline ADL function  Description: INTERVENTIONS:  -  Assess patient's ability to carry out ADLs; assess patient's baseline for ADL function and identify physical deficits which impact ability to perform ADLs (bathing, care of mouth/teeth, toileting, grooming, dressing, etc.)  - Assess/evaluate cause of self-care deficits   - Assess range of motion  - Assess patient's mobility; develop plan if impaired  - Assess patient's need for assistive devices and provide as appropriate  - Encourage maximum independence but intervene and supervise when necessary  - Involve family in performance of ADLs  - Assess for home care needs following discharge   - Consider OT consult to assist with ADL evaluation and planning for discharge  - Provide patient education as appropriate  Outcome: Progressing  Goal: Maintains/Returns to pre admission functional level  Description: INTERVENTIONS:  - Perform BMAT or MOVE assessment daily.   - Set and communicate daily mobility goal to care team and patient/family/caregiver. - Collaborate with rehabilitation services on mobility goals if consulted  - Perform Range of Motion 2 times a day. - Reposition patient every 2 hours.   - Dangle patient 3 times a day  - Stand patient 3 times a day  - Ambulate patient 3 times a day  - Out of bed to chair 3 times a day   - Out of bed for meals 3 times a day  - Out of bed for toileting  - Record patient progress and toleration of activity level   Outcome: Progressing  Goal: Patient will remain free of falls  Description: INTERVENTIONS:  - Educate patient/family on patient safety including physical limitations  - Instruct patient to call for assistance with activity   - Consult OT/PT to assist with strengthening/mobility   - Keep Call bell within reach  - Keep bed low and locked with side rails adjusted as appropriate  - Keep care items and personal belongings within reach  - Initiate and maintain comfort rounds  - Make Fall Risk Sign visible to staff  - Offer Toileting every 2 Hours, in advance of need  - Initiate/Maintain bed alarm  - Apply yellow socks and bracelet for high fall risk patients  - Consider moving patient to room near nurses station  Outcome: Progressing     Problem: DISCHARGE PLANNING  Goal: Discharge to home or other facility with appropriate resources  Description: INTERVENTIONS:  - Identify barriers to discharge w/patient and caregiver  - Arrange for needed discharge resources and transportation as appropriate  - Identify discharge learning needs (meds, wound care, etc.)  - Arrange for interpretive services to assist at discharge as needed  - Refer to Case Management Department for coordinating discharge planning if the patient needs post-hospital services based on physician/advanced practitioner order or complex needs related to functional status, cognitive ability, or social support system  Outcome: Progressing     Problem: Knowledge Deficit  Goal: Patient/family/caregiver demonstrates understanding of disease process, treatment plan, medications, and discharge instructions  Description: Complete learning assessment and assess knowledge base. Interventions:  - Provide teaching at level of understanding  - Provide teaching via preferred learning methods  Outcome: Progressing     Problem: Prexisting or High Potential for Compromised Skin Integrity  Goal: Skin integrity is maintained or improved  Description: INTERVENTIONS:  - Identify patients at risk for skin breakdown  - Assess and monitor skin integrity  - Assess and monitor nutrition and hydration status  - Monitor labs   - Assess for incontinence   - Turn and reposition patient  - Assist with mobility/ambulation  - Relieve pressure over bony prominences  - Avoid friction and shearing  - Provide appropriate hygiene as needed including keeping skin clean and dry  - Evaluate need for skin moisturizer/barrier cream  - Collaborate with interdisciplinary team   - Patient/family teaching  - Consider wound care consult   Outcome: Progressing     Problem: Nutrition/Hydration-ADULT  Goal: Nutrient/Hydration intake appropriate for improving, restoring or maintaining nutritional needs  Description: Monitor and assess patient's nutrition/hydration status for malnutrition. Collaborate with interdisciplinary team and initiate plan and interventions as ordered. Monitor patient's weight and dietary intake as ordered or per policy. Utilize nutrition screening tool and intervene as necessary. Determine patient's food preferences and provide high-protein, high-caloric foods as appropriate.      INTERVENTIONS:  - Monitor oral intake, urinary output, labs, and treatment plans  - Assess nutrition and hydration status and recommend course of action  - Evaluate amount of meals eaten  - Assist patient with eating if necessary   - Allow adequate time for meals  - Recommend/ encourage appropriate diets, oral nutritional supplements, and vitamin/mineral supplements  - Order, calculate, and assess calorie counts as needed  - Recommend, monitor, and adjust tube feedings and TPN/PPN based on assessed needs  - Assess need for intravenous fluids  - Provide specific nutrition/hydration education as appropriate  - Include patient/family/caregiver in decisions related to nutrition  Outcome: Progressing

## 2023-08-17 ENCOUNTER — TELEPHONE (OUTPATIENT)
Dept: NEUROSURGERY | Facility: CLINIC | Age: 48
End: 2023-08-17

## 2023-08-17 ENCOUNTER — TELEPHONE (OUTPATIENT)
Dept: NEUROLOGY | Facility: CLINIC | Age: 48
End: 2023-08-17

## 2023-08-17 LAB
ANION GAP SERPL CALCULATED.3IONS-SCNC: 4 MMOL/L
APTT PPP: 22 SECONDS (ref 23–37)
B BURGDOR DNA SPEC QL NAA+PROBE: NEGATIVE
BUN SERPL-MCNC: 44 MG/DL (ref 5–25)
CALCIUM SERPL-MCNC: 8.5 MG/DL (ref 8.3–10.1)
CHLORIDE SERPL-SCNC: 106 MMOL/L (ref 96–108)
CO2 SERPL-SCNC: 28 MMOL/L (ref 21–32)
CREAT SERPL-MCNC: 0.62 MG/DL (ref 0.6–1.3)
ERYTHROCYTE [DISTWIDTH] IN BLOOD BY AUTOMATED COUNT: 22.1 % (ref 11.6–15.1)
GAMMA INTERFERON BACKGROUND BLD IA-ACNC: 0.02 IU/ML
GFR SERPL CREATININE-BSD FRML MDRD: 109 ML/MIN/1.73SQ M
GLUCOSE SERPL-MCNC: 106 MG/DL (ref 65–140)
HCT VFR BLD AUTO: 28 % (ref 34.8–46.1)
HGB BLD-MCNC: 8.6 G/DL (ref 11.5–15.4)
INR PPP: 0.98 (ref 0.84–1.19)
M TB IFN-G BLD-IMP: ABNORMAL
M TB IFN-G CD4+ BCKGRND COR BLD-ACNC: -0.01 IU/ML
M TB IFN-G CD4+ BCKGRND COR BLD-ACNC: -0.01 IU/ML
MCH RBC QN AUTO: 26.1 PG (ref 26.8–34.3)
MCHC RBC AUTO-ENTMCNC: 30.7 G/DL (ref 31.4–37.4)
MCV RBC AUTO: 85 FL (ref 82–98)
MITOGEN IGNF BCKGRD COR BLD-ACNC: 0.28 IU/ML
PLATELET # BLD AUTO: 398 THOUSANDS/UL (ref 149–390)
PMV BLD AUTO: 8.5 FL (ref 8.9–12.7)
POTASSIUM SERPL-SCNC: 5.1 MMOL/L (ref 3.5–5.3)
PROTHROMBIN TIME: 13.2 SECONDS (ref 11.6–14.5)
RBC # BLD AUTO: 3.3 MILLION/UL (ref 3.81–5.12)
SODIUM SERPL-SCNC: 138 MMOL/L (ref 135–147)
WBC # BLD AUTO: 20.27 THOUSAND/UL (ref 4.31–10.16)

## 2023-08-17 PROCEDURE — 97167 OT EVAL HIGH COMPLEX 60 MIN: CPT

## 2023-08-17 PROCEDURE — 85730 THROMBOPLASTIN TIME PARTIAL: CPT | Performed by: PHYSICIAN ASSISTANT

## 2023-08-17 PROCEDURE — 85027 COMPLETE CBC AUTOMATED: CPT | Performed by: PHYSICIAN ASSISTANT

## 2023-08-17 PROCEDURE — 80048 BASIC METABOLIC PNL TOTAL CA: CPT | Performed by: PHYSICIAN ASSISTANT

## 2023-08-17 PROCEDURE — 99024 POSTOP FOLLOW-UP VISIT: CPT | Performed by: PHYSICIAN ASSISTANT

## 2023-08-17 PROCEDURE — 99232 SBSQ HOSP IP/OBS MODERATE 35: CPT | Performed by: STUDENT IN AN ORGANIZED HEALTH CARE EDUCATION/TRAINING PROGRAM

## 2023-08-17 PROCEDURE — 97163 PT EVAL HIGH COMPLEX 45 MIN: CPT

## 2023-08-17 PROCEDURE — 85610 PROTHROMBIN TIME: CPT | Performed by: PHYSICIAN ASSISTANT

## 2023-08-17 RX ORDER — GABAPENTIN 300 MG/1
600 CAPSULE ORAL 2 TIMES DAILY
Status: DISCONTINUED | OUTPATIENT
Start: 2023-08-18 | End: 2023-08-25 | Stop reason: HOSPADM

## 2023-08-17 RX ORDER — METHYLPREDNISOLONE SODIUM SUCCINATE 40 MG/ML
30 INJECTION, POWDER, LYOPHILIZED, FOR SOLUTION INTRAMUSCULAR; INTRAVENOUS ONCE
Status: COMPLETED | OUTPATIENT
Start: 2023-08-17 | End: 2023-08-17

## 2023-08-17 RX ORDER — GABAPENTIN 300 MG/1
600 CAPSULE ORAL 3 TIMES DAILY
Status: DISCONTINUED | OUTPATIENT
Start: 2023-08-17 | End: 2023-08-17

## 2023-08-17 RX ORDER — GABAPENTIN 300 MG/1
900 CAPSULE ORAL
Status: DISCONTINUED | OUTPATIENT
Start: 2023-08-17 | End: 2023-08-25 | Stop reason: HOSPADM

## 2023-08-17 RX ADMIN — HYDROCODONE BITARTRATE AND ACETAMINOPHEN 1 TABLET: 5; 325 TABLET ORAL at 20:58

## 2023-08-17 RX ADMIN — SENNOSIDES 8.6 MG: 8.6 TABLET, FILM COATED ORAL at 08:57

## 2023-08-17 RX ADMIN — MAGNESIUM OXIDE TAB 400 MG (241.3 MG ELEMENTAL MG) 400 MG: 400 (241.3 MG) TAB at 21:01

## 2023-08-17 RX ADMIN — HEPARIN SODIUM 5000 UNITS: 5000 INJECTION INTRAVENOUS; SUBCUTANEOUS at 14:15

## 2023-08-17 RX ADMIN — HEPARIN SODIUM 5000 UNITS: 5000 INJECTION INTRAVENOUS; SUBCUTANEOUS at 21:01

## 2023-08-17 RX ADMIN — GABAPENTIN 600 MG: 300 CAPSULE ORAL at 16:09

## 2023-08-17 RX ADMIN — PYRIDOXINE HCL TAB 50 MG 50 MG: 50 TAB at 08:58

## 2023-08-17 RX ADMIN — ONDANSETRON 4 MG: 2 INJECTION INTRAMUSCULAR; INTRAVENOUS at 12:01

## 2023-08-17 RX ADMIN — CEFAZOLIN SODIUM 2000 MG: 2 SOLUTION INTRAVENOUS at 14:15

## 2023-08-17 RX ADMIN — ONDANSETRON 4 MG: 2 INJECTION INTRAMUSCULAR; INTRAVENOUS at 01:12

## 2023-08-17 RX ADMIN — CHOLECALCIFEROL TAB 10 MCG (400 UNIT) 400 UNITS: 10 TAB at 08:57

## 2023-08-17 RX ADMIN — CEFAZOLIN SODIUM 2000 MG: 2 SOLUTION INTRAVENOUS at 05:05

## 2023-08-17 RX ADMIN — GABAPENTIN 900 MG: 300 CAPSULE ORAL at 21:11

## 2023-08-17 RX ADMIN — HYDROCODONE BITARTRATE AND ACETAMINOPHEN 1 TABLET: 5; 325 TABLET ORAL at 05:05

## 2023-08-17 RX ADMIN — METHYLPREDNISOLONE SODIUM SUCCINATE 30 MG: 40 INJECTION, POWDER, FOR SOLUTION INTRAMUSCULAR; INTRAVENOUS at 08:57

## 2023-08-17 RX ADMIN — GABAPENTIN 600 MG: 300 CAPSULE ORAL at 08:57

## 2023-08-17 RX ADMIN — CYANOCOBALAMIN TAB 500 MCG 1000 MCG: 500 TAB at 08:57

## 2023-08-17 RX ADMIN — METHYLPREDNISOLONE SODIUM SUCCINATE 30 MG: 40 INJECTION, POWDER, FOR SOLUTION INTRAMUSCULAR; INTRAVENOUS at 20:59

## 2023-08-17 NOTE — ASSESSMENT & PLAN NOTE
Malnutrition Findings:   Adult Malnutrition type: Chronic illness  Adult Degree of Malnutrition: Malnutrition of moderate degree  Malnutrition Characteristics: Muscle loss, Fat loss                  360 Statement: Malnutrition of moderate degree in the context of chronic illness as evidenced by moderate depletion of the temples, visible clavicle and moderate buccal fat pad loss. Treated with oral diet and oral nutrition supplement. BMI Findings:  Adult BMI Classifications: Underweight < 18.5        Body mass index is 16.61 kg/m².

## 2023-08-17 NOTE — OCCUPATIONAL THERAPY NOTE
Occupational Therapy Evaluation     Patient Name: Bahman Pittman  GNFZH'[id number] Date: 2023  Problem List  Principal Problem:    Bilateral upper and lower extremity neuropathy  Active Problems:    Asthma    Rheumatoid arthritis ([address])    Moderate protein-calorie malnutrition (HCC)    Vitamin B6 deficiency    Neuropathy involving both lower extremities    Leucocytosis    Ambulatory dysfunction    Anemia    SIRS (systemic inflammatory response syndrome) ([address])    Past Medical History  Past Medical History:   Diagnosis Date    Asthma     RA (rheumatoid arthritis) ([address])      Past Surgical History  Past Surgical History:   Procedure Laterality Date    ANKLE SURGERY      2 surgerys     SECTION      ELBOW SURGERY      IR LUMBAR PUNCTURE  2023    IR MIDLINE PLACEMENT  2023    JOINT REPLACEMENT      knees bilateral    MUSCLE BIOPSY Left 2023    Procedure: Muscle biopsy;  Surgeon: Juany Fitzgerald To, ;  Location: [facility];  Service: General    TOTAL SHOULDER REPLACEMENT      WOUND DEBRIDEMENT Left 2023    Procedure: Sural nerve biopsy;  Surgeon: Nidhi Frost; Location: [facility];  Service: Neurosurgery           23 1022   OT Last Visit   OT Visit Date 23   Note Type   Note type Evaluation   Additional Comments Pt seen w/ PT to increase safety, decrease fall risk, and maximize functional/occupational performance 2* medical complexity which is a regression from pt's functional baseline. Pain Assessment   Pain Assessment Tool FLACC   Effect of Pain on Daily Activities Impacts ability to engage in valued occupations   Hospital Pain Intervention(s) Repositioned; Ambulation/increased activity; Emotional support   Pain Rating: FLACC (Rest) - Face 0   Pain Rating: FLACC (Rest) - Legs 0   Pain Rating: FLACC (Rest) - Activity 0   Pain Rating: FLACC (Rest) - Cry 0   Pain Rating: FLACC (Rest) - Consolability 0   Score: FLACC (Rest) 0   Pain Rating: FLACC (Activity) - Face 0   Pain Rating: FLACC (Activity) - Legs 0   Pain Rating: FLACC (Activity) - Activity 0   Pain Rating: FLACC (Activity) - Cry 0   Pain Rating: FLACC (Activity) - Consolability 0   Score: FLACC (Activity) 0   Restrictions/Precautions   Weight Bearing Precautions Per Order No   Other Precautions Multiple lines; Fall Risk;Pain   Home Living   Type of Home House  ([facility])   Home Layout One level;Performs ADLs on one level; Able to live on main level with bedroom/bathroom; Access   [employer] Walk-in shower   Bathroom Toilet Raised   Bathroom Equipment Grab bars in shower;Grab bars around toilet;Commode;Tub transfer bench   Bathroom Accessibility Accessible; Other (Comment)  (Pt's  reporting ADA accesible bathroom)   Home Equipment Walker;Cane;Wheelchair-manual;Electric scooter; Platform Rolling Walker   Additional Comments Pt reports living in [facility] with her ;  reporting they are in process of putting an elevator in; @ baseline, pt reporting that she is able to stand pivot from w/c w/ A x1; ADA assessible bathroom setup; reports that she switches from sleeping in the recliner vs bed depending on preference   Prior Function   Level of [place] Needs assistance with ADLs; Needs assistance with functional mobility; Needs assistance with IADLS   Lives With Spouse   Receives Help From Family   IADLs Family/Friend/Other provides transportation; Family/Friend/Other provides meals; Family/Friend/Other provides medication management   Falls in the last 6 months 0   Vocational Unemployed   Comments Pt's  reporting that he assists with all functional needs   Lifestyle   Autonomy PTA, pt reports requiring assistance w/ ADLs/IADLs and uses w/c for functional mobility, able to stand pivot w/ assist x1   Reciprocal Relationships Lives with her supportive    General   Family/Caregiver Present Yes   Additional General Comments Pt's  present, very supportive/interactive   Subjective Subjective "I used to be walking in the parallel bars at outpatient therapy, but then I got COVID and go so weak."   ADL   Where Assessed Edge of bed   Eating Assistance 2  Maximal Assistance   Grooming Assistance 2  Maximal Assistance   UB Bathing Assistance 2  Maximal Assistance   LB Bathing Assistance 2  Maximal Assistance   UB Dressing Assistance 2  Maximal Assistance   LB Dressing Assistance 2  Maximal [address]  2  Maximal Assistance   Functional Assistance 2  Maximal Assistance   Bed Mobility   Supine to Sit 5  Supervision   Additional items HOB elevated; Bedrails; Increased time required;Verbal cues   Sit to Supine 2  Maximal assistance   Additional items Assist x 1;LE management   Additional Comments Pt performed supine <> sit @ supervision level and sat EOB with fair+ static sitting balance; @ end of session, pt requiring Max A for LE management to return to bed   Transfers   Sit to Stand 2  Maximal assistance   Additional items Assist x 1; Increased time required;Verbal cues   Stand to Sit 2  Maximal assistance   Additional items Assist x 1; Increased time required;Verbal cues   Additional Comments w/ b/l HHA;  presenting, demonstrating how he typically transfers pt in which provided  with education regarding safe/proper transfer techniques   Functional Mobility   Functional Mobility 2  Maximal assistance   Additional Comments Pt completed short household functional mobility distances with Max A x1 w/ HHA for safety and support   Additional items Hand hold assistance   Balance   Static Sitting Fair +   Dynamic Sitting Fair   Static Standing Poor   Dynamic Standing Poor -   Ambulatory Poor -   Activity Tolerance   Activity Tolerance Patient limited by fatigue;Patient limited by pain   Medical Staff Made [address]uniga DPT   Nurse Made Aware RN cleared   RUE Assessment   RUE Assessment X  (Approx 15 degrees of active shoulder flexion, compensating with scapular elevation) LUE Assessment   LUE Assessment X  (Approx 15 degrees of active shoulder flexion, compensating with scapular elevation)   Hand Function   Gross Motor Coordination Impaired   Fine Motor Coordination Impaired   Hand Function Comments (S)  b/l hands in contracture with various boutonniere deformities 2* RA   Sensation   Light Touch Severe deficits in the RUE;Severe deficits in the LUE;Severe deficits in the RLE; Severe deficits in the LLE   Sharp/Dull Severe deficits in the RUE;Severe deficits in the LUE;Severe deficits in the RLE; Severe deficits in the LLE   Additional Comments Pt reporting that she has no sensation in b/l feet in which she has to rely on her vision to look down at her LE's to see where she is going   Psychosocial   Psychosocial (WDL) WDL   Cognition   Overall Cognitive Status WFL   Arousal/Participation Alert; Responsive;Arousable; Cooperative   Attention Within functional limits   Orientation Level Oriented X4   Memory Within functional limits   Following Commands Follows all commands and directions without difficulty   Comments Pt very pleasant and cooperative   Assessment   Limitation Decreased ADL status; Decreased UE ROM; Decreased UE strength;Decreased endurance;Decreased sensation;Decreased fine motor control;Decreased self-care trans;Decreased high-level ADLs   Prognosis Fair   Assessment Pt is a 42 yo female who presented to [address] [facility] with b/l UE and LE pain and numbness in which extensive work-up including MRI of spine and LP were (-) negative. Pt transferred to [facility] for further management in which pt s/p "Left - Sural nerve biopsy Left - Muscle biopsy" on 8/16. Pt  has a past medical history of Asthma and RA (rheumatoid arthritis) ([address] Pt with active OT orders in which OT consulted to assess pt's functional status and occupational performance to determine safe d/c needs.  Pt seen with PT to increase safety, decrease fall risk, and maximize functional/occupational performance 2* medical complexity which is a regression from pt's functional baseline. [place] her  assists with all ADLs/IADLs. Pt reports that she is able to stand pivot with an assist x1 from w/c. Currently, pt performing supine <> sit @ supervision level, sit <> supine w/ Max A x1, functional transfers w/ Max A x1 w/ HHA, functional mobility w/ Max A x1 w/ HHA, and UB/LB ADLs w/ Max A. Pt demonstrates the following limitations/impairments which impact the pt's ability to engage in valued occupations: decreased functional ROM, fine motor skills 2* RA/contractures, balance, endurance/activity tolerance, standing tolerance, functional reach, postural/trunk control, strength, safety awareness, and pain. From an OT standpoint, recommend discharge to post-acute rehab once medically stable. The patient's raw score on the AM-PAC Daily Activity Inpatient Short Form is 12. A raw score of less than 19 suggests the patient may benefit from discharge to post-acute rehabilitation services. Please refer to the recommendation of the Occupational Therapist for safe discharge planning. Pt would benefit from skilled OT services 2-3x/wk to address immediate acute care needs and underlying performance skills to promote safety, decrease fall risk, and enhance occupational performance to return to [facility]. Goals to be met within the next 10-14 days. Goals   Patient Goals To be able to walk   LTG Time Frame 10-14   Long Term Goal #1 See OT goals listed below   Plan   Treatment Interventions ADL retraining;Functional transfer training;UE strengthening/ROM; Endurance training;Patient/family training;Equipment evaluation/education; Fine motor coordination activities; Compensatory technique education;Continued evaluation; Energy conservation; Activityengagement   Goal Expiration Date 08/31/23   OT Frequency 2-3x/wk   Recommendation   OT Discharge Recommendation Post acute rehabilitation services   [facility] Daily Activity Inpatient   Lower Body Dressing 2   Bathing 2   Toileting 2   Upper Body Dressing 2   Grooming 2   Eating 2   Daily Activity Raw Score 12   Daily Activity Standardized Score (Calc for Raw Score >=11) 30.6   AM-PAC Applied Cognition Inpatient   Following a Speech/Presentation 4   Understanding Ordinary Conversation 4   Taking Medications 4   Remembering Where Things Are Placed or Put Away 4   Remembering List of 4-5 Errands 4   Taking Care of Complicated Tasks 4   Applied Cognition Raw Score 24   Applied Cognition Standardized Score 62.21   End of Consult   Education Provided Yes;Family or social support of family present for education by provider   Patient Position at End of Consult Supine; All needs within reach   Nurse Communication Nurse aware of consult   End of Consult Comments Pt's  present, very supportive/interactive of education; receptive of all education provided t/o     OT GOALS:    Pt will improve functional mobility during ADL/IADL/leisure tasks with Min A using AE/DME prn. Pt will improve activity tolerance/functional endurance during ADL/IADL/leisure tasks for at least 20 minutes to improve occupational performance and engagement in valued occupations using AE/DME prn. Pt will engage in ongoing functional/formal cognitive assessments to assist with safe d/c planning and increase safety during functional tasks. Pt will improve dynamic standing balance for at least 10 minutes with Min A during functional tasks to decrease fall risk and improve independence and engagement in ADL/IADL/leisure activities. Pt will follow 100% of multi-step commands in ADL/IADL/leisure activities to improve functional cognition used in functional daily routines. Pt will complete functional transfers on and off all surfaces used in daily routines with Min A for safety to maximize functional/occupational performance.     Pt will complete all bed mobility tasks with S to serve as a prerequisite for EOB/OOB ADL/IADL/leisure tasks, optimize positioning/comfort, and increase functional independence. Pt will independently demonstrate good carryover of safety precautions and education/training during ADL/IADL/leisure tasks with energy conservation techniques s/p skilled instruction without verbal cues. Pt will demonstrate 100% carryover UE PROM/AAROM/AROM s/p education on exercise program to improve bilateral coordination in ADL/IADL/leisure tasks with S. Pt will complete UB ADL tasks with Min A using AE/DME prn to increase functional independence in ADL/IADL/leisure tasks. Pt will complete LB ADL tasks with Mod A using AE/DME prn to increase functional independence in ADL/IADL/leisure tasks. Pt will complete toileting tasks with Mod A and good hygiene/thoroughness using AE/DME prn to increase functional independence. Pt will independently identify and utilize 2-3 positive coping strategies to enhance overall wellbeing and engagement in valued occupations.     Hal Collet, MS, OTR/L

## 2023-08-17 NOTE — PROGRESS NOTES
[address] [facility]  Progress Note  Name: Adama Para I  MRN: [id number]  Unit/Bed#: [facility] 734-01 I Date of Admission: 8/14/2023   Date of Service: 8/17/2023 I Hospital Day: 3    Assessment/Plan   * Bilateral upper and lower extremity neuropathy  Assessment & Plan  POD#1 - s/p sural nerve bx (BK w/ gen surg 8/16)   Concern for vasculitis vs NMD  - pt presents with progressively worsening N/T, pain, and weakness to tod hands and feet   - hx of RA    - initially felt to be an RA exacerbation, but no improvement with steroids   - extensive workup completed, MRI C/T/L spine negative, LP bland with positive IgG   - p-ANCA positive     Imaging:   · 8/7 MRI L-spine:   No abnormal signal or pathologic enhancement in the visualized cord. Mild degenerative change without canal or foraminal stenosis. · 8/7 MRI T-spine: No cord signal abnormality or pathologic enhancement. Unremarkable MRI of the thoracic spine. Small right and minimal left pleural effusion  · 8/3 MRI c-spine: No disc herniation, canal or foraminal stenosis. 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  · 8/3 CTH: No acute intracranial abnormality    Plan:   · Continue to closely monitor neuro exam   · Frequent neuro checks per primary team   · Maintain normotensive BP goals, MAP > 65   · No further neurosurgical intervention indicated at this time   · Case reviewed this am on rounds   · Continue local wound care to the site   · Dressing to be removed today, can be left open to air   · Will plan for a 2 week follow-up appt in our office for incision check and removal of sutures   · At this time, will defer further medically management and workup to primary team, neurology, and rheumatology   · DVT ppx: SCDs, SQH   · Pain control per primary team   · Medical management per primary team   · PT/OT   · Social work following for assistance with dispo once medically cleared     Neurosurgery will sign off at this time. Please reach out with any further questions or concerns. Subjective/Objective   Chief Complaint: "goodmorning"     Subjective: Pt seen and examined this am on rounds. BYRON. Pt in good spirits this am. Denies any pain around the incision. Reports unchanged numbness and weakness to the L foot. Objective: pleasant, thin, middle aged female sitting in bed, eating breakfast. No acute distress. I/O       08/15 0701 08/16 0700 08/16 0701 08/17 0700 08/17 0701 08/18 0700    P. O.  200     I.V. (mL/kg)  400 (8.3)     Total Intake(mL/kg)  600 (12.5)     Urine (mL/kg/hr) 400      Total Output 400      Net -400 +600            Unmeasured Urine Occurrence  1 x         Invasive Devices     Peripheral Intravenous Line  Duration           Long-Dwell Peripheral IV (Midline) 33/38/15 Right Basilic 8 days              Physical Exam:  Vitals: Blood pressure 101/69, pulse 76, temperature 97.6 °F (36.4 °C), resp. rate 16, height 5' 7" (1.702 m), weight 48.1 kg (106 lb 0.7 oz), last menstrual period 07/26/2023, SpO2 98 %. ,Body mass index is 16.61 kg/m².     General appearance: alert, appears stated age, cooperative and no distress  Head: Normocephalic, without obvious abnormality, atraumatic  Eyes: EOMI, PERRL  Neck: supple, symmetrical, trachea midline and NT  Back: no kyphosis present, no tenderness to percussion or palpation  Lungs: non labored breathing  Heart: regular heart rate  Neurologic:   Mental status: Alert, oriented x3, thought content appropriate  Cranial nerves: grossly intact (Cranial nerves II-XII)  Sensory:   - Paresthesia/hyperesthesias noted throughout tod hands and tod feet, decreased sensation to LT and PP to tod hands and feet extending into tod wrist and approx mid lower leg tod  - otherwise sensation intact throughout     Motor:   - RUE: delt add 4/5, delt abd 3/5, bicep/tricep 3/5, wrist flex/ex 2/5,  1/5, IO 1/5   - LUE: delt add 4/5, delt abd 3/5, bicep/tricep 3/5, wrist flex/ex 3/5,  1/5, IO 1/5     - RLE: hip 4/5, knee flex 4/5, knee ext 3/5, DF/PF 0/5, EHL 0/5, IO 0/5   - LLE: hip 4/5, knee flex 4/5, knee ext 3/5, DF/PF 0/5, EHL 0/5, IO 0/5   Reflexes: 1 to 2+ throughout   Coordination: finger to nose normal bilaterally, no drift bilaterally  MSK: decreased ROM to several joints, tod shoulders, R wrist, and tod ankles. - R wrist contracted at baseline  - ulnar deviation noted to tod hands   - R foot in out turned at baseline     Lab Results:  Results from last 7 days   Lab Units 08/17/23  0555 08/16/23  1747 08/16/23  0554 08/14/23  2226 08/14/23  0507   WBC Thousand/uL 20.27*  --  18.73* 19.75* 14.97*   HEMOGLOBIN g/dL 8.6*  --  9.9* 9.4* 9.9*   HEMATOCRIT % 28.0*  --  32.8* 30.5* 32.7*   PLATELETS Thousands/uL 398* 224 476* 452* 454*   NEUTROS PCT %  --   --  77* 68 76*   MONOS PCT %  --   --  4 6 3*   EOS PCT %  --   --  0 0 0     Results from last 7 days   Lab Units 08/17/23  0555 08/16/23  0554 08/14/23  2226   SODIUM mmol/L 138 136 140   POTASSIUM mmol/L 5.1 4.8 4.8   CHLORIDE mmol/L 106 106 104   CO2 mmol/L 28 27 33*   BUN mg/dL 44* 31* 44*   CREATININE mg/dL 0.62 0.49* 0.46*   CALCIUM mg/dL 8.5 8.7 8.6   ALK PHOS U/L  --  58  --    ALT U/L  --  32  --    AST U/L  --  12  --      Results from last 7 days   Lab Units 08/16/23  0554   MAGNESIUM mg/dL 2.5     Results from last 7 days   Lab Units 08/16/23  0554   PHOSPHORUS mg/dL 4.2     Results from last 7 days   Lab Units 08/17/23  0555 08/16/23  0554   INR  0.98 0.98   PTT seconds 22* 30     No results found for: "TROPONINT"  ABG:No results found for: "PHART", "POS4QBH", "PO2ART", "HLW1BBU", "J6RDRMPK", "BEART", "SOURCE"    Imaging Studies: I have personally reviewed pertinent reports. and I have personally reviewed pertinent films in PACS    CT chest wo contrast    Result Date: 8/15/2023  Impression: Platelike atelectasis or scarring in the lung bases which is more prominent than on the prior scan.  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 Right shoulder prosthesis present which produces some artifact. Workstation performed: [id number]       EKG, Pathology, and Other Studies: I have personally reviewed pertinent reports.       VTE Pharmacologic Prophylaxis: Sequential compression device (Venodyne)  and Heparin    VTE Mechanical Prophylaxis: sequential compression device

## 2023-08-17 NOTE — PROGRESS NOTES
Progress Note - Neurology   Dana Hector 41 y.o. female [id number]  Unit/Bed#: [facility] 734/[facility] 734-01    Assessment/Plan:    ADDENDUM:  The neuropathologist at [facility] spoke to the attending Neurologist.  Preliminary findings show significant inflammation and abnormal appearing vessels, suspicious for vasculitis. Final pathology report pending. The Rheumatologist will evaluate the patient on Monday. Patient may need rituximab, but waiting on nerve/muscle biopsy results. * Bilateral upper and lower extremity neuropathy  Assessment & Plan  41 y.o. female with malnutrition and juvenile rheumatoid arthritis who has undergone several joint fusions and has not seen a rheumatologist in >10 years as she has been intolerant to biologicals as well as DMARDs (no medications since 2008) who initially presented to [address] [place] [address] on 8/3/2023 due to bilateral distal LE pain/numbness/weakness x2 weeks and bilateral hand pain/numbness/weakness x1-2 days. Since November 2022, patient has had a steady decline in her ability to ambulate. She can walk a few steps with assistance, but has otherwise been wheelchair bound. In May, she had a viral infection and since May/Leandra has had a hoarse voice with dysphagia, making it difficult to eat (lost ~10 pounds since June 2023). She was told there were ulcers on her vocal cords and video barium swallow showed mild pharyngeal dysphagia. 2 weeks prior to presentation, patient had sudden numbness/burning pain and weakness in the toes (at baseline, unable to move the ankles, but can wiggle the toes). Over the next week, the paresthesias progressed to bilateral patellas. The day of admission, patient developed severe burning pain in the hands and was no longer able to move her fingers (at baseline, right wrist is contracted, but can abduct/adduct the fingers and hold utensils with both hands).     CT head, spinal cord imaging, and CSF studies unremarkable except for elevated IgG. She had several abnormal serum labs (see below). Initially, symptoms thought to be due to RA flare, so was treated with IV Solu-Medrol (started on 8/4). She received B6 supplementation. Pain has slightly improved with gabapentin, but weakness and numbness are persistent. However, she denies any progression of symptoms since being admitted. Due to rapidly progressive and persistent weakness/paresthesias, there has been concern for rheumatologic cause, such as p-ANCA associated vasculitis with neuropathy. There has also been concern for progression of RA and nutritional deficiencies. Due to limited exam given extensive joint deformities and difficulty to determine if there are separate nerves involved, patient was transferred to [facility] for inpatient rheumatologic/neurologic evaluation and nerve/muscle biopsy (completed on 8/16). Workup:  · 8/3: CT head negative for acute intracranial abnormalities. · 8/3: MRI cervical spine wo contrast:   · No disc herniation, canal, or foraminal stenosis. 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 · 8/7: MRI thoracic spine w/wo contrast:   · No cord signal abnormality or pathologic enhancement. · Incidentally noted small right and minimal left pleural effusion. · 8/7: MRI lumbar spine w/wo contrast:   · No abnormal signal pathologic enhancement  · 8/15: CT chest wo contrast  · Platelike atelectasis or scarring in the lung bases, which is more prominent than on prior scan  · Arthritis of the left shoulder and sternoclavicular joints. Right shoulder prosthesis present which produces some artifact.     · S/p LP on 8/8 with opening pressure 13.5 cm H2O  · CSF labs:  · Abnormal: glucose 79, RBC 67, IgG 2193  · WNL: gram stain, WBC 0, protein 27, 0 oligoclonal bands  · Pending: Lyme PCR    · Serum/Urine Labs:  · Abnormal initial labs:sodium 122, hypomagnesemia (1.6), TSH 5.229 with normal free T4, iron deficiency anemia  · Abnormal labs throughout admission: B12 340, B6 2.7, vitamin D 9.6, hypoalbuminemia (2.6), , HsCRP >90, elevated p-ANCA, elevated MPO antibody, positive VALDO (titer 320 and homogenous pattern with positive antichromatin antibodies), SPEP shows hypergammaglobulinemia with beta-gamma bridging but no monoclonal immunoglobulins on immunofixation, IgG 2270, IgM 559, elevated serum Ig Kappa and Ig Lambda free light chains (but normal urine Kappa/Lambda light chains)  · WNL: Folate 822, copper 120, thiamine 84, heavy metals screen, UPEP normal, C3/C4, chronic hepatitis panel  · Pending: urine heavy metals, Quantiferon TB    Plan:  - Patient underwent nerve and muscle biopsy on 8/16; specimens were sent to [facility] in [place]. Final pathology report pending, however preliminary report concerning for vasculitis given significant inflammation and abnormal appearing vessels  - Rheumatology consulted; appreciate recommendations  · Per SLIM, rheumatology has recommended inpatient EMG if possible and CT chest (results above)  · Continue IV Solu-Medrol at this time (currently receiving 30 mg Q12 hours; previously received 40 mg Q12 hours); management per rheumatology  · Patient may need rituximab, but waiting on nerve/muscle biopsy results  · Formal Rheumatology consult is pending; hopefully will be able to see the patient on Monday  - Attending Neurologist spoke to the neuromuscular specialist Dr. Rand Stinson regarding the case. She could potentially do the EMG this week, however the patient does not want the EMG at this time. Unclear if EMG would .   Will further discuss the need for PLEX with our neuromuscular specialist, however on literature review there does not seem to be data regarding PLEX specifically treating neuropathy  - Continue B6 supplementation  - Continue B12 and Vitamin D supplementation  - Continue gabapentin 600 mg TID (has been titrated up from 300 mg TID)  - PT/OT  - Frequent neuro checks. Continue to monitor and notify neurology with any changes. - Medical management and supportive care per primary team. Correction of any metabolic or infectious disturbances      Recommendations for outpatient neurological follow up have yet to be determined. Subjective:   Patient seen and examined at bedside. Patient states that she did not sleep well overnight, so she is tired today. She is feeling quite nauseous despite having antinausea medications. Her left leg hurts secondary to nerve/muscle biopsy completed yesterday. She states that the paresthesias and burning pain are worse today in the hands and feet. Denies any worsening weakness. She actually thinks that she has slightly more movement in her fingers today.         Past Medical History:   Diagnosis Date   • Asthma    • RA (rheumatoid arthritis) ([address])      Past Surgical History:   Procedure Laterality Date   • ANKLE SURGERY      2 surgerys   •  SECTION     • ELBOW SURGERY     • IR LUMBAR PUNCTURE  2023   • IR MIDLINE PLACEMENT  2023   • JOINT REPLACEMENT      knees bilateral   • TOTAL SHOULDER REPLACEMENT       Family History   Problem Relation Age of Onset   • No Known Problems Mother    • No Known Problems Father      Social History     Socioeconomic History   • Marital status: /Civil Union     Spouse name: Not on file   • Number of children: Not on file   • Years of education: Not on file   • Highest education level: Not on file   Occupational History   • Not on file   Tobacco Use   • Smoking status: Never   • Smokeless tobacco: Never   Vaping Use   • Vaping Use: Never used   Substance and Sexual Activity   • Alcohol use: Not Currently     Comment: none   • Drug use: Never     Comment: none   • Sexual activity: Not Currently     Birth control/protection: None     Comment: none   Other Topics Concern   • Not on file   Social History Narrative   • Not on file     Social Determinants of Health     Financial Resource Strain: Not on file   Food Insecurity: No Food Insecurity (8/15/2023)    Hunger Vital Sign    • Worried About Running Out of Food in the Last Year: Never true    • Ran Out of Food in the Last Year: Never true   Transportation Needs: No Transportation Needs (8/15/2023)    PRAPARE - Transportation    • Lack of Transportation (Medical): No    • Lack of Transportation (Non-Medical): No   Physical Activity: Not on file   Stress: Not on file   Social Connections: Not on file   Intimate Partner Violence: Not on file   Housing Stability: Low Risk  (8/8/2023)    Housing Stability Vital Sign    • Unable to Pay for Housing in the Last Year: No    • Number of Places Lived in the Last Year: 1    • Unstable Housing in the Last Year: No     E-Cigarette/Vaping   • E-Cigarette Use Never User      E-Cigarette/Vaping Substances   • Nicotine No    • THC No    • CBD No    • Flavoring No    • Other No    • Unknown No          Medications:   All current active meds have been reviewed and current meds:  Scheduled Meds:  Current Facility-Administered Medications   Medication Dose Route Frequency Provider Last Rate   • albuterol  2 puff Inhalation Q4H PRN Earline Xavier PA-C     • bisacodyl  10 mg Rectal Daily PRN Moe Xavier PA-C     • cefazolin  2,000 mg Intravenous Q8H Earline Xavier PA-C 2,000 mg (08/17/23 0505)   • cholecalciferol  400 Units Oral Daily Earline Xavier PA-C     • cyanocobalamin  1,000 mcg Oral Daily Earline Xavier PA-C     • diphenhydramine, lidocaine, Al/Mg hydroxide, simethicone  10 mL Swish & Spit Q4H PRN Earline Xavier PA-C     • docusate sodium  100 mg Oral BID Earline Xavier PA-C     • gabapentin  600 mg Oral TID Earline Xavier PA-C     • heparin (porcine)  5,000 Units Subcutaneous Q8H [address] Earline Xavier PA-C     • HYDROcodone-acetaminophen  1 tablet Oral Q6H PRN Emilee العراقيveronique Xavier PA-C     • magnesium Oxide  400 mg Oral HS Earline Xavier PA-C     • methylPREDNISolone sodium succinate  30 mg Intravenous Q12H [address] Earline Xavier PA-C     • morphine injection  2 mg Intravenous Q4H PRN Earline Xavier PA-C     • ondansetron  4 mg Intravenous Q6H PRN Earline Xavier PA-C     • pyridoxine  50 mg Oral Daily Earline BOLIVAR Xavier PA-C     • senna  1 tablet Oral Daily Earline BOLIVAR Xavier PA-C     • sodium chloride  75 mL/hr Intravenous Continuous Earline Xavier PA-C 75 mL/hr (08/16/23 1826)     Continuous Infusions:sodium chloride, 75 mL/hr, Last Rate: 75 mL/hr (08/16/23 1826)      PRN Meds:.•  albuterol  •  bisacodyl  •  diphenhydramine, lidocaine, Al/Mg hydroxide, simethicone  •  HYDROcodone-acetaminophen  •  morphine injection  •  ondansetron       ROS:   Review of Systems   Constitutional: Positive for fatigue. Gastrointestinal: Positive for nausea. Musculoskeletal: Positive for myalgias. Neurological: Positive for weakness and numbness. Vitals:   /69   Pulse 76   Temp 97.6 °F (36.4 °C)   Resp 16   Ht 5' 7" (1.702 m)   Wt 48.1 kg (106 lb 0.7 oz)   LMP 07/26/2023 (Approximate)   SpO2 98%   BMI 16.61 kg/m²     Physical Exam:   Physical Exam  Vitals and nursing note reviewed. Constitutional:       Comments: Pleasant female sitting comfortably in bedside chair in no acute distress  BMI 16.61   HENT:      Head: Normocephalic and atraumatic. Mouth/Throat:      Mouth: Mucous membranes are moist.      Pharynx: Oropharynx is clear. Eyes:      Extraocular Movements: Extraocular movements intact. Conjunctiva/sclera: Conjunctivae normal.      Pupils: Pupils are equal, round, and reactive to light. Pulmonary:      Effort: Pulmonary effort is normal.   Musculoskeletal:      Comments: Continues to have weakness in bilateral hands/feet   Skin:     General: Skin is warm and dry.       Comments: Ecchymosis surrounding the muscle/nerve biopsy incision site on the left thigh. No drainage. L foot is warm. R foot is cold  Flaky skin on both feet with discoloration (chronic)   Neurological:      Mental Status: She is alert and oriented to person, place, and time. Comments: See full neuro exam below       Neurologic Exam     Mental Status   Oriented to person, place, and time. Patient awake and alert. Oriented to person, place, month, and year. No dysarthria or aphasia. Able to follow simple midline and appendicular commands. Cranial Nerves     CN III, IV, VI   Pupils are equal, round, and reactive to light. Pupils 3 mm, round, reactive to light bilaterally. EOMs intact with bilateral end gaze horizontal nystagmus. No visual field deficits. Facial sensation to light touch intact throughout. Facial expressions full and symmetric. Tongue midline. Soft palate raises symmetrically. Hearing grossly intact. Motor Exam   Multiple joint fusions (R shoulder, L elbow, R wrist, and bilateral ankles)  Limited ROM of left shoulder  Chronically contracted right wrist.  Atrophied right hand  Decreased tone in the fingers on the left hand    RUE: Limited ROM of deltoid, but approximately 4+/5.  4+/5 biceps, 4/5 triceps. Wrist is chronically flexed/contracted. Trace movement in the thumb, index, 3rd, and 5th fingers (slightly more movement today compared to yesterday)    LUE: Limited ROM of left deltoid, but approximately 4+/5.  5/5 left biceps/triceps. Trace wrist flexion. Trace movement in the thumb, index, and 3rd finger (slightly more movement today compared to yesterday)    RLE: 5/5 iliopsoas, 5/5 knee extension, 4/5 knee flexion, 0/5 dorsiflexion/plantarflexion (chronic), no movement in the toes. LLE: 5/5 iliopsoas, 5/5 knee extension, 4/5 knee flexion, 0/5 dorsiflexion/plantarflexion (chronic), no movement in the toes.      Sensory Exam   Sensation to light touch diminished in bilateral LEs distal to patellas and diminished distal to bilateral wrists. Light touch causes burning pain. Temperature is absent in both feet, diminished from mid shin to patellas, and normal proximal to the patellas bilaterally    Unable to feel vibration in the left great toe and diminished vibration in the left hand. Normal vibration the right UE/LE. Gait, Coordination, and Reflexes   No resting or action tremor  Unable to assess ankle clonus due to fused ankles  Mute toes bilaterally           Labs: I have personally reviewed pertinent reports. Recent Results (from the past 24 hour(s))   Tissue Exam    Collection Time: 08/16/23  2:30 PM   Result Value Ref Range    Case Report       Surgical Pathology Report                         Case: [id number]                                   Authorizing Provider:  Roxanna Agarwal DO          Collected:           08/16/2023 1430              Ordering Location:     [facility]      Received:            08/16/2023 [address] Operating Room                                                      Pathologist:           Tory Stroud MD                                                         Intraop:               Tory Stroud MD                                                         Specimens:   A) - Nerve, sural nerve                                                                             B) - Muscle, Bx left quadriceps muscle                                                     Final Diagnosis       A. Nerve, sural nerve biopsy:  -  Nerve tissue, gross evaluation only. -  The entire specimen sent to [facility]. -  A full report from that institution will follow under separate cover. B. Muscle, Biopsy left quadriceps muscle:  -  Muscle tissue, gross evaluation only. -  The entire specimen sent to [facility]. -  A full report from that institution will follow under separate cover.        Additional Information All reported additional testing was performed with appropriately reactive controls. These tests were developed and their performance characteristics determined by [facility] or appropriate performing facility, though some tests may be performed on tissues which have not been validated for performance characteristics (such as staining performed on alcohol exposed cell blocks and decalcified tissues). Results should be interpreted with caution and in the context of the patients’ clinical condition. These tests may not be cleared or approved by the U.S. Food and Drug Administration, though the FDA has determined that such clearance or approval is not necessary. These tests are used for clinical purposes and they should not be regarded as investigational or for research. This laboratory has been approved by [place] [id number], designated as a high-complexity laboratory and is qualified to perform these tests. Interpretation performed at [facility], [address] [place] [address] [address] [facility]. Gross Description       A. The specimen is received fresh, labeled with the patient's name and hospital number, and is designated "sural nerve biopsy." The specimen consists of a 3 cm in length, 0.3 cm in diameter portion of white nerve. The specimen is trisected with portions placed in glutaraldehyde, formalin and wrapped in saline moistened telfa pad. The specimen is entirely sent to:    [address], [address] [place] [address]    Gross only. LUCILA   B. The specimen is received fresh, labeled with the patient's name and hospital number, and is designated "left quadriceps muscle." The specimen consists of a 2.5 cm in diameter portion of brown yellow soft tissue. The specimen is trisected with portions placed in glutaraldehyde, formalin and wrapped in saline moistened telfa pad.    The specimen is entirely sent to:    [address] [place] [address] [facility], [address] [facility]    Gross only. LUCILA       Clinical Information       Bilateral upper and lower extremity neuropathy  ? pANCA associated  History of rheumatoid arthritis   Tissue Exam    Collection Time: 08/16/23  2:30 PM   Result Value Ref Range    Case Report       Surgical Pathology Report                         Case: [id number]                                   Authorizing Provider:  Sherrill Brittle To, DO          Collected:           08/16/2023 1430              Ordering Location:     [facility]      Received:            08/16/2023 [address] Operating Room                                                      Pathologist:           Josey Ayon MD                                                         Intraop:               Josey Ayon MD                                                         Specimens:   A) - Nerve, sural nerve                                                                             B) - Muscle, Bx left quadriceps muscle                                                     Final Diagnosis       A. Nerve, sural nerve biopsy:  -  Nerve tissue, gross evaluation only. -  The entire specimen sent to [facility]. -  A full report from that institution will follow under separate cover. B. Muscle, Biopsy left quadriceps muscle:  -  Muscle tissue, gross evaluation only. -  The entire specimen sent to [facility]. -  A full report from that institution will follow under separate cover. Additional Information       All reported additional testing was performed with appropriately reactive controls.   These tests were developed and their performance characteristics determined by [facility] or appropriate performing facility, though some tests may be performed on tissues which have not been validated for performance characteristics (such as staining performed on alcohol exposed cell blocks and decalcified tissues). Results should be interpreted with caution and in the context of the patients’ clinical condition. These tests may not be cleared or approved by the U.S. Food and Drug Administration, though the FDA has determined that such clearance or approval is not necessary. These tests are used for clinical purposes and they should not be regarded as investigational or for research. This laboratory has been approved by [place] [address], designated as a high-complexity laboratory and is qualified to perform these tests. Interpretation performed at [facility], [address] [place] [address]. Gross Description       A. The specimen is received fresh, labeled with the patient's name and hospital number, and is designated "sural nerve biopsy." The specimen consists of a 3 cm in length, 0.3 cm in diameter portion of white nerve. The specimen is trisected with portions placed in glutaraldehyde, formalin and wrapped in saline moistened telfa pad. The specimen is entirely sent to:    [address], [address]    Gross only. LUCILA   B. The specimen is received fresh, labeled with the patient's name and hospital number, and is designated "left quadriceps muscle." The specimen consists of a 2.5 cm in diameter portion of brown yellow soft tissue. The specimen is trisected with portions placed in glutaraldehyde, formalin and wrapped in saline moistened telfa pad. The specimen is entirely sent to:    [address], [address] Gross only. LUCILA       Clinical Information       Bilateral upper and lower extremity neuropathy  ?  pANCA associated  History of rheumatoid arthritis   Platelet count    Collection Time: 08/16/23  5:47 PM   Result Value Ref Range    Platelets 138 236 - 166 Thousands/uL    MPV 8.1 (L) 8.9 - 12.7 fL   Basic metabolic panel Collection Time: 08/17/23  5:55 AM   Result Value Ref Range    Sodium 138 135 - 147 mmol/L    Potassium 5.1 3.5 - 5.3 mmol/L    Chloride 106 96 - 108 mmol/L    CO2 28 21 - 32 mmol/L    ANION GAP 4 mmol/L    BUN 44 (H) 5 - 25 mg/dL    Creatinine 0.62 0.60 - 1.30 mg/dL    Glucose 106 65 - 140 mg/dL    Calcium 8.5 8.3 - 10.1 mg/dL    eGFR 109 ml/min/1.73sq m   CBC    Collection Time: 08/17/23  5:55 AM   Result Value Ref Range    WBC 20.27 (H) 4.31 - 10.16 Thousand/uL    RBC 3.30 (L) 3.81 - 5.12 Million/uL    Hemoglobin 8.6 (L) 11.5 - 15.4 g/dL    Hematocrit 28.0 (L) 34.8 - 46.1 %    MCV 85 82 - 98 fL    MCH 26.1 (L) 26.8 - 34.3 pg    MCHC 30.7 (L) 31.4 - 37.4 g/dL    RDW 22.1 (H) 11.6 - 15.1 %    Platelets 802 (H) 723 - 390 Thousands/uL    MPV 8.5 (L) 8.9 - 12.7 fL   Protime-INR    Collection Time: 08/17/23  5:55 AM   Result Value Ref Range    Protime 13.2 11.6 - 14.5 seconds    INR 0.98 0.84 - 1.19   APTT    Collection Time: 08/17/23  5:55 AM   Result Value Ref Range    PTT 22 (L) 23 - 37 seconds       Imaging: I have personally reviewed pertinent imaging in PACS, including CT head, MRI c/t/l spine,  and I have personally reviewed PACS reports. EKG, Pathology, and Other Studies: I have personally reviewed pertinent reports. VTE Prophylaxis: Sequential compression device (Venodyne)  and Heparin      Counseling / Coordination of Care  I have spent a total time of 27 minutes on 08/17/23 in caring for this patient including Diagnostic results, Prognosis, Risks and benefits of tx options, Instructions for management, Patient and family education, Importance of tx compliance, Risk factor reductions, Impressions, Counseling / Coordination of care, Documenting in the medical record, Reviewing / ordering tests, medicine, procedures  , Obtaining or reviewing history   and Communicating with other healthcare professionals .

## 2023-08-17 NOTE — ASSESSMENT & PLAN NOTE
· As evidenced by tachycardia and leukocytosis. · Tachycardia improving. · Leukocytosis secondary to steroid use. · Patient appears clinically non toxic and afebrile. · Monitor.

## 2023-08-17 NOTE — PLAN OF CARE
Problem: PHYSICAL THERAPY ADULT  Goal: Performs mobility at highest level of function for planned discharge setting. See evaluation for individualized goals. Description: Treatment/Interventions: ADL retraining, Functional transfer training, LE strengthening/ROM, Elevations, Therapeutic exercise, Endurance training, Bed mobility, Gait training, Spoke to nursing, OT          See flowsheet documentation for full assessment, interventions and recommendations. Note: Prognosis: Good  Problem List: Decreased strength, Decreased range of motion, Decreased endurance, Decreased mobility, Impaired balance, Decreased coordination, Pain, Impaired tone, Decreased skin integrity  Assessment: PT orders received and acknowledged. Patient was seen today for high complexity PT evaluation. High complexity evaluation due to Ongoing medical management for primary dx, Decreased activity tolerance compared to baseline, Fall risk, Increased assistance needed from caregiver at current time, Continuous pulse oximetry monitoring  Patient is a 41 y.o. female  who was admitted to [address] [place] [address] on 8/14/2023  with Neuropathy . Pt [facility] to [facility] from [facility] where she was admitted for several weeks of worsening numbness and weakness in distal extremities . Patient performed functional mobility as described above. At baseline, pt resides with  in house and was requiring assistance with most functional mobility and ADLs prior to hospital admission. Currently, upon initial examination, pt  is requiring supervision A for bed mobility skills; maxA for functional transfers and maxA for ambulation with HHA. Patient currently presents below baseline with limitations in gait, balance, and transfers. Patient will benefit from continued PT services while in hospital in order to address remaining limitations. The patients AM-PAC Basic Mobility Inpatient Short From Raw Score is 13 .  A Raw score of 13  suggests that the patient may benefit from discharge to post acute rehab . PT recommendation at this time is for post acute rehab . Please also refer to the recommendation of the Physical Therapist for safe discharge planning. PT Discharge Recommendation: Post acute rehabilitation services    See flowsheet documentation for full assessment.

## 2023-08-17 NOTE — ASSESSMENT & PLAN NOTE
• POA- Presented with bilateral upper and lower extremity pain, weakness, and numbness  • MRI cervical,thoracic and lumbar spinal without abnormalities or pathologic enhancement. • LP with normal WBCs, protein, negative Gram stain, elevated IgG. • Work-up illustrated evidence of B6 deficiency for which supplementation was ordered. • Autoimmune work-up with elevated inflammatory marker, elevated p-ANCA MPO antibodies and positive VALDO. • Suspicion for P-ANCA vasculitis. • Patient is status post nerve and muscle biopsy. Preliminary results. Neurology suspicious for vasculitis. Await final results. • Continue Solu-Medrol   • Continue gabapentin   • Discussed with rheumatology Dr Carlitos Chaudhry who recommended the following:  • CT chest without contrast which showed Platelike atelectasis or scarring in the lung bases. • UA, urine protein creatinine ratio showing microscopic hematuria and small proteins. • chronic hepatitis panel negative. QuantiFERON test pending. • Patient may need rituximab but would like to await nerve/muscle biopsy results. • In the meantime continue steroids. • Formal consult to follow.

## 2023-08-17 NOTE — PLAN OF CARE
Problem: OCCUPATIONAL THERAPY ADULT  Goal: Performs self-care activities at highest level of function for planned discharge setting. See evaluation for individualized goals. Description: Treatment Interventions: ADL retraining, Functional transfer training, UE strengthening/ROM, Endurance training, Patient/family training, Equipment evaluation/education, Fine motor coordination activities, Compensatory technique education, Continued evaluation, Energy conservation, Activityengagement          See flowsheet documentation for full assessment, interventions and recommendations. Note: Limitation: Decreased ADL status, Decreased UE ROM, Decreased UE strength, Decreased endurance, Decreased sensation, Decreased fine motor control, Decreased self-care trans, Decreased high-level ADLs  Prognosis: Fair  Assessment: Pt is a 42 yo female who presented to [address] [place] [address] with b/l UE and LE pain and numbness in which extensive work-up including MRI of spine and LP were (-) negative. Pt transferred to [facility] for further management in which pt s/p "Left - Sural nerve biopsy Left - Muscle biopsy" on 8/16. Pt  has a past medical history of Asthma and RA (rheumatoid arthritis) ([address] Pt with active OT orders in which OT consulted to assess pt's functional status and occupational performance to determine safe d/c needs. Pt seen with PT to increase safety, decrease fall risk, and maximize functional/occupational performance 2* medical complexity which is a regression from pt's functional baseline. [place]  [place] which her  assists with all ADLs/IADLs. Pt reports that she is able to stand pivot with an assist x1 from w/c. Currently, pt performing supine <> sit @ supervision level, sit <> supine w/ Max A x1, functional transfers w/ Max A x1 w/ HHA, functional mobility w/ Max A x1 w/ HHA, and UB/LB ADLs w/ Max A.  Pt demonstrates the following limitations/impairments which impact the pt's ability to engage in valued occupations: decreased functional ROM, fine motor skills 2* RA/contractures, balance, endurance/activity tolerance, standing tolerance, functional reach, postural/trunk control, strength, safety awareness, and pain. From an OT standpoint, recommend discharge to post-acute rehab once medically stable. The patient's raw score on the -PAC Daily Activity Inpatient Short Form is 12. A raw score of less than 19 suggests the patient may benefit from discharge to post-acute rehabilitation services. Please refer to the recommendation of the Occupational Therapist for safe discharge planning. Pt would benefit from skilled OT services 2-3x/wk to address immediate acute care needs and underlying performance skills to promote safety, decrease fall risk, and enhance occupational performance to return to PLOF. Goals to be met within the next 10-14 days.      OT Discharge Recommendation: Post acute rehabilitation services

## 2023-08-17 NOTE — QUICK NOTE
Post-op check - General Surgery  David Hector 41 y.o. female MRN: [id number]  Unit/Bed#: [facility] 734-01 Encounter: [id number]    Assessment:  41 y.o. female now Day of Surgery s/p Procedure(s) (LRB):  Sural nerve biopsy (Left)  Muscle biopsy (Left)    Plan:  - Diet Regular; Regular House  - Pain and Nausea control PRN  - Incentive spirometry  - OOB, ambulate  - outpatient follow up as needed  - general surgery will sign off at this time    Subjective/Objective     Subjective: Went to evaluate the patient after arrival to the floor. The patient has no complaints of pain in the surgical site. Objective:   Vitals: Blood pressure 100/67, pulse 76, temperature (!) 97.4 °F (36.3 °C), resp. rate (!) 10, height 5' 7" (1.702 m), last menstrual period 07/26/2023, SpO2 98 %. ,Body mass index is 15.43 kg/m². I/O       08/15 0701  08/16 0700 08/16 0701  08/17 0700    P. O.  200    I.V.  400    Total Intake  600    Urine 400     Total Output 400     Net -400 +600          Unmeasured Urine Occurrence  1 x          Physical Exam:  General: No acute distress  Neuro: alert and oriented  HEENT: moist mucous membranes  CV: Well perfused, regular rate and rhythm  Lungs: Normal work of breathing, no increased respiratory effort  Abdomen: Soft, non-tender, non-distended. Extremities: No edema, clubbing or cyanosis. Left thigh with incision clean, dry and intact.   Skin: Warm, dry

## 2023-08-17 NOTE — ASSESSMENT & PLAN NOTE
POD#1 - s/p sural nerve bx (BK w/ gen surg 8/16)   Concern for vasculitis vs NMD  - pt presents with progressively worsening N/T, pain, and weakness to tod hands and feet   - hx of RA    - initially felt to be an RA exacerbation, but no improvement with steroids   - extensive workup completed, MRI C/T/L spine negative, LP bland with positive IgG   - p-ANCA positive     Imaging:   · 8/7 MRI L-spine:   No abnormal signal or pathologic enhancement in the visualized cord. Mild degenerative change without canal or foraminal stenosis. · 8/7 MRI T-spine: No cord signal abnormality or pathologic enhancement. Unremarkable MRI of the thoracic spine. Small right and minimal left pleural effusion  · 8/3 MRI c-spine: No disc herniation, canal or foraminal stenosis. 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  · 8/3 CTH: No acute intracranial abnormality    Plan:   · Continue to closely monitor neuro exam   · Frequent neuro checks per primary team   · Maintain normotensive BP goals, MAP > 65   · No further neurosurgical intervention indicated at this time   · Case reviewed this am on rounds   · Continue local wound care to the site   · Dressing to be removed today, can be left open to air   · Will plan for a 2 week follow-up appt in our office for incision check and removal of sutures   · At this time, will defer further medically management and workup to primary team, neurology, and rheumatology   · DVT ppx: SCDs, SQH   · Pain control per primary team   · Medical management per primary team   · PT/OT   · Social work following for assistance with dispo once medically cleared     Neurosurgery will sign off at this time. Please reach out with any further questions or concerns.

## 2023-08-17 NOTE — TREATMENT PLAN
Contacted by rheumatology to start patient on pulse steroids IV Solu-Medrol 250 mg once daily for 3 days then continue with current steroid dosing. Orders placed.

## 2023-08-17 NOTE — PHYSICAL THERAPY NOTE
Physical Therapy Evaluation     Patient's Name: Drew Joshua    Admitting Diagnosis  Neuropathy [G62.9]    Problem List  Patient Active Problem List   Diagnosis    Abnormal electrocardiogram    Abnormal findings on diagnostic imaging of heart and coronary circulation    Acquired bilateral club feet    Acrocyanosis (HCC)    Ankle arthritis    Asthma    Blue color skin    Cardiac murmur    Cardiomyopathy ([address])    DJD (degenerative joint disease)    Hyponatremia    Nonrheumatic mitral valve insufficiency    Osteoarthritis of left hip    Psoriasis    Rheumatoid arthritis ([address])    Status post left hip replacement    Bilateral upper and lower extremity neuropathy    Microcytic anemia    Moderate protein-calorie malnutrition (HCC)    Vitamin B6 deficiency    Neuropathy involving both lower extremities    Leucocytosis    Ambulatory dysfunction    Anemia    SIRS (systemic inflammatory response syndrome) ([address])       Past Medical History  Past Medical History:   Diagnosis Date    Asthma     RA (rheumatoid arthritis) ([address])        Past Surgical History  Past Surgical History:   Procedure Laterality Date    ANKLE SURGERY      2 surgerys     SECTION      ELBOW SURGERY      IR LUMBAR PUNCTURE  2023    IR MIDLINE PLACEMENT  2023    JOINT REPLACEMENT      knees bilateral    MUSCLE BIOPSY Left 2023    Procedure: Muscle biopsy;  Surgeon: Rich Moses ToDO;  Location: [facility];  Service: General    TOTAL SHOULDER REPLACEMENT      WOUND DEBRIDEMENT Left 2023    Procedure: Sural nerve biopsy;  Surgeon: Remington Moreno; Location: [facility];  Service: Neurosurgery          23 1052   PT Last Visit   PT Visit Date 23   Note Type   Note type Evaluation   Pain Assessment   Pain Assessment Tool 0-10   Pain Score No Pain   Restrictions/Precautions   Weight Bearing Precautions Per Order No   Other Precautions Fall Risk   Home Living   Type of [address] [place] [address] Dr One level; Able to live on main level with bedroom/bathroom; Performs ADLs on one level   Bathroom Shower/Tub Walk-in shower   Bathroom Toilet Standard   Bathroom Equipment Shower chair   Bathroom Accessibility Accessible   Home Equipment Walker;Cane;Wheelchair-manual;Electric scooter   Prior Function   Level of [place] Needs assistance with ADLs; Needs assistance with functional mobility; Needs assistance with IADLS   Lives With Spouse   Receives Help From Family   IADLs Family/Friend/Other provides transportation; Family/Friend/Other provides meals; Family/Friend/Other provides medication management   Falls in the last 6 months 0   Vocational Unemployed   General   Family/Caregiver Present Yes  (spouse)   Cognition   Overall Cognitive Status WFL   Arousal/Participation Alert   Orientation Level Oriented X4   Memory Within functional limits   Following Commands Follows all commands and directions without difficulty   Subjective   Subjective pt very pleasant and cooperative throughout therapy session. pt received supine in bed   RLE Assessment   RLE Assessment X  (no muscle contraction below the knee, 3+/5 above the knee)   LLE Assessment   LLE Assessment X  (no muscle contraction below the knee, 3+/5 above the knee)   Light Touch   RLE Light Touch Impaired   RLE Light Touch Comments diminished or absent sensation below knee   LLE Light Touch Impaired   LLE Light Touch Comments diminished or absent sensation below knee   Bed Mobility   Supine to Sit 5  Supervision   Additional items HOB elevated; Increased time required;Verbal cues   Sit to Supine 2  Maximal assistance   Additional items Assist x 1;HOB elevated; Increased time required;Verbal cues;LE management   Transfers   Sit to Stand 2  Maximal assistance   Additional items Assist x 1; Increased time required;Verbal cues   Stand to Sit 2  Maximal assistance   Additional items Assist x 1; Increased time required;Verbal cues   Additional Comments transfers w BL HHA   Ambulation/Elevation   Gait pattern R Foot drag;L Foot drag;Improper Weight shift; Forward Flexion; Wide DANN; Decreased foot clearance;Shuffling; Inconsistent zabrina; Short stride; Excessively slow   Gait Assistance 2  Maximal assist   Additional items Assist x 1;Verbal cues; Tactile cues   Assistive Device   (BL HHA)   Distance 10'   Balance   Static Sitting Fair +   Dynamic Sitting Fair   Static Standing Poor +   Dynamic Standing Poor   Ambulatory Poor   Endurance Deficit   Endurance Deficit Yes   Endurance Deficit Description generalized weakness, decreased exercise tolerance   Activity Tolerance   Activity Tolerance Patient limited by fatigue;Patient limited by pain   Medical Staff Made Aware OT rosario Rutledge due to medical complexity and multiple comorbidities   Nurse Made Aware RN cleared and updated   Assessment   Prognosis Good   Problem List Decreased strength;Decreased range of motion;Decreased endurance;Decreased mobility; Impaired balance;Decreased coordination;Pain; Impaired tone;Decreased skin integrity   Assessment PT orders received and acknowledged. Patient was seen today for high complexity PT evaluation. High complexity evaluation due to Ongoing medical management for primary dx, Decreased activity tolerance compared to baseline, Fall risk, Increased assistance needed from caregiver at current time, Continuous pulse oximetry monitoring  Patient is a 41 y.o. female  who was admitted to [address] [place] [address] on 8/14/2023  with Neuropathy . Pt presents to [facility] [place] [facility] from [facility] where she was admitted for several weeks of worsening numbness and weakness in distal extremities . Patient performed functional mobility as described above. At baseline, pt resides with  in house and was requiring assistance with most functional mobility and ADLs prior to hospital admission. Currently, upon initial examination, pt  is requiring supervision A for bed mobility skills; maxA for functional transfers and maxA for ambulation with HHA. Patient currently presents below baseline with limitations in gait, balance, and transfers. Patient will benefit from continued PT services while in hospital in order to address remaining limitations. The patients AM-PAC Basic Mobility Inpatient Short From Raw Score is 13 . A Raw score of 13  suggests that the patient may benefit from discharge to post acute rehab . PT recommendation at this time is for post acute rehab . Please also refer to the recommendation of the Physical Therapist for safe discharge planning. Goals   Patient Goals to be able to walk independently again   STG Expiration Date 08/31/23   Short Term Goal #1 Patient will be able to perform bed mobility tasks with modified independence in order to improve overall functional mobility and assist in safe d/c. STG 2 Patient will sit EOB for at least 25 minutes at modified independence level in order to strengthen abdominal musculature and assist in future transfers and ambulation. STG 3 Patient will be able to perform functional transfer with Azra in order to improve overall functional mobility and assist in safe discharge. STG 4 Patient will be able to ambulate at least 100 feet with least restrictive device with Azra assist in order to improve overall functional mobility and assist in safe discharge. STG 5 Patient will improve sitting/standing static/dynamic balance 1/2 grade in order to improve functional mobility and assist in safe discharge. STG 6 Patient will improve LE strength by 1/2 grade in order to improve functional mobility and assist in safe discharge. PT Treatment Day 0   Plan   Treatment/Interventions ADL retraining;Functional transfer training;LE strengthening/ROM; Elevations; Therapeutic exercise; Endurance training;Bed mobility;Gait training;Spoke to nursing;OT   PT Frequency 2-3x/wk   Recommendation   PT Discharge Recommendation Post acute rehabilitation services   AM-PAC Basic Mobility Inpatient   Turning in Flat Bed Without Bedrails 4   Lying on Back to Sitting on Edge of Flat Bed Without Bedrails 4   Moving Bed to Chair 2   Standing Up From Chair Using Arms 1   Walk in Room 1   Climb 3-5 Stairs With Railing 1   Basic Mobility Inpatient Raw Score 13   Basic Mobility Standardized Score 33.99   Highest Level Of Mobility   [facility] Goal 4: Move to chair/commode   -HL Achieved 6: Walk 10 steps or more   Barthel Index   Feeding 5   Bathing 0   Grooming Score 0   Dressing Score 0   Bladder Score 0   Bowels Score 5   Toilet Use Score 0   Transfers (Bed/Chair) Score 5   Mobility (Level Surface) Score 0   Stairs Score 0   Barthel Index Score 15   End of Consult   Patient Position at End of Consult Supine;Bed/Chair alarm activated; All needs within reach         Ariela Gardiner, PT

## 2023-08-17 NOTE — PROGRESS NOTES
[address] [facility]  Progress Note  Name: Kristin Marino I  MRN: [id number]  Unit/Bed#: [facility] 734-01 I Date of Admission: 8/14/2023   Date of Service: 8/17/2023 I Hospital Day: 3    Assessment/Plan   * Bilateral upper and lower extremity neuropathy  Assessment & Plan  • POA- Presented with bilateral upper and lower extremity pain, weakness, and numbness  • MRI cervical,thoracic and lumbar spinal without abnormalities or pathologic enhancement. • LP with normal WBCs, protein, negative Gram stain, elevated IgG. • Work-up illustrated evidence of B6 deficiency for which supplementation was ordered. • Autoimmune work-up with elevated inflammatory marker, elevated p-ANCA MPO antibodies and positive VALDO. • Suspicion for P-ANCA vasculitis. • Patient is status post nerve and muscle biopsy. Preliminary results. Neurology suspicious for vasculitis. Await final results. • Continue Solu-Medrol   • Continue gabapentin   • Discussed with rheumatology Dr Kathryn Valente who recommended the following:  • CT chest without contrast which showed Platelike atelectasis or scarring in the lung bases. • UA, urine protein creatinine ratio showing microscopic hematuria and small proteins. • chronic hepatitis panel negative. QuantiFERON test pending. • Patient may need rituximab but would like to await nerve/muscle biopsy results. • In the meantime continue steroids. • Formal consult to follow. Rheumatoid arthritis ([address])  Assessment & Plan  • Patient with longstanding history of rheumatoid arthritis, has undergone several joint fusions. she was intolerant to multiple DMARDs/Biologics and currently not on any maintenance therapy. • Continue IV Solu-Medrol as above. • Rheumatology evaluation appreciated. SIRS (systemic inflammatory response syndrome) (HCC)  Assessment & Plan  · As evidenced by tachycardia and leukocytosis. · Tachycardia improving. · Leukocytosis secondary to steroid use. · Patient appears clinically non toxic and afebrile. · Monitor. Anemia  Assessment & Plan  Likely anemia of chronic disease. Monitor hemoglobin and transfuse as needed. Ambulatory dysfunction  Assessment & Plan  At baseline, patient is wheelchair-bound outside the house and can only walk few steps with assistance at home. Now, with declining functional status. PT/OT. Leucocytosis  Assessment & Plan  Secondary to steroid use. Afebrile, nontoxic without evidence of infection. Monitor. Neuropathy involving both lower extremities  Assessment & Plan  As above        Vitamin B6 deficiency  Assessment & Plan  Replacement ordered. Moderate protein-calorie malnutrition ([address])  Assessment & Plan  Malnutrition Findings:   Adult Malnutrition type: Chronic illness  Adult Degree of Malnutrition: Malnutrition of moderate degree  Malnutrition Characteristics: Muscle loss, Fat loss                  360 Statement: Malnutrition of moderate degree in the context of chronic illness as evidenced by moderate depletion of the temples, visible clavicle and moderate buccal fat pad loss. Treated with oral diet and oral nutrition supplement. BMI Findings:  Adult BMI Classifications: Underweight < 18.5        Body mass index is 16.61 kg/m². Asthma  Assessment & Plan  Continue with current regimen. VTE Pharmacologic Prophylaxis:   Moderate Risk (Score 3-4) - Pharmacological DVT Prophylaxis Ordered: heparin. Patient Centered Rounds: I performed bedside rounds with nursing staff today. Discussions with Specialists or Other Care Team Provider: Neurology, CM    Education and Discussions with Family / Patient: Updated  () at bedside.     Total Time Spent on Date of Encounter in care of patient: 35 minutes This time was spent on one or more of the following: performing physical exam; counseling and coordination of care; obtaining or reviewing history; documenting in the medical record; reviewing/ordering tests, medications or procedures; communicating with other healthcare professionals and discussing with patient's family/caregivers. Current Length of Stay: 3 day(s)  Current Patient Status: Inpatient   Certification Statement: The patient will continue to require additional inpatient hospital stay due to vasculitis workup   Discharge Plan: Anticipate discharge in 48-72 hrs to discharge location to be determined pending rehab evaluations. Code Status: Level 1 - Full Code    Subjective:   Patient seen and examined at bedside. She is feeling overall okay. She is complaining of pain at the biopsy site. She did have nausea yesterday but feeling overall improved this morning. Objective:     Vitals:   Temp (24hrs), Av.7 °F (36.5 °C), Min:97.4 °F (36.3 °C), Max:97.9 °F (36.6 °C)    Temp:  [97.4 °F (36.3 °C)-97.9 °F (36.6 °C)] 97.9 °F (36.6 °C)  HR:  [76] 76  Resp:  [16] 16  BP: (100-101)/(67-70) 101/70  Body mass index is 16.61 kg/m². Input and Output Summary (last 24 hours): Intake/Output Summary (Last 24 hours) at 2023 1553  Last data filed at 2023 2000  Gross per 24 hour   Intake 200 ml   Output --   Net 200 ml       Physical Exam:   Physical Exam  Vitals and nursing note reviewed. Constitutional:       General: She is not in acute distress. Appearance: She is well-developed. Comments: Cachectic patient   HENT:      Head: Normocephalic and atraumatic. Eyes:      Conjunctiva/sclera: Conjunctivae normal.   Cardiovascular:      Rate and Rhythm: Normal rate and regular rhythm. Heart sounds: No murmur heard. Pulmonary:      Effort: Pulmonary effort is normal. No respiratory distress. Breath sounds: Normal breath sounds. Abdominal:      Palpations: Abdomen is soft. Tenderness: There is no abdominal tenderness. Musculoskeletal:         General: Deformity present. No swelling. Cervical back: Neck supple.    Skin:     General: Skin is warm and dry. Neurological:      Mental Status: She is alert. Additional Data:     Labs:  Results from last 7 days   Lab Units 08/17/23  0555 08/16/23  1747 08/16/23  0554   WBC Thousand/uL 20.27*  --  18.73*   HEMOGLOBIN g/dL 8.6*  --  9.9*   HEMATOCRIT % 28.0*  --  32.8*   PLATELETS Thousands/uL 398*   < > 476*   NEUTROS PCT %  --   --  77*   LYMPHS PCT %  --   --  17   MONOS PCT %  --   --  4   EOS PCT %  --   --  0    < > = values in this interval not displayed.      Results from last 7 days   Lab Units 08/17/23  0555 08/16/23  0554   SODIUM mmol/L 138 136   POTASSIUM mmol/L 5.1 4.8   CHLORIDE mmol/L 106 106   CO2 mmol/L 28 27   BUN mg/dL 44* 31*   CREATININE mg/dL 0.62 0.49*   ANION GAP mmol/L 4 3   CALCIUM mg/dL 8.5 8.7   ALBUMIN g/dL  --  2.6*   TOTAL BILIRUBIN mg/dL  --  0.43   ALK PHOS U/L  --  58   ALT U/L  --  32   AST U/L  --  12   GLUCOSE RANDOM mg/dL 106 125     Results from last 7 days   Lab Units 08/17/23  0555   INR  0.98             Results from last 7 days   Lab Units 08/12/23  0605   PROCALCITONIN ng/ml 0.09       Lines/Drains:  Invasive Devices     Peripheral Intravenous Line  Duration           Long-Dwell Peripheral IV (Midline) 87/98/27 Right Basilic 9 days                      Imaging: Reviewed radiology reports from this admission including: chest CT scan, MRI brain and MRI spine    Recent Cultures (last 7 days):         Last 24 Hours Medication List:   Current Facility-Administered Medications   Medication Dose Route Frequency Provider Last Rate   • albuterol  2 puff Inhalation Q4H PRN Earline Xavier PA-C     • bisacodyl  10 mg Rectal Daily PRN Karthik Xavier PA-C     • cholecalciferol  400 Units Oral Daily Earline Xavier PA-C     • cyanocobalamin  1,000 mcg Oral Daily Earline Xavier PA-C     • diphenhydramine, lidocaine, Al/Mg hydroxide, simethicone  10 mL Swish & Spit Q4H PRN Earline Xavier PA-C     • docusate sodium  100 mg Oral BID Karthik Xavier PA-C     • gabapentin  600 mg Oral TID Dusty Valdez Morenita, PA-C     • heparin (porcine)  5,000 Units Subcutaneous Q8H [address] Earline N Morenita, PA-C     • HYDROcodone-acetaminophen  1 tablet Oral Q6H PRN Earline N Morenita, PA-C     • magnesium Oxide  400 mg Oral HS Earline N Morenita, PA-C     • methylPREDNISolone sodium succinate  30 mg Intravenous Q12H [address] Earline N Morenita, PA-C     • morphine injection  2 mg Intravenous Q4H PRN Earline N Morenita, PA-C     • ondansetron  4 mg Intravenous Q6H PRN Earline N Morenita, PA-C     • pyridoxine  50 mg Oral Daily Earline N Morenita, PA-C     • senna  1 tablet Oral Daily Earline N Morenita, PA-C     • sodium chloride  75 mL/hr Intravenous Continuous Earline BOLIVAR Xavier, PA-C 75 mL/hr (08/16/23 1826)        Today, Patient Was Seen By: Erik Garcia MD    **Please Note: This note may have been constructed using a voice recognition system. **

## 2023-08-17 NOTE — TELEPHONE ENCOUNTER
I spoke to [place] at [facility]. She requested progress notes as well as lab work throughout the patient's admission. I spoke to the Medical Records department who will send the Neurology progress note from 8/15 as well as the Internal Medicine progress note from 8/16. They will also fax all of the lab work that has been completed thus far during the patient's admission. The pathology report for the nerve and muscle biopsy should come back later today. I gave my contact information as well as Dr. Joycelyn Soto contact information.

## 2023-08-17 NOTE — TELEPHONE ENCOUNTER
Received vm at neurology office-[facility], my name is jared i'm calling from our [facility] labs, we are diagnosing a nerve biopsy Today for a patient that was seen by I believe 2 of your neurologist over at [facility]. And I was just trying to verify who we needed to contact when we have the results ready. And if it be possible to get a progress note with some labs faxed over patient's name is rebecca maravilla. YOB: 1975. If you could give me a call back at [phone]. Just to confirm which of your neurologist would be. we would need to contact when we have the results ready. And my fax number, if you're able to fax over a progress notes and some labs is [phone]. And I can also leave my direct line, it's [phone]. Again, my name is [place] with [facility].  Thank you.  ---------------------------------------  Pt is not seen by out pt neurology and currently admitted to B    Please see above    TT sent to Eneida Aleman PA-C

## 2023-08-17 NOTE — TELEPHONE ENCOUNTER
08/18/2023-PT STILL IN HOSPITAL    08/17/2023-PT STILL IN HOSPITAL  08/29/2023-2 WK POV W/DARWIN (NO IMAGING)      Delfin Dixon PA-C   Can we please schedule this patient for a 2 week incision check? She under went a nerve biopsy with Dr. Basilia Archer on 8/16. Delfin Dixon PA-C   Can this 2 week incision check be with Dr. Basilia Archer?

## 2023-08-18 PROBLEM — I77.6 VASCULITIS (HCC): Status: ACTIVE | Noted: 2023-08-05

## 2023-08-18 LAB
ANION GAP SERPL CALCULATED.3IONS-SCNC: 4 MMOL/L
BACTERIA UR QL AUTO: ABNORMAL /HPF
BASOPHILS # BLD AUTO: 0.02 THOUSANDS/ÂΜL (ref 0–0.1)
BASOPHILS NFR BLD AUTO: 0 % (ref 0–1)
BILIRUB UR QL STRIP: NEGATIVE
BUN SERPL-MCNC: 36 MG/DL (ref 5–25)
CALCIUM SERPL-MCNC: 8.6 MG/DL (ref 8.3–10.1)
CHLORIDE SERPL-SCNC: 107 MMOL/L (ref 96–108)
CLARITY UR: CLEAR
CO2 SERPL-SCNC: 27 MMOL/L (ref 21–32)
COLOR UR: COLORLESS
CREAT SERPL-MCNC: 0.46 MG/DL (ref 0.6–1.3)
EOSINOPHIL # BLD AUTO: 0 THOUSAND/ÂΜL (ref 0–0.61)
EOSINOPHIL NFR BLD AUTO: 0 % (ref 0–6)
ERYTHROCYTE [DISTWIDTH] IN BLOOD BY AUTOMATED COUNT: 23 % (ref 11.6–15.1)
GFR SERPL CREATININE-BSD FRML MDRD: 120 ML/MIN/1.73SQ M
GLUCOSE SERPL-MCNC: 128 MG/DL (ref 65–140)
GLUCOSE UR STRIP-MCNC: NEGATIVE MG/DL
HCT VFR BLD AUTO: 27.3 % (ref 34.8–46.1)
HGB BLD-MCNC: 8.5 G/DL (ref 11.5–15.4)
HGB UR QL STRIP.AUTO: ABNORMAL
IMM GRANULOCYTES # BLD AUTO: 0.2 THOUSAND/UL (ref 0–0.2)
IMM GRANULOCYTES NFR BLD AUTO: 1 % (ref 0–2)
KETONES UR STRIP-MCNC: NEGATIVE MG/DL
LEUKOCYTE ESTERASE UR QL STRIP: NEGATIVE
LYMPHOCYTES # BLD AUTO: 2.29 THOUSANDS/ÂΜL (ref 0.6–4.47)
LYMPHOCYTES NFR BLD AUTO: 12 % (ref 14–44)
MCH RBC QN AUTO: 26.5 PG (ref 26.8–34.3)
MCHC RBC AUTO-ENTMCNC: 31.1 G/DL (ref 31.4–37.4)
MCV RBC AUTO: 85 FL (ref 82–98)
MONOCYTES # BLD AUTO: 0.76 THOUSAND/ÂΜL (ref 0.17–1.22)
MONOCYTES NFR BLD AUTO: 4 % (ref 4–12)
NEUTROPHILS # BLD AUTO: 15.78 THOUSANDS/ÂΜL (ref 1.85–7.62)
NEUTS SEG NFR BLD AUTO: 83 % (ref 43–75)
NITRITE UR QL STRIP: NEGATIVE
NON-SQ EPI CELLS URNS QL MICRO: ABNORMAL /HPF
NRBC BLD AUTO-RTO: 0 /100 WBCS
PH UR STRIP.AUTO: 6.5 [PH]
PLATELET # BLD AUTO: 388 THOUSANDS/UL (ref 149–390)
PMV BLD AUTO: 8.6 FL (ref 8.9–12.7)
POTASSIUM SERPL-SCNC: 4.6 MMOL/L (ref 3.5–5.3)
PROT UR STRIP-MCNC: NEGATIVE MG/DL
RBC # BLD AUTO: 3.21 MILLION/UL (ref 3.81–5.12)
RBC #/AREA URNS AUTO: ABNORMAL /HPF
SODIUM SERPL-SCNC: 138 MMOL/L (ref 135–147)
SP GR UR STRIP.AUTO: 1.01 (ref 1–1.03)
UROBILINOGEN UR STRIP-ACNC: <2 MG/DL
WBC # BLD AUTO: 19.05 THOUSAND/UL (ref 4.31–10.16)
WBC #/AREA URNS AUTO: ABNORMAL /HPF

## 2023-08-18 PROCEDURE — 99232 SBSQ HOSP IP/OBS MODERATE 35: CPT | Performed by: STUDENT IN AN ORGANIZED HEALTH CARE EDUCATION/TRAINING PROGRAM

## 2023-08-18 PROCEDURE — 93005 ELECTROCARDIOGRAM TRACING: CPT

## 2023-08-18 PROCEDURE — 99222 1ST HOSP IP/OBS MODERATE 55: CPT | Performed by: INTERNAL MEDICINE

## 2023-08-18 PROCEDURE — 80048 BASIC METABOLIC PNL TOTAL CA: CPT | Performed by: NURSE PRACTITIONER

## 2023-08-18 PROCEDURE — 81001 URINALYSIS AUTO W/SCOPE: CPT | Performed by: INTERNAL MEDICINE

## 2023-08-18 PROCEDURE — 85025 COMPLETE CBC W/AUTO DIFF WBC: CPT | Performed by: NURSE PRACTITIONER

## 2023-08-18 RX ADMIN — CHOLECALCIFEROL TAB 10 MCG (400 UNIT) 400 UNITS: 10 TAB at 08:56

## 2023-08-18 RX ADMIN — SODIUM CHLORIDE 250 MG: 0.9 INJECTION, SOLUTION INTRAVENOUS at 09:00

## 2023-08-18 RX ADMIN — GABAPENTIN 900 MG: 300 CAPSULE ORAL at 22:22

## 2023-08-18 RX ADMIN — GABAPENTIN 600 MG: 300 CAPSULE ORAL at 06:47

## 2023-08-18 RX ADMIN — HYDROCODONE BITARTRATE AND ACETAMINOPHEN 1 TABLET: 5; 325 TABLET ORAL at 02:53

## 2023-08-18 RX ADMIN — HEPARIN SODIUM 5000 UNITS: 5000 INJECTION INTRAVENOUS; SUBCUTANEOUS at 22:22

## 2023-08-18 RX ADMIN — MAGNESIUM OXIDE TAB 400 MG (241.3 MG ELEMENTAL MG) 400 MG: 400 (241.3 MG) TAB at 22:23

## 2023-08-18 RX ADMIN — HYDROCODONE BITARTRATE AND ACETAMINOPHEN 1 TABLET: 5; 325 TABLET ORAL at 22:21

## 2023-08-18 RX ADMIN — HEPARIN SODIUM 5000 UNITS: 5000 INJECTION INTRAVENOUS; SUBCUTANEOUS at 06:47

## 2023-08-18 RX ADMIN — HYDROCODONE BITARTRATE AND ACETAMINOPHEN 1 TABLET: 5; 325 TABLET ORAL at 17:28

## 2023-08-18 RX ADMIN — HEPARIN SODIUM 5000 UNITS: 5000 INJECTION INTRAVENOUS; SUBCUTANEOUS at 14:32

## 2023-08-18 RX ADMIN — GABAPENTIN 600 MG: 300 CAPSULE ORAL at 14:32

## 2023-08-18 RX ADMIN — PYRIDOXINE HCL TAB 50 MG 50 MG: 50 TAB at 08:57

## 2023-08-18 RX ADMIN — CYANOCOBALAMIN TAB 500 MCG 1000 MCG: 500 TAB at 08:57

## 2023-08-18 RX ADMIN — TUBERCULIN PURIFIED PROTEIN DERIVATIVE 5 UNITS: 5 INJECTION, SOLUTION INTRADERMAL at 14:32

## 2023-08-18 NOTE — ASSESSMENT & PLAN NOTE
• Presented with bilateral upper and lower extremity pain, weakness, and numbness. Has hx of RA, not currently on treatment. • MRI cervical,thoracic and lumbar spinal without abnormalities or pathologic enhancement. • LP with normal WBCs, protein, negative Gram stain, elevated IgG. • Work-up illustrated evidence of B6 deficiency for which supplementation was ordered. • Autoimmune work-up with elevated inflammatory marker, elevated p-ANCA MPO antibodies and positive VALDO. • Suspicion for P-ANCA vasculitis. • Patient is status post nerve and muscle biopsy. Preliminary results suspicious for vasculitis. Await final results. • Discussed with rheumatology Dr Debbie Weiner who recommended the following:  • CT chest without contrast which showed Platelike atelectasis or scarring in the lung bases. • UA, urine protein creatinine ratio showing microscopic hematuria and small proteins. • chronic hepatitis panel negative. QuantiFERON test indeterminate. • Pulse dose steroid x 3 days. • Plan to start rituximab pending nerve/muscle biopsy results. • Formal rheum consult to follow.

## 2023-08-18 NOTE — PROGRESS NOTES
Pt's spouse concerned about pt's left thigh wound s/p Biopsy, stated he thought it was more reddened and bruised, pt stating pain was about 4/10 only with OOB, RN informed Provider Kingsley Robertson and sent her image of the wound, orders received to put border markers around wound perimeter and monitor for changes. No further changes observed this shift.

## 2023-08-18 NOTE — CONSULTS
Consultation - Infectious Disease   Sherman Hector 41 y.o. female MRN: [id number]  [facility] Encounter: [id number]      IMPRESSION & RECOMMENDATIONS:   Impression/Recommendations: Indeterminate quantiferon gold result. Due to negative control in setting of immunosuppression. No known prior TB exposures or high-risk activities. Reports prior negative PPE, TB blood testing in past.  No cough. CT chest without suggestion of active or prior infection. 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 The above impression and plan was discussed in detail with the patient, her  at the bedside, Dr. Janet Meneses, and PAZ Maradiaga. We will see as needed based on test results as above. Antibiotics:  None    Thank you for this consultation. We will follow along with you. HISTORY OF PRESENT ILLNESS:  Reason for Consult: Indeterminate quant-Gold    HPI: Gloria Delgadillo is a 41 y.o. female with extensive rheumatologic history currently on high-dose steroids. Possible Remicade initiation planned. Had QuantiFERON test with indeterminate results. Reports extensive rheumatologic treatment history with prior negative PPDs and TB blood tests in setting of Remicade. 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 Born and lived in [place]. Denies any foreign travel, [employer], imprisonment. Only had short rehab stays postoperatively. Denies any TB exposures. In performing this consult, I have reviewed prior admission and outpatient visit records in detail. REVIEW OF SYSTEMS:  Denies cough. A complete system-based review of systems is otherwise negative.     PAST MEDICAL HISTORY:  Past Medical History:   Diagnosis Date   • Asthma    • RA (rheumatoid arthritis) ([address])      Past Surgical History:   Procedure Laterality Date • ANKLE SURGERY      2 surgerys   •  SECTION     • ELBOW SURGERY     • IR LUMBAR PUNCTURE  2023   • IR MIDLINE PLACEMENT  2023   • JOINT REPLACEMENT      knees bilateral   • MUSCLE BIOPSY Left 2023    Procedure: Muscle biopsy;  Surgeon: Lasha Agarwal DO;  Location: [facility];  Service: General   • TOTAL SHOULDER REPLACEMENT     • WOUND DEBRIDEMENT Left 2023    Procedure: Sural nerve biopsy;  Surgeon: Rosaura Ny; Location: [facility];  Service: Neurosurgery       FAMILY HISTORY:  Non-contributory    SOCIAL HISTORY:  Social History     Substance and Sexual Activity   Alcohol Use Not Currently    Comment: none     Social History     Substance and Sexual Activity   Drug Use Never    Comment: none     Social History     Tobacco Use   Smoking Status Never   Smokeless Tobacco Never       ALLERGIES:  Allergies   Allergen Reactions   • Corn Oil - Food Allergy - Food Allergy Shortness Of Breath   • Gluten Meal - Food Allergy Shortness Of Breath   • Infliximab Hypertension and Throat Swelling   • Isoflavones (Soy) Shortness Of Breath   • Motrin [Ibuprofen] Throat Swelling   • Oseltamivir Shortness Of Breath   • Yeast - Food Allergy Shortness Of Breath   • Lyrica [Pregabalin] Tongue Swelling     Possible allergy. Taken previously without reaction, but had significant tongue swelling when taken with acetaminophen (2023). • Tilactase    • Medical Tape Rash     If on for too long; please use paper tape   • Sulfa Antibiotics Hives       MEDICATIONS:  All current active medications have been reviewed.     PHYSICAL EXAM:  Vitals:  Temp:  [97.4 °F (36.3 °C)-97.9 °F (36.6 °C)] 97.4 °F (36.3 °C)  HR:  [] 129  Resp:  [13-16] 13  BP: (101-113)/(70-71) 113/70  SpO2:  [94 %-95 %] 95 %  Temp (24hrs), Av.7 °F (36.5 °C), Min:97.4 °F (36.3 °C), Max:97.9 °F (36.6 °C)  Current: Temperature: (!) 97.4 °F (36.3 °C)     Physical Exam:  General:  Well-nourished, well-developed, in no acute distress  Eyes:  Conjunctive clear with no hemorrhages or effusions  Oropharynx:  No ulcers, no lesions  Neck:  Supple, no lymphadenopathy  Lungs:  Normal respiratory excursion, no accessory muscle use  Cardiac:  Regular rate and rhythm, extremities well perfused  Abdomen:  Soft, non-tender, non-distended  Extremities:  No peripheral cyanosis, clubbing, nonpitting leg edema  Skin:  No rashes, no ulcers  Neurological:  Moves all four extremities spontaneously, sensation grossly intact    LABS, IMAGING, & OTHER STUDIES:  Lab Results:  I have personally reviewed pertinent labs. Results from last 7 days   Lab Units 08/18/23  0511 08/17/23  0555 08/16/23  0554   POTASSIUM mmol/L 4.6 5.1 4.8   CHLORIDE mmol/L 107 106 106   CO2 mmol/L 27 28 27   BUN mg/dL 36* 44* 31*   CREATININE mg/dL 0.46* 0.62 0.49*   EGFR ml/min/1.73sq m 120 109 118   CALCIUM mg/dL 8.6 8.5 8.7   AST U/L  --   --  12   ALT U/L  --   --  32   ALK PHOS U/L  --   --  58     Results from last 7 days   Lab Units 08/18/23  0511 08/17/23  0555 08/16/23  1747 08/16/23  0554   WBC Thousand/uL 19.05* 20.27*  --  18.73*   HEMOGLOBIN g/dL 8.5* 8.6*  --  9.9*   PLATELETS Thousands/uL 388 398* 224 476*           Imaging Studies:   I have personally reviewed pertinent imaging study reports and images in PACS. CT chest reviewed personally no active infiltrates    EKG, Pathology, and Other Studies:   I have personally reviewed pertinent reports.

## 2023-08-18 NOTE — PLAN OF CARE
Problem: MOBILITY - ADULT  Goal: Maintain or return to baseline ADL function  Description: INTERVENTIONS:  -  Assess patient's ability to carry out ADLs; assess patient's baseline for ADL function and identify physical deficits which impact ability to perform ADLs (bathing, care of mouth/teeth, toileting, grooming, dressing, etc.)  - Assess/evaluate cause of self-care deficits   - Assess range of motion  - Assess patient's mobility; develop plan if impaired  - Assess patient's need for assistive devices and provide as appropriate  - Encourage maximum independence but intervene and supervise when necessary  - Involve family in performance of ADLs  - Assess for home care needs following discharge   - Consider OT consult to assist with ADL evaluation and planning for discharge  - Provide patient education as appropriate  Outcome: Progressing     Problem: PAIN - ADULT  Goal: Verbalizes/displays adequate comfort level or baseline comfort level  Description: Interventions:  - Encourage patient to monitor pain and request assistance  - Assess pain using appropriate pain scale  - Administer analgesics based on type and severity of pain and evaluate response  - Implement non-pharmacological measures as appropriate and evaluate response  - Consider cultural and social influences on pain and pain management  - Notify physician/advanced practitioner if interventions unsuccessful or patient reports new pain  Outcome: Progressing     Problem: INFECTION - ADULT  Goal: Absence or prevention of progression during hospitalization  Description: INTERVENTIONS:  - Assess and monitor for signs and symptoms of infection  - Monitor lab/diagnostic results  - Monitor all insertion sites, i.e. indwelling lines, tubes, and drains  - Monitor endotracheal if appropriate and nasal secretions for changes in amount and color  - [place] appropriate cooling/warming therapies per order  - Administer medications as ordered  - Instruct and encourage patient and family to use good hand hygiene technique  - Identify and instruct in appropriate isolation precautions for identified infection/condition  Outcome: Progressing     Problem: SAFETY ADULT  Goal: Maintain or return to baseline ADL function  Description: INTERVENTIONS:  -  Assess patient's ability to carry out ADLs; assess patient's baseline for ADL function and identify physical deficits which impact ability to perform ADLs (bathing, care of mouth/teeth, toileting, grooming, dressing, etc.)  - Assess/evaluate cause of self-care deficits   - Assess range of motion  - Assess patient's mobility; develop plan if impaired  - Assess patient's need for assistive devices and provide as appropriate  - Encourage maximum independence but intervene and supervise when necessary  - Involve family in performance of ADLs  - Assess for home care needs following discharge   - Consider OT consult to assist with ADL evaluation and planning for discharge  - Provide patient education as appropriate  Outcome: Progressing

## 2023-08-18 NOTE — PROGRESS NOTES
[address] [facility]  Progress Note  Name: Altagracia Marti I  MRN: [id number]  Unit/Bed#: [facility] 734-01 I Date of Admission: 8/14/2023   Date of Service: 8/18/2023 I Hospital Day: 4    Assessment/Plan   * Vasculitis [facility])  Assessment & Plan  • Presented with bilateral upper and lower extremity pain, weakness, and numbness. Has hx of RA, not currently on treatment. • MRI cervical,thoracic and lumbar spinal without abnormalities or pathologic enhancement. • LP with normal WBCs, protein, negative Gram stain, elevated IgG. • Work-up illustrated evidence of B6 deficiency for which supplementation was ordered. • Autoimmune work-up with elevated inflammatory marker, elevated p-ANCA MPO antibodies and positive VALDO. • Suspicion for P-ANCA vasculitis. • Patient is status post nerve and muscle biopsy. Preliminary results suspicious for vasculitis. Await final results. • Discussed with rheumatology Dr Robert Delacruz who recommended the following:  • CT chest without contrast which showed Platelike atelectasis or scarring in the lung bases. • UA, urine protein creatinine ratio showing microscopic hematuria and small proteins. • chronic hepatitis panel negative. QuantiFERON test indeterminate. • Pulse dose steroid x 3 days. • Plan to start rituximab pending nerve/muscle biopsy results. • Formal rheum consult to follow. Rheumatoid arthritis ([address])  Assessment & Plan  • Patient with longstanding history of rheumatoid arthritis, has undergone several joint fusions. she was intolerant to multiple DMARDs/Biologics and currently not on any maintenance therapy. • Continue IV Solu-Medrol as above. • Rheumatology evaluation appreciated. SIRS (systemic inflammatory response syndrome) (HCC)  Assessment & Plan  · As evidenced by tachycardia and leukocytosis. · Check EKG  · Leukocytosis secondary to steroid use. · Patient appears clinically non toxic and afebrile. · Monitor. Anemia  Assessment & Plan  Likely anemia of chronic disease. Monitor hemoglobin and transfuse as needed. Ambulatory dysfunction  Assessment & Plan  At baseline, patient is wheelchair-bound outside the house and can only walk few steps with assistance at home. Now, with declining functional status. PT/OT. Leucocytosis  Assessment & Plan  Secondary to steroid use. Afebrile, nontoxic without evidence of infection. Monitor. Neuropathy involving both lower extremities  Assessment & Plan  As above        Vitamin B6 deficiency  Assessment & Plan  Replacement ordered. Moderate protein-calorie malnutrition ([address])  Assessment & Plan  Malnutrition Findings:   Adult Malnutrition type: Chronic illness  Adult Degree of Malnutrition: Malnutrition of moderate degree  Malnutrition Characteristics: Muscle loss, Fat loss                  360 Statement: Malnutrition of moderate degree in the context of chronic illness as evidenced by moderate depletion of the temples, visible clavicle and moderate buccal fat pad loss. Treated with oral diet and oral nutrition supplement. BMI Findings:  Adult BMI Classifications: Underweight < 18.5        Body mass index is 16.64 kg/m². Asthma  Assessment & Plan  Continue with current regimen. VTE Pharmacologic Prophylaxis:   Moderate Risk (Score 3-4) - Pharmacological DVT Prophylaxis Ordered: heparin. Patient Centered Rounds: I performed bedside rounds with nursing staff today. Discussions with Specialists or Other Care Team Provider: Neuro, Rheum, ID    Education and Discussions with Family / Patient:  to be updated later.      Total Time Spent on Date of Encounter in care of patient: 35 minutes This time was spent on one or more of the following: performing physical exam; counseling and coordination of care; obtaining or reviewing history; documenting in the medical record; reviewing/ordering tests, medications or procedures; communicating with other healthcare professionals and discussing with patient's family/caregivers. Current Length of Stay: 4 day(s)  Current Patient Status: Inpatient   Certification Statement: The patient will continue to require additional inpatient hospital stay due to vasculitis workup  Discharge Plan: Anticipate discharge in 48-72 hrs to discharge location to be determined pending rehab evaluations. Code Status: Level 1 - Full Code    Subjective:   Patient was seen at bedside. She reports she is doing overall well. She offers no acute complaints. Objective:     Vitals:   Temp (24hrs), Av.7 °F (36.5 °C), Min:97.4 °F (36.3 °C), Max:97.9 °F (36.6 °C)    Temp:  [97.4 °F (36.3 °C)-97.9 °F (36.6 °C)] 97.4 °F (36.3 °C)  HR:  [] 129  Resp:  [13-16] 13  BP: (101-113)/(70-71) 113/70  SpO2:  [94 %-95 %] 95 %  Body mass index is 16.64 kg/m². Input and Output Summary (last 24 hours): Intake/Output Summary (Last 24 hours) at 2023 1151  Last data filed at 2023 0600  Gross per 24 hour   Intake 120 ml   Output --   Net 120 ml       Physical Exam:   Physical Exam  Vitals and nursing note reviewed. Constitutional:       General: She is not in acute distress. Appearance: She is well-developed. Comments: Cachectic   HENT:      Head: Normocephalic and atraumatic. Eyes:      Conjunctiva/sclera: Conjunctivae normal.   Cardiovascular:      Rate and Rhythm: Normal rate and regular rhythm. Heart sounds: No murmur heard. Pulmonary:      Effort: Pulmonary effort is normal. No respiratory distress. Breath sounds: Normal breath sounds. Abdominal:      Palpations: Abdomen is soft. Tenderness: There is no abdominal tenderness. Musculoskeletal:         General: Deformity present. No swelling. Cervical back: Neck supple. Skin:     General: Skin is warm and dry. Capillary Refill: Capillary refill takes less than 2 seconds. Neurological:      Mental Status: She is alert.    Psychiatric: Mood and Affect: Mood normal.          Additional Data:     Labs:  Results from last 7 days   Lab Units 08/18/23  0511   WBC Thousand/uL 19.05*   HEMOGLOBIN g/dL 8.5*   HEMATOCRIT % 27.3*   PLATELETS Thousands/uL 388   NEUTROS PCT % 83*   LYMPHS PCT % 12*   MONOS PCT % 4   EOS PCT % 0     Results from last 7 days   Lab Units 08/18/23  0511 08/17/23  0555 08/16/23  0554   SODIUM mmol/L 138   < > 136   POTASSIUM mmol/L 4.6   < > 4.8   CHLORIDE mmol/L 107   < > 106   CO2 mmol/L 27   < > 27   BUN mg/dL 36*   < > 31*   CREATININE mg/dL 0.46*   < > 0.49*   ANION GAP mmol/L 4   < > 3   CALCIUM mg/dL 8.6   < > 8.7   ALBUMIN g/dL  --   --  2.6*   TOTAL BILIRUBIN mg/dL  --   --  0.43   ALK PHOS U/L  --   --  58   ALT U/L  --   --  32   AST U/L  --   --  12   GLUCOSE RANDOM mg/dL 128   < > 125    < > = values in this interval not displayed.      Results from last 7 days   Lab Units 08/17/23  0555   INR  0.98             Results from last 7 days   Lab Units 08/12/23  0605   PROCALCITONIN ng/ml 0.09       Lines/Drains:  Invasive Devices     Peripheral Intravenous Line  Duration           Long-Dwell Peripheral IV (Midline) 25/86/59 Right Basilic 9 days                      Imaging: Reviewed radiology reports from this admission including: chest CT scan    Recent Cultures (last 7 days):         Last 24 Hours Medication List:   Current Facility-Administered Medications   Medication Dose Route Frequency Provider Last Rate   • albuterol  2 puff Inhalation Q4H PRN Earline Xavier PA-C     • bisacodyl  10 mg Rectal Daily PRN Moe Xavier PA-C     • cholecalciferol  400 Units Oral Daily Earline Xavier PA-C     • cyanocobalamin  1,000 mcg Oral Daily Earline Xavier PA-C     • diphenhydramine, lidocaine, Al/Mg hydroxide, simethicone  10 mL Swish & Spit Q4H PRN Earline Xavier PA-C     • docusate sodium  100 mg Oral BID Earline Xavier PA-C     • gabapentin  600 mg Oral BID Nii Ellis MD     • gabapentin 900 mg Oral HS Mono Reece MD     • heparin (porcine)  5,000 Units Subcutaneous [facility] Earline Xavier PA-C     • HYDROcodone-acetaminophen  1 tablet Oral Q6H PRN Earline Xavier PA-C     • magnesium Oxide  400 mg Oral HS Earline Xavier PA-C     • methylPREDNISolone sodium succinate  250 mg Intravenous Daily Luis M Guzman  mg (08/18/23 0900)   • morphine injection  2 mg Intravenous Q4H PRN Earline Xavier PA-C     • ondansetron  4 mg Intravenous Q6H PRN Earline Xavier PA-C     • pyridoxine  50 mg Oral Daily Earline Xavier PA-C     • senna  1 tablet Oral Daily Earline Xavier PA-C          Today, Patient Was Seen By: Luis M Guzman MD    **Please Note: This note may have been constructed using a voice recognition system. **

## 2023-08-18 NOTE — CASE MANAGEMENT
Case Management Discharge Planning Note    Patient name Miriam Goss  Location [facility] [id number] MRN [id number]  : 1975 Date 2023       Current Admission Date: 2023  Current Admission Diagnosis:Vasculitis [facility])   Patient Active Problem List    Diagnosis Date Noted   • SIRS (systemic inflammatory response syndrome) ([address]) 2023   • Leucocytosis 08/15/2023   • Ambulatory dysfunction 08/15/2023   • Anemia 08/15/2023   • Neuropathy involving both lower extremities    • Vitamin B6 deficiency 08/10/2023   • Moderate protein-calorie malnutrition ([address]) 2023   • Vasculitis ([address]) 2023   • Microcytic anemia 2023   • 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   • Asthma 2023   • Psoriasis 2023   • DJD (degenerative joint disease) 2022   • Hyponatremia 2020   • Status post left hip replacement 2020   • Osteoarthritis of left hip 2020   • Ankle arthritis 2017   • Acquired bilateral club feet 2017   • Abnormal electrocardiogram 2016   • Abnormal findings on diagnostic imaging of heart and coronary circulation 2016   • Cardiac murmur 2016   • Cardiomyopathy ([address]) 2016   • Nonrheumatic mitral valve insufficiency 2016   • Blue color skin 2014   • Rheumatoid arthritis ([address]) 2014      LOS (days): 4  Geometric Mean LOS (GMLOS) (days): 4.30  Days to GMLOS:0.5     OBJECTIVE:  Risk of Unplanned Readmission Score: 15.35         Current admission status: Inpatient   Preferred Pharmacy:   [address] [facility] [address], [address] [place] [address] [facility]. [id number]  [address] [facility].  [address] Brayan [place]ARSON [id number]  Phone: [phone] Fax: [phone]    [address] [facility], [address] [place] [address]  Phone: [phone] Fax: [phone]    Primary Care Provider: Jyothi Myers DO    Primary Insurance: MEDICARE  Secondary Insurance:     DISCHARGE DETAILS: Additional Comments: Pt to remain IP through the weekend. Nerve biopsy completed results are pending.  Rhematology to assess on 8/21

## 2023-08-18 NOTE — ASSESSMENT & PLAN NOTE
· As evidenced by tachycardia and leukocytosis. · Check EKG  · Leukocytosis secondary to steroid use. · Patient appears clinically non toxic and afebrile. · Monitor.

## 2023-08-18 NOTE — ASSESSMENT & PLAN NOTE
Malnutrition Findings:   Adult Malnutrition type: Chronic illness  Adult Degree of Malnutrition: Malnutrition of moderate degree  Malnutrition Characteristics: Muscle loss, Fat loss                  360 Statement: Malnutrition of moderate degree in the context of chronic illness as evidenced by moderate depletion of the temples, visible clavicle and moderate buccal fat pad loss. Treated with oral diet and oral nutrition supplement. BMI Findings:  Adult BMI Classifications: Underweight < 18.5        Body mass index is 16.64 kg/m².

## 2023-08-19 LAB
CREAT UR-MCNC: 57.6 MG/DL
GLUCOSE SERPL-MCNC: 108 MG/DL (ref 65–140)
PROT UR-MCNC: 26 MG/DL
PROT/CREAT UR: 0.45 MG/G{CREAT} (ref 0–0.1)

## 2023-08-19 PROCEDURE — 84156 ASSAY OF PROTEIN URINE: CPT | Performed by: INTERNAL MEDICINE

## 2023-08-19 PROCEDURE — 99232 SBSQ HOSP IP/OBS MODERATE 35: CPT | Performed by: STUDENT IN AN ORGANIZED HEALTH CARE EDUCATION/TRAINING PROGRAM

## 2023-08-19 PROCEDURE — 82948 REAGENT STRIP/BLOOD GLUCOSE: CPT

## 2023-08-19 PROCEDURE — 82570 ASSAY OF URINE CREATININE: CPT | Performed by: INTERNAL MEDICINE

## 2023-08-19 PROCEDURE — 86480 TB TEST CELL IMMUN MEASURE: CPT | Performed by: INTERNAL MEDICINE

## 2023-08-19 RX ADMIN — HEPARIN SODIUM 5000 UNITS: 5000 INJECTION INTRAVENOUS; SUBCUTANEOUS at 22:03

## 2023-08-19 RX ADMIN — HEPARIN SODIUM 5000 UNITS: 5000 INJECTION INTRAVENOUS; SUBCUTANEOUS at 05:44

## 2023-08-19 RX ADMIN — PYRIDOXINE HCL TAB 50 MG 50 MG: 50 TAB at 09:09

## 2023-08-19 RX ADMIN — SENNOSIDES 8.6 MG: 8.6 TABLET, FILM COATED ORAL at 09:09

## 2023-08-19 RX ADMIN — GABAPENTIN 600 MG: 300 CAPSULE ORAL at 14:30

## 2023-08-19 RX ADMIN — DOCUSATE SODIUM 100 MG: 100 CAPSULE, LIQUID FILLED ORAL at 09:09

## 2023-08-19 RX ADMIN — GABAPENTIN 600 MG: 300 CAPSULE ORAL at 05:47

## 2023-08-19 RX ADMIN — HEPARIN SODIUM 5000 UNITS: 5000 INJECTION INTRAVENOUS; SUBCUTANEOUS at 14:30

## 2023-08-19 RX ADMIN — HYDROCODONE BITARTRATE AND ACETAMINOPHEN 1 TABLET: 5; 325 TABLET ORAL at 05:15

## 2023-08-19 RX ADMIN — MAGNESIUM OXIDE TAB 400 MG (241.3 MG ELEMENTAL MG) 400 MG: 400 (241.3 MG) TAB at 22:03

## 2023-08-19 RX ADMIN — HYDROCODONE BITARTRATE AND ACETAMINOPHEN 1 TABLET: 5; 325 TABLET ORAL at 22:03

## 2023-08-19 RX ADMIN — SODIUM CHLORIDE 250 MG: 0.9 INJECTION, SOLUTION INTRAVENOUS at 09:12

## 2023-08-19 RX ADMIN — GABAPENTIN 900 MG: 300 CAPSULE ORAL at 22:03

## 2023-08-19 RX ADMIN — CHOLECALCIFEROL TAB 10 MCG (400 UNIT) 400 UNITS: 10 TAB at 09:09

## 2023-08-19 RX ADMIN — CYANOCOBALAMIN TAB 500 MCG 1000 MCG: 500 TAB at 09:09

## 2023-08-19 NOTE — ASSESSMENT & PLAN NOTE
· As evidenced by tachycardia and leukocytosis. · Leukocytosis is likely secondary to steroid use, tachycardia improving  · Patient appears clinically non toxic and afebrile. · Monitor.

## 2023-08-19 NOTE — ASSESSMENT & PLAN NOTE
• Presented with bilateral upper and lower extremity pain, weakness, and numbness. Has hx of RA, not currently on treatment. • MRI cervical,thoracic and lumbar spinal without abnormalities or pathologic enhancement. • LP with normal WBCs, protein, negative Gram stain, elevated IgG. • Work-up illustrated evidence of B6 deficiency for which supplementation was ordered. • Autoimmune work-up with elevated inflammatory marker, elevated p-ANCA MPO antibodies and positive VALDO. • Suspicion for P-ANCA vasculitis. • Patient is status post nerve and muscle biopsy. Preliminary results suspicious for vasculitis. Await final results. • Discussed with rheumatology Dr Malaika Abdul who recommended the following:  • CT chest without contrast which showed Platelike atelectasis or scarring in the lung bases. • UA, urine protein creatinine ratio showing microscopic hematuria and small proteins. • chronic hepatitis panel negative. QuantiFERON test indeterminate, repeat pending. • Pulse dose steroid x 3 days. • Plan to start rituximab pending nerve/muscle biopsy results.

## 2023-08-19 NOTE — PLAN OF CARE
Problem: MOBILITY - ADULT  Goal: Maintain or return to baseline ADL function  Description: INTERVENTIONS:  -  Assess patient's ability to carry out ADLs; assess patient's baseline for ADL function and identify physical deficits which impact ability to perform ADLs (bathing, care of mouth/teeth, toileting, grooming, dressing, etc.)  - Assess/evaluate cause of self-care deficits   - Assess range of motion  - Assess patient's mobility; develop plan if impaired  - Assess patient's need for assistive devices and provide as appropriate  - Encourage maximum independence but intervene and supervise when necessary  - Involve family in performance of ADLs  - Assess for home care needs following discharge   - Consider OT consult to assist with ADL evaluation and planning for discharge  - Provide patient education as appropriate  Outcome: Progressing     Problem: PAIN - ADULT  Goal: Verbalizes/displays adequate comfort level or baseline comfort level  Description: Interventions:  - Encourage patient to monitor pain and request assistance  - Assess pain using appropriate pain scale  - Administer analgesics based on type and severity of pain and evaluate response  - Implement non-pharmacological measures as appropriate and evaluate response  - Consider cultural and social influences on pain and pain management  - Notify physician/advanced practitioner if interventions unsuccessful or patient reports new pain  Outcome: Progressing     Problem: INFECTION - ADULT  Goal: Absence or prevention of progression during hospitalization  Description: INTERVENTIONS:  - Assess and monitor for signs and symptoms of infection  - Monitor lab/diagnostic results  - Monitor all insertion sites, i.e. indwelling lines, tubes, and drains  - Monitor endotracheal if appropriate and nasal secretions for changes in amount and color  - [place] appropriate cooling/warming therapies per order  - Administer medications as ordered  - Instruct and encourage patient and family to use good hand hygiene technique  - Identify and instruct in appropriate isolation precautions for identified infection/condition  Outcome: Progressing     Problem: SAFETY ADULT  Goal: Maintain or return to baseline ADL function  Description: INTERVENTIONS:  -  Assess patient's ability to carry out ADLs; assess patient's baseline for ADL function and identify physical deficits which impact ability to perform ADLs (bathing, care of mouth/teeth, toileting, grooming, dressing, etc.)  - Assess/evaluate cause of self-care deficits   - Assess range of motion  - Assess patient's mobility; develop plan if impaired  - Assess patient's need for assistive devices and provide as appropriate  - Encourage maximum independence but intervene and supervise when necessary  - Involve family in performance of ADLs  - Assess for home care needs following discharge   - Consider OT consult to assist with ADL evaluation and planning for discharge  - Provide patient education as appropriate  Outcome: Progressing

## 2023-08-19 NOTE — PROGRESS NOTES
[address] [facility]  Progress Note  Name: Eva Trejo I  MRN: [id number]  Unit/Bed#: [facility] 734-01 I Date of Admission: 8/14/2023   Date of Service: 8/19/2023 I Hospital Day: 5    Assessment/Plan   * Vasculitis [facility])  Assessment & Plan  • Presented with bilateral upper and lower extremity pain, weakness, and numbness. Has hx of RA, not currently on treatment. • MRI cervical,thoracic and lumbar spinal without abnormalities or pathologic enhancement. • LP with normal WBCs, protein, negative Gram stain, elevated IgG. • Work-up illustrated evidence of B6 deficiency for which supplementation was ordered. • Autoimmune work-up with elevated inflammatory marker, elevated p-ANCA MPO antibodies and positive VALDO. • Suspicion for P-ANCA vasculitis. • Patient is status post nerve and muscle biopsy. Preliminary results suspicious for vasculitis. Await final results. • Discussed with rheumatology Dr Debbie Weiner who recommended the following:  • CT chest without contrast which showed Platelike atelectasis or scarring in the lung bases. • UA, urine protein creatinine ratio showing microscopic hematuria and small proteins. • chronic hepatitis panel negative. QuantiFERON test indeterminate, repeat pending. • Pulse dose steroid x 3 days. • Plan to start rituximab pending nerve/muscle biopsy results. Rheumatoid arthritis ([address])  Assessment & Plan  • Patient with longstanding history of rheumatoid arthritis, has undergone several joint fusions. she was intolerant to multiple DMARDs/Biologics and currently not on any maintenance therapy. • Continue IV Solu-Medrol as above. • Rheumatology evaluation appreciated. SIRS (systemic inflammatory response syndrome) (HCC)  Assessment & Plan  · As evidenced by tachycardia and leukocytosis. · Leukocytosis is likely secondary to steroid use, tachycardia improving  · Patient appears clinically non toxic and afebrile. · Monitor. Anemia  Assessment & Plan  · Likely anemia of chronic disease. · Monitor hemoglobin and transfuse as needed. Ambulatory dysfunction  Assessment & Plan  At baseline, patient is wheelchair-bound outside the house and can only walk few steps with assistance at home. Now, with declining functional status. PT/OT. Leucocytosis  Assessment & Plan  Secondary to steroid use. Afebrile, nontoxic without evidence of infection. Monitor. Neuropathy involving both lower extremities  Assessment & Plan  As above        Vitamin B6 deficiency  Assessment & Plan  Replacement ordered. Moderate protein-calorie malnutrition ([address])  Assessment & Plan  Malnutrition Findings:   Adult Malnutrition type: Chronic illness  Adult Degree of Malnutrition: Malnutrition of moderate degree  Malnutrition Characteristics: Muscle loss, Fat loss                  360 Statement: Malnutrition of moderate degree in the context of chronic illness as evidenced by moderate depletion of the temples, visible clavicle and moderate buccal fat pad loss. Treated with oral diet and oral nutrition supplement. BMI Findings:  Adult BMI Classifications: Underweight < 18.5        Body mass index is 16.64 kg/m². Asthma  Assessment & Plan  Continue with current regimen. VTE Pharmacologic Prophylaxis:   Moderate Risk (Score 3-4) - Pharmacological DVT Prophylaxis Ordered: heparin. Patient Centered Rounds: I performed bedside rounds with nursing staff today. Discussions with Specialists or Other Care Team Provider: N/a    Education and Discussions with Family / Patient:  to be updated .      Total Time Spent on Date of Encounter in care of patient: 35 minutes This time was spent on one or more of the following: performing physical exam; counseling and coordination of care; obtaining or reviewing history; documenting in the medical record; reviewing/ordering tests, medications or procedures; communicating with other healthcare professionals and discussing with patient's family/caregivers. Current Length of Stay: 5 day(s)  Current Patient Status: Inpatient   Certification Statement: The patient will continue to require additional inpatient hospital stay due to vasculitis workup  Discharge Plan: Anticipate discharge in 48-72 hrs to discharge location to be determined pending rehab evaluations. Code Status: Level 1 - Full Code    Subjective:   Patient was seen and examined at bedside. She feels overall. She reports that her numbness has improved with the high-dose steroids. No acute complaints. Objective:     Vitals:   Temp (24hrs), Av.7 °F (36.5 °C), Min:97.5 °F (36.4 °C), Max:97.9 °F (36.6 °C)    Temp:  [97.5 °F (36.4 °C)-97.9 °F (36.6 °C)] 97.5 °F (36.4 °C)  HR:  [96-99] 96  Resp:  [16-18] 18  BP: (98)/(65) 98/65  SpO2:  [95 %-97 %] 97 %  Body mass index is 16.64 kg/m². Input and Output Summary (last 24 hours):   No intake or output data in the 24 hours ending 23 3957    Physical Exam:   Physical Exam  Vitals and nursing note reviewed. Constitutional:       General: She is not in acute distress. Appearance: She is well-developed. Comments: Malnourished patient   HENT:      Head: Normocephalic and atraumatic. Cardiovascular:      Rate and Rhythm: Normal rate and regular rhythm. Heart sounds: No murmur heard. Pulmonary:      Effort: Pulmonary effort is normal. No respiratory distress. Breath sounds: Normal breath sounds. Abdominal:      Palpations: Abdomen is soft. Tenderness: There is no abdominal tenderness. Musculoskeletal:         General: Deformity present. No swelling. Cervical back: Neck supple. Comments: Decreased ROM   Skin:     General: Skin is warm and dry. Capillary Refill: Capillary refill takes less than 2 seconds. Neurological:      Mental Status: She is alert.    Psychiatric:         Mood and Affect: Mood normal.         Additional Data: Labs:  Results from last 7 days   Lab Units 08/18/23  0511   WBC Thousand/uL 19.05*   HEMOGLOBIN g/dL 8.5*   HEMATOCRIT % 27.3*   PLATELETS Thousands/uL 388   NEUTROS PCT % 83*   LYMPHS PCT % 12*   MONOS PCT % 4   EOS PCT % 0     Results from last 7 days   Lab Units 08/18/23  0511 08/17/23  0555 08/16/23  0554   SODIUM mmol/L 138   < > 136   POTASSIUM mmol/L 4.6   < > 4.8   CHLORIDE mmol/L 107   < > 106   CO2 mmol/L 27   < > 27   BUN mg/dL 36*   < > 31*   CREATININE mg/dL 0.46*   < > 0.49*   ANION GAP mmol/L 4   < > 3   CALCIUM mg/dL 8.6   < > 8.7   ALBUMIN g/dL  --   --  2.6*   TOTAL BILIRUBIN mg/dL  --   --  0.43   ALK PHOS U/L  --   --  58   ALT U/L  --   --  32   AST U/L  --   --  12   GLUCOSE RANDOM mg/dL 128   < > 125    < > = values in this interval not displayed.      Results from last 7 days   Lab Units 08/17/23  0555   INR  0.98     Results from last 7 days   Lab Units 08/19/23  1036   POC GLUCOSE mg/dl 108               Lines/Drains:  Invasive Devices     Peripheral Intravenous Line  Duration           Long-Dwell Peripheral IV (Midline) 90/65/79 Right Basilic 11 days                      Imaging: Reviewed radiology reports from this admission including: chest CT scan    Recent Cultures (last 7 days):         Last 24 Hours Medication List:   Current Facility-Administered Medications   Medication Dose Route Frequency Provider Last Rate   • albuterol  2 puff Inhalation Q4H PRN Earline Xavier PA-C     • bisacodyl  10 mg Rectal Daily PRN Ishmael Osler Ireifej, PA-C     • cholecalciferol  400 Units Oral Daily Earline Xavier PA-C     • cyanocobalamin  1,000 mcg Oral Daily Earline Xavier PA-C     • diphenhydramine, lidocaine, Al/Mg hydroxide, simethicone  10 mL Swish & Spit Q4H PRN Earline Xavier PA-C     • docusate sodium  100 mg Oral BID Earline Xavier PA-C     • gabapentin  600 mg Oral BID Stella Sanchez MD     • gabapentin  900 mg Oral HS Stella Sanchez MD     • heparin (porcine) 5,000 Units Subcutaneous Q8H [address] Earline Xavier PA-C     • HYDROcodone-acetaminophen  1 tablet Oral Q6H PRN Earline Xavier PA-C     • magnesium Oxide  400 mg Oral HS Earline Xavier PA-C     • methylPREDNISolone sodium succinate  250 mg Intravenous Daily Robert Bowens  mg (08/19/23 0912)   • morphine injection  2 mg Intravenous Q4H PRN Earline Xavier PA-C     • ondansetron  4 mg Intravenous Q6H PRN Earline Xavier PA-C     • pyridoxine  50 mg Oral Daily Earline Xavier PA-C     • senna  1 tablet Oral Daily Earline Xavier PA-C     • tuberculin  5 Units Intradermal Once [place] D[place] MD          Today, Patient Was Seen By: Robert Bowens MD    **Please Note: This note may have been constructed using a voice recognition system. **

## 2023-08-20 PROBLEM — R80.9 PROTEINURIA: Status: ACTIVE | Noted: 2023-08-20

## 2023-08-20 PROBLEM — R31.29 MICROSCOPIC HEMATURIA: Status: ACTIVE | Noted: 2023-08-20

## 2023-08-20 LAB
ANION GAP SERPL CALCULATED.3IONS-SCNC: 2 MMOL/L
BUN SERPL-MCNC: 32 MG/DL (ref 5–25)
CALCIUM SERPL-MCNC: 8.1 MG/DL (ref 8.3–10.1)
CHLORIDE SERPL-SCNC: 108 MMOL/L (ref 96–108)
CO2 SERPL-SCNC: 29 MMOL/L (ref 21–32)
CREAT SERPL-MCNC: 0.31 MG/DL (ref 0.6–1.3)
CREAT UR-MCNC: 27.1 MG/DL
GFR SERPL CREATININE-BSD FRML MDRD: 137 ML/MIN/1.73SQ M
GLUCOSE SERPL-MCNC: 89 MG/DL (ref 65–140)
POTASSIUM SERPL-SCNC: 4.4 MMOL/L (ref 3.5–5.3)
PROT UR-MCNC: 14 MG/DL
PROT/CREAT UR: 0.52 MG/G{CREAT} (ref 0–0.1)
SODIUM SERPL-SCNC: 139 MMOL/L (ref 135–147)

## 2023-08-20 PROCEDURE — 84156 ASSAY OF PROTEIN URINE: CPT | Performed by: INTERNAL MEDICINE

## 2023-08-20 PROCEDURE — 80048 BASIC METABOLIC PNL TOTAL CA: CPT | Performed by: STUDENT IN AN ORGANIZED HEALTH CARE EDUCATION/TRAINING PROGRAM

## 2023-08-20 PROCEDURE — 99232 SBSQ HOSP IP/OBS MODERATE 35: CPT | Performed by: STUDENT IN AN ORGANIZED HEALTH CARE EDUCATION/TRAINING PROGRAM

## 2023-08-20 PROCEDURE — 82570 ASSAY OF URINE CREATININE: CPT | Performed by: INTERNAL MEDICINE

## 2023-08-20 PROCEDURE — 99222 1ST HOSP IP/OBS MODERATE 55: CPT | Performed by: INTERNAL MEDICINE

## 2023-08-20 RX ORDER — METHYLPREDNISOLONE SODIUM SUCCINATE 40 MG/ML
30 INJECTION, POWDER, LYOPHILIZED, FOR SOLUTION INTRAMUSCULAR; INTRAVENOUS EVERY 12 HOURS SCHEDULED
Status: DISCONTINUED | OUTPATIENT
Start: 2023-08-21 | End: 2023-08-25 | Stop reason: HOSPADM

## 2023-08-20 RX ADMIN — PYRIDOXINE HCL TAB 50 MG 50 MG: 50 TAB at 09:36

## 2023-08-20 RX ADMIN — HYDROCODONE BITARTRATE AND ACETAMINOPHEN 1 TABLET: 5; 325 TABLET ORAL at 21:53

## 2023-08-20 RX ADMIN — CHOLECALCIFEROL TAB 10 MCG (400 UNIT) 400 UNITS: 10 TAB at 09:36

## 2023-08-20 RX ADMIN — GABAPENTIN 900 MG: 300 CAPSULE ORAL at 21:25

## 2023-08-20 RX ADMIN — GABAPENTIN 600 MG: 300 CAPSULE ORAL at 14:02

## 2023-08-20 RX ADMIN — GABAPENTIN 600 MG: 300 CAPSULE ORAL at 05:17

## 2023-08-20 RX ADMIN — HEPARIN SODIUM 5000 UNITS: 5000 INJECTION INTRAVENOUS; SUBCUTANEOUS at 05:18

## 2023-08-20 RX ADMIN — CYANOCOBALAMIN TAB 500 MCG 1000 MCG: 500 TAB at 09:36

## 2023-08-20 RX ADMIN — HEPARIN SODIUM 5000 UNITS: 5000 INJECTION INTRAVENOUS; SUBCUTANEOUS at 21:26

## 2023-08-20 RX ADMIN — HYDROCODONE BITARTRATE AND ACETAMINOPHEN 1 TABLET: 5; 325 TABLET ORAL at 14:02

## 2023-08-20 RX ADMIN — SODIUM CHLORIDE 250 MG: 0.9 INJECTION, SOLUTION INTRAVENOUS at 09:36

## 2023-08-20 RX ADMIN — MAGNESIUM OXIDE TAB 400 MG (241.3 MG ELEMENTAL MG) 400 MG: 400 (241.3 MG) TAB at 21:26

## 2023-08-20 RX ADMIN — HEPARIN SODIUM 5000 UNITS: 5000 INJECTION INTRAVENOUS; SUBCUTANEOUS at 14:02

## 2023-08-20 NOTE — PROGRESS NOTES
[address] [facility]  Progress Note  Name: Adria Aly I  MRN: [id number]  Unit/Bed#: [facility]PHP 734-01 I Date of Admission: 8/14/2023   Date of Service: 8/20/2023 I Hospital Day: 6    Assessment/Plan   * Vasculitis [facility])  Assessment & Plan  • Presented with bilateral upper and lower extremity pain, weakness, and numbness. Has hx of RA, not currently on treatment. • MRI cervical,thoracic and lumbar spinal without abnormalities or pathologic enhancement. • LP with normal WBCs, protein, negative Gram stain, elevated IgG. • Work-up illustrated evidence of B6 deficiency for which supplementation was ordered. • Autoimmune work-up with elevated inflammatory marker, elevated p-ANCA MPO antibodies and positive VALDO. • Suspicion for P-ANCA vasculitis. • Patient is status post nerve and muscle biopsy. Preliminary results suspicious for vasculitis. Await final results. • Discussed with rheumatology Dr Jacinto Christine who recommended the following:  • CT chest without contrast which showed Platelike atelectasis or scarring in the lung bases. • UA, urine protein creatinine ratio showing microscopic hematuria and small proteins. • chronic hepatitis panel negative. QuantiFERON test indeterminate, repeat pending. • Completed pulse dose steroid x 3 days. Continue with Solu-Medrol 30 mg twice daily. • Plan to start rituximab pending nerve/muscle biopsy results. Rheumatoid arthritis ([address])  Assessment & Plan  • Patient with longstanding history of rheumatoid arthritis, has undergone several joint fusions. she was intolerant to multiple DMARDs/Biologics and currently not on any maintenance therapy. • Continue IV Solu-Medrol as above. • Rheumatology evaluation appreciated. Microscopic hematuria  Assessment & Plan  Nephrology consulted. SIRS (systemic inflammatory response syndrome) (HCC)  Assessment & Plan  · As evidenced by tachycardia and leukocytosis.    · Leukocytosis is likely secondary to steroid use, tachycardia improving  · Patient appears clinically non toxic and afebrile. · Monitor. Anemia  Assessment & Plan  · Likely anemia of chronic disease. · Monitor hemoglobin and transfuse as needed. Ambulatory dysfunction  Assessment & Plan  At baseline, patient is wheelchair-bound outside the house and can only walk few steps with assistance at home. Now, with declining functional status. PT/OT. Leucocytosis  Assessment & Plan  Secondary to steroid use. Afebrile, nontoxic without evidence of infection. Monitor. Neuropathy involving both lower extremities  Assessment & Plan  As above        Vitamin B6 deficiency  Assessment & Plan  Replacement ordered. Moderate protein-calorie malnutrition ([address])  Assessment & Plan  Malnutrition Findings:   Adult Malnutrition type: Chronic illness  Adult Degree of Malnutrition: Malnutrition of moderate degree  Malnutrition Characteristics: Muscle loss, Fat loss                  360 Statement: Malnutrition of moderate degree in the context of chronic illness as evidenced by moderate depletion of the temples, visible clavicle and moderate buccal fat pad loss. Treated with oral diet and oral nutrition supplement. BMI Findings:  Adult BMI Classifications: Underweight < 18.5        Body mass index is 16.61 kg/m². Asthma  Assessment & Plan  Continue with current regimen. VTE Pharmacologic Prophylaxis:   Moderate Risk (Score 3-4) - Pharmacological DVT Prophylaxis Ordered: heparin. Patient Centered Rounds: I performed bedside rounds with nursing staff today. Discussions with Specialists or Other Care Team Provider: Rheumatology    Education and Discussions with Family / Patient: Patient will update .      Total Time Spent on Date of Encounter in care of patient: 35 minutes This time was spent on one or more of the following: performing physical exam; counseling and coordination of care; obtaining or reviewing history; documenting in the medical record; reviewing/ordering tests, medications or procedures; communicating with other healthcare professionals and discussing with patient's family/caregivers. Current Length of Stay: 6 day(s)  Current Patient Status: Inpatient   Certification Statement: The patient will continue to require additional inpatient hospital stay due to Work-up for vasculitis  Discharge Plan: Anticipate discharge in 48-72 hrs to discharge location to be determined pending rehab evaluations. Code Status: Level 1 - Full Code    Subjective:   Patient was seen and examined this morning at bedside. She had no acute complaints. Objective:     Vitals:   Temp (24hrs), Av.6 °F (36.4 °C), Min:97.5 °F (36.4 °C), Max:97.6 °F (36.4 °C)    Temp:  [97.5 °F (36.4 °C)-97.6 °F (36.4 °C)] 97.6 °F (36.4 °C)  HR:  [88-96] 88  Resp:  [16-18] 16  BP: ()/(65-69) 100/69  SpO2:  [94 %-97 %] 94 %  Body mass index is 16.61 kg/m². Input and Output Summary (last 24 hours):   No intake or output data in the 24 hours ending 23 1227    Physical Exam:   Physical Exam  Vitals and nursing note reviewed. Constitutional:       General: She is not in acute distress. Appearance: She is well-developed. Comments: Malnourished   HENT:      Head: Normocephalic and atraumatic. Eyes:      Conjunctiva/sclera: Conjunctivae normal.   Cardiovascular:      Rate and Rhythm: Normal rate and regular rhythm. Heart sounds: No murmur heard. Pulmonary:      Effort: Pulmonary effort is normal. No respiratory distress. Breath sounds: Normal breath sounds. Abdominal:      Palpations: Abdomen is soft. Tenderness: There is no abdominal tenderness. Musculoskeletal:         General: Deformity present. No swelling. Cervical back: Neck supple. Comments: Decreased ROM   Skin:     General: Skin is warm and dry. Neurological:      Mental Status: She is alert.          Additional Data:     Labs:  Results from last 7 days   Lab Units 08/18/23  0511   WBC Thousand/uL 19.05*   HEMOGLOBIN g/dL 8.5*   HEMATOCRIT % 27.3*   PLATELETS Thousands/uL 388   NEUTROS PCT % 83*   LYMPHS PCT % 12*   MONOS PCT % 4   EOS PCT % 0     Results from last 7 days   Lab Units 08/20/23  0510 08/17/23  0555 08/16/23  0554   SODIUM mmol/L 139   < > 136   POTASSIUM mmol/L 4.4   < > 4.8   CHLORIDE mmol/L 108   < > 106   CO2 mmol/L 29   < > 27   BUN mg/dL 32*   < > 31*   CREATININE mg/dL 0.31*   < > 0.49*   ANION GAP mmol/L 2   < > 3   CALCIUM mg/dL 8.1*   < > 8.7   ALBUMIN g/dL  --   --  2.6*   TOTAL BILIRUBIN mg/dL  --   --  0.43   ALK PHOS U/L  --   --  58   ALT U/L  --   --  32   AST U/L  --   --  12   GLUCOSE RANDOM mg/dL 89   < > 125    < > = values in this interval not displayed.      Results from last 7 days   Lab Units 08/17/23  0555   INR  0.98     Results from last 7 days   Lab Units 08/19/23  1036   POC GLUCOSE mg/dl 108               Lines/Drains:  Invasive Devices     Peripheral Intravenous Line  Duration           Long-Dwell Peripheral IV (Midline) 53/60/87 Right Basilic 11 days                      Imaging: Reviewed radiology reports from this admission including: chest CT scan    Recent Cultures (last 7 days):         Last 24 Hours Medication List:   Current Facility-Administered Medications   Medication Dose Route Frequency Provider Last Rate   • albuterol  2 puff Inhalation Q4H PRN Earline Xavier PA-C     • bisacodyl  10 mg Rectal Daily PRN Moe Xavier PA-C     • cholecalciferol  400 Units Oral Daily Earline Xavier PA-C     • cyanocobalamin  1,000 mcg Oral Daily Earline Xavier PA-C     • diphenhydramine, lidocaine, Al/Mg hydroxide, simethicone  10 mL Swish & Spit Q4H PRN Earline Xavier PA-C     • docusate sodium  100 mg Oral BID Earline Xavier PA-C     • gabapentin  600 mg Oral BID Nii Ellis MD     • gabapentin  900 mg Oral HS Nii Ellis MD     • heparin (porcine)  5,000 Units Subcutaneous Q8H [address] Earline Xavier PA-C     • HYDROcodone-acetaminophen  1 tablet Oral Q6H PRN Earline Xavier PA-C     • magnesium Oxide  400 mg Oral HS Earline Xavier PA-C     • [START ON 8/21/2023] methylPREDNISolone sodium succinate  30 mg Intravenous Q12H [address] Mehdi Ernst MD     • morphine injection  2 mg Intravenous Q4H PRN Earline aXvier PA-C     • ondansetron  4 mg Intravenous Q6H PRN Earline Xavier PA-C     • pyridoxine  50 mg Oral Daily Earline Xavier PA-C     • senna  1 tablet Oral Daily Earline Xavier PA-C     • tuberculin  5 Units Intradermal Once Angelina Ambrosio MD          Today, Patient Was Seen By: Mehdi Ernst MD    **Please Note: This note may have been constructed using a voice recognition system. **

## 2023-08-20 NOTE — PLAN OF CARE
Problem: MOBILITY - ADULT  Goal: Maintain or return to baseline ADL function  Description: INTERVENTIONS:  -  Assess patient's ability to carry out ADLs; assess patient's baseline for ADL function and identify physical deficits which impact ability to perform ADLs (bathing, care of mouth/teeth, toileting, grooming, dressing, etc.)  - Assess/evaluate cause of self-care deficits   - Assess range of motion  - Assess patient's mobility; develop plan if impaired  - Assess patient's need for assistive devices and provide as appropriate  - Encourage maximum independence but intervene and supervise when necessary  - Involve family in performance of ADLs  - Assess for home care needs following discharge   - Consider OT consult to assist with ADL evaluation and planning for discharge  - Provide patient education as appropriate  Outcome: Progressing     Problem: PAIN - ADULT  Goal: Verbalizes/displays adequate comfort level or baseline comfort level  Description: Interventions:  - Encourage patient to monitor pain and request assistance  - Assess pain using appropriate pain scale  - Administer analgesics based on type and severity of pain and evaluate response  - Implement non-pharmacological measures as appropriate and evaluate response  - Consider cultural and social influences on pain and pain management  - Notify physician/advanced practitioner if interventions unsuccessful or patient reports new pain  Outcome: Progressing     Problem: INFECTION - ADULT  Goal: Absence or prevention of progression during hospitalization  Description: INTERVENTIONS:  - Assess and monitor for signs and symptoms of infection  - Monitor lab/diagnostic results  - Monitor all insertion sites, i.e. indwelling lines, tubes, and drains  - Monitor endotracheal if appropriate and nasal secretions for changes in amount and color  - [place] appropriate cooling/warming therapies per order  - Administer medications as ordered  - Instruct and encourage patient and family to use good hand hygiene technique  - Identify and instruct in appropriate isolation precautions for identified infection/condition  Outcome: Progressing     Problem: SAFETY ADULT  Goal: Maintain or return to baseline ADL function  Description: INTERVENTIONS:  -  Assess patient's ability to carry out ADLs; assess patient's baseline for ADL function and identify physical deficits which impact ability to perform ADLs (bathing, care of mouth/teeth, toileting, grooming, dressing, etc.)  - Assess/evaluate cause of self-care deficits   - Assess range of motion  - Assess patient's mobility; develop plan if impaired  - Assess patient's need for assistive devices and provide as appropriate  - Encourage maximum independence but intervene and supervise when necessary  - Involve family in performance of ADLs  - Assess for home care needs following discharge   - Consider OT consult to assist with ADL evaluation and planning for discharge  - Provide patient education as appropriate  Outcome: Progressing

## 2023-08-20 NOTE — ASSESSMENT & PLAN NOTE
• Presented with bilateral upper and lower extremity pain, weakness, and numbness. Has hx of RA, not currently on treatment. • MRI cervical,thoracic and lumbar spinal without abnormalities or pathologic enhancement. • LP with normal WBCs, protein, negative Gram stain, elevated IgG. • Work-up illustrated evidence of B6 deficiency for which supplementation was ordered. • Autoimmune work-up with elevated inflammatory marker, elevated p-ANCA MPO antibodies and positive VALDO. • Suspicion for P-ANCA vasculitis. • Patient is status post nerve and muscle biopsy. Preliminary results suspicious for vasculitis. Await final results. • Discussed with rheumatology Dr Nisreen Madera who recommended the following:  • CT chest without contrast which showed Platelike atelectasis or scarring in the lung bases. • UA, urine protein creatinine ratio showing microscopic hematuria and small proteins. • chronic hepatitis panel negative. QuantiFERON test indeterminate, repeat pending. • Completed pulse dose steroid x 3 days. Continue with Solu-Medrol 30 mg twice daily. • Plan to start rituximab pending nerve/muscle biopsy results.

## 2023-08-20 NOTE — PLAN OF CARE
Problem: MOBILITY - ADULT  Goal: Maintain or return to baseline ADL function  Description: INTERVENTIONS:  -  Assess patient's ability to carry out ADLs; assess patient's baseline for ADL function and identify physical deficits which impact ability to perform ADLs (bathing, care of mouth/teeth, toileting, grooming, dressing, etc.)  - Assess/evaluate cause of self-care deficits   - Assess range of motion  - Assess patient's mobility; develop plan if impaired  - Assess patient's need for assistive devices and provide as appropriate  - Encourage maximum independence but intervene and supervise when necessary  - Involve family in performance of ADLs  - Assess for home care needs following discharge   - Consider OT consult to assist with ADL evaluation and planning for discharge  - Provide patient education as appropriate  8/20/2023 1044 by Husam Quintanilla RN  Outcome: Progressing  8/20/2023 1044 by Husam Quintanilla RN  Outcome: Progressing  Goal: Maintains/Returns to pre admission functional level  Description: INTERVENTIONS:  - Perform BMAT or MOVE assessment daily.   - Set and communicate daily mobility goal to care team and patient/family/caregiver.    - Collaborate with rehabilitation services on mobility goals if consulted  - Out of bed for toileting  - Record patient progress and toleration of activity level   8/20/2023 1044 by Husam Quintanilla RN  Outcome: Progressing  8/20/2023 1044 by Husam Quintanilla RN  Outcome: Progressing     Problem: PAIN - ADULT  Goal: Verbalizes/displays adequate comfort level or baseline comfort level  Description: Interventions:  - Encourage patient to monitor pain and request assistance  - Assess pain using appropriate pain scale  - Administer analgesics based on type and severity of pain and evaluate response  - Implement non-pharmacological measures as appropriate and evaluate response  - Consider cultural and social influences on pain and pain management  - Notify physician/advanced practitioner if interventions unsuccessful or patient reports new pain  8/20/2023 1044 by Morris Kc RN  Outcome: Progressing  8/20/2023 1044 by Morris Kc RN  Outcome: Progressing     Problem: INFECTION - ADULT  Goal: Absence or prevention of progression during hospitalization  Description: INTERVENTIONS:  - Assess and monitor for signs and symptoms of infection  - Monitor lab/diagnostic results  - Monitor all insertion sites, i.e. indwelling lines, tubes, and drains  - Monitor endotracheal if appropriate and nasal secretions for changes in amount and color  - Warren appropriate cooling/warming therapies per order  - Administer medications as ordered  - Instruct and encourage patient and family to use good hand hygiene technique  - Identify and instruct in appropriate isolation precautions for identified infection/condition  8/20/2023 1044 by Morris Kc RN  Outcome: Progressing  8/20/2023 1044 by Morris Kc RN  Outcome: Progressing  Goal: Absence of fever/infection during neutropenic period  Description: INTERVENTIONS:  - Monitor WBC    8/20/2023 1044 by Morris Kc RN  Outcome: Progressing  8/20/2023 1044 by Morris Kc RN  Outcome: Progressing     Problem: SAFETY ADULT  Goal: Maintain or return to baseline ADL function  Description: INTERVENTIONS:  -  Assess patient's ability to carry out ADLs; assess patient's baseline for ADL function and identify physical deficits which impact ability to perform ADLs (bathing, care of mouth/teeth, toileting, grooming, dressing, etc.)  - Assess/evaluate cause of self-care deficits   - Assess range of motion  - Assess patient's mobility; develop plan if impaired  - Assess patient's need for assistive devices and provide as appropriate  - Encourage maximum independence but intervene and supervise when necessary  - Involve family in performance of ADLs  - Assess for home care needs following discharge   - Consider OT consult to assist with ADL evaluation and planning for discharge  - Provide patient education as appropriate  8/20/2023 1044 by Lilliam Almaraz RN  Outcome: Progressing  8/20/2023 1044 by Lilliam Almaraz RN  Outcome: Progressing  Goal: Maintains/Returns to pre admission functional level  Description: INTERVENTIONS:  - Perform BMAT or MOVE assessment daily.   - Set and communicate daily mobility goal to care team and patient/family/caregiver.    - Collaborate with rehabilitation services on mobility goals if consulted  - Out of bed for toileting  - Record patient progress and toleration of activity level   8/20/2023 1044 by Lilliam Almaraz RN  Outcome: Progressing  8/20/2023 1044 by Lilliam Almaraz RN  Outcome: Progressing  Goal: Patient will remain free of falls  Description: INTERVENTIONS:  - Educate patient/family on patient safety including physical limitations  - Instruct patient to call for assistance with activity   - Consult OT/PT to assist with strengthening/mobility   - Keep Call bell within reach  - Keep bed low and locked with side rails adjusted as appropriate  - Keep care items and personal belongings within reach  - Initiate and maintain comfort rounds  - Make Fall Risk Sign visible to staff  - Apply yellow socks and bracelet for high fall risk patients  - Consider moving patient to room near nurses station  8/20/2023 1044 by Lilliam Almaraz RN  Outcome: Progressing  8/20/2023 1044 by Lilliam Almaraz RN  Outcome: Progressing     Problem: DISCHARGE PLANNING  Goal: Discharge to home or other facility with appropriate resources  Description: INTERVENTIONS:  - Identify barriers to discharge w/patient and caregiver  - Arrange for needed discharge resources and transportation as appropriate  - Identify discharge learning needs (meds, wound care, etc.)  - Arrange for interpretive services to assist at discharge as needed  - Refer to Case Management Department for coordinating discharge planning if the patient needs post-hospital services based on physician/advanced practitioner order or complex needs related to functional status, cognitive ability, or social support system  8/20/2023 1044 by Meyer Felty, RN  Outcome: Progressing  8/20/2023 1044 by Meyer Felty, RN  Outcome: Progressing     Problem: Knowledge Deficit  Goal: Patient/family/caregiver demonstrates understanding of disease process, treatment plan, medications, and discharge instructions  Description: Complete learning assessment and assess knowledge base. Interventions:  - Provide teaching at level of understanding  - Provide teaching via preferred learning methods  8/20/2023 1044 by Meyer Felty, RN  Outcome: Progressing  8/20/2023 1044 by Meyer Felty, RN  Outcome: Progressing     Problem: Prexisting or High Potential for Compromised Skin Integrity  Goal: Skin integrity is maintained or improved  Description: INTERVENTIONS:  - Identify patients at risk for skin breakdown  - Assess and monitor skin integrity  - Assess and monitor nutrition and hydration status  - Monitor labs   - Assess for incontinence   - Turn and reposition patient  - Assist with mobility/ambulation  - Relieve pressure over bony prominences  - Avoid friction and shearing  - Provide appropriate hygiene as needed including keeping skin clean and dry  - Evaluate need for skin moisturizer/barrier cream  - Collaborate with interdisciplinary team   - Patient/family teaching  - Consider wound care consult   8/20/2023 1044 by Meyer Felty, RN  Outcome: Progressing  8/20/2023 1044 by Meyer Felty, RN  Outcome: Progressing     Problem: Nutrition/Hydration-ADULT  Goal: Nutrient/Hydration intake appropriate for improving, restoring or maintaining nutritional needs  Description: Monitor and assess patient's nutrition/hydration status for malnutrition. Collaborate with interdisciplinary team and initiate plan and interventions as ordered. Monitor patient's weight and dietary intake as ordered or per policy.  Utilize nutrition screening tool and intervene as necessary. Determine patient's food preferences and provide high-protein, high-caloric foods as appropriate.      INTERVENTIONS:  - Monitor oral intake, urinary output, labs, and treatment plans  - Assess nutrition and hydration status and recommend course of action  - Evaluate amount of meals eaten  - Assist patient with eating if necessary   - Allow adequate time for meals  - Recommend/ encourage appropriate diets, oral nutritional supplements, and vitamin/mineral supplements  - Order, calculate, and assess calorie counts as needed  - Recommend, monitor, and adjust tube feedings and TPN/PPN based on assessed needs  - Assess need for intravenous fluids  - Provide specific nutrition/hydration education as appropriate  - Include patient/family/caregiver in decisions related to nutrition  8/20/2023 1044 by Mahin Murillo RN  Outcome: Progressing  8/20/2023 1044 by Mahin Murillo, RN  Outcome: Progressing

## 2023-08-20 NOTE — CONSULTS
Consultation    Nephrology   Lela Hector 41 y.o. female MRN: [id number]  Unit/Bed#: [facility] 734-01 Encounter: [id number]    History of Present Illness   Physician Requesting Consult: Owen Shen MD  Reason for Consult : -Microscopic hematuria/vasculitis    ASSESSMENT/PLAN:   41 y.o.  female with pmh of rheumatoid arthritis who was admitted from 8/3 until 8/14 at at [facility] for bilateral pain and numbness upper and lower extremity and was treated for rheumatoid arthritis flare subsequently there was concern for possible vasculitis with neuropathy and patient was transferred over to [facility] for further evaluation with rheumatology and neurology. Nephrology has been consulted for evaluation and management of microscopic hematuria. MPO + /P ANCA vasculitis:  At risk for JEM multifactorial most likely secondary to underlying vasculitis likely. Based on immunological work-up thus far highly suggestive of patient having vasculitis secondary to systemic disorders likely underlying rheumatoid arthritis. After review of records In [facility] as well as Care everywhere patient has a baseline Creatinine of 0.4-0.6mg/dL. Admission creatinine of 0.28 mg/dL on 8/3/2023. Creatinine today is at 0.31 mg/dL, stable and still below baseline. Immunological work-up thus far showing :  C3, C4 from 8/14 within normal limits. p-ANCA elevated at 1 is to 640 from 8/5  C-ANCA negative from 8/5  Atypical p-ANCA negative at less than 1 is to 20  MPO antibodies positive at greater than 8  CO-3 antibodies negative at 0.2  VALDO positive with a titer of 320 from 8/8  check BMP in a.m. At this time patient is status post muscle nerve biopsy from 8/17/23 suggestive of vasculitis. Is status post Solu-Medrol 250 mg on 8/18 8/19 and 8/20 to be started on 30 mg every 12 from 8/21 as per primary team and rheumatology. Rheumatology is following and is undergoing testing for QuantiFERON which is pending plan is for initiation of rituximab.   No role for renal biopsy at this time this was discussed with the patient in depth. Optimize hemodynamic status to avoid delay in renal recovery. Avoid nephrotoxins, adjust meds to appropriate GFR. Strict I/O. Daily weights  Urinary retention protocol if patient does not have a Barker  will need to set up patient for follow up with Nephrology as an outpatient post hospitalization. Outpatient nephrology follow-up with no nephrologist    Acid-base electrolytes:    Electrolytes:      History of chronic intermittent hyponatremia:  Baseline serum sodium around 135 137 mEq intermittent drops to 132 mEq  Most recent sodium at 139 mEq  Main stable    Acid-base:    Most recent bicarb at 29 stable    H/H/anemia:  most recent hemoglobin at 8.5 g/dL  maintain hemoglobin greater than 8 grams/deciliter  Management primary team    Blood pressure/normotensive to hypotensive:  home medications: None  current medications: None  recommendations: If blood pressure continues to be low consider addition of midodrine 5 mg p.o. 3 times daily hold for SBP greater than 110. Optimize hemodynamics. Maintain MAP > 65mmHg  Avoid BP fluctuations. Other medical problems:  Proteinuria/microscopic hematuria: Most recent protein creatinine ratio 450 mg as of 8/19/2023. UA positive blood no protein. Microscopic hematuria and proteinuria could likely be secondary to underlying vasculitis affecting the kidneys however since we already have a tissue biopsy no role for renal biopsy at this time. Continue management per rheumatology with steroids and rituximab as planned. Rheumatoid arthritis with concern for rheumatoid arthritis flare: Follow-up with rheumatology. Status post Solu-Medrol to 50 mg IV daily on 8/18 8/19 and 8/20. Then to be on Solu-Medrol 30 mg IV every 12 from 8/21  Bilateral upper and lower extremity neuropathy: Follow-up with neurology. As per neurology CSF not suggestive of AI PD or CIPD.   As per neurology possibly B6 deficiency contributing. MRI without any pathologic abnormalities. Patient status post nerve and muscle biopsy on 8/17 result suggestive of vasculitis. Follow-up with rheumatology [facility] ([place], [place]) initial test indeterminate repeat pending. Status post Solu-Medrol 250 mg x 3 days and decrease taper and plans for rituximab initiation from tomorrow as per rheumatology . Severe vitamin D deficiency: Most recent vitamin D level 9.6      Thanks for the consult  Will continue to follow  Please call with questions/ concerns. Above-mentioned orders and Plan continue follow-up with rheumatology no role for renal biopsy at this time check blood work in a.m. for chemistries were discussed with the team in depth and they agree. Nohemy Mckay MD, [facility], 8/20/2023, 12:48 PM          HISTORY OF PRESENT ILLNESS:   Mary Hussein is a 41 y.o.  female with pmh of rheumatoid arthritis who was admitted from 8/3 until 8/14 at at [address] [place] [address] for bilateral pain and numbness upper and lower extremity and was treated for rheumatoid arthritis flare subsequently there was concern for possible vasculitis with neuropathy and patient was transferred over to [facility] for further evaluation with rheumatology and neurology. Nephrology has been consulted for evaluation and management of microscopic hematuria. Review of record shows patient has a baseline creatinine of 0.4-0.6 mg/dL dating as far back as 2020. Has had intermittent hyponatremia baseline serum sodium around 135 mEq with occasional drops to 132 mEq. Admitted on 8/3/2023 with a creatinine of 0.28 mg/dL creatinine today at 0.31 mg/dL. Thus far work-up suggestive of positivity for MPO and p-ANCA. Hemodynamically stable SBPs in the 90s to 100s urine output not adequately documented remains afebrile patient seen and examined in her room. Work-up ongoing to initiate rituximab as per rheumatology. Patient is status post muscle nerve biopsy on 8/17.   No history of JEM needing dialysis no NSAID use. Reports symptoms have been going on progressively since 1 month. Minimal improvement after initiation of steroids. History obtained from chart review and the patient    Inpatient consult to Nephrology  Consult performed by: Nohemy Mckay MD  Consult ordered by: Lilian Scott MD          Review of Systems   Constitutional: Positive for fatigue. Negative for chills. HENT: Negative for congestion. Respiratory: Negative for cough and shortness of breath. Cardiovascular: Negative for leg swelling. Gastrointestinal: Negative for abdominal pain and diarrhea. Genitourinary: Negative for dysuria. Musculoskeletal: Negative for back pain. Neurological: Positive for weakness. Negative for dizziness and headaches. Psychiatric/Behavioral: Negative for agitation and confusion. All other systems reviewed and are negative.        Historical Information   Patient Active Problem List   Diagnosis   • Abnormal electrocardiogram   • Abnormal findings on diagnostic imaging of heart and coronary circulation   • Acquired bilateral club feet   • Acrocyanosis (HCC)   • Ankle arthritis   • Asthma   • Blue color skin   • Cardiac murmur   • Cardiomyopathy (HCC)   • DJD (degenerative joint disease)   • Hyponatremia   • Nonrheumatic mitral valve insufficiency   • Osteoarthritis of left hip   • Psoriasis   • Rheumatoid arthritis (HCC)   • Status post left hip replacement   • Vasculitis (HCC)   • Microcytic anemia   • Moderate protein-calorie malnutrition (HCC)   • Vitamin B6 deficiency   • Neuropathy involving both lower extremities   • Leucocytosis   • Ambulatory dysfunction   • Anemia   • SIRS (systemic inflammatory response syndrome) (HCC)   • Proteinuria   • Microscopic hematuria     Past Medical History:   Diagnosis Date   • Asthma    • RA (rheumatoid arthritis) ([address])      Past Surgical History:   Procedure Laterality Date   • ANKLE SURGERY      2 surgerys   •  SECTION     • ELBOW SURGERY • IR LUMBAR PUNCTURE  8/8/2023   • IR MIDLINE PLACEMENT  8/8/2023   • JOINT REPLACEMENT      knees bilateral   • MUSCLE BIOPSY Left 8/16/2023    Procedure: Muscle biopsy;  Surgeon: Blue Agarwal DO;  Location: [facility];  Service: General   • TOTAL SHOULDER REPLACEMENT     • WOUND DEBRIDEMENT Left 8/16/2023    Procedure: Sural nerve biopsy;  Surgeon: Ghislaine Cortes;   Location: [facility];  Service: Neurosurgery     Social History   Social History     Substance and Sexual Activity   Alcohol Use Not Currently    Comment: none     Social History     Substance and Sexual Activity   Drug Use Never    Comment: none     Social History     Tobacco Use   Smoking Status Never   Smokeless Tobacco Never     Family History   Problem Relation Age of Onset   • No Known Problems Mother    • No Known Problems Father        Meds/Allergies   current meds:   Current Facility-Administered Medications   Medication Dose Route Frequency   • albuterol (PROVENTIL HFA,VENTOLIN HFA) inhaler 2 puff  2 puff Inhalation Q4H PRN   • bisacodyl (DULCOLAX) rectal suppository 10 mg  10 mg Rectal Daily PRN   • cholecalciferol (VITAMIN D3) tablet 400 Units  400 Units Oral Daily   • cyanocobalamin (VITAMIN B-12) tablet 1,000 mcg  1,000 mcg Oral Daily   • diphenhydramine, lidocaine, Al/Mg hydroxide, simethicone (Magic Mouthwash) oral solution 10 mL  10 mL Swish & Spit Q4H PRN   • docusate sodium (COLACE) capsule 100 mg  100 mg Oral BID   • gabapentin (NEURONTIN) capsule 600 mg  600 mg Oral BID   • gabapentin (NEURONTIN) capsule 900 mg  900 mg Oral HS   • heparin (porcine) subcutaneous injection 5,000 Units  5,000 Units Subcutaneous Q8H [address]   • HYDROcodone-acetaminophen (NORCO) 5-325 mg per tablet 1 tablet  1 tablet Oral Q6H PRN   • magnesium Oxide (MAG-OX) tablet 400 mg  400 mg Oral HS   • [START ON 8/21/2023] methylPREDNISolone sodium succinate (Solu-MEDROL) injection 30 mg  30 mg Intravenous Q12H [address]   • morphine injection 2 mg  2 mg Intravenous Q4H PRN   • ondansetron (ZOFRAN) injection 4 mg  4 mg Intravenous Q6H PRN   • pyridoxine (VITAMIN B6) tablet 50 mg  50 mg Oral Daily   • senna (SENOKOT) tablet 8.6 mg  1 tablet Oral Daily   • tuberculin injection 5 Units  5 Units Intradermal Once    and PTA meds:   Prior to Admission Medications   Prescriptions Last Dose Informant Patient Reported? Taking?    Magnesium Oxide 400 MG CAPS 8/14/2023 Self Yes Yes   Sig: Take 1 capsule by mouth   Ventolin  (90 Base) MCG/ACT inhaler 8/14/2023 Self Yes Yes   meloxicam (MOBIC) 7.5 mg tablet 8/14/2023  Yes Yes   Sig: Take 7.5 mg by mouth daily   methylPREDNISolone 4 MG tablet therapy pack  Self No No   Sig: Use as directed on package   Patient not taking: Reported on 6/22/2023   prednisoLONE (ORAPRED) 15 mg/5 mL oral solution Not Taking  No No   Sig: 10 ml by mouth on days 1-4; 5 ml on days 5-8 and 2.5 ml on day 9-12   Patient not taking: Reported on 8/14/2023      Facility-Administered Medications: None       Scheduled Meds:  Current Facility-Administered Medications   Medication Dose Route Frequency Provider Last Rate   • albuterol  2 puff Inhalation Q4H PRN Earline Xavier PA-C     • bisacodyl  10 mg Rectal Daily PRN Earline Xavier PA-C     • cholecalciferol  400 Units Oral Daily Earline Xavier PA-C     • cyanocobalamin  1,000 mcg Oral Daily Earline Xavier PA-C     • diphenhydramine, lidocaine, Al/Mg hydroxide, simethicone  10 mL Swish & Spit Q4H PRN Earline Xavier PA-C     • docusate sodium  100 mg Oral BID Earline Xavier PA-C     • gabapentin  600 mg Oral BID Luis Quiros MD     • gabapentin  900 mg Oral HS Luis Quiros MD     • heparin (porcine)  5,000 Units Subcutaneous Q8H [facility] Earline Xavier PA-C     • HYDROcodone-acetaminophen  1 tablet Oral Q6H PRN Earline Xavier PA-C     • magnesium Oxide  400 mg Oral HS LILI Nair-C     • [START ON 8/21/2023] methylPREDNISolone sodium succinate  30 mg Intravenous Q12H [facility] Ad Lamb MD     • morphine injection  2 mg Intravenous Q4H PRN Earline Xavier PA-C     • ondansetron  4 mg Intravenous Q6H PRN Earline Xavier PA-C     • pyridoxine  50 mg Oral Daily Earline Xavier PA-C     • senna  1 tablet Oral Daily Earline Xavier PA-C     • tuberculin  5 Units Intradermal Once Scottie David MD         PRN Meds:.•  albuterol  •  bisacodyl  •  diphenhydramine, lidocaine, Al/Mg hydroxide, simethicone  •  HYDROcodone-acetaminophen  •  morphine injection  •  ondansetron    Continuous Infusions: Allergies   Allergen Reactions   • Corn Oil - Food Allergy - Food Allergy Shortness Of Breath   • Gluten Meal - Food Allergy Shortness Of Breath   • Infliximab Hypertension and Throat Swelling   • Isoflavones (Soy) Shortness Of Breath   • Motrin [Ibuprofen] Throat Swelling   • Oseltamivir Shortness Of Breath   • Yeast - Food Allergy Shortness Of Breath   • Lyrica [Pregabalin] Tongue Swelling     Possible allergy. Taken previously without reaction, but had significant tongue swelling when taken with acetaminophen (2023). • Tilactase    • Medical Tape Rash     If on for too long; please use paper tape   • Sulfa Antibiotics Hives         Invasive Devices: Invasive Devices     Peripheral Intravenous Line  Duration           Long-Dwell Peripheral IV (Midline)  Right Basilic 12 days                  PHYSICAL EXAM  /69   Pulse 88   Temp 97.6 °F (36.4 °C)   Resp 16   Ht 5' 7" (1.702 m)   Wt 48.1 kg (106 lb 0.7 oz)   LMP 2023 (Approximate)   SpO2 94%   BMI 16.61 kg/m²   Temp (24hrs), Av.6 °F (36.4 °C), Min:97.5 °F (36.4 °C), Max:97.6 °F (36.4 °C)      No intake or output data in the 24 hours ending 23 1248    No intake/output data recorded. Current Weight: Weight - Scale: 48.1 kg (106 lb 0.7 oz)  First Weight: Weight - Scale: 48.1 kg (106 lb 0.7 oz)  Physical Exam  Vitals and nursing note reviewed.    Constitutional:       General: She is not in acute distress. Appearance: Normal appearance. She is normal weight. She is ill-appearing. She is not toxic-appearing or diaphoretic. HENT:      Head: Normocephalic and atraumatic. Mouth/Throat:      Mouth: Mucous membranes are moist.      Pharynx: No oropharyngeal exudate. Eyes:      General: No scleral icterus. Conjunctiva/sclera: Conjunctivae normal.   Cardiovascular:      Rate and Rhythm: Normal rate. Heart sounds: No friction rub. Pulmonary:      Effort: No respiratory distress. Breath sounds: Normal breath sounds. No stridor. Abdominal:      General: There is no distension. Palpations: Abdomen is soft. Tenderness: There is no abdominal tenderness. Musculoskeletal:         General: No swelling. Cervical back: Normal range of motion. No rigidity. Skin:     General: Skin is warm. Coloration: Skin is not jaundiced. Neurological:      Mental Status: She is alert and oriented to person, place, and time. Mental status is at baseline. Comments: Inability to move lower extremities or even wiggle toes.   Able to lift slightly upper extremities and some movement to digits   Psychiatric:         Mood and Affect: Mood normal.         Behavior: Behavior normal.           LABORATORY:    Results from last 7 days   Lab Units 08/20/23  0510 08/18/23  0511 08/17/23  0555 08/16/23  1747 08/16/23  0554 08/14/23  2226 08/14/23  0507   WBC Thousand/uL  --  19.05* 20.27*  --  18.73* 19.75* 14.97*   HEMOGLOBIN g/dL  --  8.5* 8.6*  --  9.9* 9.4* 9.9*   HEMATOCRIT %  --  27.3* 28.0*  --  32.8* 30.5* 32.7*   PLATELETS Thousands/uL  --  388 398* 224 476* 452* 454*   POTASSIUM mmol/L 4.4 4.6 5.1  --  4.8 4.8 4.7   CHLORIDE mmol/L 108 107 106  --  106 104 102   CO2 mmol/L 29 27 28  --  27 33* 29   BUN mg/dL 32* 36* 44*  --  31* 44* 31*   CREATININE mg/dL 0.31* 0.46* 0.62  --  0.49* 0.46* 0.38*   CALCIUM mg/dL 8.1* 8.6 8.5  --  8.7 8.6 8.8   MAGNESIUM mg/dL  --   --   -- --  2.5  --   --    PHOSPHORUS mg/dL  --   --   --   --  4.2  --   --       rest all reviewed    RADIOLOGY:  CT chest wo contrast   Final Result by Matteo Zaldivar MD (08/15 1638)      Platelike atelectasis or scarring in the lung bases which is more prominent than on the prior scan. 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 Right shoulder prosthesis present which produces some artifact. Workstation performed: [id number]           Rest all reviewed    Portions of the record may have been created with voice recognition software. Occasional wrong word or "sound a like" substitutions may have occurred due to the inherent limitations of voice recognition software. Read the chart carefully and recognize, using context, where substitutions have occurred. If you have any questions, please contact the dictating provider.

## 2023-08-21 LAB
ANION GAP SERPL CALCULATED.3IONS-SCNC: 4 MMOL/L
ATRIAL RATE: 105 BPM
BUN SERPL-MCNC: 30 MG/DL (ref 5–25)
CALCIUM SERPL-MCNC: 8.5 MG/DL (ref 8.3–10.1)
CHLORIDE SERPL-SCNC: 109 MMOL/L (ref 96–108)
CO2 SERPL-SCNC: 28 MMOL/L (ref 21–32)
CREAT SERPL-MCNC: 0.34 MG/DL (ref 0.6–1.3)
GFR SERPL CREATININE-BSD FRML MDRD: 133 ML/MIN/1.73SQ M
GLUCOSE SERPL-MCNC: 86 MG/DL (ref 65–140)
P AXIS: 70 DEGREES
POTASSIUM SERPL-SCNC: 3.9 MMOL/L (ref 3.5–5.3)
PR INTERVAL: 132 MS
QRS AXIS: 78 DEGREES
QRSD INTERVAL: 78 MS
QT INTERVAL: 338 MS
QTC INTERVAL: 446 MS
SODIUM SERPL-SCNC: 141 MMOL/L (ref 135–147)
T WAVE AXIS: 196 DEGREES
VENTRICULAR RATE: 105 BPM

## 2023-08-21 PROCEDURE — 99222 1ST HOSP IP/OBS MODERATE 55: CPT | Performed by: INTERNAL MEDICINE

## 2023-08-21 PROCEDURE — 80048 BASIC METABOLIC PNL TOTAL CA: CPT | Performed by: INTERNAL MEDICINE

## 2023-08-21 PROCEDURE — 99232 SBSQ HOSP IP/OBS MODERATE 35: CPT | Performed by: STUDENT IN AN ORGANIZED HEALTH CARE EDUCATION/TRAINING PROGRAM

## 2023-08-21 PROCEDURE — 93010 ELECTROCARDIOGRAM REPORT: CPT | Performed by: INTERNAL MEDICINE

## 2023-08-21 PROCEDURE — 97112 NEUROMUSCULAR REEDUCATION: CPT

## 2023-08-21 PROCEDURE — 97530 THERAPEUTIC ACTIVITIES: CPT

## 2023-08-21 PROCEDURE — 86481 TB AG RESPONSE T-CELL SUSP: CPT | Performed by: INTERNAL MEDICINE

## 2023-08-21 PROCEDURE — 99232 SBSQ HOSP IP/OBS MODERATE 35: CPT | Performed by: INTERNAL MEDICINE

## 2023-08-21 PROCEDURE — 97110 THERAPEUTIC EXERCISES: CPT

## 2023-08-21 RX ADMIN — GABAPENTIN 600 MG: 300 CAPSULE ORAL at 13:26

## 2023-08-21 RX ADMIN — PYRIDOXINE HCL TAB 50 MG 50 MG: 50 TAB at 09:07

## 2023-08-21 RX ADMIN — HEPARIN SODIUM 5000 UNITS: 5000 INJECTION INTRAVENOUS; SUBCUTANEOUS at 05:34

## 2023-08-21 RX ADMIN — GABAPENTIN 600 MG: 300 CAPSULE ORAL at 05:34

## 2023-08-21 RX ADMIN — METHYLPREDNISOLONE SODIUM SUCCINATE 30 MG: 40 INJECTION, POWDER, FOR SOLUTION INTRAMUSCULAR; INTRAVENOUS at 21:08

## 2023-08-21 RX ADMIN — HEPARIN SODIUM 5000 UNITS: 5000 INJECTION INTRAVENOUS; SUBCUTANEOUS at 21:08

## 2023-08-21 RX ADMIN — CYANOCOBALAMIN TAB 500 MCG 1000 MCG: 500 TAB at 09:05

## 2023-08-21 RX ADMIN — GABAPENTIN 900 MG: 300 CAPSULE ORAL at 21:07

## 2023-08-21 RX ADMIN — METHYLPREDNISOLONE SODIUM SUCCINATE 30 MG: 40 INJECTION, POWDER, FOR SOLUTION INTRAMUSCULAR; INTRAVENOUS at 10:30

## 2023-08-21 RX ADMIN — MAGNESIUM OXIDE TAB 400 MG (241.3 MG ELEMENTAL MG) 400 MG: 400 (241.3 MG) TAB at 21:07

## 2023-08-21 RX ADMIN — HEPARIN SODIUM 5000 UNITS: 5000 INJECTION INTRAVENOUS; SUBCUTANEOUS at 13:26

## 2023-08-21 RX ADMIN — CHOLECALCIFEROL TAB 10 MCG (400 UNIT) 400 UNITS: 10 TAB at 09:05

## 2023-08-21 RX ADMIN — HYDROCODONE BITARTRATE AND ACETAMINOPHEN 1 TABLET: 5; 325 TABLET ORAL at 16:46

## 2023-08-21 NOTE — PHYSICAL THERAPY NOTE
PHYSICAL THERAPY NOTE          Patient Name: Jessy Calero  ENPIK'N Date: 8/21/2023 08/21/23 1153   PT Last Visit   PT Visit Date 08/21/23   Note Type   Note Type Treatment   Pain Assessment   Pain Assessment Tool 0-10   Pain Score 5   Pain Location/Orientation Orientation: Bilateral;Orientation: Lower; Location: Leg   Restrictions/Precautions   Weight Bearing Precautions Per Order No   Other Precautions Fall Risk;Pain   General   Chart Reviewed Yes   Response to Previous Treatment Patient with no complaints from previous session. Family/Caregiver Present Yes  (spouse)   Cognition   Overall Cognitive Status WFL   Arousal/Participation Alert; Responsive;Arousable; Cooperative   Attention Within functional limits   Orientation Level Oriented X4   Memory Within functional limits   Following Commands Follows all commands and directions without difficulty   Subjective   Subjective pt pleasant and cooperative throughout therapy session. pt received supine in bed   Bed Mobility   Supine to Sit 5  Supervision   Additional items HOB elevated; Increased time required   Sit to Supine 3  Moderate assistance   Additional items Assist x 1; Increased time required;Verbal cues;LE management   Transfers   Sit to Stand Unable to assess   Ambulation/Elevation   Gait pattern Not appropriate   Balance   Static Sitting Fair +   Dynamic Sitting Fair   Endurance Deficit   Endurance Deficit Yes   Endurance Deficit Description generalized weakness, decreased exercise tolerance   Activity Tolerance   Activity Tolerance Patient limited by fatigue;Treatment limited secondary to medical complications (Comment)  (Tachycardia)   Nurse Made Aware RN cleared and updated   Exercises   Glute Sets Sitting;10 reps;AROM; Bilateral   Hip Flexion Sitting;10 reps;AROM; Bilateral   Knee AROM Long Arc Quad Sitting;10 reps;AROM; Bilateral   Ankle Pumps Sitting;10 reps;AROM; Bilateral   Assessment   Prognosis Good   Problem List Decreased strength;Decreased range of motion;Decreased endurance;Decreased mobility; Impaired balance;Decreased coordination;Pain; Impaired tone;Decreased skin integrity   Assessment Patient was received supine in bed . Patient was agreeable to therapy services today. PT session focused on mobility and therapeutic exercises today in order to improve functional mobility and independence. Functional mobility was performed as described above. Pt was eager to participate in therapy services today, but was anxious about her increased HR. At rest, HR was measured at 110 BPM. Upon transferring to EOB, HR elevated to approx 140 BPM and took several minutes to drop to 120 BPM. Pt denied any symptoms or feeling of rapid heart rate. PT deferred ambulation with pt today, but was able to complete several exercises at EOB without elevating HR any higher. Pt reported some discomfort in LLE with exercises, but was able to complete them all. Pt was educated to continue exercises outside of PT services. Pt reports understanding. Pt was assisted back to supine at conclusion of session and showed supine exercises as well. Patient will benefit from continued PT services while in hospital in order to address remaining limitations. The patients AM-PAC Basic Mobility Inpatient Short From Raw Score is 13 . A Raw score of  13  suggests that the patient may benefit from discharge to post acute rehab. PT recommendation at this time is for post acute rehab. Please also refer to the recommendation of the Physical Therapist for safe discharge planning. Goals   Patient Goals to feel better   [facility] Expiration Date 08/31/23   PT Treatment Day 1   Plan   Treatment/Interventions ADL retraining;Functional transfer training;LE strengthening/ROM; Elevations; Endurance training; Therapeutic exercise; Bed mobility;Gait training   Progress Progressing toward goals   PT Frequency 2-3x/wk Recommendation   PT Discharge Recommendation Post acute rehabilitation services   AM-PAC Basic Mobility Inpatient   Turning in Flat Bed Without Bedrails 4   Lying on Back to Sitting on Edge of Flat Bed Without Bedrails 4   Moving Bed to Chair 2   Standing Up From Chair Using Arms 1   Walk in Room 1   Climb 3-5 Stairs With Railing 1   Basic Mobility Inpatient Raw Score 13   Basic Mobility Standardized Score 33.99   Highest Level Of Mobility   JH-HLM Goal 4: Move to chair/commode   JH-HLM Achieved 3: Sit at edge of bed   Education   Education Provided Mobility training;Home exercise program   Patient Demonstrates acceptance/verbal understanding   End of Consult   Patient Position at End of Consult Supine; All needs within reach       Filippo Mcdowell PT, DPT

## 2023-08-21 NOTE — PLAN OF CARE
Problem: MOBILITY - ADULT  Goal: Maintain or return to baseline ADL function  Description: INTERVENTIONS:  -  Assess patient's ability to carry out ADLs; assess patient's baseline for ADL function and identify physical deficits which impact ability to perform ADLs (bathing, care of mouth/teeth, toileting, grooming, dressing, etc.)  - Assess/evaluate cause of self-care deficits   - Assess range of motion  - Assess patient's mobility; develop plan if impaired  - Assess patient's need for assistive devices and provide as appropriate  - Encourage maximum independence but intervene and supervise when necessary  - Involve family in performance of ADLs  - Assess for home care needs following discharge   - Consider OT consult to assist with ADL evaluation and planning for discharge  - Provide patient education as appropriate  Outcome: Progressing     Problem: MOBILITY - ADULT  Goal: Maintains/Returns to pre admission functional level  Description: INTERVENTIONS:  - Perform BMAT or MOVE assessment daily.   - Set and communicate daily mobility goal to care team and patient/family/caregiver.    - Collaborate with rehabilitation services on mobility goals if consulted  - Out of bed for toileting  - Record patient progress and toleration of activity level   Outcome: Progressing     Problem: PAIN - ADULT  Goal: Verbalizes/displays adequate comfort level or baseline comfort level  Description: Interventions:  - Encourage patient to monitor pain and request assistance  - Assess pain using appropriate pain scale  - Administer analgesics based on type and severity of pain and evaluate response  - Implement non-pharmacological measures as appropriate and evaluate response  - Consider cultural and social influences on pain and pain management  - Notify physician/advanced practitioner if interventions unsuccessful or patient reports new pain  Outcome: Progressing     Problem: SAFETY ADULT  Goal: Maintain or return to baseline ADL function  Description: INTERVENTIONS:  -  Assess patient's ability to carry out ADLs; assess patient's baseline for ADL function and identify physical deficits which impact ability to perform ADLs (bathing, care of mouth/teeth, toileting, grooming, dressing, etc.)  - Assess/evaluate cause of self-care deficits   - Assess range of motion  - Assess patient's mobility; develop plan if impaired  - Assess patient's need for assistive devices and provide as appropriate  - Encourage maximum independence but intervene and supervise when necessary  - Involve family in performance of ADLs  - Assess for home care needs following discharge   - Consider OT consult to assist with ADL evaluation and planning for discharge  - Provide patient education as appropriate  Outcome: Progressing     Problem: SAFETY ADULT  Goal: Maintains/Returns to pre admission functional level  Description: INTERVENTIONS:  - Perform BMAT or MOVE assessment daily.   - Set and communicate daily mobility goal to care team and patient/family/caregiver.    - Collaborate with rehabilitation services on mobility goals if consulted  - Out of bed for toileting  - Record patient progress and toleration of activity level   Outcome: Progressing

## 2023-08-21 NOTE — CONSULTS
42 yo woman seen in the hospital today for MPA causing neuropathy and muscle weakness. She has long-standing RA diagnosed at age 16y. and has required multiple joint replacement surgeries including right shoulder, bilateral hips and bilateral knees. She has also had bilateral ankle fusions. She has had UE/LE weakness and paresthesias for the past month. Was seen a [facility] as well as [facility]. 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 PE: pleasant, cooperative in NAD  Heent: EOMI, anicteric  Heart/Lungs: normal  Abd.benign  EXT: right hand with fixed flexion deformitis. No active synovitis appreciated. Knees with healed surgical scars. Left shoulder with decreased ROM    Imp/P: MPA causing neuropathy  S/p Pulse dose Solumedrol  I would begin Rituxan 1g every 14 days x 2 doses while she is in the hospital.  (She will receive 375mg/m2 per week for 4 weeks. Order placed). Risks/benefits of medication discussed with patient and her  at the bedside on 8/21/23). Continue to monitor symptoms and f/u Quantiferon TB results. If still indeterminate I would ask ID to assess this result in the setting of her needing treatment with either Rituxan or Cytoxan. She may f/u with Lauren Jack as an outpatient.

## 2023-08-21 NOTE — PLAN OF CARE
Problem: PHYSICAL THERAPY ADULT  Goal: Performs mobility at highest level of function for planned discharge setting. See evaluation for individualized goals. Description: Treatment/Interventions: ADL retraining, Functional transfer training, LE strengthening/ROM, Elevations, Therapeutic exercise, Endurance training, Bed mobility, Gait training, Spoke to nursing, OT          See flowsheet documentation for full assessment, interventions and recommendations. Outcome: Progressing  Note: Prognosis: Good  Problem List: Decreased strength, Decreased range of motion, Decreased endurance, Decreased mobility, Impaired balance, Decreased coordination, Pain, Impaired tone, Decreased skin integrity  Assessment: Patient was received supine in bed . Patient was agreeable to therapy services today. PT session focused on mobility and therapeutic exercises today in order to improve functional mobility and independence. Functional mobility was performed as described above. Pt was eager to participate in therapy services today, but was anxious about her increased HR. At rest, HR was measured at 110 BPM. Upon transferring to EOB, HR elevated to approx 140 BPM and took several minutes to drop to 120 BPM. Pt denied any symptoms or feeling of rapid heart rate. PT deferred ambulation with pt today, but was able to complete several exercises at EOB without elevating HR any higher. Pt reported some discomfort in LLE with exercises, but was able to complete them all. Pt was educated to continue exercises outside of PT services. Pt reports understanding. Pt was assisted back to supine at conclusion of session and showed supine exercises as well. Patient will benefit from continued PT services while in hospital in order to address remaining limitations. The patients AM-PAC Basic Mobility Inpatient Short From Raw Score is 13 . A Raw score of  13  suggests that the patient may benefit from discharge to post acute rehab.  PT recommendation at this time is for post acute rehab. Please also refer to the recommendation of the Physical Therapist for safe discharge planning. PT Discharge Recommendation: Post acute rehabilitation services    See flowsheet documentation for full assessment.

## 2023-08-21 NOTE — RESTORATIVE TECHNICIAN NOTE
Restorative Technician Note      Patient Name: Faviola Hector     Note Type: Mobility (Pt refused 2* states she worked with PT recently/wants to rest.)    Jennie QUESADA, Restorative Technician, [employer]

## 2023-08-21 NOTE — ASSESSMENT & PLAN NOTE
· As evidenced by tachycardia and leukocytosis. · Leukocytosis is likely secondary to steroid use, tachycardia improving. · Place patient on tele  · Patient appears clinically non toxic and afebrile. · Monitor.

## 2023-08-21 NOTE — ASSESSMENT & PLAN NOTE
• Presented with bilateral upper and lower extremity pain, weakness, and numbness. Has hx of RA, not currently on treatment. • MRI cervical,thoracic and lumbar spinal without abnormalities or pathologic enhancement. • LP with normal WBCs, protein, negative Gram stain, elevated IgG. • Work-up illustrated evidence of B6 deficiency for which supplementation was ordered. • Autoimmune work-up with elevated inflammatory marker, elevated p-ANCA MPO antibodies and positive VALDO. • Suspicion for P-ANCA vasculitis. • Patient is status post nerve and muscle biopsy. Preliminary results suspicious for vasculitis. Await final results. • Discussed with rheumatology Dr Alee Guardado who recommended the following:  • CT chest without contrast which showed Platelike atelectasis or scarring in the lung bases. • UA, urine protein creatinine ratio showing microscopic hematuria and small proteins. • chronic hepatitis panel negative. QuantiFERON test indeterminate, repeat pending. • Completed pulse dose steroid x 3 days. Continue with Solu-Medrol 30 mg twice daily. • Plan to start rituximab pending nerve/muscle biopsy results.

## 2023-08-21 NOTE — PLAN OF CARE
Problem: MOBILITY - ADULT  Goal: Maintain or return to baseline ADL function  Description: INTERVENTIONS:  -  Assess patient's ability to carry out ADLs; assess patient's baseline for ADL function and identify physical deficits which impact ability to perform ADLs (bathing, care of mouth/teeth, toileting, grooming, dressing, etc.)  - Assess/evaluate cause of self-care deficits   - Assess range of motion  - Assess patient's mobility; develop plan if impaired  - Assess patient's need for assistive devices and provide as appropriate  - Encourage maximum independence but intervene and supervise when necessary  - Involve family in performance of ADLs  - Assess for home care needs following discharge   - Consider OT consult to assist with ADL evaluation and planning for discharge  - Provide patient education as appropriate  Outcome: Progressing     Problem: PAIN - ADULT  Goal: Verbalizes/displays adequate comfort level or baseline comfort level  Description: Interventions:  - Encourage patient to monitor pain and request assistance  - Assess pain using appropriate pain scale  - Administer analgesics based on type and severity of pain and evaluate response  - Implement non-pharmacological measures as appropriate and evaluate response  - Consider cultural and social influences on pain and pain management  - Notify physician/advanced practitioner if interventions unsuccessful or patient reports new pain  Outcome: Progressing     Problem: INFECTION - ADULT  Goal: Absence or prevention of progression during hospitalization  Description: INTERVENTIONS:  - Assess and monitor for signs and symptoms of infection  - Monitor lab/diagnostic results  - Monitor all insertion sites, i.e. indwelling lines, tubes, and drains  - Monitor endotracheal if appropriate and nasal secretions for changes in amount and color  - [place] appropriate cooling/warming therapies per order  - Administer medications as ordered  - Instruct and encourage patient and family to use good hand hygiene technique  - Identify and instruct in appropriate isolation precautions for identified infection/condition  Outcome: Progressing     Problem: SAFETY ADULT  Goal: Maintain or return to baseline ADL function  Description: INTERVENTIONS:  -  Assess patient's ability to carry out ADLs; assess patient's baseline for ADL function and identify physical deficits which impact ability to perform ADLs (bathing, care of mouth/teeth, toileting, grooming, dressing, etc.)  - Assess/evaluate cause of self-care deficits   - Assess range of motion  - Assess patient's mobility; develop plan if impaired  - Assess patient's need for assistive devices and provide as appropriate  - Encourage maximum independence but intervene and supervise when necessary  - Involve family in performance of ADLs  - Assess for home care needs following discharge   - Consider OT consult to assist with ADL evaluation and planning for discharge  - Provide patient education as appropriate  Outcome: Progressing

## 2023-08-21 NOTE — PROGRESS NOTES
NEPHROLOGY PROGRESS NOTE   Lorri Hector 41 y.o. female MRN: [id number]  Unit/Bed#: [facility] 734-01 Encounter: [id number]      ASSESSMENT/PLAN:  1. Microscopic hematuria and proteinuria: Likely due to vasculitis  · Creatinine remains stable at baseline at 0.3  · UPC ratio 0.5 g  · UA: Moderate blood, 4-10 RBCs  · Work-up: Complements normal, C ANCA negative, atypical P ANCA negative, MPO positive >8, AL-3 negative, VALDO + with titer 320  · S/p muscle nerve biopsy 8/17 suggestive of vasculitis  · Status post Solu-Medrol 250 mg x 3 (8/18-8/20) and starting 30 mg every 12 hours today per rheumatology. And also planning to start rituximab per rheumatology  2. RA with concern for RA flare: Follows with rheumatology  3. Bilateral upper and lower extremity neuropathy: Neurology following  4. Vitamin D deficiency: Vitamin D 9.6  5. Intermittent hypotension: May need midodrine if persistent. Continue to monitor  6. Anemia of chronic disease: Hemoglobin 8.5 continue to monitor    Plan Summary:   • Vasculitis treatment per rheumatology with steroids and eventual rituximab as above    SUBJECTIVE:  Complains of some leg pain from biopsy site but otherwise doing okay. Eating and drinking well. No chest pain or shortness of breath.     OBJECTIVE:  Current Weight: Weight - Scale: 48.1 kg (106 lb 0.7 oz)  Vitals:    08/21/23 0904   BP: (!) 88/60   Pulse: (!) 146   Resp:    Temp: 98.1 °F (36.7 °C)   SpO2: 92%       Intake/Output Summary (Last 24 hours) at 8/21/2023 1053  Last data filed at 8/21/2023 4731  Gross per 24 hour   Intake 245 ml   Output 600 ml   Net -355 ml       General:  appears comfortable and in no acute distress   Skin:  No rash  Eyes:  Sclerae anicteric, no periorbital edema   ENT:  Moist mucous membranes  Neck:  Trachea midline, symmetric   Chest:  Clear to auscultation bilaterally with no wheezes, rales or rhonchi  CVS: Tachycardic  Abdomen:  Soft, nontender, nondistended  Neuro:  Awake and alert  Psych: Appropriate affect  Extremities: no lower extremity edema       Medications:  Scheduled Meds:  Current Facility-Administered Medications   Medication Dose Route Frequency Provider Last Rate   • albuterol  2 puff Inhalation Q4H PRN Earline Xavier PA-C     • bisacodyl  10 mg Rectal Daily PRN Earline Xavier PA-C     • cholecalciferol  400 Units Oral Daily Earline Xavier PA-C     • cyanocobalamin  1,000 mcg Oral Daily Earline Xavier PA-C     • diphenhydramine, lidocaine, Al/Mg hydroxide, simethicone  10 mL Swish & Spit Q4H PRN Earline Xavier PA-C     • docusate sodium  100 mg Oral BID Silvia Dottie FAHAD Xavier     • gabapentin  600 mg Oral BID Edilson Wells MD     • gabapentin  900 mg Oral HS Edilson Wells MD     • heparin (porcine)  5,000 Units Subcutaneous Q8H [address] Earline Xavier PA-C     • HYDROcodone-acetaminophen  1 tablet Oral Q6H PRN Earline Xavier PA-C     • magnesium Oxide  400 mg Oral HS Earline Xavier PA-C     • methylPREDNISolone sodium succinate  30 mg Intravenous Q12H [address]  Viviana Cavazos MD     • morphine injection  2 mg Intravenous Q4H PRN Earline Xavier PA-C     • ondansetron  4 mg Intravenous Q6H PRN Earline Xavier PA-C     • pyridoxine  50 mg Oral Daily Earline Xavier PA-C     • senna  1 tablet Oral Daily Earline Xavier PA-C     • tuberculin  5 Units Intradermal Once Joseph Austin MD         PRN Meds:.•  albuterol  •  bisacodyl  •  diphenhydramine, lidocaine, Al/Mg hydroxide, simethicone  •  HYDROcodone-acetaminophen  •  morphine injection  •  ondansetron    Laboratory Results:  Results from last 7 days   Lab Units 08/21/23  0539 08/20/23  0510 08/18/23  0511 08/17/23  0555 08/16/23  1747 08/16/23  0554 08/14/23  2226   WBC Thousand/uL  --   --  19.05* 20.27*  --  18.73* 19.75*   HEMOGLOBIN g/dL  --   --  8.5* 8.6*  --  9.9* 9.4*   HEMATOCRIT %  --   --  27.3* 28.0*  --  32.8* 30.5*   PLATELETS Thousands/uL  --   --  388 398* 224 476* 452*   SODIUM mmol/L 141 139 138 138  --  136 140   POTASSIUM mmol/L 3.9 4.4 4.6 5.1  --  4.8 4.8   CHLORIDE mmol/L 109* 108 107 106  --  106 104   CO2 mmol/L 28 29 27 28  --  27 33*   BUN mg/dL 30* 32* 36* 44*  --  31* 44*   CREATININE mg/dL 0.34* 0.31* 0.46* 0.62  --  0.49* 0.46*   CALCIUM mg/dL 8.5 8.1* 8.6 8.5  --  8.7 8.6   MAGNESIUM mg/dL  --   --   --   --   --  2.5  --    PHOSPHORUS mg/dL  --   --   --   --   --  4.2  --

## 2023-08-21 NOTE — PROGRESS NOTES
[address] [facility]  Progress Note  Name: Mary Hussein I  MRN: [id number]  Unit/Bed#: [facility] 734-01 I Date of Admission: 8/14/2023   Date of Service: 8/21/2023 I Hospital Day: 7    Assessment/Plan   * Vasculitis [facility])  Assessment & Plan  • Presented with bilateral upper and lower extremity pain, weakness, and numbness. Has hx of RA, not currently on treatment. • MRI cervical,thoracic and lumbar spinal without abnormalities or pathologic enhancement. • LP with normal WBCs, protein, negative Gram stain, elevated IgG. • Work-up illustrated evidence of B6 deficiency for which supplementation was ordered. • Autoimmune work-up with elevated inflammatory marker, elevated p-ANCA MPO antibodies and positive VALDO. • Suspicion for P-ANCA vasculitis. • Patient is status post nerve and muscle biopsy. Preliminary results suspicious for vasculitis. Await final results. • Discussed with rheumatology Dr Emily Hedrick who recommended the following:  • CT chest without contrast which showed Platelike atelectasis or scarring in the lung bases. • UA, urine protein creatinine ratio showing microscopic hematuria and small proteins. • chronic hepatitis panel negative. QuantiFERON test indeterminate, repeat pending. • Completed pulse dose steroid x 3 days. Continue with Solu-Medrol 30 mg twice daily. • Plan to start rituximab pending nerve/muscle biopsy results. Rheumatoid arthritis ([address])  Assessment & Plan  • Patient with longstanding history of rheumatoid arthritis, has undergone several joint fusions. she was intolerant to multiple DMARDs/Biologics and currently not on any maintenance therapy. • Continue IV Solu-Medrol as above. • Rheumatology evaluation appreciated. Microscopic hematuria  Assessment & Plan  Nephrology consulted, no indication for renal biopsy. Renal function stable.      Proteinuria  Assessment & Plan  As above     SIRS (systemic inflammatory response syndrome) [facility])  Assessment & Plan  · As evidenced by tachycardia and leukocytosis. · Leukocytosis is likely secondary to steroid use, tachycardia improving. · Place patient on tele  · Patient appears clinically non toxic and afebrile. · Monitor. Anemia  Assessment & Plan  · Likely anemia of chronic disease. · Monitor hemoglobin and transfuse as needed. Ambulatory dysfunction  Assessment & Plan  At baseline, patient is wheelchair-bound outside the house and can only walk few steps with assistance at home. Now, with declining functional status. PT/OT. Leucocytosis  Assessment & Plan  Secondary to steroid use. Afebrile, nontoxic without evidence of infection. Monitor. Neuropathy involving both lower extremities  Assessment & Plan  As above        Vitamin B6 deficiency  Assessment & Plan  Replacement ordered. Moderate protein-calorie malnutrition ([address])  Assessment & Plan  Malnutrition Findings:   Adult Malnutrition type: Chronic illness  Adult Degree of Malnutrition: Malnutrition of moderate degree  Malnutrition Characteristics: Muscle loss, Fat loss                  360 Statement: Malnutrition of moderate degree in the context of chronic illness as evidenced by moderate depletion of the temples, visible clavicle and moderate buccal fat pad loss. Treated with oral diet and oral nutrition supplement. BMI Findings:  Adult BMI Classifications: Underweight < 18.5        Body mass index is 16.61 kg/m². Asthma  Assessment & Plan  Continue with current regimen. VTE Pharmacologic Prophylaxis:   Moderate Risk (Score 3-4) - Pharmacological DVT Prophylaxis Ordered: heparin. Patient Centered Rounds: I performed bedside rounds with nursing staff today. Discussions with Specialists or Other Care Team Provider: TEENA    Education and Discussions with Family / Patient: Updated  () at bedside.     Total Time Spent on Date of Encounter in care of patient: 35 minutes This time was spent on one or more of the following: performing physical exam; counseling and coordination of care; obtaining or reviewing history; documenting in the medical record; reviewing/ordering tests, medications or procedures; communicating with other healthcare professionals and discussing with patient's family/caregivers. Current Length of Stay: 7 day(s)  Current Patient Status: Inpatient   Certification Statement: The patient will continue to require additional inpatient hospital stay due to pending biopsy results  Discharge Plan: Anticipate discharge in >72 hrs to discharge location to be determined pending rehab evaluations. Code Status: Level 1 - Full Code    Subjective:   Patient was seen at bedside. Today she reported mild red blood on toilet paper. She denies constipation. Objective:     Vitals:   Temp (24hrs), Av.9 °F (36.6 °C), Min:97.8 °F (36.6 °C), Max:98.1 °F (36.7 °C)    Temp:  [97.8 °F (36.6 °C)-98.1 °F (36.7 °C)] 98.1 °F (36.7 °C)  HR:  [] 146  Resp:  [16-20] 16  BP: ()/(54-81) 88/60  SpO2:  [92 %-100 %] 92 %  Body mass index is 16.61 kg/m². Input and Output Summary (last 24 hours): Intake/Output Summary (Last 24 hours) at 2023 1208  Last data filed at 2023 3512  Gross per 24 hour   Intake 245 ml   Output 600 ml   Net -355 ml       Physical Exam:   Physical Exam  Vitals and nursing note reviewed. Constitutional:       General: She is not in acute distress. Appearance: She is well-developed. Comments: malnourished    HENT:      Head: Normocephalic and atraumatic. Eyes:      Conjunctiva/sclera: Conjunctivae normal.   Cardiovascular:      Rate and Rhythm: Normal rate and regular rhythm. Heart sounds: No murmur heard. Pulmonary:      Effort: Pulmonary effort is normal. No respiratory distress. Breath sounds: Normal breath sounds. Abdominal:      Palpations: Abdomen is soft. Tenderness: There is no abdominal tenderness. Musculoskeletal:         General: Deformity present. No swelling. Cervical back: Neck supple. Skin:     General: Skin is warm and dry. Neurological:      Mental Status: She is alert. Additional Data:     Labs:  Results from last 7 days   Lab Units 08/18/23  0511   WBC Thousand/uL 19.05*   HEMOGLOBIN g/dL 8.5*   HEMATOCRIT % 27.3*   PLATELETS Thousands/uL 388   NEUTROS PCT % 83*   LYMPHS PCT % 12*   MONOS PCT % 4   EOS PCT % 0     Results from last 7 days   Lab Units 08/21/23  0539 08/17/23  0555 08/16/23  0554   SODIUM mmol/L 141   < > 136   POTASSIUM mmol/L 3.9   < > 4.8   CHLORIDE mmol/L 109*   < > 106   CO2 mmol/L 28   < > 27   BUN mg/dL 30*   < > 31*   CREATININE mg/dL 0.34*   < > 0.49*   ANION GAP mmol/L 4   < > 3   CALCIUM mg/dL 8.5   < > 8.7   ALBUMIN g/dL  --   --  2.6*   TOTAL BILIRUBIN mg/dL  --   --  0.43   ALK PHOS U/L  --   --  58   ALT U/L  --   --  32   AST U/L  --   --  12   GLUCOSE RANDOM mg/dL 86   < > 125    < > = values in this interval not displayed.      Results from last 7 days   Lab Units 08/17/23  0555   INR  0.98     Results from last 7 days   Lab Units 08/19/23  1036   POC GLUCOSE mg/dl 108               Lines/Drains:  Invasive Devices     Peripheral Intravenous Line  Duration           Long-Dwell Peripheral IV (Midline) 74/72/35 Right Basilic 12 days                  Telemetry:  Telemetry Orders (From admission, onward)             24 Hour Telemetry Monitoring  Continuous x 24 Hours (Telem)        Question:  Reason for 24 Hour Telemetry  Answer:  Arrhythmias requiring acute medical intervention / PPM or ICD malfunction                 Telemetry Reviewed: telemetry was just started                Imaging: Reviewed radiology reports from this admission including: chest CT scan    Recent Cultures (last 7 days):         Last 24 Hours Medication List:   Current Facility-Administered Medications   Medication Dose Route Frequency Provider Last Rate   • albuterol  2 puff Inhalation Q4H PRN [facility] FAHAD Xavier     • bisacodyl  10 mg Rectal Daily PRN [facility] FAHAD Xavier     • cholecalciferol  400 Units Oral Daily Earline BOLIVAR Xavier PA-C     • cyanocobalamin  1,000 mcg Oral Daily Earline BOLIVAR Xavier PA-C     • diphenhydramine, lidocaine, Al/Mg hydroxide, simethicone  10 mL Swish & Spit Q4H PRN Earline BOLIVAR Xavier PA-C     • docusate sodium  100 mg Oral BID [facility] FAHAD Xavier     • gabapentin  600 mg Oral BID Kellen Vargas MD     • gabapentin  900 mg Oral HS Kellen Vargas MD     • heparin (porcine)  5,000 Units Subcutaneous Q8H [address] Earline BOLIVAR Xavier PA-C     • HYDROcodone-acetaminophen  1 tablet Oral Q6H PRN Earline Xavier PA-C     • magnesium Oxide  400 mg Oral HS Earline Xavier PA-C     • methylPREDNISolone sodium succinate  30 mg Intravenous Q12H [address] Elena Benitez MD     • morphine injection  2 mg Intravenous Q4H PRN Earline Xavier PA-C     • ondansetron  4 mg Intravenous Q6H PRN Earline Xavier PA-C     • pyridoxine  50 mg Oral Daily Earline Xavier PA-C     • senna  1 tablet Oral Daily Earline Xavier PA-C     • tuberculin  5 Units Intradermal Once Maite Valerio MD          Today, Patient Was Seen By: Elena Benitez MD    **Please Note: This note may have been constructed using a voice recognition system. **

## 2023-08-22 LAB
ATRIAL RATE: 98 BPM
BASOPHILS # BLD AUTO: 0.01 THOUSANDS/ÂΜL (ref 0–0.1)
BASOPHILS NFR BLD AUTO: 0 % (ref 0–1)
EOSINOPHIL # BLD AUTO: 0 THOUSAND/ÂΜL (ref 0–0.61)
EOSINOPHIL NFR BLD AUTO: 0 % (ref 0–6)
ERYTHROCYTE [DISTWIDTH] IN BLOOD BY AUTOMATED COUNT: 26.5 % (ref 11.6–15.1)
GAMMA INTERFERON BACKGROUND BLD IA-ACNC: 0.03 IU/ML
HCT VFR BLD AUTO: 24.3 % (ref 34.8–46.1)
HGB BLD-MCNC: 7.5 G/DL (ref 11.5–15.4)
IMM GRANULOCYTES # BLD AUTO: 0.08 THOUSAND/UL (ref 0–0.2)
IMM GRANULOCYTES NFR BLD AUTO: 1 % (ref 0–2)
LYMPHOCYTES # BLD AUTO: 1.72 THOUSANDS/ÂΜL (ref 0.6–4.47)
LYMPHOCYTES NFR BLD AUTO: 13 % (ref 14–44)
M TB IFN-G BLD-IMP: ABNORMAL
M TB IFN-G CD4+ BCKGRND COR BLD-ACNC: 0 IU/ML
M TB IFN-G CD4+ BCKGRND COR BLD-ACNC: 0 IU/ML
MCH RBC QN AUTO: 27.4 PG (ref 26.8–34.3)
MCHC RBC AUTO-ENTMCNC: 30.9 G/DL (ref 31.4–37.4)
MCV RBC AUTO: 89 FL (ref 82–98)
MITOGEN IGNF BCKGRD COR BLD-ACNC: <0.1 IU/ML
MONOCYTES # BLD AUTO: 0.51 THOUSAND/ÂΜL (ref 0.17–1.22)
MONOCYTES NFR BLD AUTO: 4 % (ref 4–12)
NEUTROPHILS # BLD AUTO: 10.82 THOUSANDS/ÂΜL (ref 1.85–7.62)
NEUTS SEG NFR BLD AUTO: 82 % (ref 43–75)
NRBC BLD AUTO-RTO: 0 /100 WBCS
P AXIS: 70 DEGREES
PLATELET # BLD AUTO: 345 THOUSANDS/UL (ref 149–390)
PMV BLD AUTO: 8.1 FL (ref 8.9–12.7)
PR INTERVAL: 136 MS
QRS AXIS: 78 DEGREES
QRSD INTERVAL: 72 MS
QT INTERVAL: 340 MS
QTC INTERVAL: 434 MS
RBC # BLD AUTO: 2.74 MILLION/UL (ref 3.81–5.12)
T WAVE AXIS: 201 DEGREES
VENTRICULAR RATE: 98 BPM
WBC # BLD AUTO: 13.14 THOUSAND/UL (ref 4.31–10.16)

## 2023-08-22 PROCEDURE — 93010 ELECTROCARDIOGRAM REPORT: CPT | Performed by: INTERNAL MEDICINE

## 2023-08-22 PROCEDURE — 85025 COMPLETE CBC W/AUTO DIFF WBC: CPT | Performed by: STUDENT IN AN ORGANIZED HEALTH CARE EDUCATION/TRAINING PROGRAM

## 2023-08-22 PROCEDURE — 99232 SBSQ HOSP IP/OBS MODERATE 35: CPT | Performed by: STUDENT IN AN ORGANIZED HEALTH CARE EDUCATION/TRAINING PROGRAM

## 2023-08-22 PROCEDURE — 97110 THERAPEUTIC EXERCISES: CPT

## 2023-08-22 PROCEDURE — 97530 THERAPEUTIC ACTIVITIES: CPT

## 2023-08-22 RX ADMIN — MAGNESIUM OXIDE TAB 400 MG (241.3 MG ELEMENTAL MG) 400 MG: 400 (241.3 MG) TAB at 21:58

## 2023-08-22 RX ADMIN — HYDROCODONE BITARTRATE AND ACETAMINOPHEN 1 TABLET: 5; 325 TABLET ORAL at 17:23

## 2023-08-22 RX ADMIN — METHYLPREDNISOLONE SODIUM SUCCINATE 30 MG: 40 INJECTION, POWDER, FOR SOLUTION INTRAMUSCULAR; INTRAVENOUS at 21:58

## 2023-08-22 RX ADMIN — GABAPENTIN 600 MG: 300 CAPSULE ORAL at 14:11

## 2023-08-22 RX ADMIN — GABAPENTIN 600 MG: 300 CAPSULE ORAL at 06:33

## 2023-08-22 RX ADMIN — CYANOCOBALAMIN TAB 500 MCG 1000 MCG: 500 TAB at 08:48

## 2023-08-22 RX ADMIN — GABAPENTIN 900 MG: 300 CAPSULE ORAL at 21:58

## 2023-08-22 RX ADMIN — METHYLPREDNISOLONE SODIUM SUCCINATE 30 MG: 40 INJECTION, POWDER, FOR SOLUTION INTRAMUSCULAR; INTRAVENOUS at 09:01

## 2023-08-22 RX ADMIN — HYDROCODONE BITARTRATE AND ACETAMINOPHEN 1 TABLET: 5; 325 TABLET ORAL at 01:17

## 2023-08-22 RX ADMIN — HEPARIN SODIUM 5000 UNITS: 5000 INJECTION INTRAVENOUS; SUBCUTANEOUS at 14:11

## 2023-08-22 RX ADMIN — HYDROCODONE BITARTRATE AND ACETAMINOPHEN 1 TABLET: 5; 325 TABLET ORAL at 08:48

## 2023-08-22 RX ADMIN — HEPARIN SODIUM 5000 UNITS: 5000 INJECTION INTRAVENOUS; SUBCUTANEOUS at 21:58

## 2023-08-22 RX ADMIN — CHOLECALCIFEROL TAB 10 MCG (400 UNIT) 400 UNITS: 10 TAB at 08:48

## 2023-08-22 RX ADMIN — HEPARIN SODIUM 5000 UNITS: 5000 INJECTION INTRAVENOUS; SUBCUTANEOUS at 06:33

## 2023-08-22 RX ADMIN — PYRIDOXINE HCL TAB 50 MG 50 MG: 50 TAB at 08:49

## 2023-08-22 NOTE — ASSESSMENT & PLAN NOTE
Malnutrition Findings:   Adult Malnutrition type: Chronic illness  Adult Degree of Malnutrition: Malnutrition of moderate degree  Malnutrition Characteristics: Muscle loss, Fat loss                  360 Statement: Malnutrition of moderate degree in the context of chronic illness as evidenced by moderate depletion of the temples, visible clavicle and moderate buccal fat pad loss. Treated with oral diet and oral nutrition supplement. BMI Findings:  Adult BMI Classifications: Underweight < 18.5        Body mass index is 17.58 kg/m².

## 2023-08-22 NOTE — RESTORATIVE TECHNICIAN NOTE
Restorative Technician Note      Patient Name: Giovanni Hector     Note Type: Mobility (Pt refused activity 2* c/o having too much pain EVER Stephen aware/present.)    Jeff QUESADA, Restorative Technician,

## 2023-08-22 NOTE — ASSESSMENT & PLAN NOTE
• Presented with bilateral upper and lower extremity pain, weakness, and numbness. Has hx of RA, not currently on treatment. • MRI cervical,thoracic and lumbar spinal without abnormalities or pathologic enhancement. • LP with normal WBCs, protein, negative Gram stain, elevated IgG. • Work-up illustrated evidence of B6 deficiency for which supplementation was ordered. • Autoimmune work-up with elevated inflammatory marker, elevated p-ANCA MPO antibodies and positive VALDO. • Suspicion for P-ANCA vasculitis. • Patient is status post nerve and muscle biopsy. Preliminary results suspicious for vasculitis. Await final results. • Previous provider discussed with rheumatology Dr Anival Ramires who recommended the following:  • CT chest without contrast which showed Platelike atelectasis or scarring in the lung bases. • UA, urine protein creatinine ratio showing microscopic hematuria and small proteins. • chronic hepatitis panel negative. QuantiFERON test indeterminate, repeat pending. • Completed pulse dose steroid x 3 days. Continue with Solu-Medrol 30 mg twice daily.   • Plan to start rituximab pending nerve/muscle biopsy results and repeat QuantiFERON test.

## 2023-08-22 NOTE — PLAN OF CARE
Problem: MOBILITY - ADULT  Goal: Maintain or return to baseline ADL function  Description: INTERVENTIONS:  -  Assess patient's ability to carry out ADLs; assess patient's baseline for ADL function and identify physical deficits which impact ability to perform ADLs (bathing, care of mouth/teeth, toileting, grooming, dressing, etc.)  - Assess/evaluate cause of self-care deficits   - Assess range of motion  - Assess patient's mobility; develop plan if impaired  - Assess patient's need for assistive devices and provide as appropriate  - Encourage maximum independence but intervene and supervise when necessary  - Involve family in performance of ADLs  - Assess for home care needs following discharge   - Consider OT consult to assist with ADL evaluation and planning for discharge  - Provide patient education as appropriate  Outcome: Progressing  Goal: Maintains/Returns to pre admission functional level  Description: INTERVENTIONS:  - Perform BMAT or MOVE assessment daily.   - Set and communicate daily mobility goal to care team and patient/family/caregiver. - Collaborate with rehabilitation services on mobility goals if consulted  - Perform Range of Motion 3 times a day. - Reposition patient every 3 hours.   - Dangle patient 3 times a day  - Stand patient 3 times a day  - Ambulate patient 3 times a day  - Out of bed to chair 3 times a day   - Out of bed for meals 3 times a day  - Out of bed for toileting  - Record patient progress and toleration of activity level   Outcome: Progressing     Problem: PAIN - ADULT  Goal: Verbalizes/displays adequate comfort level or baseline comfort level  Description: Interventions:  - Encourage patient to monitor pain and request assistance  - Assess pain using appropriate pain scale  - Administer analgesics based on type and severity of pain and evaluate response  - Implement non-pharmacological measures as appropriate and evaluate response  - Consider cultural and social influences on pain and pain management  - Notify physician/advanced practitioner if interventions unsuccessful or patient reports new pain  Outcome: Progressing     Problem: INFECTION - ADULT  Goal: Absence or prevention of progression during hospitalization  Description: INTERVENTIONS:  - Assess and monitor for signs and symptoms of infection  - Monitor lab/diagnostic results  - Monitor all insertion sites, i.e. indwelling lines, tubes, and drains  - Monitor endotracheal if appropriate and nasal secretions for changes in amount and color  - [place] appropriate cooling/warming therapies per order  - Administer medications as ordered  - Instruct and encourage patient and family to use good hand hygiene technique  - Identify and instruct in appropriate isolation precautions for identified infection/condition  Outcome: Progressing  Goal: Absence of fever/infection during neutropenic period  Description: INTERVENTIONS:  - Monitor WBC    Outcome: Progressing     Problem: SAFETY ADULT  Goal: Maintain or return to baseline ADL function  Description: INTERVENTIONS:  -  Assess patient's ability to carry out ADLs; assess patient's baseline for ADL function and identify physical deficits which impact ability to perform ADLs (bathing, care of mouth/teeth, toileting, grooming, dressing, etc.)  - Assess/evaluate cause of self-care deficits   - Assess range of motion  - Assess patient's mobility; develop plan if impaired  - Assess patient's need for assistive devices and provide as appropriate  - Encourage maximum independence but intervene and supervise when necessary  - Involve family in performance of ADLs  - Assess for home care needs following discharge   - Consider OT consult to assist with ADL evaluation and planning for discharge  - Provide patient education as appropriate  Outcome: Progressing  Goal: Maintains/Returns to pre admission functional level  Description: INTERVENTIONS:  - Perform BMAT or MOVE assessment daily.   - Set and communicate daily mobility goal to care team and patient/family/caregiver. - Collaborate with rehabilitation services on mobility goals if consulted  - Perform Range of Motion 3 times a day. - Reposition patient every 3 hours.   - Dangle patient 3 times a day  - Stand patient 3 times a day  - Ambulate patient 3 times a day  - Out of bed to chair 3 times a day   - Out of bed for meals 3 times a day  - Out of bed for toileting  - Record patient progress and toleration of activity level   Outcome: Progressing  Goal: Patient will remain free of falls  Description: INTERVENTIONS:  - Educate patient/family on patient safety including physical limitations  - Instruct patient to call for assistance with activity   - Consult OT/PT to assist with strengthening/mobility   - Keep Call bell within reach  - Keep bed low and locked with side rails adjusted as appropriate  - Keep care items and personal belongings within reach  - Initiate and maintain comfort rounds  - Make Fall Risk Sign visible to staff  - Offer Toileting every 3 Hours, in advance of need  - Initiate/Maintain bed alarm  - Obtain necessary fall risk management equipment:   - Apply yellow socks and bracelet for high fall risk patients  - Consider moving patient to room near nurses station  Outcome: Progressing     Problem: DISCHARGE PLANNING  Goal: Discharge to home or other facility with appropriate resources  Description: INTERVENTIONS:  - Identify barriers to discharge w/patient and caregiver  - Arrange for needed discharge resources and transportation as appropriate  - Identify discharge learning needs (meds, wound care, etc.)  - Arrange for interpretive services to assist at discharge as needed  - Refer to Case Management Department for coordinating discharge planning if the patient needs post-hospital services based on physician/advanced practitioner order or complex needs related to functional status, cognitive ability, or social support system  Outcome: Progressing     Problem: Knowledge Deficit  Goal: Patient/family/caregiver demonstrates understanding of disease process, treatment plan, medications, and discharge instructions  Description: Complete learning assessment and assess knowledge base. Interventions:  - Provide teaching at level of understanding  - Provide teaching via preferred learning methods  Outcome: Progressing     Problem: Prexisting or High Potential for Compromised Skin Integrity  Goal: Skin integrity is maintained or improved  Description: INTERVENTIONS:  - Identify patients at risk for skin breakdown  - Assess and monitor skin integrity  - Assess and monitor nutrition and hydration status  - Monitor labs   - Assess for incontinence   - Turn and reposition patient  - Assist with mobility/ambulation  - Relieve pressure over bony prominences  - Avoid friction and shearing  - Provide appropriate hygiene as needed including keeping skin clean and dry  - Evaluate need for skin moisturizer/barrier cream  - Collaborate with interdisciplinary team   - Patient/family teaching  - Consider wound care consult   Outcome: Progressing     Problem: Nutrition/Hydration-ADULT  Goal: Nutrient/Hydration intake appropriate for improving, restoring or maintaining nutritional needs  Description: Monitor and assess patient's nutrition/hydration status for malnutrition. Collaborate with interdisciplinary team and initiate plan and interventions as ordered. Monitor patient's weight and dietary intake as ordered or per policy. Utilize nutrition screening tool and intervene as necessary. Determine patient's food preferences and provide high-protein, high-caloric foods as appropriate.      INTERVENTIONS:  - Monitor oral intake, urinary output, labs, and treatment plans  - Assess nutrition and hydration status and recommend course of action  - Evaluate amount of meals eaten  - Assist patient with eating if necessary   - Allow adequate time for meals  - Recommend/ encourage appropriate diets, oral nutritional supplements, and vitamin/mineral supplements  - Order, calculate, and assess calorie counts as needed  - Recommend, monitor, and adjust tube feedings and TPN/PPN based on assessed needs  - Assess need for intravenous fluids  - Provide specific nutrition/hydration education as appropriate  - Include patient/family/caregiver in decisions related to nutrition  Outcome: Progressing

## 2023-08-22 NOTE — PROGRESS NOTES
[facility]  Progress Note  Name: Timbo Samano I  MRN: [id number]  Unit/Bed#: [facility] 734-01 I Date of Admission: 8/14/2023   Date of Service: 8/22/2023 I Hospital Day: 8    Assessment/Plan   * Vasculitis [facility])  Assessment & Plan  • Presented with bilateral upper and lower extremity pain, weakness, and numbness. Has hx of RA, not currently on treatment. • MRI cervical,thoracic and lumbar spinal without abnormalities or pathologic enhancement. • LP with normal WBCs, protein, negative Gram stain, elevated IgG. • Work-up illustrated evidence of B6 deficiency for which supplementation was ordered. • Autoimmune work-up with elevated inflammatory marker, elevated p-ANCA MPO antibodies and positive VALDO. • Suspicion for P-ANCA vasculitis. • Patient is status post nerve and muscle biopsy. Preliminary results suspicious for vasculitis. Await final results. • Previous provider discussed with rheumatology Dr Simran Gallo who recommended the following:  • CT chest without contrast which showed Platelike atelectasis or scarring in the lung bases. • UA, urine protein creatinine ratio showing microscopic hematuria and small proteins. • chronic hepatitis panel negative. QuantiFERON test indeterminate, repeat pending. • Completed pulse dose steroid x 3 days. Continue with Solu-Medrol 30 mg twice daily. • Plan to start rituximab pending nerve/muscle biopsy results and repeat QuantiFERON test.      Microscopic hematuria  Assessment & Plan  Nephrology consulted, no indication for renal biopsy. Renal function stable. SIRS (systemic inflammatory response syndrome) (HCC)  Assessment & Plan  · As evidenced by tachycardia and leukocytosis. · Leukocytosis is likely secondary to steroid use, tachycardia improving. · Patient appears clinically non toxic and afebrile. · Continue to monitor for temperature curve or worsening leukocytosis.     Anemia  Assessment & Plan  · Likely anemia of chronic disease. · Monitor hemoglobin and transfuse as needed. Ambulatory dysfunction  Assessment & Plan  At baseline, patient is wheelchair-bound outside the house and can only walk few steps with assistance at home. Now, with declining functional status. PT/OT. Neuropathy involving both lower extremities  Assessment & Plan  As above        Vitamin B6 deficiency  Assessment & Plan  Replacement ordered. Moderate protein-calorie malnutrition ([address])  Assessment & Plan  Malnutrition Findings:   Adult Malnutrition type: Chronic illness  Adult Degree of Malnutrition: Malnutrition of moderate degree  Malnutrition Characteristics: Muscle loss, Fat loss                  360 Statement: Malnutrition of moderate degree in the context of chronic illness as evidenced by moderate depletion of the temples, visible clavicle and moderate buccal fat pad loss. Treated with oral diet and oral nutrition supplement. BMI Findings:  Adult BMI Classifications: Underweight < 18.5        Body mass index is 17.58 kg/m². Rheumatoid arthritis ([address])  Assessment & Plan  • Patient with longstanding history of rheumatoid arthritis, has undergone several joint fusions. she was intolerant to multiple DMARDs/Biologics and currently not on any maintenance therapy. • Continue IV Solu-Medrol as above. • Rheumatology evaluation appreciated. Asthma  Assessment & Plan  Continue with current regimen. VTE Pharmacologic Prophylaxis:   Moderate Risk (Score 3-4) - Pharmacological DVT Prophylaxis Ordered: heparin. Patient Centered Rounds: I performed bedside rounds with nursing staff today. Discussions with Specialists or Other Care Team Provider: Cm    Education and Discussions with Family / Patient: Updated  ( and daughter) at bedside.     Total Time Spent on Date of Encounter in care of patient: 35 minutes This time was spent on one or more of the following: performing physical exam; counseling and coordination of care; obtaining or reviewing history; documenting in the medical record; reviewing/ordering tests, medications or procedures; communicating with other healthcare professionals and discussing with patient's family/caregivers. Current Length of Stay: 8 day(s)  Current Patient Status: Inpatient   Certification Statement: The patient will continue to require additional inpatient hospital stay due to Pending QuantiFERON test, patient will need Rituxan. Discharge Plan: Anticipate discharge in 48-72 hrs to discharge location to be determined pending rehab evaluations. Code Status: Level 1 - Full Code    Subjective:   Patient examined at bedside, denies any active complaints. Discussed ongoing care plan with patient and her family at bedside, pending QuantiFERON repeat test, she will benefit from Rituxan or Cytoxan per rheumatology recommendations. Objective:     Vitals:   Temp (24hrs), Av.9 °F (36.6 °C), Min:97.6 °F (36.4 °C), Max:98.1 °F (36.7 °C)    Temp:  [97.6 °F (36.4 °C)-98.1 °F (36.7 °C)] 97.6 °F (36.4 °C)  HR:  [] 104  Resp:  [16-18] 18  BP: ()/(66-75) 105/66  SpO2:  [95 %-97 %] 95 %  Body mass index is 17.58 kg/m². Input and Output Summary (last 24 hours):   No intake or output data in the 24 hours ending 23    Physical Exam:   Physical Exam  Constitutional:       Appearance: Normal appearance. HENT:      Head: Normocephalic and atraumatic. Eyes:      Conjunctiva/sclera: Conjunctivae normal.      Pupils: Pupils are equal, round, and reactive to light. Cardiovascular:      Rate and Rhythm: Normal rate and regular rhythm. Pulses: Normal pulses. Heart sounds: Normal heart sounds. Pulmonary:      Effort: Pulmonary effort is normal.      Breath sounds: Normal breath sounds. Abdominal:      General: Abdomen is flat. Bowel sounds are normal.   Musculoskeletal:         General: Normal range of motion.       Comments: RA changes in both hands and feet. Neurological:      General: No focal deficit present. Mental Status: She is alert and oriented to person, place, and time. Additional Data:     Labs:  Results from last 7 days   Lab Units 08/22/23  0447   WBC Thousand/uL 13.14*   HEMOGLOBIN g/dL 7.5*   HEMATOCRIT % 24.3*   PLATELETS Thousands/uL 345   NEUTROS PCT % 82*   LYMPHS PCT % 13*   MONOS PCT % 4   EOS PCT % 0     Results from last 7 days   Lab Units 08/21/23  0539 08/17/23  0555 08/16/23  0554   SODIUM mmol/L 141   < > 136   POTASSIUM mmol/L 3.9   < > 4.8   CHLORIDE mmol/L 109*   < > 106   CO2 mmol/L 28   < > 27   BUN mg/dL 30*   < > 31*   CREATININE mg/dL 0.34*   < > 0.49*   ANION GAP mmol/L 4   < > 3   CALCIUM mg/dL 8.5   < > 8.7   ALBUMIN g/dL  --   --  2.6*   TOTAL BILIRUBIN mg/dL  --   --  0.43   ALK PHOS U/L  --   --  58   ALT U/L  --   --  32   AST U/L  --   --  12   GLUCOSE RANDOM mg/dL 86   < > 125    < > = values in this interval not displayed. Results from last 7 days   Lab Units 08/17/23  0555   INR  0.98     Results from last 7 days   Lab Units 08/19/23  1036   POC GLUCOSE mg/dl 108               Lines/Drains:  Invasive Devices     Peripheral Intravenous Line  Duration           Long-Dwell Peripheral IV (Midline) 82/90/12 Right Basilic 14 days                  Telemetry:  Telemetry Orders (From admission, onward)             24 Hour Telemetry Monitoring  Continuous x 24 Hours (Telem)        Question:  Reason for 24 Hour Telemetry  Answer:  Arrhythmias requiring acute medical intervention / PPM or ICD malfunction                 Telemetry Reviewed: Normal Sinus Rhythm  Indication for Continued Telemetry Use: Metabolic/electrolyte disturbance with high probability of dysrhythmia             Imaging: No pertinent imaging reviewed.     Recent Cultures (last 7 days):         Last 24 Hours Medication List:   Current Facility-Administered Medications   Medication Dose Route Frequency Provider Last Rate   • albuterol  2 puff Inhalation Q4H PRN Earline Xavier PA-C     • bisacodyl  10 mg Rectal Daily PRN Alisia Xavier PA-C     • cholecalciferol  400 Units Oral Daily Earline Xavier PA-C     • cyanocobalamin  1,000 mcg Oral Daily Earline Xavier PA-C     • diphenhydramine, lidocaine, Al/Mg hydroxide, simethicone  10 mL Swish & Spit Q4H PRN Earline Xavier PA-C     • docusate sodium  100 mg Oral BID Alisia Xavier PA-C     • gabapentin  600 mg Oral BID Ailin Fall MD     • gabapentin  900 mg Oral HS Ailin Fall MD     • heparin (porcine)  5,000 Units Subcutaneous Q8H [facility] Earline Xavier PA-C     • HYDROcodone-acetaminophen  1 tablet Oral Q6H PRN Earline Xavier PA-C     • magnesium Oxide  400 mg Oral HS Earline Xavier PA-C     • methylPREDNISolone sodium succinate  30 mg Intravenous Q12H [facility] Lori Gómez MD     • morphine injection  2 mg Intravenous Q4H PRN Earline Xavier PA-C     • ondansetron  4 mg Intravenous Q6H PRN Earline Xavier PA-C     • pyridoxine  50 mg Oral Daily Earline Xavier PA-C     • senna  1 tablet Oral Daily Earline Xavier PA-C          Today, Patient Was Seen By: Aviva Yeung MD    **Please Note: This note may have been constructed using a voice recognition system. **

## 2023-08-22 NOTE — ASSESSMENT & PLAN NOTE
· As evidenced by tachycardia and leukocytosis. · Leukocytosis is likely secondary to steroid use, tachycardia improving. · Patient appears clinically non toxic and afebrile. · Continue to monitor for temperature curve or worsening leukocytosis.

## 2023-08-22 NOTE — CASE MANAGEMENT
Case Management Discharge Planning Note    Patient name Penny Lebron  Location [facility] 734/[facility] 703-00 MRN [id number]  : 1975 Date 2023       Current Admission Date: 2023  Current Admission Diagnosis:Vasculitis [facility])   Patient Active Problem List    Diagnosis Date Noted   • Proteinuria 2023   • Microscopic hematuria 2023   • SIRS (systemic inflammatory response syndrome) ([address]) 2023   • Leucocytosis 08/15/2023   • Ambulatory dysfunction 08/15/2023   • Anemia 08/15/2023   • Neuropathy involving both lower extremities    • Vitamin B6 deficiency 08/10/2023   • Moderate protein-calorie malnutrition ([address]) 2023   • Vasculitis ([address]) 2023   • Microcytic anemia 2023   • 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   • Asthma 2023   • Psoriasis 2023   • DJD (degenerative joint disease) 2022   • Hyponatremia 2020   • Status post left hip replacement 2020   • Osteoarthritis of left hip 2020   • Ankle arthritis 2017   • Acquired bilateral club feet 2017   • Abnormal electrocardiogram 2016   • Abnormal findings on diagnostic imaging of heart and coronary circulation 2016   • Cardiac murmur 2016   • Cardiomyopathy ([address]) 2016   • Nonrheumatic mitral valve insufficiency 2016   • Blue color skin 2014   • Rheumatoid arthritis ([address]) 2014      LOS (days): 8  Geometric Mean LOS (GMLOS) (days): 4.30  Days to GMLOS:-3.4     OBJECTIVE:  Risk of Unplanned Readmission Score: 14.67         Current admission status: Inpatient   Preferred Pharmacy:   [address] [place] [address] [address] [facility] Nadia P[place]TM[place], [address] [place], [place] [address]arabella Zuniga [facility]. DR. PITTMAN[address]  [address] [facility].  DR. Jennifer PEARSON [address]  Phone: [phone] Fax: [phone]    [address] [facility], [address] [place] [address] [place] [address]  Phone: [phone] Fax: [phone]    Primary Care Provider: Carrol Castellon DO    Primary Insurance: MEDICARE  Secondary Insurance:     DISCHARGE DETAILS:        Additional Comments: Pt being followed by Rhumatology see  note from Rhumatology consult  dated 8/21/23

## 2023-08-22 NOTE — PLAN OF CARE
Problem: PHYSICAL THERAPY ADULT  Goal: Performs mobility at highest level of function for planned discharge setting. See evaluation for individualized goals. Description: Treatment/Interventions: ADL retraining, Functional transfer training, LE strengthening/ROM, Elevations, Therapeutic exercise, Endurance training, Bed mobility, Gait training, Spoke to nursing, OT          See flowsheet documentation for full assessment, interventions and recommendations. Outcome: Progressing  Note: Prognosis: Good  Problem List: Decreased strength, Decreased endurance, Impaired balance, Decreased mobility, Decreased range of motion  Assessment: Pt agreeable to participate in PT session. Pt performed functional mobility and therex as outlined above. First 1/2 of STS, maxAx1 however pt able to actively assist more in 2nd half of transfer. Able to take step towards chair in stand pivot attempt. Reports she is not able to feel her feet and relies on her vision to know where her feet are. Tachycardic (~140bpm )throughout especially with transitional movements and breath holding. Improves steadily to 100-110 bpm with rest and relaxation techniques. Pt left seated in chair with chair alarm and soft care cushion, call bell, phone, and all personal needs within reach. Pt will continue to benefit from skilled acute care PT to further address their functional mobility limitations. D/C recommendations remain rehab. Barriers to Discharge: Inaccessible home environment, Decreased caregiver support     PT Discharge Recommendation: Post acute rehabilitation services    See flowsheet documentation for full assessment.

## 2023-08-22 NOTE — PHYSICAL THERAPY NOTE
PHYSICAL THERAPY NOTE          Patient Name: Ryder Blanco  OQPDC'K Date: 8/22/2023 08/22/23 1204   PT Last Visit   PT Visit Date 08/22/23   Note Type   Note Type Treatment   Pain Assessment   Pain Assessment Tool 0-10   Pain Score No Pain   Pain Onset/Description Descriptor: Discomfort   Restrictions/Precautions   Weight Bearing Precautions Per Order No   Other Precautions Chair Alarm; Bed Alarm;Multiple lines;Telemetry; Fall Risk;Pain  (b/l ankle fusions, tachycardic)   General   Chart Reviewed Yes   Response to Previous Treatment Patient states she had pain and trouble sleeping following yesterdays session. Family/Caregiver Present No   Cognition   Overall Cognitive Status WFL   Arousal/Participation Cooperative   Attention Within functional limits   Orientation Level Oriented X4   Memory Within functional limits   Following Commands Follows all commands and directions without difficulty   Subjective   Subjective pt reporting discomfort in thighs s/p biopsy; agreeable to participate   Bed Mobility   Supine to Sit 5  Supervision   Additional items HOB elevated; Increased time required;Verbal cues;LE management   Sit to Supine Unable to assess   Additional Comments remained seated in chair with alarm upon conclusion   Transfers   Sit to Stand 2  Maximal assistance   Additional items Assist x 1; Increased time required;Verbal cues  (x2)   Stand to Sit 2  Maximal assistance   Additional items Assist x 1; Increased time required;Verbal cues   Stand pivot 3  Moderate assistance   Additional items Assist x 1; Increased time required;Verbal cues  (x2)   Additional Comments anterior bear hug approach   Ambulation/Elevation   Gait pattern Improper Weight shift;Decreased foot clearance; Short stride; Step to;Excessively slow   Gait Assistance 2  Maximal assist   Additional items Assist x 1;Verbal cues   Assistive Device   (anterior bear hug)   Distance 1' to chair   Wheelchair Activities   Wheelchair Parts Management No   Propulsion   (dependently pushed pt in w/c throughout hallway to increased emotional moral)   Balance   Static Sitting Fair -   Dynamic Sitting Poor +   Static Standing Poor   Dynamic Standing Poor -   Ambulatory Poor -   Endurance Deficit   Endurance Deficit Yes   Endurance Deficit Description generalized fatigue and weakness   Activity Tolerance   Activity Tolerance Patient limited by fatigue   Nurse Made Aware yes-cleared   Exercises   Knee AROM Long Arc Quad Sitting;5 reps;AROM; Bilateral   Marching Sitting;10 reps;AROM; Bilateral   Assessment   Prognosis Good   Problem List Decreased strength;Decreased endurance; Impaired balance;Decreased mobility; Decreased range of motion   Assessment Pt agreeable to participate in PT session. Pt performed functional mobility and therex as outlined above. First 1/2 of STS, maxAx1 however pt able to actively assist more in 2nd half of transfer. Able to take step towards chair in stand pivot attempt. Reports she is not able to feel her feet and relies on her vision to know where her feet are. Tachycardic (~140bpm )throughout especially with transitional movements and breath holding. Improves steadily to 100-110 bpm with rest and relaxation techniques. Pt left seated in chair with chair alarm and soft care cushion, call bell, phone, and all personal needs within reach. Pt will continue to benefit from skilled acute care PT to further address their functional mobility limitations. D/C recommendations remain rehab. Barriers to Discharge Inaccessible home environment;Decreased caregiver support   Goals   Patient Goals for doctors to start their action plan   STG Expiration Date 08/31/23   PT Treatment Day 2   Plan   Treatment/Interventions Functional transfer training;LE strengthening/ROM; Therapeutic exercise; Endurance training;Patient/family training;Equipment eval/education; Bed mobility;Gait training;Spoke to nursing;Spoke to case management;OT   Progress Progressing toward goals   PT Frequency 2-3x/wk   Recommendation   PT Discharge Recommendation Post acute rehabilitation services   AM-PAC Basic Mobility Inpatient   Turning in Flat Bed Without Bedrails 3   Lying on Back to Sitting on Edge of Flat Bed Without Bedrails 3   Moving Bed to Chair 2   Standing Up From Chair Using Arms 2   Walk in Room 1   Climb 3-5 Stairs With Railing 1   Basic Mobility Inpatient Raw Score 12   Basic Mobility Standardized Score 32.23   Highest Level Of Mobility   -HL Goal 4: Move to chair/commode   JH-HLM Achieved 4: Move to [employer] [place], PT, DPT      * Later in afternoon, pt mentioning to nurse that she did not want to do therapy today due to pain and that therapist was "pushy". RN relayed information to PT. Spoke to pt with  in room regarding autonomy to tell therapist no to session as desired and that she did not indicated to therapist once that she did not want to perform session. During session, she stated that she uncomfortable overnight following session prior with different therapist however was agreeable to suggestion of transferring to w/c to get out of room and then to chair for lunch without requiring motivation or convincing to do so. Pt states she at times does not want to but knows she should. Pt states feels session today was "not too much" and that she is ok. Reinforced concept of autonomy.      Christina Wade, PT, DPT

## 2023-08-23 LAB
ANION GAP SERPL CALCULATED.3IONS-SCNC: 5 MMOL/L
BUN SERPL-MCNC: 29 MG/DL (ref 5–25)
CALCIUM SERPL-MCNC: 8.3 MG/DL (ref 8.4–10.2)
CHLORIDE SERPL-SCNC: 105 MMOL/L (ref 96–108)
CO2 SERPL-SCNC: 27 MMOL/L (ref 21–32)
CREAT SERPL-MCNC: 0.29 MG/DL (ref 0.6–1.3)
GFR SERPL CREATININE-BSD FRML MDRD: 140 ML/MIN/1.73SQ M
GLUCOSE SERPL-MCNC: 118 MG/DL (ref 65–140)
HCT VFR BLD AUTO: 25 % (ref 34.8–46.1)
HGB BLD-MCNC: 7.7 G/DL (ref 11.5–15.4)
MCH RBC QN AUTO: 27.6 PG (ref 26.8–34.3)
MCHC RBC AUTO-ENTMCNC: 30.8 G/DL (ref 31.4–37.4)
MCV RBC AUTO: 90 FL (ref 82–98)
MISCELLANEOUS LAB TEST RESULT: NORMAL
PLATELET # BLD AUTO: 351 THOUSANDS/UL (ref 149–390)
PMV BLD AUTO: 8.4 FL (ref 8.9–12.7)
POTASSIUM SERPL-SCNC: 4.4 MMOL/L (ref 3.5–5.3)
RBC # BLD AUTO: 2.79 MILLION/UL (ref 3.81–5.12)
SODIUM SERPL-SCNC: 137 MMOL/L (ref 135–147)
WBC # BLD AUTO: 9.9 THOUSAND/UL (ref 4.31–10.16)

## 2023-08-23 PROCEDURE — 99232 SBSQ HOSP IP/OBS MODERATE 35: CPT | Performed by: INTERNAL MEDICINE

## 2023-08-23 PROCEDURE — 85027 COMPLETE CBC AUTOMATED: CPT | Performed by: STUDENT IN AN ORGANIZED HEALTH CARE EDUCATION/TRAINING PROGRAM

## 2023-08-23 PROCEDURE — 99233 SBSQ HOSP IP/OBS HIGH 50: CPT | Performed by: STUDENT IN AN ORGANIZED HEALTH CARE EDUCATION/TRAINING PROGRAM

## 2023-08-23 PROCEDURE — 80048 BASIC METABOLIC PNL TOTAL CA: CPT | Performed by: STUDENT IN AN ORGANIZED HEALTH CARE EDUCATION/TRAINING PROGRAM

## 2023-08-23 RX ORDER — SODIUM CHLORIDE 9 MG/ML
20 INJECTION, SOLUTION INTRAVENOUS ONCE
Status: COMPLETED | OUTPATIENT
Start: 2023-08-23 | End: 2023-08-24

## 2023-08-23 RX ORDER — DIPHENHYDRAMINE HCL 25 MG
25 TABLET ORAL ONCE
Status: COMPLETED | OUTPATIENT
Start: 2023-08-23 | End: 2023-08-23

## 2023-08-23 RX ORDER — ACETAMINOPHEN 325 MG/1
650 TABLET ORAL ONCE
Status: COMPLETED | OUTPATIENT
Start: 2023-08-23 | End: 2023-08-23

## 2023-08-23 RX ADMIN — SODIUM CHLORIDE 20 ML/HR: 0.9 INJECTION, SOLUTION INTRAVENOUS at 19:49

## 2023-08-23 RX ADMIN — DIPHENHYDRAMINE HCL 25 MG: 25 TABLET ORAL at 19:44

## 2023-08-23 RX ADMIN — MAGNESIUM OXIDE TAB 400 MG (241.3 MG ELEMENTAL MG) 400 MG: 400 (241.3 MG) TAB at 21:58

## 2023-08-23 RX ADMIN — GABAPENTIN 900 MG: 300 CAPSULE ORAL at 21:57

## 2023-08-23 RX ADMIN — METHYLPREDNISOLONE SODIUM SUCCINATE 30 MG: 40 INJECTION, POWDER, FOR SOLUTION INTRAMUSCULAR; INTRAVENOUS at 22:57

## 2023-08-23 RX ADMIN — HYDROCODONE BITARTRATE AND ACETAMINOPHEN 1 TABLET: 5; 325 TABLET ORAL at 21:57

## 2023-08-23 RX ADMIN — METHYLPREDNISOLONE SODIUM SUCCINATE 30 MG: 40 INJECTION, POWDER, FOR SOLUTION INTRAMUSCULAR; INTRAVENOUS at 09:16

## 2023-08-23 RX ADMIN — CHOLECALCIFEROL TAB 10 MCG (400 UNIT) 400 UNITS: 10 TAB at 09:16

## 2023-08-23 RX ADMIN — HEPARIN SODIUM 5000 UNITS: 5000 INJECTION INTRAVENOUS; SUBCUTANEOUS at 14:07

## 2023-08-23 RX ADMIN — GABAPENTIN 600 MG: 300 CAPSULE ORAL at 06:16

## 2023-08-23 RX ADMIN — PYRIDOXINE HCL TAB 50 MG 50 MG: 50 TAB at 09:16

## 2023-08-23 RX ADMIN — CYANOCOBALAMIN TAB 500 MCG 1000 MCG: 500 TAB at 09:16

## 2023-08-23 RX ADMIN — ACETAMINOPHEN 650 MG: 325 TABLET, FILM COATED ORAL at 19:44

## 2023-08-23 RX ADMIN — HEPARIN SODIUM 5000 UNITS: 5000 INJECTION INTRAVENOUS; SUBCUTANEOUS at 06:16

## 2023-08-23 RX ADMIN — HYDROCODONE BITARTRATE AND ACETAMINOPHEN 1 TABLET: 5; 325 TABLET ORAL at 01:37

## 2023-08-23 RX ADMIN — HEPARIN SODIUM 5000 UNITS: 5000 INJECTION INTRAVENOUS; SUBCUTANEOUS at 21:58

## 2023-08-23 RX ADMIN — GABAPENTIN 600 MG: 300 CAPSULE ORAL at 14:07

## 2023-08-23 RX ADMIN — RITUXIMAB 600 MG: 10 INJECTION, SOLUTION INTRAVENOUS at 19:48

## 2023-08-23 NOTE — PROGRESS NOTES
[address] [facility]  Progress Note  Name: Mark Dos Santos I  MRN: [id number]  Unit/Bed#: PPHP 734-01 I Date of Admission: 8/14/2023   Date of Service: 8/23/2023 I Hospital Day: 9    Assessment/Plan   * Vasculitis [facility])  Assessment & Plan  • Presented with bilateral upper and lower extremity pain, weakness, and numbness. Has hx of RA, not currently on treatment. • MRI cervical,thoracic and lumbar spinal without abnormalities or pathologic enhancement. • LP with normal WBCs, protein, negative Gram stain, elevated IgG. • Work-up illustrated evidence of B6 deficiency for which supplementation was ordered. • Autoimmune work-up with elevated inflammatory marker, elevated p-ANCA MPO antibodies and positive VALDO. • Suspicion for P-ANCA vasculitis. • Patient is status post nerve and muscle biopsy. Preliminary results suspicious for vasculitis. Await final results. • Previous provider discussed with rheumatology Dr Roxanna Maharaj who recommended the following:  • CT chest without contrast which showed Platelike atelectasis or scarring in the lung bases. • UA, urine protein creatinine ratio showing microscopic hematuria and small proteins. • chronic hepatitis panel negative. QuantiFERON test indeterminate, repeat indeterminate as well. • Completed pulse dose steroid x 3 days. Continue with Solu-Medrol 30 mg twice daily. • Plan to start rituximab pending nerve/muscle biopsy results. Discussed with infectious disease and rheumatology, Dr. Sonya Stubbs to round on patient later this afternoon as patient has completed her PPD test on 8/21. Once cleared from infectious disease perspective rheumatology to start her on Rituxan. Microscopic hematuria  Assessment & Plan  Nephrology consulted, no indication for renal biopsy. Renal function stable.      Proteinuria  Assessment & Plan  As above     SIRS (systemic inflammatory response syndrome) (HCC)  Assessment & Plan  · As evidenced by tachycardia and leukocytosis. · Leukocytosis is likely secondary to steroid use, tachycardia improving. · Patient appears clinically non toxic and afebrile. · Continue to monitor for temperature curve or worsening leukocytosis. Anemia  Assessment & Plan  · Likely anemia of chronic disease. · Monitor hemoglobin and transfuse as needed. Ambulatory dysfunction  Assessment & Plan  At baseline, patient is wheelchair-bound outside the house and can only walk few steps with assistance at home. Now, with declining functional status. Evaluated by PT/OT, recommended rehab. Vitamin B6 deficiency  Assessment & Plan  Replacement ordered. Moderate protein-calorie malnutrition ([address])  Assessment & Plan  Malnutrition Findings:   Adult Malnutrition type: Chronic illness  Adult Degree of Malnutrition: Malnutrition of moderate degree  Malnutrition Characteristics: Muscle loss, Fat loss                  360 Statement: Malnutrition of moderate degree in the context of chronic illness as evidenced by moderate depletion of the temples, visible clavicle and moderate buccal fat pad loss. Treated with oral diet and oral nutrition supplement. BMI Findings:  Adult BMI Classifications: Underweight < 18.5        Body mass index is 17.4 kg/m². Rheumatoid arthritis ([address])  Assessment & Plan  • Patient with longstanding history of rheumatoid arthritis, has undergone several joint fusions. she was intolerant to multiple DMARDs/Biologics and currently not on any maintenance therapy. • Continue IV Solu-Medrol as above. • Rheumatology evaluation appreciated. Asthma  Assessment & Plan  Continue with current regimen. VTE Pharmacologic Prophylaxis:   Moderate Risk (Score 3-4) - Pharmacological DVT Prophylaxis Ordered: heparin. Patient Centered Rounds: I performed bedside rounds with nursing staff today.   Discussions with Specialists or Other Care Team Provider: Rheumatology, infectious disease and case management. Education and Discussions with Family / Patient: Updated patient at bedside. .     Total Time Spent on Date of Encounter in care of patient: 45 minutes This time was spent on one or more of the following: performing physical exam; counseling and coordination of care; obtaining or reviewing history; documenting in the medical record; reviewing/ordering tests, medications or procedures; communicating with other healthcare professionals and discussing with patient's family/caregivers. Current Length of Stay: 9 day(s)  Current Patient Status: Inpatient   Certification Statement: The patient will continue to require additional inpatient hospital stay due to Pending clearance for Rituxan, will also need first dose of Rituxan while inpatient. Discharge Plan: Anticipate discharge in 48-72 hrs to rehab facility. Code Status: Level 1 - Full Code    Subjective:   Patient examined at bedside, no active complaints. Discussed about management and plan as she will be seen by infectious disease today, once cleared patient will need to be started on Rituxan. Objective:     Vitals:   Temp (24hrs), Av.7 °F (36.5 °C), Min:97.5 °F (36.4 °C), Max:97.8 °F (36.6 °C)    Temp:  [97.5 °F (36.4 °C)-97.8 °F (36.6 °C)] 97.8 °F (36.6 °C)  HR:  [] 130  Resp:  [14-18] 14  BP: (105-114)/(66-73) 107/71  SpO2:  [95 %-98 %] 98 %  Body mass index is 17.4 kg/m². Input and Output Summary (last 24 hours):   No intake or output data in the 24 hours ending 23 1206    Physical Exam:   Physical Exam  Constitutional:       Appearance: Normal appearance. HENT:      Head: Normocephalic and atraumatic. Mouth/Throat:      Mouth: Mucous membranes are moist.      Pharynx: Oropharynx is clear. Eyes:      Conjunctiva/sclera: Conjunctivae normal.      Pupils: Pupils are equal, round, and reactive to light. Cardiovascular:      Rate and Rhythm: Normal rate and regular rhythm.    Pulmonary:      Effort: Pulmonary effort is normal.      Breath sounds: Normal breath sounds. Abdominal:      General: Abdomen is flat. Bowel sounds are normal.   Musculoskeletal:      Cervical back: Normal range of motion. Comments: Significant rheumatoid arthritis changes in bilateral hands and feet. Neurological:      General: No focal deficit present. Mental Status: She is alert and oriented to person, place, and time. Psychiatric:         Mood and Affect: Mood normal.         Behavior: Behavior normal.          Additional Data:     Labs:  Results from last 7 days   Lab Units 23  0533 23  0447   WBC Thousand/uL 9.90 13.14*   HEMOGLOBIN g/dL 7.7* 7.5*   HEMATOCRIT % 25.0* 24.3*   PLATELETS Thousands/uL 351 345   NEUTROS PCT %  --  82*   LYMPHS PCT %  --  13*   MONOS PCT %  --  4   EOS PCT %  --  0     Results from last 7 days   Lab Units 23  0533   SODIUM mmol/L 137   POTASSIUM mmol/L 4.4   CHLORIDE mmol/L 105   CO2 mmol/L 27   BUN mg/dL 29*   CREATININE mg/dL 0.29*   ANION GAP mmol/L 5   CALCIUM mg/dL 8.3*   GLUCOSE RANDOM mg/dL 118     Results from last 7 days   Lab Units 23  0555   INR  0.98     Results from last 7 days   Lab Units 23  1036   POC GLUCOSE mg/dl 108               Lines/Drains:  Invasive Devices     Peripheral Intravenous Line  Duration           Long-Dwell Peripheral IV (Midline)  Right Basilic 14 days                  Telemetry:  Telemetry Orders (From admission, onward)             24 Hour Telemetry Monitoring  Continuous x 24 Hours (Telem)           Question:  Reason for 24 Hour Telemetry  Answer:  Arrhythmias requiring acute medical intervention / PPM or ICD malfunction                 Telemetry Reviewed: Normal Sinus Rhythm  Indication for Continued Telemetry Use: No indication for continued use. Will discontinue. Imaging: No pertinent imaging reviewed.     Recent Cultures (last 7 days):         Last 24 Hours Medication List:   Current Facility-Administered Medications   Medication Dose Route Frequency Provider Last Rate   • albuterol  2 puff Inhalation Q4H PRN Earline Xavier PA-C     • bisacodyl  10 mg Rectal Daily PRN Jad Xavier PA-C     • cholecalciferol  400 Units Oral Daily Earline Xavier PA-C     • cyanocobalamin  1,000 mcg Oral Daily Earline Xavier PA-C     • diphenhydramine, lidocaine, Al/Mg hydroxide, simethicone  10 mL Swish & Spit Q4H PRN Earline Xavier PA-C     • docusate sodium  100 mg Oral BID Jad Xavier PA-C     • gabapentin  600 mg Oral BID [place] Joseph MD     • gabapentin  900 mg Oral HS Luz MD Jake     • heparin (porcine)  5,000 Units Subcutaneous Q8H [address] Earline Xavier PA-C     • HYDROcodone-acetaminophen  1 tablet Oral Q6H PRN Earline Xavier PA-C     • magnesium Oxide  400 mg Oral HS Earline Xavier PA-C     • methylPREDNISolone sodium succinate  30 mg Intravenous Q12H [address] Velia Amaya MD     • morphine injection  2 mg Intravenous Q4H PRN Earline Xavier PA-C     • ondansetron  4 mg Intravenous Q6H PRN Earline Xavier PA-C     • pyridoxine  50 mg Oral Daily Earline Xavier PA-C     • senna  1 tablet Oral Daily Earline Xavier PA-C          Today, Patient Was Seen By: Kenyatta Lopez MD    **Please Note: This note may have been constructed using a voice recognition system. **

## 2023-08-23 NOTE — PLAN OF CARE
Problem: MOBILITY - ADULT  Goal: Maintain or return to baseline ADL function  Description: INTERVENTIONS:  -  Assess patient's ability to carry out ADLs; assess patient's baseline for ADL function and identify physical deficits which impact ability to perform ADLs (bathing, care of mouth/teeth, toileting, grooming, dressing, etc.)  - Assess/evaluate cause of self-care deficits   - Assess range of motion  - Assess patient's mobility; develop plan if impaired  - Assess patient's need for assistive devices and provide as appropriate  - Encourage maximum independence but intervene and supervise when necessary  - Involve family in performance of ADLs  - Assess for home care needs following discharge   - Consider OT consult to assist with ADL evaluation and planning for discharge  - Provide patient education as appropriate  Outcome: Progressing  Goal: Maintains/Returns to pre admission functional level  Description: INTERVENTIONS:  - Perform BMAT or MOVE assessment daily.   - Set and communicate daily mobility goal to care team and patient/family/caregiver. - Collaborate with rehabilitation services on mobility goals if consulted  - Perform Range of Motion 3 times a day. - Reposition patient every 2 hours.   - Dangle patient 3 times a day  - Stand patient 3 times a day  - Ambulate patient 3 times a day  - Out of bed to chair 3 times a day   - Out of bed for meals 3 times a day  - Out of bed for toileting  - Record patient progress and toleration of activity level   Outcome: Progressing     Problem: PAIN - ADULT  Goal: Verbalizes/displays adequate comfort level or baseline comfort level  Description: Interventions:  - Encourage patient to monitor pain and request assistance  - Assess pain using appropriate pain scale  - Administer analgesics based on type and severity of pain and evaluate response  - Implement non-pharmacological measures as appropriate and evaluate response  - Consider cultural and social influences on pain and pain management  - Notify physician/advanced practitioner if interventions unsuccessful or patient reports new pain  Outcome: Progressing     Problem: INFECTION - ADULT  Goal: Absence or prevention of progression during hospitalization  Description: INTERVENTIONS:  - Assess and monitor for signs and symptoms of infection  - Monitor lab/diagnostic results  - Monitor all insertion sites, i.e. indwelling lines, tubes, and drains  - Monitor endotracheal if appropriate and nasal secretions for changes in amount and color  - Harned appropriate cooling/warming therapies per order  - Administer medications as ordered  - Instruct and encourage patient and family to use good hand hygiene technique  - Identify and instruct in appropriate isolation precautions for identified infection/condition  Outcome: Progressing  Goal: Absence of fever/infection during neutropenic period  Description: INTERVENTIONS:  - Monitor WBC    Outcome: Progressing     Problem: SAFETY ADULT  Goal: Maintain or return to baseline ADL function  Description: INTERVENTIONS:  -  Assess patient's ability to carry out ADLs; assess patient's baseline for ADL function and identify physical deficits which impact ability to perform ADLs (bathing, care of mouth/teeth, toileting, grooming, dressing, etc.)  - Assess/evaluate cause of self-care deficits   - Assess range of motion  - Assess patient's mobility; develop plan if impaired  - Assess patient's need for assistive devices and provide as appropriate  - Encourage maximum independence but intervene and supervise when necessary  - Involve family in performance of ADLs  - Assess for home care needs following discharge   - Consider OT consult to assist with ADL evaluation and planning for discharge  - Provide patient education as appropriate  Outcome: Progressing  Goal: Patient will remain free of falls  Description: INTERVENTIONS:  - Educate patient/family on patient safety including physical limitations  - Instruct patient to call for assistance with activity   - Consult OT/PT to assist with strengthening/mobility   - Keep Call bell within reach  - Keep bed low and locked with side rails adjusted as appropriate  - Keep care items and personal belongings within reach  - Initiate and maintain comfort rounds  - Make Fall Risk Sign visible to staff  - Offer Toileting every 2 Hours, in advance of need  - Initiate/Maintain bed alarm  - Obtain necessary fall risk management equipment: in room   - Apply yellow socks and bracelet for high fall risk patients  - Consider moving patient to room near nurses station  Outcome: Progressing     Problem: DISCHARGE PLANNING  Goal: Discharge to home or other facility with appropriate resources  Description: INTERVENTIONS:  - Identify barriers to discharge w/patient and caregiver  - Arrange for needed discharge resources and transportation as appropriate  - Identify discharge learning needs (meds, wound care, etc.)  - Arrange for interpretive services to assist at discharge as needed  - Refer to Case Management Department for coordinating discharge planning if the patient needs post-hospital services based on physician/advanced practitioner order or complex needs related to functional status, cognitive ability, or social support system  Outcome: Progressing     Problem: Knowledge Deficit  Goal: Patient/family/caregiver demonstrates understanding of disease process, treatment plan, medications, and discharge instructions  Description: Complete learning assessment and assess knowledge base.   Interventions:  - Provide teaching at level of understanding  - Provide teaching via preferred learning methods  Outcome: Progressing     Problem: Prexisting or High Potential for Compromised Skin Integrity  Goal: Skin integrity is maintained or improved  Description: INTERVENTIONS:  - Identify patients at risk for skin breakdown  - Assess and monitor skin integrity  - Assess and monitor nutrition and hydration status  - Monitor labs   - Assess for incontinence   - Turn and reposition patient  - Assist with mobility/ambulation  - Relieve pressure over bony prominences  - Avoid friction and shearing  - Provide appropriate hygiene as needed including keeping skin clean and dry  - Evaluate need for skin moisturizer/barrier cream  - Collaborate with interdisciplinary team   - Patient/family teaching  - Consider wound care consult   Outcome: Progressing     Problem: Nutrition/Hydration-ADULT  Goal: Nutrient/Hydration intake appropriate for improving, restoring or maintaining nutritional needs  Description: Monitor and assess patient's nutrition/hydration status for malnutrition. Collaborate with interdisciplinary team and initiate plan and interventions as ordered. Monitor patient's weight and dietary intake as ordered or per policy. Utilize nutrition screening tool and intervene as necessary. Determine patient's food preferences and provide high-protein, high-caloric foods as appropriate.      INTERVENTIONS:  - Monitor oral intake, urinary output, labs, and treatment plans  - Assess nutrition and hydration status and recommend course of action  - Evaluate amount of meals eaten  - Assist patient with eating if necessary   - Allow adequate time for meals  - Recommend/ encourage appropriate diets, oral nutritional supplements, and vitamin/mineral supplements  - Order, calculate, and assess calorie counts as needed  - Recommend, monitor, and adjust tube feedings and TPN/PPN based on assessed needs  - Assess need for intravenous fluids  - Provide specific nutrition/hydration education as appropriate  - Include patient/family/caregiver in decisions related to nutrition  Outcome: Progressing

## 2023-08-23 NOTE — ASSESSMENT & PLAN NOTE
At baseline, patient is wheelchair-bound outside the house and can only walk few steps with assistance at home. Now, with declining functional status. Evaluated by PT/OT, recommended rehab.

## 2023-08-23 NOTE — RESTORATIVE TECHNICIAN NOTE
Restorative Technician Note      Patient Name: Gloria Hector     Note Type: Mobility  Patient Position Upon Consult: Supine  Activity Performed: Dangled; Range of motion; Repositioned  Assistive Device: Other (Comment) (Assist x1 to sit EOB/perform ROM exercises.)  Education Provided: Yes  Patient Position at End of Consult: Supine;  All needs within reach; Bed/Chair alarm activated    Enedelia QUESADA, Restorative Technician, [employer]

## 2023-08-23 NOTE — ASSESSMENT & PLAN NOTE
• Presented with bilateral upper and lower extremity pain, weakness, and numbness. Has hx of RA, not currently on treatment. • MRI cervical,thoracic and lumbar spinal without abnormalities or pathologic enhancement. • LP with normal WBCs, protein, negative Gram stain, elevated IgG. • Work-up illustrated evidence of B6 deficiency for which supplementation was ordered. • Autoimmune work-up with elevated inflammatory marker, elevated p-ANCA MPO antibodies and positive VALDO. • Suspicion for P-ANCA vasculitis. • Patient is status post nerve and muscle biopsy. Preliminary results suspicious for vasculitis. Await final results. • Previous provider discussed with rheumatology Dr Sanjay Sewell who recommended the following:  • CT chest without contrast which showed Platelike atelectasis or scarring in the lung bases. • UA, urine protein creatinine ratio showing microscopic hematuria and small proteins. • chronic hepatitis panel negative. QuantiFERON test indeterminate, repeat indeterminate as well. • Completed pulse dose steroid x 3 days. Continue with Solu-Medrol 30 mg twice daily. • Plan to start rituximab pending nerve/muscle biopsy results. Discussed with infectious disease and rheumatology, Dr. Nav Lerma to round on patient later this afternoon as patient has completed her PPD test on 8/21. Once cleared from infectious disease perspective rheumatology to start her on Rituxan.

## 2023-08-23 NOTE — ASSESSMENT & PLAN NOTE
Malnutrition Findings:   Adult Malnutrition type: Chronic illness  Adult Degree of Malnutrition: Malnutrition of moderate degree  Malnutrition Characteristics: Muscle loss, Fat loss                  360 Statement: Malnutrition of moderate degree in the context of chronic illness as evidenced by moderate depletion of the temples, visible clavicle and moderate buccal fat pad loss. Treated with oral diet and oral nutrition supplement. BMI Findings:  Adult BMI Classifications: Underweight < 18.5        Body mass index is 17.4 kg/m².

## 2023-08-23 NOTE — PROGRESS NOTES
Addendum:  T-spot is negative. Low risk from ID for Rituxan or Cytoxan if clinically indicated    Progress Note - Infectious Disease   Iraida Hector 41 y.o. female MRN: [id number]  Unit/Bed#: PPHP 734-01 Encounter: [id number]      Impression/Recommendations: Indeterminate quantiferon gold result. Due to negative control in setting of immunosuppression. No known prior TB exposures or high-risk activities. PPD negative and reports prior negative PPD, TB blood testing. No cough. CT chest without suggestion of active or prior infection. Rec:  • Follow up T-spot but suspect will be negative or uninterpretable  • Low risk from ID for Rituxan or Cytoxan if clinically indicated     Microscopic polyangiitis. With neuropathy, muscle weakness. Status post pulse steroids.     RA. Immunosuppressed state. On solumedrol 30 Q12  Rec:  · Defer to Rheumatology need for PCP prophylaxis given anticipated dose/duration of steroids and ultimate immunosuppression plan     The above impression and plan was discussed in detail with the patient, and Dr. Suellen Garcia. We will sign off for now. Please call with new questions.     Antibiotics:  None    Subjective:  Patient seen on AM rounds. No new complaints    24 Hour Events:  No documented fevers, chills, sweats, nausea, vomiting, or diarrhea. Objective:  Vitals:  Temp:  [97.5 °F (36.4 °C)-97.8 °F (36.6 °C)] 97.8 °F (36.6 °C)  HR:  [] 130  Resp:  [14-18] 14  BP: (105-114)/(66-73) 107/71  SpO2:  [95 %-98 %] 98 %  Temp (24hrs), Av.7 °F (36.5 °C), Min:97.5 °F (36.4 °C), Max:97.8 °F (36.6 °C)  Current: Temperature: 97.8 °F (36.6 °C)    Physical Exam:   General:  No acute distress  Psychiatric:  Awake and alert  Pulmonary:  Normal respiratory excursion without accessory muscle use  Abdomen:  Soft, nontender  Extremities:  No edema  Skin:  No rashes    Lab Results:  I have personally reviewed pertinent labs.   Results from last 7 days   Lab Units 23  7467 08/21/23  0539 08/20/23  0510   POTASSIUM mmol/L 4.4 3.9 4.4   CHLORIDE mmol/L 105 109* 108   CO2 mmol/L 27 28 29   BUN mg/dL 29* 30* 32*   CREATININE mg/dL 0.29* 0.34* 0.31*   EGFR ml/min/1.73sq m 140 133 137   CALCIUM mg/dL 8.3* 8.5 8.1*     Results from last 7 days   Lab Units 08/23/23  0533 08/22/23  0447 08/18/23  0511   WBC Thousand/uL 9.90 13.14* 19.05*   HEMOGLOBIN g/dL 7.7* 7.5* 8.5*   PLATELETS Thousands/uL 351 345 388           Imaging Studies:   I have personally reviewed pertinent imaging study reports and images in PACS. EKG, Pathology, and Other Studies:   I have personally reviewed pertinent reports.

## 2023-08-23 NOTE — PLAN OF CARE
Problem: MOBILITY - ADULT  Goal: Maintain or return to baseline ADL function  Description: INTERVENTIONS:  -  Assess patient's ability to carry out ADLs; assess patient's baseline for ADL function and identify physical deficits which impact ability to perform ADLs (bathing, care of mouth/teeth, toileting, grooming, dressing, etc.)  - Assess/evaluate cause of self-care deficits   - Assess range of motion  - Assess patient's mobility; develop plan if impaired  - Assess patient's need for assistive devices and provide as appropriate  - Encourage maximum independence but intervene and supervise when necessary  - Involve family in performance of ADLs  - Assess for home care needs following discharge   - Consider OT consult to assist with ADL evaluation and planning for discharge  - Provide patient education as appropriate  Outcome: Progressing     Problem: PAIN - ADULT  Goal: Verbalizes/displays adequate comfort level or baseline comfort level  Description: Interventions:  - Encourage patient to monitor pain and request assistance  - Assess pain using appropriate pain scale  - Administer analgesics based on type and severity of pain and evaluate response  - Implement non-pharmacological measures as appropriate and evaluate response  - Consider cultural and social influences on pain and pain management  - Notify physician/advanced practitioner if interventions unsuccessful or patient reports new pain  Outcome: Progressing     Problem: INFECTION - ADULT  Goal: Absence or prevention of progression during hospitalization  Description: INTERVENTIONS:  - Assess and monitor for signs and symptoms of infection  - Monitor lab/diagnostic results  - Monitor all insertion sites, i.e. indwelling lines, tubes, and drains  - Monitor endotracheal if appropriate and nasal secretions for changes in amount and color  - [place] appropriate cooling/warming therapies per order  - Administer medications as ordered  - Instruct and encourage patient and family to use good hand hygiene technique  - Identify and instruct in appropriate isolation precautions for identified infection/condition  Outcome: Progressing     Problem: SAFETY ADULT  Goal: Maintain or return to baseline ADL function  Description: INTERVENTIONS:  -  Assess patient's ability to carry out ADLs; assess patient's baseline for ADL function and identify physical deficits which impact ability to perform ADLs (bathing, care of mouth/teeth, toileting, grooming, dressing, etc.)  - Assess/evaluate cause of self-care deficits   - Assess range of motion  - Assess patient's mobility; develop plan if impaired  - Assess patient's need for assistive devices and provide as appropriate  - Encourage maximum independence but intervene and supervise when necessary  - Involve family in performance of ADLs  - Assess for home care needs following discharge   - Consider OT consult to assist with ADL evaluation and planning for discharge  - Provide patient education as appropriate  Outcome: Progressing

## 2023-08-24 ENCOUNTER — TELEPHONE (OUTPATIENT)
Dept: RHEUMATOLOGY | Facility: CLINIC | Age: 48
End: 2023-08-24

## 2023-08-24 LAB
ANION GAP SERPL CALCULATED.3IONS-SCNC: 7 MMOL/L
ATRIAL RATE: 125 BPM
BUN SERPL-MCNC: 31 MG/DL (ref 5–25)
CALCIUM SERPL-MCNC: 8 MG/DL (ref 8.4–10.2)
CHLORIDE SERPL-SCNC: 104 MMOL/L (ref 96–108)
CO2 SERPL-SCNC: 25 MMOL/L (ref 21–32)
CREAT SERPL-MCNC: 0.33 MG/DL (ref 0.6–1.3)
GFR SERPL CREATININE-BSD FRML MDRD: 134 ML/MIN/1.73SQ M
GLUCOSE SERPL-MCNC: 120 MG/DL (ref 65–140)
HCT VFR BLD AUTO: 24.5 % (ref 34.8–46.1)
HGB BLD-MCNC: 7.4 G/DL (ref 11.5–15.4)
MCH RBC QN AUTO: 28 PG (ref 26.8–34.3)
MCHC RBC AUTO-ENTMCNC: 30.2 G/DL (ref 31.4–37.4)
MCV RBC AUTO: 93 FL (ref 82–98)
P AXIS: 56 DEGREES
PLATELET # BLD AUTO: 292 THOUSANDS/UL (ref 149–390)
PMV BLD AUTO: 8 FL (ref 8.9–12.7)
POTASSIUM SERPL-SCNC: 4.5 MMOL/L (ref 3.5–5.3)
PR INTERVAL: 140 MS
QRS AXIS: 86 DEGREES
QRSD INTERVAL: 78 MS
QT INTERVAL: 300 MS
QTC INTERVAL: 433 MS
RBC # BLD AUTO: 2.64 MILLION/UL (ref 3.81–5.12)
SODIUM SERPL-SCNC: 136 MMOL/L (ref 135–147)
T WAVE AXIS: 254 DEGREES
VENTRICULAR RATE: 125 BPM
WBC # BLD AUTO: 15.29 THOUSAND/UL (ref 4.31–10.16)

## 2023-08-24 PROCEDURE — 93010 ELECTROCARDIOGRAM REPORT: CPT | Performed by: INTERNAL MEDICINE

## 2023-08-24 PROCEDURE — 85027 COMPLETE CBC AUTOMATED: CPT | Performed by: STUDENT IN AN ORGANIZED HEALTH CARE EDUCATION/TRAINING PROGRAM

## 2023-08-24 PROCEDURE — 80048 BASIC METABOLIC PNL TOTAL CA: CPT | Performed by: STUDENT IN AN ORGANIZED HEALTH CARE EDUCATION/TRAINING PROGRAM

## 2023-08-24 PROCEDURE — 93005 ELECTROCARDIOGRAM TRACING: CPT

## 2023-08-24 PROCEDURE — 99232 SBSQ HOSP IP/OBS MODERATE 35: CPT | Performed by: STUDENT IN AN ORGANIZED HEALTH CARE EDUCATION/TRAINING PROGRAM

## 2023-08-24 RX ADMIN — METHYLPREDNISOLONE SODIUM SUCCINATE 30 MG: 40 INJECTION, POWDER, FOR SOLUTION INTRAMUSCULAR; INTRAVENOUS at 21:49

## 2023-08-24 RX ADMIN — METHYLPREDNISOLONE SODIUM SUCCINATE 30 MG: 40 INJECTION, POWDER, FOR SOLUTION INTRAMUSCULAR; INTRAVENOUS at 09:23

## 2023-08-24 RX ADMIN — HYDROCODONE BITARTRATE AND ACETAMINOPHEN 1 TABLET: 5; 325 TABLET ORAL at 21:50

## 2023-08-24 RX ADMIN — PYRIDOXINE HCL TAB 50 MG 50 MG: 50 TAB at 09:26

## 2023-08-24 RX ADMIN — GABAPENTIN 900 MG: 300 CAPSULE ORAL at 21:49

## 2023-08-24 RX ADMIN — CHOLECALCIFEROL TAB 10 MCG (400 UNIT) 400 UNITS: 10 TAB at 09:24

## 2023-08-24 RX ADMIN — HEPARIN SODIUM 5000 UNITS: 5000 INJECTION INTRAVENOUS; SUBCUTANEOUS at 13:49

## 2023-08-24 RX ADMIN — HEPARIN SODIUM 5000 UNITS: 5000 INJECTION INTRAVENOUS; SUBCUTANEOUS at 21:49

## 2023-08-24 RX ADMIN — GABAPENTIN 600 MG: 300 CAPSULE ORAL at 13:49

## 2023-08-24 RX ADMIN — MAGNESIUM OXIDE TAB 400 MG (241.3 MG ELEMENTAL MG) 400 MG: 400 (241.3 MG) TAB at 21:50

## 2023-08-24 RX ADMIN — GABAPENTIN 600 MG: 300 CAPSULE ORAL at 06:06

## 2023-08-24 RX ADMIN — HEPARIN SODIUM 5000 UNITS: 5000 INJECTION INTRAVENOUS; SUBCUTANEOUS at 06:06

## 2023-08-24 RX ADMIN — CYANOCOBALAMIN TAB 500 MCG 1000 MCG: 500 TAB at 09:24

## 2023-08-24 NOTE — ASSESSMENT & PLAN NOTE
At baseline, patient is wheelchair-bound outside the house and can only walk few steps with assistance at home. Now, with declining functional status. Evaluated by PT/OT, recommended rehab but patient and her  opted to go home with home health.

## 2023-08-24 NOTE — CASE MANAGEMENT
Case Management Discharge Planning Note    Patient name Dee Warner  Location [facility] 734/[facility] 760-38 MRN [id number]  : 1975 Date 2023       Current Admission Date: 2023  Current Admission Diagnosis:Vasculitis [facility])   Patient Active Problem List    Diagnosis Date Noted   • Proteinuria 2023   • Microscopic hematuria 2023   • SIRS (systemic inflammatory response syndrome) ([address]) 2023   • Leucocytosis 08/15/2023   • Ambulatory dysfunction 08/15/2023   • Anemia 08/15/2023   • Neuropathy involving both lower extremities    • Vitamin B6 deficiency 08/10/2023   • Moderate protein-calorie malnutrition ([address]) 2023   • Vasculitis ([address]) 2023   • Microcytic anemia 2023   • 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   • Asthma 2023   • Psoriasis 2023   • DJD (degenerative joint disease) 2022   • Hyponatremia 2020   • Status post left hip replacement 2020   • Osteoarthritis of left hip 2020   • Ankle arthritis 2017   • Acquired bilateral club feet 2017   • Abnormal electrocardiogram 2016   • Abnormal findings on diagnostic imaging of heart and coronary circulation 2016   • Cardiac murmur 2016   • Cardiomyopathy ([address]) 2016   • Nonrheumatic mitral valve insufficiency 2016   • Blue color skin 2014   • Rheumatoid arthritis ([address]) 2014      LOS (days): 10  Geometric Mean LOS (GMLOS) (days): 4.30  Days to GMLOS:-5.3     OBJECTIVE:  Risk of Unplanned Readmission Score: 17.92         Current admission status: Inpatient   Preferred Pharmacy:   [address] [place] [address] [address], [address]  [address] [facility].  DR. Sofi PEARSON [id number]  Phone: [phone] Fax: [phone]    [facility], [address] [place] [address]  Phone: [phone] Fax: [phone]    Primary Care Provider: Luis Tucker DO    Primary Insurance: MEDICARE  Secondary Insurance:     DISCHARGE DETAILS:          Comments - Freedom of Choice: [employer] notified of pt DC in 24h        Additional Comments: Per kang @ rounding. Pt to be DC in 24h.  once all Rhumatology f/u appts are scheduled

## 2023-08-24 NOTE — PROGRESS NOTES
[address] [facility]  Progress Note  Name: Tommy Smith I  MRN: [id number]  Unit/Bed#: [facility] 734-01 I Date of Admission: 8/14/2023   Date of Service: 8/24/2023 I Hospital Day: 10    Assessment/Plan   * Vasculitis [facility])  Assessment & Plan  • Presented with bilateral upper and lower extremity pain, weakness, and numbness. Has hx of RA, not currently on treatment. • MRI cervical,thoracic and lumbar spinal without abnormalities or pathologic enhancement. • LP with normal WBCs, protein, negative Gram stain, elevated IgG. • Work-up illustrated evidence of B6 deficiency for which supplementation was ordered. • Autoimmune work-up with elevated inflammatory marker, elevated p-ANCA MPO antibodies and positive VALDO. • Suspicion for P-ANCA vasculitis. • Patient is status post nerve and muscle biopsy. Preliminary results suspicious for vasculitis. Await final results. • Previous provider discussed with rheumatology Dr Mary Henry who recommended the following:  • CT chest without contrast which showed Plat[place] atelectasis or scarring in the lung bases. • UA, urine protein creatinine ratio showing microscopic hematuria and small proteins. • chronic hepatitis panel negative. QuantiFERON test indeterminate, repeat indeterminate as well. • Completed pulse dose steroid x 3 days. Continue with Solu-Medrol 30 mg twice daily. • Patient evaluated by infectious disease, given to QuantiFERON test for indeterminant, PPD negative, patient remains low risk for infusion if clinically needed. • Discussed with rheumatology, patient cannot first dose of Rituxan on 8/23, tolerated very well. • Patient will get second dose in 14 days, have Hodge texted rheumatology on call Dr. Vincent Thompson for further plans as I was told by rheumatologist yesterday she needs all arrangements for second dose prior to discharge.       Microscopic hematuria  Assessment & Plan  Nephrology consulted, no indication for renal biopsy. Renal function stable. Proteinuria  Assessment & Plan  As above     SIRS (systemic inflammatory response syndrome) (HCC)  Assessment & Plan  · As evidenced by tachycardia and leukocytosis. · Leukocytosis is likely secondary to steroid use, tachycardia improving. · Patient appears clinically non toxic and afebrile. · Continue to monitor for temperature curve or worsening leukocytosis. Anemia  Assessment & Plan  · Likely anemia of chronic disease. · Monitor hemoglobin and transfuse as needed. Ambulatory dysfunction  Assessment & Plan  At baseline, patient is wheelchair-bound outside the house and can only walk few steps with assistance at home. Now, with declining functional status. Evaluated by PT/OT, recommended rehab but patient and her  opted to go home with home health. Leucocytosis  Assessment & Plan  Secondary to steroid use. Afebrile, nontoxic without evidence of infection. Monitor. Neuropathy involving both lower extremities  Assessment & Plan  As above        Vitamin B6 deficiency  Assessment & Plan  Replacement ordered. Moderate protein-calorie malnutrition ([address])  Assessment & Plan  Malnutrition Findings:   Adult Malnutrition type: Chronic illness  Adult Degree of Malnutrition: Malnutrition of moderate degree  Malnutrition Characteristics: Muscle loss, Fat loss                  360 Statement: Malnutrition of moderate degree in the context of chronic illness as evidenced by moderate depletion of the temples, visible clavicle and moderate buccal fat pad loss. Treated with oral diet and oral nutrition supplement. BMI Findings:  Adult BMI Classifications: Underweight < 18.5        Body mass index is 18.12 kg/m². Rheumatoid arthritis ([address])  Assessment & Plan  • Patient with longstanding history of rheumatoid arthritis, has undergone several joint fusions.  she was intolerant to multiple DMARDs/Biologics and currently not on any maintenance therapy. • Continue IV Solu-Medrol as above. • Rheumatology evaluation appreciated. Asthma  Assessment & Plan  Continue with current regimen. VTE Pharmacologic Prophylaxis:   Moderate Risk (Score 3-4) - Pharmacological DVT Prophylaxis Ordered: heparin. Patient Centered Rounds: I performed bedside rounds with nursing staff today. Discussions with Specialists or Other Care Team Provider: Rheum, Cm    Education and Discussions with Family / Patient: Updated patient and her friend present at bedside. Patient's  to visit her this evening, will update him at bedside. .     Total Time Spent on Date of Encounter in care of patient: 35 minutes This time was spent on one or more of the following: performing physical exam; counseling and coordination of care; obtaining or reviewing history; documenting in the medical record; reviewing/ordering tests, medications or procedures; communicating with other healthcare professionals and discussing with patient's family/caregivers. Current Length of Stay: 10 day(s)  Current Patient Status: Inpatient   Certification Statement: The patient will continue to require additional inpatient hospital stay due to Pending clearance by Rheum. Discharge Plan: Anticipate discharge tomorrow to home with home services. Code Status: Level 1 - Full Code    Subjective:   Examined at bedside, denies any active complaints. She got her Rituxan first dose yesterday, and tolerated very well without any dissipated side effects. On my noted increased erythema and edema on right foot the patient she usually develops on and off erythema, denies any pain. Objective:     Vitals:   Temp (24hrs), Av.7 °F (36.5 °C), Min:97.4 °F (36.3 °C), Max:97.9 °F (36.6 °C)    Temp:  [97.4 °F (36.3 °C)-97.9 °F (36.6 °C)] 97.4 °F (36.3 °C)  HR:  [] 102  Resp:  [16-18] 16  BP: (100-143)/(69-92) 125/92  SpO2:  [95 %-99 %] 97 %  Body mass index is 18.12 kg/m².      Input and Output Summary (last 24 hours): Intake/Output Summary (Last 24 hours) at 8/24/2023 1214  Last data filed at 8/24/2023 0901  Gross per 24 hour   Intake 440 ml   Output --   Net 440 ml       Physical Exam:   Physical Exam  Constitutional:       Appearance: Normal appearance. HENT:      Head: Normocephalic and atraumatic. Mouth/Throat:      Mouth: Mucous membranes are moist.      Pharynx: Oropharynx is clear. Eyes:      Conjunctiva/sclera: Conjunctivae normal.      Pupils: Pupils are equal, round, and reactive to light. Cardiovascular:      Rate and Rhythm: Normal rate and regular rhythm. Pulses: Normal pulses. Heart sounds: Normal heart sounds. Pulmonary:      Effort: Pulmonary effort is normal.      Breath sounds: Normal breath sounds. Abdominal:      General: Abdomen is flat. Bowel sounds are normal.   Musculoskeletal:      Cervical back: Normal range of motion. Comments: Rheumatoid changes in bilateral hands and feet. Right foot more swollen and erythematous today compared to yesterday. Skin:     General: Skin is warm and dry. Neurological:      General: No focal deficit present. Mental Status: She is alert and oriented to person, place, and time. Psychiatric:         Mood and Affect: Mood normal.         Behavior: Behavior normal.          Additional Data:     Labs:  Results from last 7 days   Lab Units 08/24/23  0641 08/23/23  0533 08/22/23  0447   WBC Thousand/uL 15.29*   < > 13.14*   HEMOGLOBIN g/dL 7.4*   < > 7.5*   HEMATOCRIT % 24.5*   < > 24.3*   PLATELETS Thousands/uL 292   < > 345   NEUTROS PCT %  --   --  82*   LYMPHS PCT %  --   --  13*   MONOS PCT %  --   --  4   EOS PCT %  --   --  0    < > = values in this interval not displayed.      Results from last 7 days   Lab Units 08/24/23  0641   SODIUM mmol/L 136   POTASSIUM mmol/L 4.5   CHLORIDE mmol/L 104   CO2 mmol/L 25   BUN mg/dL 31*   CREATININE mg/dL 0.33*   ANION GAP mmol/L 7   CALCIUM mg/dL 8.0*   GLUCOSE RANDOM mg/dL 120         Results from last 7 days   Lab Units 08/19/23  1036   POC GLUCOSE mg/dl 108               Lines/Drains:  Invasive Devices     Peripheral Intravenous Line  Duration           Long-Dwell Peripheral IV (Midline) 96/72/11 Right Basilic 15 days                  Telemetry:  Telemetry Orders (From admission, onward)             24 Hour Telemetry Monitoring  Continuous x 24 Hours (Telem)        Question:  Reason for 24 Hour Telemetry  Answer:  Arrhythmias requiring acute medical intervention / PPM or ICD malfunction                 Telemetry Reviewed: Sinus Tachycardia  Indication for Continued Telemetry Use: Metabolic/electrolyte disturbance with high probability of dysrhythmia             Imaging: No pertinent imaging reviewed.     Recent Cultures (last 7 days):         Last 24 Hours Medication List:   Current Facility-Administered Medications   Medication Dose Route Frequency Provider Last Rate   • albuterol  2 puff Inhalation Q4H PRN Earline Xavier PA-C     • bisacodyl  10 mg Rectal Daily PRN Earline Xaveir PA-C     • cholecalciferol  400 Units Oral Daily Earline Xavier PA-C     • cyanocobalamin  1,000 mcg Oral Daily Earline Xavier PA-C     • diphenhydramine, lidocaine, Al/Mg hydroxide, simethicone  10 mL Swish & Spit Q4H PRN Earline Xavier PA-C     • docusate sodium  100 mg Oral BID Beatriz Percy Xavier PA-C     • gabapentin  600 mg Oral BID Corie Lange MD     • gabapentin  900 mg Oral HS Corie Lange MD     • heparin (porcine)  5,000 Units Subcutaneous Q8H [facility] Earline Xavier PA-C     • HYDROcodone-acetaminophen  1 tablet Oral Q6H PRN Earline Xavier PA-C     • magnesium Oxide  400 mg Oral HS Earline Xavier PA-C     • methylPREDNISolone sodium succinate  30 mg Intravenous Q12H [facility] Lizbet Maradiaga MD     • morphine injection  2 mg Intravenous Q4H PRN Earline Xavier PA-C     • ondansetron  4 mg Intravenous Q6H PRN Earline Xavier PA-C     • pyridoxine  50 mg Oral Daily Earline Xavier PA-C     • senna  1 tablet Oral Daily Earline Xavier PA-C          Today, Patient Was Seen By: Stanley Cunha MD    **Please Note: This note may have been constructed using a voice recognition system. **

## 2023-08-24 NOTE — RESTORATIVE TECHNICIAN NOTE
Restorative Technician Note      Patient Name: Jerald Hector     Note Type: Mobility (Attempted to see pt for activity, pt refused 2* to currently eating lunch.)    Andrea QUESADA, Restorative Technician, [employer]

## 2023-08-24 NOTE — PLAN OF CARE
Problem: MOBILITY - ADULT  Goal: Maintain or return to baseline ADL function  Description: INTERVENTIONS:  -  Assess patient's ability to carry out ADLs; assess patient's baseline for ADL function and identify physical deficits which impact ability to perform ADLs (bathing, care of mouth/teeth, toileting, grooming, dressing, etc.)  - Assess/evaluate cause of self-care deficits   - Assess range of motion  - Assess patient's mobility; develop plan if impaired  - Assess patient's need for assistive devices and provide as appropriate  - Encourage maximum independence but intervene and supervise when necessary  - Involve family in performance of ADLs  - Assess for home care needs following discharge   - Consider OT consult to assist with ADL evaluation and planning for discharge  - Provide patient education as appropriate  Outcome: Progressing  Goal: Maintains/Returns to pre admission functional level  Description: INTERVENTIONS:  - Perform BMAT or MOVE assessment daily.   - Set and communicate daily mobility goal to care team and patient/family/caregiver. - Collaborate with rehabilitation services on mobility goals if consulted  - Perform Range of Motion 3 times a day. - Reposition patient every 3 hours.   - Dangle patient 3 times a day  - Stand patient 3 times a day  - Ambulate patient 3 times a day  - Out of bed to chair 3 times a day   - Out of bed for meals 3 times a day  - Out of bed for toileting  - Record patient progress and toleration of activity level   Outcome: Progressing     Problem: PAIN - ADULT  Goal: Verbalizes/displays adequate comfort level or baseline comfort level  Description: Interventions:  - Encourage patient to monitor pain and request assistance  - Assess pain using appropriate pain scale  - Administer analgesics based on type and severity of pain and evaluate response  - Implement non-pharmacological measures as appropriate and evaluate response  - Consider cultural and social influences on pain and pain management  - Notify physician/advanced practitioner if interventions unsuccessful or patient reports new pain  Outcome: Progressing     Problem: INFECTION - ADULT  Goal: Absence or prevention of progression during hospitalization  Description: INTERVENTIONS:  - Assess and monitor for signs and symptoms of infection  - Monitor lab/diagnostic results  - Monitor all insertion sites, i.e. indwelling lines, tubes, and drains  - Monitor endotracheal if appropriate and nasal secretions for changes in amount and color  - [place] appropriate cooling/warming therapies per order  - Administer medications as ordered  - Instruct and encourage patient and family to use good hand hygiene technique  - Identify and instruct in appropriate isolation precautions for identified infection/condition  Outcome: Progressing  Goal: Absence of fever/infection during neutropenic period  Description: INTERVENTIONS:  - Monitor WBC    Outcome: Progressing     Problem: SAFETY ADULT  Goal: Maintain or return to baseline ADL function  Description: INTERVENTIONS:  -  Assess patient's ability to carry out ADLs; assess patient's baseline for ADL function and identify physical deficits which impact ability to perform ADLs (bathing, care of mouth/teeth, toileting, grooming, dressing, etc.)  - Assess/evaluate cause of self-care deficits   - Assess range of motion  - Assess patient's mobility; develop plan if impaired  - Assess patient's need for assistive devices and provide as appropriate  - Encourage maximum independence but intervene and supervise when necessary  - Involve family in performance of ADLs  - Assess for home care needs following discharge   - Consider OT consult to assist with ADL evaluation and planning for discharge  - Provide patient education as appropriate  Outcome: Progressing  Goal: Maintains/Returns to pre admission functional level  Description: INTERVENTIONS:  - Perform BMAT or MOVE assessment daily.   - Set and communicate daily mobility goal to care team and patient/family/caregiver. - Collaborate with rehabilitation services on mobility goals if consulted  - Perform Range of Motion 3 times a day. - Reposition patient every 3 hours.   - Dangle patient 3 times a day  - Stand patient 3 times a day  - Ambulate patient 3 times a day  - Out of bed to chair 3 times a day   - Out of bed for meals 3 times a day  - Out of bed for toileting  - Record patient progress and toleration of activity level   Outcome: Progressing  Goal: Patient will remain free of falls  Description: INTERVENTIONS:  - Educate patient/family on patient safety including physical limitations  - Instruct patient to call for assistance with activity   - Consult OT/PT to assist with strengthening/mobility   - Keep Call bell within reach  - Keep bed low and locked with side rails adjusted as appropriate  - Keep care items and personal belongings within reach  - Initiate and maintain comfort rounds  - Make Fall Risk Sign visible to staff  - Offer Toileting every 2 Hours, in advance of need  - Initiate/Maintain bed alarm  - Obtain necessary fall risk management equipment  - Apply yellow socks and bracelet for high fall risk patients  - Consider moving patient to room near nurses station  Outcome: Progressing     Problem: DISCHARGE PLANNING  Goal: Discharge to home or other facility with appropriate resources  Description: INTERVENTIONS:  - Identify barriers to discharge w/patient and caregiver  - Arrange for needed discharge resources and transportation as appropriate  - Identify discharge learning needs (meds, wound care, etc.)  - Arrange for interpretive services to assist at discharge as needed  - Refer to Case Management Department for coordinating discharge planning if the patient needs post-hospital services based on physician/advanced practitioner order or complex needs related to functional status, cognitive ability, or social support system  Outcome: Progressing     Problem: Knowledge Deficit  Goal: Patient/family/caregiver demonstrates understanding of disease process, treatment plan, medications, and discharge instructions  Description: Complete learning assessment and assess knowledge base. Interventions:  - Provide teaching at level of understanding  - Provide teaching via preferred learning methods  Outcome: Progressing     Problem: Prexisting or High Potential for Compromised Skin Integrity  Goal: Skin integrity is maintained or improved  Description: INTERVENTIONS:  - Identify patients at risk for skin breakdown  - Assess and monitor skin integrity  - Assess and monitor nutrition and hydration status  - Monitor labs   - Assess for incontinence   - Turn and reposition patient  - Assist with mobility/ambulation  - Relieve pressure over bony prominences  - Avoid friction and shearing  - Provide appropriate hygiene as needed including keeping skin clean and dry  - Evaluate need for skin moisturizer/barrier cream  - Collaborate with interdisciplinary team   - Patient/family teaching  - Consider wound care consult   Outcome: Progressing     Problem: Nutrition/Hydration-ADULT  Goal: Nutrient/Hydration intake appropriate for improving, restoring or maintaining nutritional needs  Description: Monitor and assess patient's nutrition/hydration status for malnutrition. Collaborate with interdisciplinary team and initiate plan and interventions as ordered. Monitor patient's weight and dietary intake as ordered or per policy. Utilize nutrition screening tool and intervene as necessary. Determine patient's food preferences and provide high-protein, high-caloric foods as appropriate.      INTERVENTIONS:  - Monitor oral intake, urinary output, labs, and treatment plans  - Assess nutrition and hydration status and recommend course of action  - Evaluate amount of meals eaten  - Assist patient with eating if necessary   - Allow adequate time for meals  - Recommend/ encourage appropriate diets, oral nutritional supplements, and vitamin/mineral supplements  - Order, calculate, and assess calorie counts as needed  - Recommend, monitor, and adjust tube feedings and TPN/PPN based on assessed needs  - Assess need for intravenous fluids  - Provide specific nutrition/hydration education as appropriate  - Include patient/family/caregiver in decisions related to nutrition  Outcome: Progressing

## 2023-08-24 NOTE — ASSESSMENT & PLAN NOTE
Malnutrition Findings:   Adult Malnutrition type: Chronic illness  Adult Degree of Malnutrition: Malnutrition of moderate degree  Malnutrition Characteristics: Muscle loss, Fat loss                  360 Statement: Malnutrition of moderate degree in the context of chronic illness as evidenced by moderate depletion of the temples, visible clavicle and moderate buccal fat pad loss. Treated with oral diet and oral nutrition supplement. BMI Findings:  Adult BMI Classifications: Underweight < 18.5        Body mass index is 18.12 kg/m².

## 2023-08-24 NOTE — TELEPHONE ENCOUNTER
Patient received 1 dose of rituximab 1g in the hospital on 8/23 and needs 1 more dose of 1 g in 2 weeks on 9/6. Can a prior auth be done to ensure patient can receive this on time otherwise she may need to go back in to the hospital for the second dose. She needs an asap new patient visit with any of us Panfilo Roldan okay too), thanks.

## 2023-08-24 NOTE — ASSESSMENT & PLAN NOTE
• Presented with bilateral upper and lower extremity pain, weakness, and numbness. Has hx of RA, not currently on treatment. • MRI cervical,thoracic and lumbar spinal without abnormalities or pathologic enhancement. • LP with normal WBCs, protein, negative Gram stain, elevated IgG. • Work-up illustrated evidence of B6 deficiency for which supplementation was ordered. • Autoimmune work-up with elevated inflammatory marker, elevated p-ANCA MPO antibodies and positive VALDO. • Suspicion for P-ANCA vasculitis. • Patient is status post nerve and muscle biopsy. Preliminary results suspicious for vasculitis. Await final results. • Previous provider discussed with rheumatology Dr Kathryn Valente who recommended the following:  • CT chest without contrast which showed Platelike atelectasis or scarring in the lung bases. • UA, urine protein creatinine ratio showing microscopic hematuria and small proteins. • chronic hepatitis panel negative. QuantiFERON test indeterminate, repeat indeterminate as well. • Completed pulse dose steroid x 3 days. Continue with Solu-Medrol 30 mg twice daily. • Patient evaluated by infectious disease, given to QuantiFERON test for indeterminant, PPD negative, patient remains low risk for infusion if clinically needed. • Discussed with rheumatology, patient cannot first dose of Rituxan on 8/23, tolerated very well. • Patient will get second dose in 14 days, have [place] texted rheumatology on call Dr. Marcella Millan for further plans as I was told by rheumatologist yesterday she needs all arrangements for second dose prior to discharge.

## 2023-08-25 VITALS
SYSTOLIC BLOOD PRESSURE: 115 MMHG | DIASTOLIC BLOOD PRESSURE: 84 MMHG | HEIGHT: 67 IN | BODY MASS INDEX: 18.16 KG/M2 | OXYGEN SATURATION: 97 % | HEART RATE: 108 BPM | RESPIRATION RATE: 16 BRPM | WEIGHT: 115.7 LBS | TEMPERATURE: 97.6 F

## 2023-08-25 LAB
ANION GAP SERPL CALCULATED.3IONS-SCNC: 6 MMOL/L
BUN SERPL-MCNC: 33 MG/DL (ref 5–25)
CALCIUM SERPL-MCNC: 8.5 MG/DL (ref 8.4–10.2)
CHLORIDE SERPL-SCNC: 104 MMOL/L (ref 96–108)
CO2 SERPL-SCNC: 26 MMOL/L (ref 21–32)
CREAT SERPL-MCNC: 0.33 MG/DL (ref 0.6–1.3)
ERYTHROCYTE [DISTWIDTH] IN BLOOD BY AUTOMATED COUNT: 26.2 % (ref 11.6–15.1)
GFR SERPL CREATININE-BSD FRML MDRD: 134 ML/MIN/1.73SQ M
GLUCOSE SERPL-MCNC: 119 MG/DL (ref 65–140)
HCT VFR BLD AUTO: 25 % (ref 34.8–46.1)
HGB BLD-MCNC: 7.9 G/DL (ref 11.5–15.4)
MCH RBC QN AUTO: 28.3 PG (ref 26.8–34.3)
MCHC RBC AUTO-ENTMCNC: 31.6 G/DL (ref 31.4–37.4)
MCV RBC AUTO: 90 FL (ref 82–98)
PLATELET # BLD AUTO: 292 THOUSANDS/UL (ref 149–390)
PMV BLD AUTO: 8.1 FL (ref 8.9–12.7)
POTASSIUM SERPL-SCNC: 4.6 MMOL/L (ref 3.5–5.3)
RBC # BLD AUTO: 2.79 MILLION/UL (ref 3.81–5.12)
SODIUM SERPL-SCNC: 136 MMOL/L (ref 135–147)
WBC # BLD AUTO: 10.81 THOUSAND/UL (ref 4.31–10.16)

## 2023-08-25 PROCEDURE — 99239 HOSP IP/OBS DSCHRG MGMT >30: CPT | Performed by: STUDENT IN AN ORGANIZED HEALTH CARE EDUCATION/TRAINING PROGRAM

## 2023-08-25 PROCEDURE — 80048 BASIC METABOLIC PNL TOTAL CA: CPT | Performed by: STUDENT IN AN ORGANIZED HEALTH CARE EDUCATION/TRAINING PROGRAM

## 2023-08-25 PROCEDURE — 85027 COMPLETE CBC AUTOMATED: CPT | Performed by: STUDENT IN AN ORGANIZED HEALTH CARE EDUCATION/TRAINING PROGRAM

## 2023-08-25 RX ORDER — GABAPENTIN 300 MG/1
900 CAPSULE ORAL
Qty: 90 CAPSULE | Refills: 0 | Status: SHIPPED | OUTPATIENT
Start: 2023-08-25 | End: 2023-09-24

## 2023-08-25 RX ORDER — HYDROCODONE BITARTRATE AND ACETAMINOPHEN 5; 325 MG/1; MG/1
1 TABLET ORAL EVERY 6 HOURS PRN
Qty: 8 TABLET | Refills: 0 | Status: SHIPPED | OUTPATIENT
Start: 2023-08-25 | End: 2023-08-30

## 2023-08-25 RX ORDER — PYRIDOXINE HCL (VITAMIN B6) 50 MG
50 TABLET ORAL DAILY
Qty: 30 TABLET | Refills: 0 | Status: SHIPPED | OUTPATIENT
Start: 2023-08-26 | End: 2023-09-25

## 2023-08-25 RX ORDER — PREDNISONE 20 MG/1
60 TABLET ORAL DAILY
Qty: 90 TABLET | Refills: 0 | Status: SHIPPED | OUTPATIENT
Start: 2023-08-25 | End: 2023-09-24

## 2023-08-25 RX ORDER — GABAPENTIN 300 MG/1
600 CAPSULE ORAL 2 TIMES DAILY
Qty: 120 CAPSULE | Refills: 0 | Status: SHIPPED | OUTPATIENT
Start: 2023-08-25 | End: 2023-09-24

## 2023-08-25 RX ORDER — OMEGA-3S/DHA/EPA/FISH OIL/D3 300MG-1000
400 CAPSULE ORAL DAILY
Qty: 30 TABLET | Refills: 0 | Status: SHIPPED | OUTPATIENT
Start: 2023-08-26 | End: 2023-09-25

## 2023-08-25 RX ADMIN — MORPHINE SULFATE 2 MG: 2 INJECTION, SOLUTION INTRAMUSCULAR; INTRAVENOUS at 14:16

## 2023-08-25 RX ADMIN — CHOLECALCIFEROL TAB 10 MCG (400 UNIT) 400 UNITS: 10 TAB at 09:08

## 2023-08-25 RX ADMIN — GABAPENTIN 600 MG: 300 CAPSULE ORAL at 13:11

## 2023-08-25 RX ADMIN — HEPARIN SODIUM 5000 UNITS: 5000 INJECTION INTRAVENOUS; SUBCUTANEOUS at 13:12

## 2023-08-25 RX ADMIN — HYDROCODONE BITARTRATE AND ACETAMINOPHEN 1 TABLET: 5; 325 TABLET ORAL at 13:15

## 2023-08-25 RX ADMIN — PYRIDOXINE HCL TAB 50 MG 50 MG: 50 TAB at 09:08

## 2023-08-25 RX ADMIN — GABAPENTIN 600 MG: 300 CAPSULE ORAL at 06:05

## 2023-08-25 RX ADMIN — METHYLPREDNISOLONE SODIUM SUCCINATE 30 MG: 40 INJECTION, POWDER, FOR SOLUTION INTRAMUSCULAR; INTRAVENOUS at 09:08

## 2023-08-25 RX ADMIN — CYANOCOBALAMIN TAB 500 MCG 1000 MCG: 500 TAB at 09:08

## 2023-08-25 RX ADMIN — HEPARIN SODIUM 5000 UNITS: 5000 INJECTION INTRAVENOUS; SUBCUTANEOUS at 06:06

## 2023-08-25 NOTE — DISCHARGE SUMMARY
[address] [facility]  Discharge- Marisa Hector 1975, 41 y.o. female MRN: [id number]  [facility]counter: [id number]  Primary Care Provider: Sadia Ocampo DO   Date and time admitted to hospital: 8/14/2023  9:27 PM    * Vasculitis [facility])  Assessment & Plan  • Presented with bilateral upper and lower extremity pain, weakness, and numbness. Has hx of RA, not currently on treatment. • MRI cervical,thoracic and lumbar spinal without abnormalities or pathologic enhancement. • LP with normal WBCs, protein, negative Gram stain, elevated IgG. • Work-up illustrated evidence of B6 deficiency for which supplementation was ordered. • Autoimmune work-up with elevated inflammatory marker, elevated p-ANCA MPO antibodies and positive VALDO. • Suspicion for P-ANCA vasculitis. • Patient is status post nerve and muscle biopsy. Preliminary results suspicious for vasculitis. Await final results. • Previous provider discussed with rheumatology Dr Simona Duarte who recommended the following:  • CT chest without contrast which showed Platelike atelectasis or scarring in the lung bases. • UA, urine protein creatinine ratio showing microscopic hematuria and small proteins. • chronic hepatitis panel negative. QuantiFERON test indeterminate, repeat indeterminate as well. • Completed pulse dose steroid x 3 days. Continue with Solu-Medrol 30 mg twice daily. • Patient evaluated by infectious disease, given to QuantiFERON test for indeterminant, PPD negative, patient remains low risk for infusion if clinically needed. • Discussed with rheumatology, patient cannot first dose of Rituxan on 8/23, tolerated very well. • Patient will need second dose in 14 days, discussed with on-call rheumatology Dr. Stacia Mendez, input appreciated.   Patient's prior Auth and outpatient appointment arrangements have been started by rheumatology team and they will reach out to patient once everything is all set.      Microscopic hematuria  Assessment & Plan  Nephrology consulted, no indication for renal biopsy. Renal function stable. Proteinuria  Assessment & Plan  As above     SIRS (systemic inflammatory response syndrome) (HCC)  Assessment & Plan  · As evidenced by tachycardia and leukocytosis. · Leukocytosis is likely secondary to steroid use, tachycardia improving. · Patient appears clinically non toxic and afebrile. · Continue to monitor for temperature curve or worsening leukocytosis. Anemia  Assessment & Plan  · Likely anemia of chronic disease. · Monitor hemoglobin and transfuse as needed. Ambulatory dysfunction  Assessment & Plan  At baseline, patient is wheelchair-bound outside the house and can only walk few steps with assistance at home. Now, with declining functional status. Evaluated by PT/OT, recommended rehab but patient and her  opted to go home with home health. Patient will be discharged home with home health. Neuropathy involving both lower extremities  Assessment & Plan  As above        Vitamin B6 deficiency  Assessment & Plan  Replacement ordered. Moderate protein-calorie malnutrition ([address])  Assessment & Plan  Malnutrition Findings:   Adult Malnutrition type: Chronic illness  Adult Degree of Malnutrition: Malnutrition of moderate degree  Malnutrition Characteristics: Muscle loss, Fat loss                  360 Statement: Malnutrition of moderate degree in the context of chronic illness as evidenced by moderate depletion of the temples, visible clavicle and moderate buccal fat pad loss. Treated with oral diet and oral nutrition supplement. BMI Findings:  Adult BMI Classifications: Underweight < 18.5        Body mass index is 18.12 kg/m². Rheumatoid arthritis ([address])  Assessment & Plan  • Patient with longstanding history of rheumatoid arthritis, has undergone several joint fusions.  she was intolerant to multiple DMARDs/Biologics and currently not on any maintenance therapy. • Patient is on IV Solu-Medrol, discussed with rheumatology, patient will be discharged on prednisone 60 mg daily. • Recommended to follow-up with rheum in outpatient setting. Asthma  Assessment & Plan  Continue with current regimen. Medical Problems     Resolved Problems  Date Reviewed: 8/25/2023   None       Discharging Physician / Practitioner: Jeni Young MD  PCP: Jh Santos DO  Admission Date:   Admission Orders (From admission, onward)     Ordered        08/14/23 2131  Inpatient Admission  Once                      Discharge Date: 08/25/23    Consultations During Hospital Stay:  · Rheumatology. · Infectious disease. · Nephrology. · Neurosurgery. · Neurology. · General surgery. Procedures Performed:     Significant Findings / Test Results:   · Mri Thoracic Spine:  IMPRESSION:     1. No cord signal abnormality or pathologic enhancement. Unremarkable MRI of the thoracic spine. 2.  Small right and minimal left pleural effusion    MRI Lumber spine:  IMPRESSION:     1. No abnormal signal or pathologic enhancement in the visualized cord. 2.  Mild degenerative change without canal or foraminal stenosis. 3.  Hepatomegaly. CT Chest:  IMPRESSION:     Platelike atelectasis or scarring in the lung bases which is more prominent than on the prior scan.     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 Right shoulder prosthesis present which produces some artifact. Incidental Findings:   ·     Test Results Pending at Discharge (will require follow up):   · no     Outpatient Tests Requested:  · no    Complications:  no    Reason for Admission: Bilateral upper and lower extremity neuropathy. Hospital Course:   Lita Reeves is a 41 y.o. female patient who originally presented to the hospital on 8/14/2023 due to bilateral upper and lower extremity neuropathy.   She has MRI C-spine with no disc herniation, patient was evaluated by neurosurgery and neurology. Patient underwent IR guided LP but results were negative. 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 Patient was evaluated by rheumatology who recommended to continue IV Solu-Medrol. Impression for initiating Rituxan TB QuantiFERON was done that was indeterminate, repeat was indeterminate as seen, patient evaluated by infectious disease, deemed low risk for Rituxan or Cytoxan if needed. Patient received Rituxan on 8/23 and tolerated very well without any anticipated side effects. She is scheduled to get her next dose of Rituxan in 14 days from first dose, rheumatology has initiated prior Auth process and will be calling patient once outpatient infusion schedule and prior Auth is completed. She was started on IV Solu-Medrol that will be transition to prednisone 60 mg daily per recommendations. Patient was evaluated by PT/OT who recommended patient to be discharged to rehab however patient and her family opted to be discharged home with home health. She will follow-up with PCP, rheumatology, neurology and nephrology in outpatient setting. Please see above list of diagnoses and related plan for additional information. Condition at Discharge: fair    Discharge Day Visit / Exam:   Subjective: Patient examined at bedside, denies any active complaints. Vitals: Blood Pressure: 115/84 (08/25/23 0656)  Pulse: (!) 108 (08/25/23 0656)  Temperature: 97.6 °F (36.4 °C) (08/25/23 0656)  Temp Source: Oral (08/17/23 1520)  Respirations: 16 (08/25/23 0656)  Height: 5' 7" (170.2 cm) (08/14/23 2306)  Weight - Scale: 52.5 kg (115 lb 11.2 oz) (08/24/23 0548)  SpO2: 97 % (08/25/23 0656)  Exam:   Physical Exam  Constitutional:       Appearance: Normal appearance. HENT:      Head: Normocephalic and atraumatic.    Eyes: Conjunctiva/sclera: Conjunctivae normal.      Pupils: Pupils are equal, round, and reactive to light. Cardiovascular:      Rate and Rhythm: Normal rate and regular rhythm. Pulses: Normal pulses. Heart sounds: Normal heart sounds. Pulmonary:      Effort: Pulmonary effort is normal.      Breath sounds: Normal breath sounds. Abdominal:      General: Bowel sounds are normal.      Palpations: Abdomen is soft. Musculoskeletal:         General: Normal range of motion. Skin:     General: Skin is warm and dry. Comments: Rheumatoid changes on both hands and  feet. Neurological:      Mental Status: She is alert and oriented to person, place, and time. Discussion with Family: Updated  () at bedside. Discharge instructions/Information to patient and family:   See after visit summary for information provided to patient and family. Provisions for Follow-Up Care:  See after visit summary for information related to follow-up care and any pertinent home health orders. Disposition:   Home with [facility]NA Services (Reminder: Complete face to face encounter)    Planned Readmission: no     Discharge Statement:  I spent 45 minutes discharging the patient. This time was spent on the day of discharge. I had direct contact with the patient on the day of discharge. Greater than 50% of the total time was spent examining patient, answering all patient questions, arranging and discussing plan of care with patient as well as directly providing post-discharge instructions. Additional time then spent on discharge activities. Discharge Medications:  See after visit summary for reconciled discharge medications provided to patient and/or family.       **Please Note: This note may have been constructed using a voice recognition system**

## 2023-08-25 NOTE — RESTORATIVE TECHNICIAN NOTE
Restorative Technician Note      Patient Name: Lorenzo Allison Hector     Note Type: Mobility (Attempted to see pt for activity, pt refused 2* to her  helping her get In/OOB for breakfast/states she's being D/C'd RN Rae rankin.)    Yani QUESADA, Restorative Technician,

## 2023-08-25 NOTE — PLAN OF CARE
Problem: MOBILITY - ADULT  Goal: Maintain or return to baseline ADL function  Description: INTERVENTIONS:  -  Assess patient's ability to carry out ADLs; assess patient's baseline for ADL function and identify physical deficits which impact ability to perform ADLs (bathing, care of mouth/teeth, toileting, grooming, dressing, etc.)  - Assess/evaluate cause of self-care deficits   - Assess range of motion  - Assess patient's mobility; develop plan if impaired  - Assess patient's need for assistive devices and provide as appropriate  - Encourage maximum independence but intervene and supervise when necessary  - Involve family in performance of ADLs  - Assess for home care needs following discharge   - Consider OT consult to assist with ADL evaluation and planning for discharge  - Provide patient education as appropriate  Outcome: Progressing     Problem: PAIN - ADULT  Goal: Verbalizes/displays adequate comfort level or baseline comfort level  Description: Interventions:  - Encourage patient to monitor pain and request assistance  - Assess pain using appropriate pain scale  - Administer analgesics based on type and severity of pain and evaluate response  - Implement non-pharmacological measures as appropriate and evaluate response  - Consider cultural and social influences on pain and pain management  - Notify physician/advanced practitioner if interventions unsuccessful or patient reports new pain  Outcome: Progressing     Problem: INFECTION - ADULT  Goal: Absence or prevention of progression during hospitalization  Description: INTERVENTIONS:  - Assess and monitor for signs and symptoms of infection  - Monitor lab/diagnostic results  - Monitor all insertion sites, i.e. indwelling lines, tubes, and drains  - Monitor endotracheal if appropriate and nasal secretions for changes in amount and color  - [place] appropriate cooling/warming therapies per order  - Administer medications as ordered  - Instruct and encourage patient and family to use good hand hygiene technique  - Identify and instruct in appropriate isolation precautions for identified infection/condition  Outcome: Progressing     Problem: SAFETY ADULT  Goal: Maintain or return to baseline ADL function  Description: INTERVENTIONS:  -  Assess patient's ability to carry out ADLs; assess patient's baseline for ADL function and identify physical deficits which impact ability to perform ADLs (bathing, care of mouth/teeth, toileting, grooming, dressing, etc.)  - Assess/evaluate cause of self-care deficits   - Assess range of motion  - Assess patient's mobility; develop plan if impaired  - Assess patient's need for assistive devices and provide as appropriate  - Encourage maximum independence but intervene and supervise when necessary  - Involve family in performance of ADLs  - Assess for home care needs following discharge   - Consider OT consult to assist with ADL evaluation and planning for discharge  - Provide patient education as appropriate  Outcome: Progressing     Problem: Knowledge Deficit  Goal: Patient/family/caregiver demonstrates understanding of disease process, treatment plan, medications, and discharge instructions  Description: Complete learning assessment and assess knowledge base. Interventions:  - Provide teaching at level of understanding  - Provide teaching via preferred learning methods  Outcome: Progressing     Problem: Nutrition/Hydration-ADULT  Goal: Nutrient/Hydration intake appropriate for improving, restoring or maintaining nutritional needs  Description: Monitor and assess patient's nutrition/hydration status for malnutrition. Collaborate with interdisciplinary team and initiate plan and interventions as ordered. Monitor patient's weight and dietary intake as ordered or per policy. Utilize nutrition screening tool and intervene as necessary. Determine patient's food preferences and provide high-protein, high-caloric foods as appropriate. INTERVENTIONS:  - Monitor oral intake, urinary output, labs, and treatment plans  - Assess nutrition and hydration status and recommend course of action  - Evaluate amount of meals eaten  - Assist patient with eating if necessary   - Allow adequate time for meals  - Recommend/ encourage appropriate diets, oral nutritional supplements, and vitamin/mineral supplements  - Order, calculate, and assess calorie counts as needed  - Recommend, monitor, and adjust tube feedings and TPN/PPN based on assessed needs  - Assess need for intravenous fluids  - Provide specific nutrition/hydration education as appropriate  - Include patient/family/caregiver in decisions related to nutrition  Outcome: Progressing

## 2023-08-25 NOTE — CASE MANAGEMENT
Case Management Discharge Planning Note    Patient name Maeve Thomson  Location [facility] 536-88 MRN [id number]  : 1975 Date 2023       Current Admission Date: 2023  Current Admission Diagnosis:Vasculitis [facility])   Patient Active Problem List    Diagnosis Date Noted   • Proteinuria 2023   • Microscopic hematuria 2023   • SIRS (systemic inflammatory response syndrome) ([address]) 2023   • Leucocytosis 08/15/2023   • Ambulatory dysfunction 08/15/2023   • Anemia 08/15/2023   • Neuropathy involving both lower extremities    • Vitamin B6 deficiency 08/10/2023   • Moderate protein-calorie malnutrition ([address]) 2023   • Vasculitis ([address]) 2023   • Microcytic anemia 2023   • 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   • Asthma 2023   • Psoriasis 2023   • DJD (degenerative joint disease) 2022   • Hyponatremia 2020   • Status post left hip replacement 2020   • Osteoarthritis of left hip 2020   • Ankle arthritis 2017   • Acquired bilateral club feet 2017   • Abnormal electrocardiogram 2016   • Abnormal findings on diagnostic imaging of heart and coronary circulation 2016   • Cardiac murmur 2016   • Cardiomyopathy ([address]) 2016   • Nonrheumatic mitral valve insufficiency 2016   • Blue color skin 2014   • Rheumatoid arthritis ([address]) 2014      LOS (days): 11  Geometric Mean LOS (GMLOS) (days): 4.30  Days to GMLOS:-6.3     OBJECTIVE:  Risk of Unplanned Readmission Score: 15.14         Current admission status: Inpatient   Preferred Pharmacy:   [facility] [address], [address] [place], [place] [address]arabella Zuniga [address]. [address]  [address] [facility].  [address]. Chilo PEARSON [address]  Phone: [phone] Fax: [phone]    [address] [facility], [address] [place] [address] [place] [address]  Phone: [phone] Fax: [phone]    Primary Care Provider: Daniel Walker DO    Primary Insurance: MEDICARE  Secondary Insurance:     DISCHARGE DETAILS:    Discharge planning discussed with[de-identified] pt and spouse bedside  Freedom of Choice: Yes  Comments - [facility]  CM contacted family/caregiver?: No- see comments (spouse was bedside)  Were Treatment Team discharge recommendations reviewed with patient/caregiver?: Yes  Did patient/caregiver verbalize understanding of patient care needs?: Yes  Were patient/caregiver advised of the risks associated with not following Treatment Team discharge recommendations?: Yes    Contacts  Patient Contacts: Earmon Councilman  Relationship to Patient[de-identified] Family (spouse)  Contact Method: Phone  Phone Number: [phone]  Reason/Outcome: Continuity of Care, Emergency Contact, Discharge [address] [facility]         Is the patient interested in [facility] at discharge?: Yes  [facility] requested[de-identified] Nursing, Occupational Therapy, Physical [facility] Name[de-identified] [facility] External Referral Reason (only applicable if external HHA name selected): Patient has established relationship with provider  [facility] Provider[de-identified] PCP  Home Health Services Needed[de-identified] Evaluate Functional Status and Safety, Gait/ADL Training, Strengthening/Theraputic Exercises to Improve Function, Wound/Ostomy Care  Homebound Criteria Met[de-identified] Requires the Assistance of Another Person for Safe Ambulation or to Leave the Home, Uses an Assist Device (i.e. cane, walker, etc)  Supporting Clincal Findings[de-identified] Fatigues Easliy in [employer], Limited Endurance, Dyspnea with Exertion    DME Referral Provided  Referral made for DME?: No    Other Referral/Resources/Interventions Provided:  Interventions: [facility]    Would you like to participate in our [address] service program?  : No - Declined    Treatment Team Recommendation: Home with [address] ([facility]-established relationship)  Discharge Destination Plan[de-identified] Home with [address] [facility] established relationship)  Transport at Discharge : Family                    Transfer Mode: Caregiver  Accompanied by: Family member              Additional Comments: Pt to be discharged today. Rhumatology to set up additional f/u appts post discharge. Evelio Lindsay is currently working on prior Adine Burrows for meds.

## 2023-08-25 NOTE — PLAN OF CARE
Problem: MOBILITY - ADULT  Goal: Maintain or return to baseline ADL function  Description: INTERVENTIONS:  -  Assess patient's ability to carry out ADLs; assess patient's baseline for ADL function and identify physical deficits which impact ability to perform ADLs (bathing, care of mouth/teeth, toileting, grooming, dressing, etc.)  - Assess/evaluate cause of self-care deficits   - Assess range of motion  - Assess patient's mobility; develop plan if impaired  - Assess patient's need for assistive devices and provide as appropriate  - Encourage maximum independence but intervene and supervise when necessary  - Involve family in performance of ADLs  - Assess for home care needs following discharge   - Consider OT consult to assist with ADL evaluation and planning for discharge  - Provide patient education as appropriate  Outcome: Adequate for Discharge  Goal: Maintains/Returns to pre admission functional level  Description: INTERVENTIONS:  - Perform BMAT or MOVE assessment daily.   - Set and communicate daily mobility goal to care team and patient/family/caregiver. - Collaborate with rehabilitation services on mobility goals if consulted  - Perform Range of Motion  times a day. - Reposition patient every  hours.   - Dangle patient  times a day  - Stand patient  times a day  - Ambulate patient  times a day  - Out of bed to chair  times a day   - Out of bed for meals  times a day  - Out of bed for toileting  - Record patient progress and toleration of activity level   Outcome: Adequate for Discharge     Problem: PAIN - ADULT  Goal: Verbalizes/displays adequate comfort level or baseline comfort level  Description: Interventions:  - Encourage patient to monitor pain and request assistance  - Assess pain using appropriate pain scale  - Administer analgesics based on type and severity of pain and evaluate response  - Implement non-pharmacological measures as appropriate and evaluate response  - Consider cultural and social influences on pain and pain management  - Notify physician/advanced practitioner if interventions unsuccessful or patient reports new pain  Outcome: Adequate for Discharge     Problem: INFECTION - ADULT  Goal: Absence or prevention of progression during hospitalization  Description: INTERVENTIONS:  - Assess and monitor for signs and symptoms of infection  - Monitor lab/diagnostic results  - Monitor all insertion sites, i.e. indwelling lines, tubes, and drains  - Monitor endotracheal if appropriate and nasal secretions for changes in amount and color  - Mitchell appropriate cooling/warming therapies per order  - Administer medications as ordered  - Instruct and encourage patient and family to use good hand hygiene technique  - Identify and instruct in appropriate isolation precautions for identified infection/condition  Outcome: Adequate for Discharge  Goal: Absence of fever/infection during neutropenic period  Description: INTERVENTIONS:  - Monitor WBC    Outcome: Adequate for Discharge     Problem: SAFETY ADULT  Goal: Maintain or return to baseline ADL function  Description: INTERVENTIONS:  -  Assess patient's ability to carry out ADLs; assess patient's baseline for ADL function and identify physical deficits which impact ability to perform ADLs (bathing, care of mouth/teeth, toileting, grooming, dressing, etc.)  - Assess/evaluate cause of self-care deficits   - Assess range of motion  - Assess patient's mobility; develop plan if impaired  - Assess patient's need for assistive devices and provide as appropriate  - Encourage maximum independence but intervene and supervise when necessary  - Involve family in performance of ADLs  - Assess for home care needs following discharge   - Consider OT consult to assist with ADL evaluation and planning for discharge  - Provide patient education as appropriate  Outcome: Adequate for Discharge  Goal: Maintains/Returns to pre admission functional level  Description: INTERVENTIONS:  - Perform BMAT or MOVE assessment daily.   - Set and communicate daily mobility goal to care team and patient/family/caregiver. - Collaborate with rehabilitation services on mobility goals if consulted  - Perform Range of Motion  times a day. - Reposition patient every  hours.   - Dangle patient  times a day  - Stand patient  times a day  - Ambulate patient  times a day  - Out of bed to chair  times a day   - Out of bed for meals  times a day  - Out of bed for toileting  - Record patient progress and toleration of activity level   Outcome: Adequate for Discharge  Goal: Patient will remain free of falls  Description: INTERVENTIONS:  - Educate patient/family on patient safety including physical limitations  - Instruct patient to call for assistance with activity   - Consult OT/PT to assist with strengthening/mobility   - Keep Call bell within reach  - Keep bed low and locked with side rails adjusted as appropriate  - Keep care items and personal belongings within reach  - Initiate and maintain comfort rounds  - Make Fall Risk Sign visible to staff  - Offer Toileting every  Hours, in advance of need  - Initiate/Maintain alarm  - Obtain necessary fall risk management equipment:   - Apply yellow socks and bracelet for high fall risk patients  - Consider moving patient to room near nurses station  Outcome: Adequate for Discharge     Problem: DISCHARGE PLANNING  Goal: Discharge to home or other facility with appropriate resources  Description: INTERVENTIONS:  - Identify barriers to discharge w/patient and caregiver  - Arrange for needed discharge resources and transportation as appropriate  - Identify discharge learning needs (meds, wound care, etc.)  - Arrange for interpretive services to assist at discharge as needed  - Refer to Case Management Department for coordinating discharge planning if the patient needs post-hospital services based on physician/advanced practitioner order or complex needs related to functional status, cognitive ability, or social support system  Outcome: Adequate for Discharge     Problem: Knowledge Deficit  Goal: Patient/family/caregiver demonstrates understanding of disease process, treatment plan, medications, and discharge instructions  Description: Complete learning assessment and assess knowledge base. Interventions:  - Provide teaching at level of understanding  - Provide teaching via preferred learning methods  Outcome: Adequate for Discharge     Problem: Prexisting or High Potential for Compromised Skin Integrity  Goal: Skin integrity is maintained or improved  Description: INTERVENTIONS:  - Identify patients at risk for skin breakdown  - Assess and monitor skin integrity  - Assess and monitor nutrition and hydration status  - Monitor labs   - Assess for incontinence   - Turn and reposition patient  - Assist with mobility/ambulation  - Relieve pressure over bony prominences  - Avoid friction and shearing  - Provide appropriate hygiene as needed including keeping skin clean and dry  - Evaluate need for skin moisturizer/barrier cream  - Collaborate with interdisciplinary team   - Patient/family teaching  - Consider wound care consult   Outcome: Adequate for Discharge     Problem: Nutrition/Hydration-ADULT  Goal: Nutrient/Hydration intake appropriate for improving, restoring or maintaining nutritional needs  Description: Monitor and assess patient's nutrition/hydration status for malnutrition. Collaborate with interdisciplinary team and initiate plan and interventions as ordered. Monitor patient's weight and dietary intake as ordered or per policy. Utilize nutrition screening tool and intervene as necessary. Determine patient's food preferences and provide high-protein, high-caloric foods as appropriate.      INTERVENTIONS:  - Monitor oral intake, urinary output, labs, and treatment plans  - Assess nutrition and hydration status and recommend course of action  - Evaluate amount of meals eaten  - Assist patient with eating if necessary   - Allow adequate time for meals  - Recommend/ encourage appropriate diets, oral nutritional supplements, and vitamin/mineral supplements  - Order, calculate, and assess calorie counts as needed  - Recommend, monitor, and adjust tube feedings and TPN/PPN based on assessed needs  - Assess need for intravenous fluids  - Provide specific nutrition/hydration education as appropriate  - Include patient/family/caregiver in decisions related to nutrition  Outcome: Adequate for Discharge

## 2023-08-25 NOTE — ASSESSMENT & PLAN NOTE
• Patient with longstanding history of rheumatoid arthritis, has undergone several joint fusions. she was intolerant to multiple DMARDs/Biologics and currently not on any maintenance therapy. • Patient is on IV Solu-Medrol, discussed with rheumatology, patient will be discharged on prednisone 60 mg daily. • Recommended to follow-up with rheum in outpatient setting.

## 2023-08-25 NOTE — ASSESSMENT & PLAN NOTE
• Presented with bilateral upper and lower extremity pain, weakness, and numbness. Has hx of RA, not currently on treatment. • MRI cervical,thoracic and lumbar spinal without abnormalities or pathologic enhancement. • LP with normal WBCs, protein, negative Gram stain, elevated IgG. • Work-up illustrated evidence of B6 deficiency for which supplementation was ordered. • Autoimmune work-up with elevated inflammatory marker, elevated p-ANCA MPO antibodies and positive VALDO. • Suspicion for P-ANCA vasculitis. • Patient is status post nerve and muscle biopsy. Preliminary results suspicious for vasculitis. Await final results. • Previous provider discussed with rheumatology Dr Katie Kumar who recommended the following:  • CT chest without contrast which showed Platelike atelectasis or scarring in the lung bases. • UA, urine protein creatinine ratio showing microscopic hematuria and small proteins. • chronic hepatitis panel negative. QuantiFERON test indeterminate, repeat indeterminate as well. • Completed pulse dose steroid x 3 days. Continue with Solu-Medrol 30 mg twice daily. • Patient evaluated by infectious disease, given to QuantiFERON test for indeterminant, PPD negative, patient remains low risk for infusion if clinically needed. • Discussed with rheumatology, patient cannot first dose of Rituxan on 8/23, tolerated very well. • Patient will need second dose in 14 days, discussed with on-call rheumatology Dr. Valorie Diaz, input appreciated. Patient's prior Auth and outpatient appointment arrangements have been started by rheumatology team and they will reach out to patient once everything is all set.

## 2023-08-25 NOTE — ASSESSMENT & PLAN NOTE
At baseline, patient is wheelchair-bound outside the house and can only walk few steps with assistance at home. Now, with declining functional status. Evaluated by PT/OT, recommended rehab but patient and her  opted to go home with home health. Patient will be discharged home with home health.

## 2023-08-28 ENCOUNTER — TELEPHONE (OUTPATIENT)
Dept: RHEUMATOLOGY | Facility: CLINIC | Age: 48
End: 2023-08-28

## 2023-08-28 ENCOUNTER — OFFICE VISIT (OUTPATIENT)
Dept: RHEUMATOLOGY | Facility: CLINIC | Age: 48
End: 2023-08-28
Payer: MEDICARE

## 2023-08-28 VITALS — BODY MASS INDEX: 18.12 KG/M2 | HEIGHT: 67 IN

## 2023-08-28 DIAGNOSIS — G57.93 NEUROPATHY INVOLVING BOTH LOWER EXTREMITIES: ICD-10-CM

## 2023-08-28 DIAGNOSIS — I77.6 VASCULITIS (HCC): Primary | ICD-10-CM

## 2023-08-28 DIAGNOSIS — M31.7 MICROSCOPIC POLYANGIITIS (HCC): ICD-10-CM

## 2023-08-28 DIAGNOSIS — R53.83 MALAISE AND FATIGUE: ICD-10-CM

## 2023-08-28 DIAGNOSIS — R53.81 MALAISE AND FATIGUE: ICD-10-CM

## 2023-08-28 DIAGNOSIS — R79.82 CRP ELEVATED: ICD-10-CM

## 2023-08-28 DIAGNOSIS — Z79.899 HIGH RISK MEDICATION USE: ICD-10-CM

## 2023-08-28 PROCEDURE — 99214 OFFICE O/P EST MOD 30 MIN: CPT | Performed by: INTERNAL MEDICINE

## 2023-08-28 RX ORDER — LEVALBUTEROL TARTRATE 45 UG/1
1-2 AEROSOL, METERED ORAL
COMMUNITY

## 2023-08-28 RX ORDER — ACETAMINOPHEN 325 MG/1
650 TABLET ORAL ONCE
Status: CANCELLED | OUTPATIENT
Start: 2023-08-31

## 2023-08-28 RX ORDER — SODIUM CHLORIDE 9 MG/ML
20 INJECTION, SOLUTION INTRAVENOUS ONCE
Status: CANCELLED | OUTPATIENT
Start: 2023-08-28

## 2023-08-28 RX ORDER — DIPHENHYDRAMINE HCL 25 MG
25 TABLET ORAL ONCE
Status: CANCELLED | OUTPATIENT
Start: 2023-08-28

## 2023-08-28 RX ORDER — DIPHENHYDRAMINE HCL 25 MG
25 TABLET ORAL ONCE
Status: CANCELLED | OUTPATIENT
Start: 2023-08-31

## 2023-08-28 RX ORDER — ACETAMINOPHEN 325 MG/1
650 TABLET ORAL ONCE
Status: CANCELLED | OUTPATIENT
Start: 2023-08-28

## 2023-08-28 RX ORDER — AMITRIPTYLINE HYDROCHLORIDE 10 MG/1
10 TABLET, FILM COATED ORAL
Qty: 90 TABLET | Refills: 1 | Status: SHIPPED | OUTPATIENT
Start: 2023-08-28

## 2023-08-28 RX ORDER — SODIUM CHLORIDE 9 MG/ML
20 INJECTION, SOLUTION INTRAVENOUS ONCE
Status: CANCELLED | OUTPATIENT
Start: 2023-08-31

## 2023-08-28 NOTE — PATIENT INSTRUCTIONS
Continue to monitor your symptoms. Continue to have the Rituxan infusions for the next 3 weeks. Please have your blood work done within the next 2 weeks. After the initial 4 infusions are completed we will begin maintenance Rituxan infusions every 6 months. Take the amitriptyline at bedtime after you receive it from the compounding.

## 2023-08-28 NOTE — PROGRESS NOTES
Assessment and Plan:   MPA with neuropathy--continue Rituximab infusions weekly x 4 weeks. If remission is achieved then she will need Rituxan 500-1000mg every 6 months as maintance therapy. Continue to monitor symptoms  Check labs including CBC, CMP, CRP every 2-3 weeks. Standing laboratory order placed  Begin amitriptyline 10mg QHS (sent to [facility] compounding pharmacy due to sever corn allergy). Continue to f/u with Neurology  Anti-inflammatory diet/exercise/weight control  Increase CV fitness/aerobic activity  Patient to continue to monitor symptoms  Topical NSAIDs/analgesics PRN joint pain  F/u in 6-8 weeks or sooner if necessary      Rheumatic Disease Summary  As above    56 yo woman seen in the hospital last week for MPA causing neuropathy and muscle weakness. She has long-standing RA diagnosed at age 14y.o. and has required multiple joint replacement surgeries including right shoulder, bilateral hips and bilateral knees. She has also had bilateral ankle fusions. She has had UE/LE weakness and paresthesias for the past month. Was seen a [facility] as well as [facility]. 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 She has receive one dose of Rituxan while in the hospital.          The following portions of the patient's history were reviewed and updated as appropriate: allergies, current medications, past family history, past medical history, past social history, past surgical history and problem list.    Review of Systems:   Review of Systems   Constitutional: Positive for fatigue. HENT: Negative for mouth sores. Eyes: Negative for pain. Respiratory: Negative for shortness of breath. Cardiovascular: Negative for leg swelling. Musculoskeletal: Positive for arthralgias. Negative for joint swelling. Skin: Negative for rash. Neurological: Positive for weakness. Hematological: Negative for adenopathy. Psychiatric/Behavioral: Negative for sleep disturbance. Home Medications:    Current Outpatient Medications:   •  cholecalciferol (VITAMIN D3) 400 units tablet, Take 1 tablet (400 Units total) by mouth daily Do not start before August 26, 2023., Disp: 30 tablet, Rfl: 0  •  cyanocobalamin (VITAMIN B-12) 1000 MCG tablet, Take 1 tablet (1,000 mcg total) by mouth daily Do not start before August 26, 2023., Disp: 30 tablet, Rfl: 0  •  gabapentin (NEURONTIN) 300 mg capsule, Take 2 capsules (600 mg total) by mouth 2 (two) times a day, Disp: 120 capsule, Rfl: 0  •  gabapentin (NEURONTIN) 300 mg capsule, Take 3 capsules (900 mg total) by mouth daily at bedtime, Disp: 90 capsule, Rfl: 0  •  HYDROcodone-acetaminophen (NORCO) 5-325 mg per tablet, Take 1 tablet by mouth every 6 (six) hours as needed for pain for up to 5 days Max Daily Amount: 4 tablets, Disp: 8 tablet, Rfl: 0  •  Magnesium Oxide 400 MG CAPS, Take 1 capsule by mouth, Disp: , Rfl:   •  predniSONE 20 mg tablet, Take 3 tablets (60 mg total) by mouth daily, Disp: 90 tablet, Rfl: 0  •  pyridoxine (VITAMIN B6) 50 mg tablet, Take 1 tablet (50 mg total) by mouth daily Do not start before August 26, 2023., Disp: 30 tablet, Rfl: 0  •  Ventolin  (90 Base) MCG/ACT inhaler, , Disp: , Rfl:     Objective: There were no vitals filed for this visit. Physical Exam  Constitutional:       General: She is not in acute distress. HENT:      Head: Normocephalic and atraumatic. Eyes:      Conjunctiva/sclera: Conjunctivae normal.   Cardiovascular:      Rate and Rhythm: Normal rate and regular rhythm. Heart sounds: S1 normal and S2 normal.      No friction rub. Pulmonary:      Effort: Pulmonary effort is normal. No respiratory distress. Breath sounds: Normal breath sounds. No wheezing, rhonchi or rales. Musculoskeletal:      Cervical back: Neck supple. Skin:     Coloration: Skin is not pale. Neurological:      Mental Status: She is alert. Mental status is at baseline. Motor: Weakness present. Psychiatric:         Mood and Affect: Mood normal.         Behavior: Behavior normal.         Reviewed labs and imaging. Imaging:   CT chest wo contrast    Result Date: 8/15/2023  Narrative: CT CHEST WITHOUT IV CONTRAST INDICATION:   Hx of RA, untreated comming with upper and lower extremity numbness. concern for ANCA vasculitis. Rheumatologic workup in place. COMPARISON: 12/29/2016 TECHNIQUE: CT examination of the chest was performed without intravenous contrast. Multiplanar 2D reformatted images were created from the source data. This examination, like all CT scans performed in the [facility], was performed utilizing techniques to minimize radiation dose exposure, including the use of iterative reconstruction and automated exposure control. Radiation dose length product (DLP) for this visit:  194.01 mGy-cm FINDINGS: LUNGS: Bands of opacity in both lower lobes have appearance favoring atelectasis or scarring. These are more prominent than on the prior scan from 2016. Small amount of mucus appearing debris in the right mainstem bronchus. Otherwise, airways are clear. No dominant lung masses or suspicious nodularity appreciated. PLEURA:  Unremarkable. HEART/GREAT VESSELS: Heart is unremarkable for patient's age. No thoracic aortic aneurysm. MEDIASTINUM AND GURDEEP:  Unremarkable. CHEST WALL AND LOWER NECK: Chest wall appears mildly edematous. Etiology uncertain. There is some artifact from a right shoulder prosthesis which limits portions of the image set. VISUALIZED STRUCTURES IN THE UPPER ABDOMEN:  Unremarkable. OSSEOUS STRUCTURES: As above, there is a right shoulder prosthesis. There is severe arthritis of the left shoulder. There appears to be some arthritis of sternoclavicular joints, right side more so than left. There is dextroscoliosis of the thoracolumbar  junction. Impression: Platelike atelectasis or scarring in the lung bases which is more prominent than on the prior scan. 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 Right shoulder prosthesis present which produces some artifact. Workstation performed: [id number]     IR lumbar puncture    Result Date: 8/8/2023  Narrative: PROCEDURE: Fluoroscopic-guided diagnostic lumbar puncture INDICATION: Polyneuropathy, extremity weakness Provider: Irene Alicia PA-C FLUOROSCOPY TIME: 1.9 minutes (accession [id number]), NA (accession [id number]) . COMPLICATIONS: None immediate. MEDICATIONS: 1% lidocaine subcutaneous. PROCEDURE: A suitable location for puncture was identified with fluoroscopy in the prone position at the L3-4 level. A 20-gauge spinal needle was advanced into the subarachnoid space using fluoroscopic guidance. Opening pressure of approximately 13 to 14 cm of H2O approximately 14 mL's of clear cerebrospinal fluid was then removed and sent for analysis. The needle was removed and the puncture site was covered with a sterile dressing. A midline catheter was then placed on the right side using ultrasound guidance by the interventional radiology team, please see documented note, 4 [place] 20 cm right basilic vein Patient tolerated procedure without immediate complication FINDINGS: 1. Fluoroscopy confirmed good positioning within the subarachnoid space, with prompt return of cerebrospinal fluid. 2.  Opening pressure was measured at 13.5 cm H20. Impression: Fluoroscopic-guided diagnostic lumbar puncture. Ultrasound guided midline catheter placement Supervising Physician: Dr. Milo Boston personally present for the entire procedure Workstation performed: [id number]     IR midline placement    Result Date: 8/8/2023  Narrative: PROCEDURE: Fluoroscopic-guided diagnostic lumbar puncture INDICATION: Polyneuropathy, extremity weakness Provider: Irene Alicia PA-C FLUOROSCOPY TIME: 1.9 minutes (accession [id number]), NA (accession [id number]) . COMPLICATIONS: None immediate. MEDICATIONS: 1% lidocaine subcutaneous.  PROCEDURE: A suitable location for puncture was identified with fluoroscopy in the prone position at the L3-4 level. A 20-gauge spinal needle was advanced into the subarachnoid space using fluoroscopic guidance. Opening pressure of approximately 13 to 14 cm of H2O approximately 14 mL's of clear cerebrospinal fluid was then removed and sent for analysis. The needle was removed and the puncture site was covered with a sterile dressing. A midline catheter was then placed on the right side using ultrasound guidance by the interventional radiology team, please see documented note, 4 [place] 20 cm right basilic vein Patient tolerated procedure without immediate complication FINDINGS: 1. Fluoroscopy confirmed good positioning within the subarachnoid space, with prompt return of cerebrospinal fluid. 2.  Opening pressure was measured at 13.5 cm H20. Impression: Fluoroscopic-guided diagnostic lumbar puncture. Ultrasound guided midline catheter placement Supervising Physician: Dr. Lino Found personally present for the entire procedure Workstation performed: [id number]     MRI lumbar spine w wo contrast    Result Date: 8/7/2023  Narrative: MRI LUMBAR SPINE WITH AND WITHOUT CONTRAST INDICATION: Bilateral arm and leg numbness. COMPARISON:  None. TECHNIQUE:  Multiplanar, multisequence imaging of the lumbar spine was performed before and after gadolinium administration. . IV Contrast:  4 mL of Gadobutrol injection (SINGLE-DOSE) IMAGE QUALITY:  Diagnostic FINDINGS: VERTEBRAL BODIES:  There are 5 lumbar type vertebral bodies. There is dextroscoliosis with apex at L1-2. Normal marrow signal is identified within the visualized bony structures. No discrete marrow lesion. SACRUM:  Normal signal within the sacrum. No evidence of insufficiency or stress fracture. DISTAL CORD AND CONUS:  Normal size and signal within the distal cord and conus. PARASPINAL SOFT TISSUES:  Paraspinal soft tissues are unremarkable.  LOWER THORACIC DISC SPACES:  Normal disc height and signal.  No disc herniation, canal stenosis or foraminal narrowing. LUMBAR DISC SPACES: L1-L2: No disc bulge. Mild facet arthropathy. No canal or foraminal stenosis. L2-L3: No disc bulge. Mild facet arthropathy. No canal or foraminal stenosis. L3-L4: No disc bulge. Mild facet arthropathy. No canal or foraminal stenosis. L4-L5: There is degenerative disc dehydration without significant disc height loss. No significant disc bulge. Mild to moderate facet arthropathy. No canal or foraminal stenosis. L5-S1: No disc bulge. Mild facet arthropathy. No canal or foraminal stenosis. POSTCONTRAST IMAGING:  No abnormal enhancement. OTHER FINDINGS: Hepatomegaly     Impression: 1. No abnormal signal or pathologic enhancement in the visualized cord. 2.  Mild degenerative change without canal or foraminal stenosis. 3.  Hepatomegaly. Workstation performed: [id number]     MRI thoracic spine w wo contrast    Result Date: 8/7/2023  Narrative: MRI THORACIC SPINE WITH AND WITHOUT CONTRAST INDICATION: no. COMPARISON:  None. TECHNIQUE:  Multiplanar, multisequence imaging of the thoracic spine was performed before and after gadolinium administration. . IV Contrast:  4 mL of Gadobutrol injection (SINGLE-DOSE) IMAGE QUALITY:  Diagnostic. FINDINGS: ALIGNMENT:  Normal alignment of the thoracic spine. No compression fracture. No subluxation. No evidence of scoliosis. MARROW SIGNAL:  Normal marrow signal is identified within the visualized bony structures. No discrete marrow lesion. THORACIC CORD:  Normal signal within the thoracic cord. PREVERTEBRAL AND PARASPINAL SOFT TISSUES:   Normal. THORACIC DEGENERATIVE CHANGE:  No disc herniation, canal stenosis or foraminal narrowing. No degenerative changes. POSTCONTRAST:  No abnormal enhancement. OTHER FINDINGS: Small right and minimal left pleural effusion. Impression: 1. No cord signal abnormality or pathologic enhancement. Unremarkable MRI of the thoracic spine.  2.  Small right and minimal left pleural effusion. Workstation performed: [id number]     VAS lower limb arterial duplex, complete bilateral    Result Date: 8/5/2023  Narrative:  [facility] REPORT CLINICAL: Indications:  Patient presents with cold, painful, numb lower extremities x several days, worsening. Operative History: No prior heart or vascular surgery Risk Factors: Patient has history of rheumatoid arthritis  FINDINGS:  Segment                Right                  Left                                          Waveform   PSV (cm/s)  Waveform   PSV (cm/s)  Post. Tibial           Triphasic              Triphasic              Ant. Tibial            Triphasic              Triphasic              Common Femoral Artery                     51                     48  Prox Profunda                             26                     26  Prox SFA                                  38                     34  Mid SFA                                   47                     55  Dist SFA                                  26                     37  Proximal Pop                              27                     26  Distal Pop                                39                     37  Tibioperoneal                             39                     50  Dist Post Tibial                          40                     59  Prox. Ant. Tibial                         27                         Dist. Ant. Tibial                         44                     38     CONCLUSION:  Impression: RIGHT LOWER LIMB: Diffuse disease noted throughout the femoral-popliteal arteries without significant focal stenosis. Evidence suggestive of tibioperoneal disease. Ankle/Brachial index:   1.16, normal. (Prior 1.1) PVR/ PPG tracings are normal. Metatarsal pressure of 107 mmHg Great toe pressure of 82 mmHg, within the healing range  LEFT LOWER LIMB: Diffuse disease noted throughout the femoral-popliteal arteries without significant focal stenosis.  Evidence suggestive of tibioperoneal disease Ankle/Brachial index:   1.20, normal. (Prior 1.0) PVR/ PPG tracings are normal. Metatarsal pressure of 117 mmHg Great toe pressure of 86 mmHg, within the healing range  Compared to previous GRAZYNA study on 4/13/2014, there is no significant change. No prior duplex for comparison. SIGNATURE: Electronically Signed by: Dustin Groves MD on 2023-08-05 07:32:56 PM    MRI cervical spine wo contrast    Result Date: 8/3/2023  Narrative: MRI CERVICAL SPINE WITHOUT CONTRAST INDICATION: Chronic neck pain. Paresthesia.) COMPARISON: CT from earlier today. TECHNIQUE:  Multiplanar, multisequence imaging of the cervical spine was performed. . IMAGE QUALITY:  Diagnostic FINDINGS: ALIGNMENT: There is straightening of the normal cervical lordosis. Minimal anterolisthesis at C5-C6, C6-7 and C7-T1. C7-T1 is unchanged. MARROW SIGNAL: There is bilateral facet ankylosis from C2-C6. Subchondral edema and sclerosis in the C6-7 facets related to degenerative facet arthropathy. No suspicious marrow signal normality. CERVICAL AND VISUALIZED THORACIC CORD:  Normal signal within the visualized cord. PREVERTEBRAL AND PARASPINAL SOFT TISSUES:  Normal. VISUALIZED POSTERIOR FOSSA:  The visualized posterior fossa demonstrates no abnormal signal. CERVICAL DISC SPACES: C2-C3: No disc herniation, canal or foraminal stenosis. C3-C4: No disc herniation, canal or foraminal stenosis. C4-C5: No disc herniation, canal or foraminal stenosis. C5-C6: Minimal anterolisthesis. No disc herniation, canal or foraminal stenosis. C6-C7: Small disc bulge and facet arthropathy, left worse than right. No significant canal or foraminal stenosis. C7-T1: Minimal anterolisthesis. No disc herniation, canal or foraminal stenosis. UPPER THORACIC DISC SPACES:  Normal. OTHER FINDINGS:  None. Impression: No disc herniation, canal or foraminal stenosis. 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  Workstation performed: [id number] CT spine cervical without contrast    Result Date: 8/3/2023  Narrative: CT CERVICAL SPINE - WITHOUT CONTRAST INDICATION:   Neck pain, chronic neck pain, parasthesia. COMPARISON:  None. TECHNIQUE:  CT examination of the cervical spine was performed without intravenous contrast.  Contiguous axial images were obtained. Multiplanar 2D reformatted images were created from the source data. Radiation dose length product (DLP) for this visit:  201.66 mGy-cm . This examination, like all CT scans performed in the [facility], was performed utilizing techniques to minimize radiation dose exposure, including the use of iterative  reconstruction and automated exposure control. IMAGE QUALITY:  Diagnostic. FINDINGS: ALIGNMENT:  Normal alignment of the cervical spine. No subluxation. VERTEBRAE:  No fracture. DEGENERATIVE CHANGES: Mild multilevel cervical degenerative changes are noted without critical central canal stenosis. PREVERTEBRAL AND PARASPINAL SOFT TISSUES: Acute on chronic left sphenoid sinusitis with an air-fluid level and periosteal thickening. THORACIC INLET:  Normal.     Impression: Acute on chronic left sphenoid sinusitis. Workstation performed: [id number]     CT head without contrast    Result Date: 8/3/2023  Narrative: CT BRAIN - WITHOUT CONTRAST INDICATION:   hand numbness. COMPARISON:  None. TECHNIQUE:  CT examination of the brain was performed. Multiplanar 2D reformatted images were created from the source data. Radiation dose length product (DLP) for this visit:  819.14 mGy-cm . This examination, like all CT scans performed in the [facility], was performed utilizing techniques to minimize radiation dose exposure, including the use of iterative  reconstruction and automated exposure control. IMAGE QUALITY:  Diagnostic. FINDINGS: PARENCHYMA:  No intracranial mass, mass effect or midline shift. No CT signs of acute infarction. No acute parenchymal hemorrhage.  VENTRICLES AND EXTRA-AXIAL SPACES:  Normal for the patient's age. VISUALIZED ORBITS: Normal visualized orbits. PARANASAL SINUSES: Mild mucosal thickening of the visualized paranasal sinuses. CALVARIUM AND EXTRACRANIAL SOFT TISSUES:  Normal.     Impression: No acute intracranial abnormality. Workstation performed: [id number]       Labs:   No results displayed because visit has over 200 results. No results displayed because visit has over 200 results.

## 2023-08-29 ENCOUNTER — OFFICE VISIT (OUTPATIENT)
Dept: NEUROSURGERY | Facility: CLINIC | Age: 48
End: 2023-08-29
Payer: MEDICARE

## 2023-08-29 VITALS
DIASTOLIC BLOOD PRESSURE: 64 MMHG | BODY MASS INDEX: 18.12 KG/M2 | SYSTOLIC BLOOD PRESSURE: 104 MMHG | HEIGHT: 67 IN | TEMPERATURE: 98.1 F

## 2023-08-29 DIAGNOSIS — I77.6: Primary | ICD-10-CM

## 2023-08-29 PROCEDURE — 99203 OFFICE O/P NEW LOW 30 MIN: CPT | Performed by: STUDENT IN AN ORGANIZED HEALTH CARE EDUCATION/TRAINING PROGRAM

## 2023-08-29 NOTE — PROGRESS NOTES
Neurosurgery Office Note  Nikki Hector 57 y.o. 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 Her sutures were removed today. The wound has some minor scabbing and superficial dehiscence after suture removal that is expected to heal with time by secondary intention. Discussed to call should any worrisome redness, leaking or purulence occur. She had minimal sensation within the sural nerve distribution pre-operatively and has noticed no difference post-operatively. Her muscle biopsy site is also healing well with some residual surgical glue remaining. No further neurosurgical management or follow-up is anticipated, but will be available on an as needed basis. I have spent a total time of 30 minutes on 08/29/23 in caring for this patient including Instructions for management, Patient and family education and Documenting in the medical record. CHIEF COMPLAINT    Chief Complaint   Patient presents with   • Post-op     2 weeks       HISTORY    History of Present Illness     57y.o. year old female who presented to the hospital on 8/14 with progressive weakness and numbness for which a broad workup was obtained including neurosurgical consultation for sural nerve biopsy. She underwent left sided sural nerve biopsy on 8/16/23 along with a left quadriceps muscle biopsy performed by general surgery. This was uneventful. She was ultimately discharged with biopsy proven vasculitis and is following with rheumatology for Rituximab infusions for microscopic polyangiitis. She is here today for wound evaluation and suture removal.        REVIEW OF SYSTEMS    Review of Systems   Constitutional: Negative. HENT: Negative. Eyes: Negative. Respiratory: Negative. Cardiovascular: Negative. Gastrointestinal: Negative. Endocrine: Negative. Genitourinary: Negative. • Infliximab Hypertension and Throat Swelling   • Isoflavones (Soy) Shortness Of Breath   • Motrin [Ibuprofen] Throat Swelling   • Oseltamivir Shortness Of Breath   • Yeast - Food Allergy Shortness Of Breath   • Lyrica [Pregabalin] Tongue Swelling     Possible allergy. Taken previously without reaction, but had significant tongue swelling when taken with acetaminophen (2023). • Tilactase    • Medical Tape Rash     If on for too long; please use paper tape   • Sulfa Antibiotics Hives       PAST HISTORY    Past Medical History:   Diagnosis Date   • Asthma    • RA (rheumatoid arthritis) ([address])        Past Surgical History:   Procedure Laterality Date   • ANKLE SURGERY      2 surgerys   •  SECTION     • ELBOW SURGERY     • IR LUMBAR PUNCTURE  2023   • IR MIDLINE PLACEMENT  2023   • JOINT REPLACEMENT      knees bilateral   • MUSCLE BIOPSY Left 2023    Procedure: Muscle biopsy;  Surgeon: Larry Agarwal DO;  Location: [facility];  Service: General   • TOTAL SHOULDER REPLACEMENT     • WOUND DEBRIDEMENT Left 2023    Procedure: Sural nerve biopsy;  Surgeon: Gemma Hsu; Location: [facility];  Service: Neurosurgery       Social History     Tobacco Use   • Smoking status: Never   • Smokeless tobacco: Never   Vaping Use   • Vaping Use: Never used   Substance Use Topics   • Alcohol use: Not Currently     Comment: none   • Drug use: Never     Comment: none       Family History   Problem Relation Age of Onset   • No Known Problems Mother    • No Known Problems Father          Above history personally reviewed. EXAM    Vitals:Last menstrual period 2023. ,There is no height or weight on file to calculate BMI. Physical Exam    Neurologic Exam      MEDICAL DECISION MAKING    Imaging Studies:     CT chest wo contrast    Result Date: 8/15/2023  Narrative: CT CHEST WITHOUT IV CONTRAST INDICATION:   Hx of RA, untreated comming with upper and lower extremity numbness.  concern for ANCA vasculitis. Rheumatologic workup in place. COMPARISON: 12/29/2016 TECHNIQUE: CT examination of the chest was performed without intravenous contrast. Multiplanar 2D reformatted images were created from the source data. This examination, like all CT scans performed in the [facility], was performed utilizing techniques to minimize radiation dose exposure, including the use of iterative reconstruction and automated exposure control. Radiation dose length product (DLP) for this visit:  194.01 mGy-cm FINDINGS: LUNGS: Bands of opacity in both lower lobes have appearance favoring atelectasis or scarring. These are more prominent than on the prior scan from 2016. Small amount of mucus appearing debris in the right mainstem bronchus. Otherwise, airways are clear. No dominant lung masses or suspicious nodularity appreciated. PLEURA:  Unremarkable. HEART/GREAT VESSELS: Heart is unremarkable for patient's age. No thoracic aortic aneurysm. MEDIASTINUM AND GURDEEP:  Unremarkable. CHEST WALL AND LOWER NECK: Chest wall appears mildly edematous. Etiology uncertain. There is some artifact from a right shoulder prosthesis which limits portions of the image set. VISUALIZED STRUCTURES IN THE UPPER ABDOMEN:  Unremarkable. OSSEOUS STRUCTURES: As above, there is a right shoulder prosthesis. There is severe arthritis of the left shoulder. There appears to be some arthritis of sternoclavicular joints, right side more so than left. There is dextroscoliosis of the thoracolumbar  junction. Impression: Platelike atelectasis or scarring in the lung bases which is more prominent than on the prior scan. 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 Right shoulder prosthesis present which produces some artifact.  Workstation performed: [id number]     IR lumbar puncture    Result Date: 8/8/2023  Narrative: PROCEDURE: Fluoroscopic-guided diagnostic lumbar puncture INDICATION: Polyneuropathy, extremity weakness Provider: Mary Beth Jarvis PA-C FLUOROSCOPY TIME: 1.9 minutes (accession [id number]), NA (accession [id number]) . COMPLICATIONS: None immediate. MEDICATIONS: 1% lidocaine subcutaneous. PROCEDURE: A suitable location for puncture was identified with fluoroscopy in the prone position at the L3-4 level. A 20-gauge spinal needle was advanced into the subarachnoid space using fluoroscopic guidance. Opening pressure of approximately 13 to 14 cm of H2O approximately 14 mL's of clear cerebrospinal fluid was then removed and sent for analysis. The needle was removed and the puncture site was covered with a sterile dressing. A midline catheter was then placed on the right side using ultrasound guidance by the interventional radiology team, please see documented note, 4 [place] 20 cm right basilic vein Patient tolerated procedure without immediate complication FINDINGS: 1. Fluoroscopy confirmed good positioning within the subarachnoid space, with prompt return of cerebrospinal fluid. 2.  Opening pressure was measured at 13.5 cm H20. Impression: Fluoroscopic-guided diagnostic lumbar puncture. Ultrasound guided midline catheter placement Supervising Physician: Dr. Velma Jiang personally present for the entire procedure Workstation performed: [id number]     IR midline placement    Result Date: 8/8/2023  Narrative: PROCEDURE: Fluoroscopic-guided diagnostic lumbar puncture INDICATION: Polyneuropathy, extremity weakness Provider: Mary Beth Jarvis PA-C FLUOROSCOPY TIME: 1.9 minutes (accession [id number]), NA (accession [id number]) . COMPLICATIONS: None immediate. MEDICATIONS: 1% lidocaine subcutaneous. PROCEDURE: A suitable location for puncture was identified with fluoroscopy in the prone position at the L3-4 level. A 20-gauge spinal needle was advanced into the subarachnoid space using fluoroscopic guidance.  Opening pressure of approximately 13 to 14 cm of H2O approximately 14 mL's of clear cerebrospinal fluid was then removed and sent for analysis. The needle was removed and the puncture site was covered with a sterile dressing. A midline catheter was then placed on the right side using ultrasound guidance by the interventional radiology team, please see documented note, 4 Japanese 20 cm right basilic vein Patient tolerated procedure without immediate complication FINDINGS: 1. Fluoroscopy confirmed good positioning within the subarachnoid space, with prompt return of cerebrospinal fluid. 2.  Opening pressure was measured at 13.5 cm H20. Impression: Fluoroscopic-guided diagnostic lumbar puncture. Ultrasound guided midline catheter placement Supervising Physician: Dr. Reina Areas personally present for the entire procedure Workstation performed: [id number]     MRI lumbar spine w wo contrast    Result Date: 8/7/2023  Narrative: MRI LUMBAR SPINE WITH AND WITHOUT CONTRAST INDICATION: Bilateral arm and leg numbness. COMPARISON:  None. TECHNIQUE:  Multiplanar, multisequence imaging of the lumbar spine was performed before and after gadolinium administration. . IV Contrast:  4 mL of Gadobutrol injection (SINGLE-DOSE) IMAGE QUALITY:  Diagnostic FINDINGS: VERTEBRAL BODIES:  There are 5 lumbar type vertebral bodies. There is dextroscoliosis with apex at L1-2. Normal marrow signal is identified within the visualized bony structures. No discrete marrow lesion. SACRUM:  Normal signal within the sacrum. No evidence of insufficiency or stress fracture. DISTAL CORD AND CONUS:  Normal size and signal within the distal cord and conus. PARASPINAL SOFT TISSUES:  Paraspinal soft tissues are unremarkable. LOWER THORACIC DISC SPACES:  Normal disc height and signal.  No disc herniation, canal stenosis or foraminal narrowing. LUMBAR DISC SPACES: L1-L2: No disc bulge. Mild facet arthropathy. No canal or foraminal stenosis. L2-L3: No disc bulge. Mild facet arthropathy. No canal or foraminal stenosis. L3-L4: No disc bulge. Mild facet arthropathy.  No canal or foraminal stenosis. L4-L5: There is degenerative disc dehydration without significant disc height loss. No significant disc bulge. Mild to moderate facet arthropathy. No canal or foraminal stenosis. L5-S1: No disc bulge. Mild facet arthropathy. No canal or foraminal stenosis. POSTCONTRAST IMAGING:  No abnormal enhancement. OTHER FINDINGS: Hepatomegaly     Impression: 1. No abnormal signal or pathologic enhancement in the visualized cord. 2.  Mild degenerative change without canal or foraminal stenosis. 3.  Hepatomegaly. Workstation performed: [id number]     MRI thoracic spine w wo contrast    Result Date: 8/7/2023  Narrative: MRI THORACIC SPINE WITH AND WITHOUT CONTRAST INDICATION: no. COMPARISON:  None. TECHNIQUE:  Multiplanar, multisequence imaging of the thoracic spine was performed before and after gadolinium administration. . IV Contrast:  4 mL of Gadobutrol injection (SINGLE-DOSE) IMAGE QUALITY:  Diagnostic. FINDINGS: ALIGNMENT:  Normal alignment of the thoracic spine. No compression fracture. No subluxation. No evidence of scoliosis. MARROW SIGNAL:  Normal marrow signal is identified within the visualized bony structures. No discrete marrow lesion. THORACIC CORD:  Normal signal within the thoracic cord. PREVERTEBRAL AND PARASPINAL SOFT TISSUES:   Normal. THORACIC DEGENERATIVE CHANGE:  No disc herniation, canal stenosis or foraminal narrowing. No degenerative changes. POSTCONTRAST:  No abnormal enhancement. OTHER FINDINGS: Small right and minimal left pleural effusion. Impression: 1. No cord signal abnormality or pathologic enhancement. Unremarkable MRI of the thoracic spine. 2.  Small right and minimal left pleural effusion. Workstation performed: [id number]     VAS lower limb arterial duplex, complete bilateral    Result Date: 8/5/2023  Narrative:  [facility] REPORT CLINICAL: Indications:  Patient presents with cold, painful, numb lower extremities x several days, worsening.  Operative History: No prior heart or vascular surgery Risk Factors: Patient has history of rheumatoid arthritis  FINDINGS:  Segment                Right                  Left                                          Waveform   PSV (cm/s)  Waveform   PSV (cm/s)  Post. Tibial           Triphasic              Triphasic              Ant. Tibial            Triphasic              Triphasic              Common Femoral Artery                     51                     48  Prox Profunda                             26                     26  Prox SFA                                  38                     34  Mid SFA                                   47                     55  Dist SFA                                  26                     37  Proximal Pop                              27                     26  Distal Pop                                39                     37  Tibioperoneal                             39                     50  Dist Post Tibial                          40                     59  Prox. Ant. Tibial                         27                         Dist. Ant. Tibial                         44                     38     CONCLUSION:  Impression: RIGHT LOWER LIMB: Diffuse disease noted throughout the femoral-popliteal arteries without significant focal stenosis. Evidence suggestive of tibioperoneal disease. Ankle/Brachial index:   1.16, normal. (Prior 1.1) PVR/ PPG tracings are normal. Metatarsal pressure of 107 mmHg Great toe pressure of 82 mmHg, within the healing range  LEFT LOWER LIMB: Diffuse disease noted throughout the femoral-popliteal arteries without significant focal stenosis. Evidence suggestive of tibioperoneal disease Ankle/Brachial index:   1.20, normal. (Prior 1.0) PVR/ PPG tracings are normal. Metatarsal pressure of 117 mmHg Great toe pressure of 86 mmHg, within the healing range  Compared to previous GRAZYNA study on 4/13/2014, there is no significant change. No prior duplex for comparison.   SIGNATURE: Electronically Signed by: Rolando Chiu MD on 2023-08-05 07:32:56 PM    MRI cervical spine wo contrast    Result Date: 8/3/2023  Narrative: MRI CERVICAL SPINE WITHOUT CONTRAST INDICATION: Chronic neck pain. Paresthesia.) COMPARISON: CT from earlier today. TECHNIQUE:  Multiplanar, multisequence imaging of the cervical spine was performed. . IMAGE QUALITY:  Diagnostic FINDINGS: ALIGNMENT: There is straightening of the normal cervical lordosis. Minimal anterolisthesis at C5-C6, C6-7 and C7-T1. C7-T1 is unchanged. MARROW SIGNAL: There is bilateral facet ankylosis from C2-C6. Subchondral edema and sclerosis in the C6-7 facets related to degenerative facet arthropathy. No suspicious marrow signal normality. CERVICAL AND VISUALIZED THORACIC CORD:  Normal signal within the visualized cord. PREVERTEBRAL AND PARASPINAL SOFT TISSUES:  Normal. VISUALIZED POSTERIOR FOSSA:  The visualized posterior fossa demonstrates no abnormal signal. CERVICAL DISC SPACES: C2-C3: No disc herniation, canal or foraminal stenosis. C3-C4: No disc herniation, canal or foraminal stenosis. C4-C5: No disc herniation, canal or foraminal stenosis. C5-C6: Minimal anterolisthesis. No disc herniation, canal or foraminal stenosis. C6-C7: Small disc bulge and facet arthropathy, left worse than right. No significant canal or foraminal stenosis. C7-T1: Minimal anterolisthesis. No disc herniation, canal or foraminal stenosis. UPPER THORACIC DISC SPACES:  Normal. OTHER FINDINGS:  None. Impression: No disc herniation, canal or foraminal stenosis. 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 Workstation performed: [id number]     CT spine cervical without contrast    Result Date: 8/3/2023  Narrative: CT CERVICAL SPINE - WITHOUT CONTRAST INDICATION:   Neck pain, chronic neck pain, parasthesia. COMPARISON:  None. TECHNIQUE:  CT examination of the cervical spine was performed without intravenous contrast.  Contiguous axial images were obtained. Multiplanar 2D reformatted images were created from the source data. Radiation dose length product (DLP) for this visit:  201.66 mGy-cm . This examination, like all CT scans performed in the [facility], was performed utilizing techniques to minimize radiation dose exposure, including the use of iterative  reconstruction and automated exposure control. IMAGE QUALITY:  Diagnostic. FINDINGS: ALIGNMENT:  Normal alignment of the cervical spine. No subluxation. VERTEBRAE:  No fracture. DEGENERATIVE CHANGES: Mild multilevel cervical degenerative changes are noted without critical central canal stenosis. PREVERTEBRAL AND PARASPINAL SOFT TISSUES: Acute on chronic left sphenoid sinusitis with an air-fluid level and periosteal thickening. THORACIC INLET:  Normal.     Impression: Acute on chronic left sphenoid sinusitis. Workstation performed: [id number]     CT head without contrast    Result Date: 8/3/2023  Narrative: CT BRAIN - WITHOUT CONTRAST INDICATION:   hand numbness. COMPARISON:  None. TECHNIQUE:  CT examination of the brain was performed. Multiplanar 2D reformatted images were created from the source data. Radiation dose length product (DLP) for this visit:  819.14 mGy-cm . This examination, like all CT scans performed in the [facility], was performed utilizing techniques to minimize radiation dose exposure, including the use of iterative  reconstruction and automated exposure control. IMAGE QUALITY:  Diagnostic. FINDINGS: PARENCHYMA:  No intracranial mass, mass effect or midline shift. No CT signs of acute infarction. No acute parenchymal hemorrhage. VENTRICLES AND EXTRA-AXIAL SPACES:  Normal for the patient's age. VISUALIZED ORBITS: Normal visualized orbits. PARANASAL SINUSES: Mild mucosal thickening of the visualized paranasal sinuses. CALVARIUM AND EXTRACRANIAL SOFT TISSUES:  Normal.     Impression: No acute intracranial abnormality.  Workstation performed: [id number]       I have personally reviewed pertinent reports.

## 2023-08-31 ENCOUNTER — HOSPITAL ENCOUNTER (OUTPATIENT)
Dept: INFUSION CENTER | Facility: HOSPITAL | Age: 48
Discharge: HOME | End: 2023-08-31

## 2023-08-31 ENCOUNTER — HOSPITAL ENCOUNTER (EMERGENCY)
Facility: HOSPITAL | Age: 48
Discharge: HOME | End: 2023-08-31
Attending: FAMILY MEDICINE
Payer: MEDICARE

## 2023-08-31 ENCOUNTER — TELEPHONE (OUTPATIENT)
Age: 48
End: 2023-08-31

## 2023-08-31 ENCOUNTER — APPOINTMENT (EMERGENCY)
Dept: NON INVASIVE DIAGNOSTICS | Facility: HOSPITAL | Age: 48
End: 2023-08-31
Payer: MEDICARE

## 2023-08-31 ENCOUNTER — APPOINTMENT (EMERGENCY)
Dept: CT IMAGING | Facility: HOSPITAL | Age: 48
End: 2023-08-31
Payer: MEDICARE

## 2023-08-31 VITALS
SYSTOLIC BLOOD PRESSURE: 103 MMHG | DIASTOLIC BLOOD PRESSURE: 73 MMHG | RESPIRATION RATE: 18 BRPM | TEMPERATURE: 97.8 F | OXYGEN SATURATION: 97 % | HEART RATE: 98 BPM

## 2023-08-31 VITALS — RESPIRATION RATE: 16 BRPM | SYSTOLIC BLOOD PRESSURE: 108 MMHG | DIASTOLIC BLOOD PRESSURE: 77 MMHG | TEMPERATURE: 97.2 F

## 2023-08-31 DIAGNOSIS — I82.409 DVT (DEEP VENOUS THROMBOSIS) (HCC): Primary | ICD-10-CM

## 2023-08-31 DIAGNOSIS — G57.93 NEUROPATHY INVOLVING BOTH LOWER EXTREMITIES: ICD-10-CM

## 2023-08-31 DIAGNOSIS — I77.6 VASCULITIS (HCC): ICD-10-CM

## 2023-08-31 LAB
ANION GAP SERPL CALCULATED.3IONS-SCNC: 8 MMOL/L
ANISOCYTOSIS BLD QL SMEAR: PRESENT
APTT PPP: 19 SECONDS (ref 23–37)
BASOPHILS # BLD MANUAL: 0 THOUSAND/UL (ref 0–0.1)
BASOPHILS NFR MAR MANUAL: 0 % (ref 0–1)
BUN SERPL-MCNC: 33 MG/DL (ref 5–25)
CALCIUM SERPL-MCNC: 8.2 MG/DL (ref 8.4–10.2)
CHLORIDE SERPL-SCNC: 103 MMOL/L (ref 96–108)
CO2 SERPL-SCNC: 25 MMOL/L (ref 21–32)
CREAT SERPL-MCNC: 0.39 MG/DL (ref 0.6–1.3)
EOSINOPHIL # BLD MANUAL: 0 THOUSAND/UL (ref 0–0.4)
EOSINOPHIL NFR BLD MANUAL: 0 % (ref 0–6)
ERYTHROCYTE [DISTWIDTH] IN BLOOD BY AUTOMATED COUNT: 22.7 % (ref 11.6–15.1)
GFR SERPL CREATININE-BSD FRML MDRD: 126 ML/MIN/1.73SQ M
GLUCOSE SERPL-MCNC: 129 MG/DL (ref 65–140)
HCT VFR BLD AUTO: 27.5 % (ref 34.8–46.1)
HGB BLD-MCNC: 8.3 G/DL (ref 11.5–15.4)
INR PPP: 0.99 (ref 0.84–1.19)
LYMPHOCYTES # BLD AUTO: 0.65 THOUSAND/UL (ref 0.6–4.47)
LYMPHOCYTES # BLD AUTO: 5 % (ref 14–44)
MCH RBC QN AUTO: 28.4 PG (ref 26.8–34.3)
MCHC RBC AUTO-ENTMCNC: 30.2 G/DL (ref 31.4–37.4)
MCV RBC AUTO: 94 FL (ref 82–98)
MONOCYTES # BLD AUTO: 0.39 THOUSAND/UL (ref 0–1.22)
MONOCYTES NFR BLD: 3 % (ref 4–12)
NEUTROPHILS # BLD MANUAL: 12.02 THOUSAND/UL (ref 1.85–7.62)
NEUTS BAND NFR BLD MANUAL: 7 % (ref 0–8)
NEUTS SEG NFR BLD AUTO: 85 % (ref 43–75)
PLATELET # BLD AUTO: 409 THOUSANDS/UL (ref 149–390)
PLATELET BLD QL SMEAR: ADEQUATE
PMV BLD AUTO: 8.1 FL (ref 8.9–12.7)
POLYCHROMASIA BLD QL SMEAR: PRESENT
POTASSIUM SERPL-SCNC: 4.3 MMOL/L (ref 3.5–5.3)
PROTHROMBIN TIME: 13.3 SECONDS (ref 11.6–14.5)
RBC # BLD AUTO: 2.92 MILLION/UL (ref 3.81–5.12)
RBC MORPH BLD: PRESENT
SODIUM SERPL-SCNC: 136 MMOL/L (ref 135–147)
WBC # BLD AUTO: 13.06 THOUSAND/UL (ref 4.31–10.16)

## 2023-08-31 PROCEDURE — 36415 COLL VENOUS BLD VENIPUNCTURE: CPT | Performed by: FAMILY MEDICINE

## 2023-08-31 PROCEDURE — 85027 COMPLETE CBC AUTOMATED: CPT | Performed by: FAMILY MEDICINE

## 2023-08-31 PROCEDURE — 80048 BASIC METABOLIC PNL TOTAL CA: CPT | Performed by: FAMILY MEDICINE

## 2023-08-31 PROCEDURE — 93971 EXTREMITY STUDY: CPT | Performed by: SURGERY

## 2023-08-31 PROCEDURE — 85610 PROTHROMBIN TIME: CPT | Performed by: FAMILY MEDICINE

## 2023-08-31 PROCEDURE — 71275 CT ANGIOGRAPHY CHEST: CPT

## 2023-08-31 PROCEDURE — 99284 EMERGENCY DEPT VISIT MOD MDM: CPT

## 2023-08-31 PROCEDURE — G1004 CDSM NDSC: HCPCS

## 2023-08-31 PROCEDURE — 85730 THROMBOPLASTIN TIME PARTIAL: CPT | Performed by: FAMILY MEDICINE

## 2023-08-31 PROCEDURE — 96374 THER/PROPH/DIAG INJ IV PUSH: CPT

## 2023-08-31 PROCEDURE — 93971 EXTREMITY STUDY: CPT

## 2023-08-31 PROCEDURE — 99285 EMERGENCY DEPT VISIT HI MDM: CPT | Performed by: FAMILY MEDICINE

## 2023-08-31 PROCEDURE — NC001 PR NO CHARGE: Performed by: SURGERY

## 2023-08-31 PROCEDURE — 85007 BL SMEAR W/DIFF WBC COUNT: CPT | Performed by: FAMILY MEDICINE

## 2023-08-31 RX ORDER — APIXABAN 5 MG/1
TABLET, FILM COATED ORAL
Qty: 74 TABLET | Refills: 0 | Status: SHIPPED | OUTPATIENT
Start: 2023-08-31 | End: 2023-09-01 | Stop reason: SDUPTHER

## 2023-08-31 RX ORDER — HYDROMORPHONE HCL/PF 1 MG/ML
0.5 SYRINGE (ML) INJECTION ONCE
Status: COMPLETED | OUTPATIENT
Start: 2023-08-31 | End: 2023-08-31

## 2023-08-31 RX ADMIN — HYDROMORPHONE HYDROCHLORIDE 0.5 MG: 1 INJECTION, SOLUTION INTRAMUSCULAR; INTRAVENOUS; SUBCUTANEOUS at 17:47

## 2023-08-31 RX ADMIN — IOHEXOL 70 ML: 350 INJECTION, SOLUTION INTRAVENOUS at 14:06

## 2023-08-31 RX ADMIN — APIXABAN 10 MG: 5 TABLET, FILM COATED ORAL at 17:45

## 2023-08-31 NOTE — PROGRESS NOTES
Vascular Attending Note    Please see AP consultaiton for full H&P. 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 Had midline catheter placement in the R basilic vein 8/4/48.     Presents with several days of RUE edema    -reviewed UEV which shows acute DVT of the R brachial, axillary, and subclavian veins  -likely secondary to recent mildline placement in that arm, immobility; unsure if her vasculitis treatments can cause hypercoagulability; may also have thoracic inlet/outlet mechanical abnormality given her severe arthritis disease  -recommend therapeutic anticoagulation  -would only consider intervention if RUE symptoms became severe or evidence of phlegmasia  -recommend arm elevation and compression as able

## 2023-08-31 NOTE — ED PROVIDER NOTES
History  Chief Complaint   Patient presents with   • Arm Pain     Patient reporting right arm pain x2 days and new onset swelling since this morning. HPI  This is a 51-year-old female with history of severe RA on infusion presented with complaint of right arm pain and swelling. She states that she was recent discharge from the hospital had IV in her arm and started to have pain while she was in the hospital.  States noticed that she started to swell up on Tuesday which prompted his ED visit. Denies any chest pain or shortness of breath. Patient found to be tachycardic states that this is her baseline and has been worked up in her previous visit. Prior to Admission Medications   Prescriptions Last Dose Informant Patient Reported? Taking? HYDROcodone-acetaminophen (NORCO) 5-325 mg per tablet   No No   Sig: Take 1 tablet by mouth every 6 (six) hours as needed for pain for up to 5 days Max Daily Amount: 4 tablets   Magnesium Oxide 400 MG CAPS  Self Yes No   Sig: Take 1 capsule by mouth   Ventolin  (90 Base) MCG/ACT inhaler  Self Yes No   Patient not taking: Reported on 8/28/2023   amitriptyline (ELAVIL) 10 mg tablet   No No   Sig: Take 1 tablet (10 mg total) by mouth daily at bedtime   cholecalciferol (VITAMIN D3) 400 units tablet   No No   Sig: Take 1 tablet (400 Units total) by mouth daily Do not start before August 26, 2023. cyanocobalamin (VITAMIN B-12) 1000 MCG tablet   No No   Sig: Take 1 tablet (1,000 mcg total) by mouth daily Do not start before August 26, 2023.    gabapentin (NEURONTIN) 300 mg capsule   No No   Sig: Take 2 capsules (600 mg total) by mouth 2 (two) times a day   gabapentin (NEURONTIN) 300 mg capsule   No No   Sig: Take 3 capsules (900 mg total) by mouth daily at bedtime   levalbuterol (XOPENEX HFA) 45 mcg/act inhaler   Yes No   Sig: Inhale 1-2 puffs   predniSONE 20 mg tablet   No No   Sig: Take 3 tablets (60 mg total) by mouth daily   pyridoxine (VITAMIN B6) 50 mg tablet   No No   Sig: Take 1 tablet (50 mg total) by mouth daily Do not start before 2023. Facility-Administered Medications: None       Past Medical History:   Diagnosis Date   • Asthma    • RA (rheumatoid arthritis) ([address])        Past Surgical History:   Procedure Laterality Date   • ANKLE SURGERY      2 surgerys   •  SECTION     • ELBOW SURGERY     • IR LUMBAR PUNCTURE  2023   • IR MIDLINE PLACEMENT  2023   • JOINT REPLACEMENT      knees bilateral   • MUSCLE BIOPSY Left 2023    Procedure: Muscle biopsy;  Surgeon: Kayy Hdz To, DO;  [facility];  Service: General   • TOTAL SHOULDER REPLACEMENT     • WOUND DEBRIDEMENT Left 2023    Procedure: Sural nerve biopsy;  Surgeon: Alcario Schlatter; Location: [facility];  Service: Neurosurgery       Family History   Problem Relation Age of Onset   • No Known Problems Mother    • No Known Problems Father      I have reviewed and agree with the history as documented. E-Cigarette/Vaping   • E-Cigarette Use Never User      E-Cigarette/Vaping Substances   • Nicotine No    • THC No    • CBD No    • Flavoring No    • Other No    • Unknown No      Social History     Tobacco Use   • Smoking status: Never   • Smokeless tobacco: Never   Vaping Use   • Vaping Use: Never used   Substance Use Topics   • Alcohol use: Not Currently     Comment: none   • Drug use: Never     Comment: none       Review of Systems   Constitutional: Negative for chills and fever. HENT: Negative for rhinorrhea and sore throat. Eyes: Negative for visual disturbance. Respiratory: Negative for cough and shortness of breath. Cardiovascular: Negative for chest pain and leg swelling. Gastrointestinal: Negative for abdominal pain, diarrhea, nausea and vomiting. Genitourinary: Negative for dysuria. Musculoskeletal: Negative for back pain and myalgias. Right arm swelling    Skin: Negative for rash. Neurological: Negative for dizziness and headaches. Psychiatric/Behavioral: Negative for confusion. All other systems reviewed and are negative. Physical Exam  Physical Exam  Vitals and nursing note reviewed. Constitutional:       Appearance: She is well-developed. HENT:      Head: Normocephalic and atraumatic. Right Ear: External ear normal.      Left Ear: External ear normal.      Nose: Nose normal.      Mouth/Throat:      Mouth: Mucous membranes are moist.      Pharynx: No oropharyngeal exudate. Eyes:      General: No scleral icterus. Right eye: No discharge. Left eye: No discharge. Conjunctiva/sclera: Conjunctivae normal.      Pupils: Pupils are equal, round, and reactive to light. Cardiovascular:      Rate and Rhythm: Normal rate and regular rhythm. Pulses: Normal pulses. Heart sounds: Normal heart sounds. Pulmonary:      Effort: Pulmonary effort is normal. No respiratory distress. Breath sounds: Normal breath sounds. No wheezing. Abdominal:      General: Bowel sounds are normal.      Palpations: Abdomen is soft. Musculoskeletal:         General: Swelling (right arm: swelling no crepitus noted. ) and tenderness present. Normal range of motion. Cervical back: Normal range of motion and neck supple. Lymphadenopathy:      Cervical: No cervical adenopathy. Skin:     General: Skin is warm and dry. Capillary Refill: Capillary refill takes less than 2 seconds. Neurological:      General: No focal deficit present. Mental Status: She is alert and oriented to person, place, and time.    Psychiatric:         Mood and Affect: Mood normal.         Behavior: Behavior normal.         Vital Signs  ED Triage Vitals   Temperature Pulse Respirations Blood Pressure SpO2   08/31/23 1059 08/31/23 1059 08/31/23 1059 08/31/23 1059 08/31/23 1059   97.6 °F (36.4 °C) (!) 126 16 111/79 99 %      Temp Source Heart Rate Source Patient Position - Orthostatic VS BP Location FiO2 (%)   08/31/23 1059 08/31/23 1059 08/31/23 1200 08/31/23 1200 --   Temporal Monitor Lying Left arm       Pain Score       08/31/23 1747       6           Vitals:    08/31/23 1200 08/31/23 1421 08/31/23 1600 08/31/23 1630   BP: 148/82 112/70 115/73 103/73   Pulse: (!) 114 (!) 108 103 99   Patient Position - Orthostatic VS: Lying Lying Lying Lying         Visual Acuity      ED Medications  Medications   iohexol (OMNIPAQUE) 350 MG/ML injection (SINGLE-DOSE) 70 mL (70 mL Intravenous Given 8/31/23 1406)   HYDROmorphone (DILAUDID) injection 0.5 mg (0.5 mg Intravenous Given 8/31/23 1747)   apixaban (ELIQUIS) tablet 10 mg (10 mg Oral Given 8/31/23 1745)       Diagnostic Studies  Results Reviewed     Procedure Component Value Units Date/Time    RBC Morphology Reflex Test [[id number]] Collected: 08/31/23 1322    Lab Status: Final result Specimen: Blood from Arm, Left Updated: 08/31/23 1502    Manual Differential(PHLEBS Do Not Order) [[id number]]  (Abnormal) Collected: 08/31/23 1322    Lab Status: Final result Specimen: Blood from Arm, Left Updated: 08/31/23 1451     Segmented % 85 %      Bands % 7 %      Lymphocytes % 5 %      Monocytes % 3 %      Eosinophils, % 0 %      Basophils % 0 %      Absolute Neutrophils 12.02 Thousand/uL      Lymphocytes Absolute 0.65 Thousand/uL      Monocytes Absolute 0.39 Thousand/uL      Eosinophils Absolute 0.00 Thousand/uL      Basophils Absolute 0.00 Thousand/uL      Total Counted --     RBC Morphology Present     Platelet Estimate Adequate     Anisocytosis Present     Polychromasia Present    CBC and differential [[id number]]  (Abnormal) Collected: 08/31/23 1322    Lab Status: Final result Specimen: Blood from Arm, Left Updated: 08/31/23 1451     WBC 13.06 Thousand/uL      RBC 2.92 Million/uL      Hemoglobin 8.3 g/dL      Hematocrit 27.5 %      MCV 94 fL      MCH 28.4 pg      MCHC 30.2 g/dL      RDW 22.7 %      MPV 8.1 fL      Platelets 806 Thousands/uL     Narrative: This is an appended report.   These results have been appended to a previously verified report. Protime-INR [[id number]]  (Normal) Collected: 08/31/23 1322    Lab Status: Final result Specimen: Blood from Arm, Left Updated: 08/31/23 1437     Protime 13.3 seconds      INR 0.99    APTT [[id number]]  (Abnormal) Collected: 08/31/23 1322    Lab Status: Final result Specimen: Blood from Arm, Left Updated: 08/31/23 1437     PTT 19 seconds     Basic metabolic panel [[id number]]  (Abnormal) Collected: 08/31/23 1322    Lab Status: Final result Specimen: Blood from Arm, Left Updated: 08/31/23 1347     Sodium 136 mmol/L      Potassium 4.3 mmol/L      Chloride 103 mmol/L      CO2 25 mmol/L      ANION GAP 8 mmol/L      BUN 33 mg/dL      Creatinine 0.39 mg/dL      Glucose 129 mg/dL      Calcium 8.2 mg/dL      eGFR 126 ml/min/1.73sq m     Narrative:      [facility] (CKD):   •  Stage 1 with normal or high GFR (GFR > 90 mL/min/1.73 square meters)  •  Stage 2 Mild CKD (GFR = 60-89 mL/min/1.73 square meters)  •  Stage 3A Moderate CKD (GFR = 45-59 mL/min/1.73 square meters)  •  Stage 3B Moderate CKD (GFR = 30-44 mL/min/1.73 square meters)  •  Stage 4 Severe CKD (GFR = 15-29 mL/min/1.73 square meters)  •  Stage 5 End Stage CKD (GFR <15 mL/min/1.73 square meters)  Note: GFR calculation is accurate only with a steady state creatinine                 CTA ED chest PE Study   Final Result by Alonzo Garcia MD (08/31 1443)      No acute pathology in the chest. No pulmonary embolus. Healing anterior fractures of the right 2nd through 4th ribs, new since the prior study. Workstation performed: [id number]         VAS upper limb venous duplex scan, unilateral/limited   Final Result by Marisela Marroquin DO (08/31 1429)                 Procedures  Procedures         ED Course  ED Course as of 08/31/23 1756   Thu Aug 31, 2023   1301 Duplex shows clot is through the subclavian/axillary.   [place] texted vascular surgery waiting for callback in the meantime will obtain labs   [facility] let me reach out to Dr. Michael Costello  12:57 PM  20 days left     1301 Patient is informed regarding ultrasound result. SBIRT 22yo+    Flowsheet Row Most Recent Value   Initial Alcohol Screen: US AUDIT-C     1. How often do you have a drink containing alcohol? 0 Filed at: 08/31/2023 1104   2. How many drinks containing alcohol do you have on a typical day you are drinking? 0 Filed at: 08/31/2023 1104   3a. Male UNDER 65: How often do you have five or more drinks on one occasion? 0 Filed at: 08/31/2023 1104   3b. FEMALE Any Age, or MALE 65+: How often do you have 4 or more drinks on one occassion? 0 Filed at: 08/31/2023 1104   Audit-C Score 0 Filed at: 08/31/2023 1104   SEE: How many times in the past year have you. .. Used an illegal drug or used a prescription medication for non-medical reasons? Never Filed at: 08/31/2023 1104                    Medical Decision Making  This is a 43-year-old female with history of severe RA on infusion presented with complaint of right arm pain and swelling. History is taken from patient and  who is by bedside. Awake alert oriented denies any chest pain shortness of breath. Found to be tachycardic which has had a work-up in the past.  Concern for superficial phlebitis/DVT /cellulitis . we will obtain on the venous duplex to rule out. Less concern for infection since the patient has been no erythema redness or fever. Duplex showedacute DVT of the R brachial, axillary, and subclavian veins. Labs within normal limits. Consulted vascular surgery recommending -recommend therapeutic anticoagulation  -would only consider intervention if RUE symptoms became severe or evidence of phlegmasia  -recommend arm elevation and compression as able  Vascular surgery recommendation discussed with patient and  both opted to for oral anticoagulation.   Patient stated that she she did not have any function of her right hand to begin with and does not think that DVT affected that so she would like to go home and take anticoagulation. Strict precaution regarding return recommending to return to the ED if noticed any worsening swelling pain chest pain shortness of breath bleeding or dizziness immediately return back to the ED. I also recommend to follow-up with vascular surgery and hematology. Both referral is provided to the patient. Again multiple discussion with patient and the  who is by bedside all the question has been answered within my scope of practice both opted for oral anticoagulation at this time    Amount and/or Complexity of Data Reviewed  Labs: ordered. Radiology: ordered. Risk  Prescription drug management. Disposition  Final diagnoses:   DVT (deep venous thrombosis) ([address])     Time reflects when diagnosis was documented in both MDM as applicable and the Disposition within this note     Time User Action Codes Description Comment    8/31/2023  2:49 PM Katie Pittman Add [I82.409] DVT (deep venous thrombosis) [facility])       ED Disposition     ED Disposition   Discharge    Condition   Stable    Date/Time   Thu Aug 31, 2023  5:39 PM    Comment   Saritha Hector discharge to home/self care. Follow-up Information     Follow up With Specialties Details Why Contact Info    Willis Zendejas DO Family Medicine Schedule an appointment as soon as possible for a visit in 2 days If symptoms worsen [id number] [facility] Lucho Anglin MD Vascular Surgery, Radiology Schedule an appointment as soon as possible for a visit in 2 days for follow up [address]  [phone]            Patient's Medications   Discharge Prescriptions    ELIQUIS 5 MG    Take 2 tablets (10 mg total) by mouth 2 (two) times a day for 7 days, THEN 1 tablet (5 mg total) 2 (two) times a day for 23 days.        Start Date: 8/31/2023 End Date: 9/30/2023       Order Dose: --       Quantity: 74 tablet    Refills: 0       No discharge procedures on file.     PDMP Review     None          ED Provider  Electronically Signed by           Jarocho Gan MD  08/31/23 0337

## 2023-08-31 NOTE — TELEPHONE ENCOUNTER
Caller: Infusion center     Reason for call: Pt has an infusion schedulle for saman. Office is calling for the RITUXIMAB (OR BIOSIMILAR) INFUSION 375 MG/M2 WEEKLY order to be signed.      Please advise as patient's appt is scheduled for 9am and Kalin Denton is ooo    Call back#: [phone]

## 2023-08-31 NOTE — CONSULTS
Consultation - Vascular Surgery   Jeannie Hector 57 y.o. female MRN: [id number]  Unit/Bed#: ED 05 Encounter: [id number]    Assessment:  57year old female with PMH significant for RA diagnosed at 18, multiple joint replacements, and recent diagnosis of vasculitis presents for the evaluation of acute RUE DVTs  · Afebrile, episodes of tachycardia, remainder of vitals stable   · WBC 13.06  · Hgb 8.3  · Cr 0.39  · CTA chest: "No acute pathology in the chest. No pulmonary embolus."  · Upper extremity duplex: "RUL: Evidence of occlusive acute DVT in the axillary and brachial veins. Nonocclusive acute DVT noted in the subclavian vein. No evidence of superficial thrombophlebitis. Left upper limb limited: No evidence of thrombus in the internal jugular and subclavian veins."    Plan:  · No emergent vascular intervention indicated at this time. Further vascular intervention pending attending attestation. · Anticoagulation recommends per attending   · Analgesia and antiemetics prn   · Discussed with Dr. Paloma Antonoi Doctor     History of Present Illness   Chief Complaint: R arm swelling     HPI:  [address] Yasmany Allison is a 57 y.o. female with past medical history significant for RA diagnosed at 18years old, multiple joint replacements, recent muscle and sural nerve biopsy, recent diagnosis of vasculitis who initially presented to [facility] ED with complaints of R arm swelling. Patient has a complicated history. 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 Patient was recently diagnosed with p-ANCA vasculitis. She follows outpatient with rheumatology. She recently had a muscle and sural nerve biopsy. She presents today with worsening R arm swelling that started at her thumb and progressed to her shoulder. She denies fever, chills, chest pain, sob, nausea, vomiting, leg pain, leg swelling. She currently does not take any blood thinners. Patient is bed bound.      Inpatient consult to Vascular Surgery  Consult performed by: Ivan Cedillo PA-C  Consult ordered by: Sumit Kent MD          Review of Systems   Constitutional: Negative for chills and fever. HENT: Negative for congestion and sore throat. Respiratory: Negative for cough and shortness of breath. Cardiovascular: Negative for chest pain and leg swelling. Gastrointestinal: Negative for abdominal pain, diarrhea, nausea and vomiting. Endocrine: Negative for polydipsia and polyuria. Genitourinary: Negative for difficulty urinating, dysuria and urgency. Musculoskeletal: Positive for gait problem, joint swelling and myalgias (hx of RA). Skin: Negative for pallor and rash. Neurological: Negative for dizziness, weakness and numbness. Historical Information   Past Medical History:   Diagnosis Date   • Asthma    • RA (rheumatoid arthritis) ([address])      Past Surgical History:   Procedure Laterality Date   • ANKLE SURGERY      2 surgerys   •  SECTION     • ELBOW SURGERY     • IR LUMBAR PUNCTURE  2023   • IR MIDLINE PLACEMENT  2023   • JOINT REPLACEMENT      knees bilateral   • MUSCLE BIOPSY Left 2023    Procedure: Muscle biopsy;  Surgeon: Kayy Agarwal DO;  [facility];  Service: General   • TOTAL SHOULDER REPLACEMENT     • WOUND DEBRIDEMENT Left 2023    Procedure: Sural nerve biopsy;  Surgeon: Alcario Schlatter;   Location[facility];  Service: Neurosurgery     Social History   Social History     Substance and Sexual Activity   Alcohol Use Not Currently    Comment: none     Social History     Substance and Sexual Activity   Drug Use Never    Comment: none     E-Cigarette/Vaping   • E-Cigarette Use Never User      E-Cigarette/Vaping Substances   • Nicotine No    • THC No    • CBD No    • Flavoring No    • Other No    • Unknown No      Social History     Tobacco Use   Smoking Status Never   Smokeless Tobacco Never     Family History:   Family History   Problem Relation Age of Onset   • No Known Problems Mother    • No Known Problems Father        Meds/Allergies   all current active meds have been reviewed and current meds:   No current facility-administered medications for this encounter. Allergies   Allergen Reactions   • Corn Oil - Food Allergy - Food Allergy Shortness Of Breath   • Gluten Meal - Food Allergy Shortness Of Breath   • Infliximab Hypertension and Throat Swelling   • Isoflavones (Soy) Shortness Of Breath   • Motrin [Ibuprofen] Throat Swelling   • Oseltamivir Shortness Of Breath   • Yeast - Food Allergy Shortness Of Breath   • Lyrica [Pregabalin] Tongue Swelling     Possible allergy. Taken previously without reaction, but had significant tongue swelling when taken with acetaminophen (7/2023). • Tilactase    • Medical Tape Rash     If on for too long; please use paper tape   • Sulfa Antibiotics Hives       Objective   First Vitals:   Blood Pressure: 111/79 (08/31/23 1059)  Pulse: (!) 126 (08/31/23 1059)  Temperature: 97.6 °F (36.4 °C) (08/31/23 1059)  Temp Source: Temporal (08/31/23 1059)  Respirations: 16 (08/31/23 1059)  SpO2: 99 % (08/31/23 1059)    Current Vitals:   Blood Pressure: 112/70 (08/31/23 1421)  Pulse: (!) 108 (08/31/23 1421)  Temperature: 97.6 °F (36.4 °C) (08/31/23 1059)  Temp Source: Oral (08/31/23 1200)  Respirations: 18 (08/31/23 1421)  SpO2: 98 % (08/31/23 1421)    No intake or output data in the 24 hours ending 08/31/23 1505    Invasive Devices     Peripheral Intravenous Line  Duration           Peripheral IV 08/31/23 Left;Upper;Ventral (anterior) Arm <1 day                Physical Exam  Vitals reviewed. Constitutional:       Appearance: She is underweight. She is not ill-appearing or toxic-appearing. HENT:      Head: Normocephalic and atraumatic. Cardiovascular:      Rate and Rhythm: Regular rhythm. Tachycardia present. Heart sounds: Normal heart sounds. Pulmonary:      Effort: Pulmonary effort is normal. No respiratory distress. Abdominal:      General: Abdomen is flat. Bowel sounds are normal. There is no distension. Palpations: Abdomen is soft. Tenderness: There is no abdominal tenderness. There is no guarding. Musculoskeletal:         General: Swelling (R arm) present. Right lower leg: No edema. Left lower leg: No edema. Skin:     General: Skin is warm and dry. Neurological:      General: No focal deficit present. Mental Status: She is alert. Mental status is at baseline. Psychiatric:         Mood and Affect: Mood normal.         Behavior: Behavior normal.         Lab Results:   I have personally reviewed pertinent lab results. , CBC:   Lab Results   Component Value Date    WBC 13.06 (H) 08/31/2023    HGB 8.3 (L) 08/31/2023    HCT 27.5 (L) 08/31/2023    MCV 94 08/31/2023     (H) 08/31/2023    RBC 2.92 (L) 08/31/2023    MCH 28.4 08/31/2023    MCHC 30.2 (L) 08/31/2023    RDW 22.7 (H) 08/31/2023    MPV 8.1 (L) 08/31/2023   , CMP:   Lab Results   Component Value Date    SODIUM 136 08/31/2023    K 4.3 08/31/2023     08/31/2023    CO2 25 08/31/2023    BUN 33 (H) 08/31/2023    CREATININE 0.39 (L) 08/31/2023    CALCIUM 8.2 (L) 08/31/2023    EGFR 126 08/31/2023     Imaging: I have personally reviewed pertinent reports. EKG, Pathology, and Other Studies: I have personally reviewed pertinent reports. Code Status: Prior  Advance Directive and Living Will:      Power of :    POLST:      Counseling / Coordination of Care  Total floor / unit time spent today 25 minutes. Greater than 50% of total time was spent with the patient and / or family counseling and / or coordination of care. A description of the counseling / coordination of care: evaluation of patient, review of diagnostic and laboratory studies and discussion with attending.      Elkin Humphries PA-C

## 2023-08-31 NOTE — TELEPHONE ENCOUNTER
I would ask her to return to infusion center and see if they can try with ultrasound guidance. Otherwise she may need to admitted to get IV access. Or can discuss with Dr. Salome Velazquez on Monday if she needs a PICC.

## 2023-08-31 NOTE — TELEPHONE ENCOUNTER
Caller: [facility]    Doctor:  [facility]    Reason for call: patient was unable to receive infusion today due to center not being able to get an IV in the patient. Patient is requesting a call back as well because they want to know what they are suppose to do since they are suppose to receive weekly infusions.     Call back#: [phone]

## 2023-08-31 NOTE — TELEPHONE ENCOUNTER
Caller: Yan Hearn    Doctor: Devonte Lei    Reason for call: Needs Rituximab order signed.  Patient currently @infusion center    Call back#: [phone]

## 2023-08-31 NOTE — TELEPHONE ENCOUNTER
They do not do US injections and have no Emergency nurses on hand either. We can send to Vernon Marquez and Iona Adrian to follow up on Tuesday/Wednesday.

## 2023-08-31 NOTE — TELEPHONE ENCOUNTER
Spoke with Corey at [facility] center and she stated patient had already left, they tried 5 times to get a line and failed each time. Patient stated also that she may go to the ED because when they removed her mid line when she left the hospital she had some concerns. ... please advise

## 2023-09-01 ENCOUNTER — TELEPHONE (OUTPATIENT)
Age: 48
End: 2023-09-01

## 2023-09-01 DIAGNOSIS — I87.2 VENOUS INSUFFICIENCY OF BOTH LOWER EXTREMITIES: Primary | ICD-10-CM

## 2023-09-01 RX ORDER — APIXABAN 5 MG/1
TABLET, FILM COATED ORAL
Qty: 74 TABLET | Refills: 0 | Status: SHIPPED | OUTPATIENT
Start: 2023-09-01 | End: 2023-10-01

## 2023-09-01 NOTE — TELEPHONE ENCOUNTER
Patients  called and asked if I could change eliquis to a different pharmacy because the pharamacy the ER sent to does not have the eliquis.    New order to be placed to [facility] pharmacy in [place], [place].

## 2023-09-01 NOTE — TELEPHONE ENCOUNTER
Caller: Patients  Raheel    Doctor: Reshma Cain    Reason for call: Patients  is calling stating his wife was in the hospital for numerous issues and saw Dr Resmha Cain while she was there. He would like to know if she should continue her infusions as scheduled?      Call back#:[phone]

## 2023-09-06 DIAGNOSIS — I77.6 VASCULITIS (HCC): ICD-10-CM

## 2023-09-06 DIAGNOSIS — G57.93 NEUROPATHY INVOLVING BOTH LOWER EXTREMITIES: Primary | ICD-10-CM

## 2023-09-06 RX ORDER — DIPHENHYDRAMINE HCL 25 MG
25 TABLET ORAL ONCE
Status: CANCELLED | OUTPATIENT
Start: 2023-09-07

## 2023-09-06 RX ORDER — ACETAMINOPHEN 325 MG/1
650 TABLET ORAL ONCE
Status: CANCELLED | OUTPATIENT
Start: 2023-09-07

## 2023-09-06 RX ORDER — SODIUM CHLORIDE 9 MG/ML
20 INJECTION, SOLUTION INTRAVENOUS ONCE
Status: CANCELLED | OUTPATIENT
Start: 2023-09-07

## 2023-09-07 ENCOUNTER — HOSPITAL ENCOUNTER (OUTPATIENT)
Dept: INFUSION CENTER | Facility: HOSPITAL | Age: 48
End: 2023-09-07
Payer: MEDICARE

## 2023-09-07 VITALS
OXYGEN SATURATION: 99 % | HEART RATE: 101 BPM | SYSTOLIC BLOOD PRESSURE: 118 MMHG | DIASTOLIC BLOOD PRESSURE: 83 MMHG | TEMPERATURE: 96.3 F | RESPIRATION RATE: 18 BRPM

## 2023-09-07 DIAGNOSIS — I77.6 VASCULITIS (HCC): ICD-10-CM

## 2023-09-07 DIAGNOSIS — G57.93 NEUROPATHY INVOLVING BOTH LOWER EXTREMITIES: Primary | ICD-10-CM

## 2023-09-07 PROCEDURE — 96413 CHEMO IV INFUSION 1 HR: CPT

## 2023-09-07 PROCEDURE — 96415 CHEMO IV INFUSION ADDL HR: CPT

## 2023-09-07 RX ORDER — ACETAMINOPHEN 325 MG/1
650 TABLET ORAL ONCE
Status: CANCELLED | OUTPATIENT
Start: 2023-09-14

## 2023-09-07 RX ORDER — ACETAMINOPHEN 325 MG/1
650 TABLET ORAL ONCE
Status: COMPLETED | OUTPATIENT
Start: 2023-09-07 | End: 2023-09-07

## 2023-09-07 RX ORDER — DIPHENHYDRAMINE HCL 25 MG
25 TABLET ORAL ONCE
Status: COMPLETED | OUTPATIENT
Start: 2023-09-07 | End: 2023-09-07

## 2023-09-07 RX ORDER — SODIUM CHLORIDE 9 MG/ML
20 INJECTION, SOLUTION INTRAVENOUS ONCE
Status: COMPLETED | OUTPATIENT
Start: 2023-09-07 | End: 2023-09-07

## 2023-09-07 RX ORDER — SODIUM CHLORIDE 9 MG/ML
20 INJECTION, SOLUTION INTRAVENOUS ONCE
Status: CANCELLED | OUTPATIENT
Start: 2023-09-14

## 2023-09-07 RX ORDER — DIPHENHYDRAMINE HCL 25 MG
25 TABLET ORAL ONCE
Status: CANCELLED | OUTPATIENT
Start: 2023-09-14

## 2023-09-07 RX ADMIN — DIPHENHYDRAMINE HYDROCHLORIDE 25 MG: 25 TABLET ORAL at 09:21

## 2023-09-07 RX ADMIN — ACETAMINOPHEN 650 MG: 325 TABLET ORAL at 09:21

## 2023-09-07 RX ADMIN — SODIUM CHLORIDE 20 ML/HR: 0.9 INJECTION, SOLUTION INTRAVENOUS at 09:22

## 2023-09-07 RX ADMIN — RITUXIMAB 600 MG: 10 INJECTION, SOLUTION INTRAVENOUS at 09:58

## 2023-09-08 LAB
ARSENIC 24H UR-MCNC: 21 UG/L (ref 0–9)
ARSENIC, DMA: <5 UG/L
ARSENIC, INORGANIC: <10 UG/L
ARSENIC, MMA: <5 UG/L
ARSENIC, TOTAL TOXIC: <20 UG/L
CREAT UR-MCNC: 0.34 G/L (ref 0.3–3)
INORG ARSENIC/CREAT UR: 62 UG/G CREAT
LABORATORY COMMENT REPORT: NORMAL
LEAD 24H UR-MCNC: ABNORMAL UG/L (ref 0–49)
MERCURY 24H UR-MCNC: ABNORMAL UG/L (ref 0–19)

## 2023-09-14 ENCOUNTER — HOSPITAL ENCOUNTER (OUTPATIENT)
Dept: INFUSION CENTER | Facility: HOSPITAL | Age: 48
End: 2023-09-14
Payer: MEDICARE

## 2023-09-14 VITALS
HEART RATE: 122 BPM | DIASTOLIC BLOOD PRESSURE: 71 MMHG | RESPIRATION RATE: 18 BRPM | SYSTOLIC BLOOD PRESSURE: 97 MMHG | OXYGEN SATURATION: 98 % | TEMPERATURE: 98.4 F

## 2023-09-14 DIAGNOSIS — G57.93 NEUROPATHY INVOLVING BOTH LOWER EXTREMITIES: ICD-10-CM

## 2023-09-14 DIAGNOSIS — I77.6 VASCULITIS (HCC): Primary | ICD-10-CM

## 2023-09-14 PROCEDURE — 96413 CHEMO IV INFUSION 1 HR: CPT

## 2023-09-14 PROCEDURE — 96415 CHEMO IV INFUSION ADDL HR: CPT

## 2023-09-14 RX ORDER — DIPHENHYDRAMINE HCL 25 MG
25 TABLET ORAL ONCE
Status: CANCELLED | OUTPATIENT
Start: 2023-09-21

## 2023-09-14 RX ORDER — ACETAMINOPHEN 325 MG/1
650 TABLET ORAL ONCE
Status: CANCELLED | OUTPATIENT
Start: 2023-09-21

## 2023-09-14 RX ORDER — DIPHENHYDRAMINE HCL 25 MG
25 TABLET ORAL ONCE
Status: COMPLETED | OUTPATIENT
Start: 2023-09-14 | End: 2023-09-14

## 2023-09-14 RX ORDER — SODIUM CHLORIDE 9 MG/ML
20 INJECTION, SOLUTION INTRAVENOUS ONCE
Status: COMPLETED | OUTPATIENT
Start: 2023-09-14 | End: 2023-09-14

## 2023-09-14 RX ORDER — ACETAMINOPHEN 325 MG/1
650 TABLET ORAL ONCE
Status: COMPLETED | OUTPATIENT
Start: 2023-09-14 | End: 2023-09-14

## 2023-09-14 RX ORDER — SODIUM CHLORIDE 9 MG/ML
20 INJECTION, SOLUTION INTRAVENOUS ONCE
Status: CANCELLED | OUTPATIENT
Start: 2023-09-21

## 2023-09-14 RX ADMIN — SODIUM CHLORIDE 20 ML/HR: 0.9 INJECTION, SOLUTION INTRAVENOUS at 09:51

## 2023-09-14 RX ADMIN — ACETAMINOPHEN 650 MG: 325 TABLET ORAL at 09:53

## 2023-09-14 RX ADMIN — RITUXIMAB 600 MG: 10 INJECTION, SOLUTION INTRAVENOUS at 10:46

## 2023-09-14 RX ADMIN — DIPHENHYDRAMINE HYDROCHLORIDE 25 MG: 25 TABLET ORAL at 09:53

## 2023-09-14 NOTE — PROGRESS NOTES
Rituxan given without incident. No S/S of adverse reaction. Patient left in stable condition with her  via wheelchair. AVS declined she is aware of next appt.

## 2023-09-15 ENCOUNTER — DOCUMENTATION (OUTPATIENT)
Dept: GASTROENTEROLOGY | Facility: CLINIC | Age: 48
End: 2023-09-15

## 2023-09-15 NOTE — PROGRESS NOTES
[facility] Gastroenterology  Gastroenterology Outpatient Consultation  Patient Arrie Najjar   Age 57 y.o. Gender female   MRN: [id number]  [facility] [id number]     ASSESSMENT AND PLAN:   Problem List Items Addressed This Visit        Digestive    Oropharyngeal dysphagia - Primary     Patient was experiencing dysphagia to most foods and pills back in May. This has slowly improved with high-dose steroids. June 29 she did have a videofluoroscopic swallowing examination on steroids and she was noted to have a mild oropharyngeal dysphagia  characterized by delayed swallow and incomplete swallow initiation resulting in pooling of the vallecula space. Once triggered, patient swallow was appropriate ROM and demonstrates a generalized mild weakness of BOT  Suspension of materials and proximal esophageal segment noted for greater than 5 seconds near C7 required 3 swallows to clear. Speech therapy did recommend evaluation for EOE. At this point in time she is having much less in the line of dysphagia although at times she may have dysphagia to both liquids and solids. This may in fact be related to an underlying motility disorder versus EOE versus vasculitis. She is currently on full dose anticoagulation on Eliquis for a right upper extremity DVT that was just recently diagnosed on August 31. I would hold off on pursuing upper endoscopy until she is cleared to either stop the Eliquis or cleared to hold the Eliquis given recent acute DVT in the right upper extremity. In the meantime I will recommend an esophagram with barium tablet. Eventual EGD    In the meantime I will start her on a proton pump inhibitor. Looks like omeprazole contains corn oil and she does have a fairly severe corn allergy with possible anaphylaxis. Therefore I will place her on pantoprazole 20 mg once a day.   Best to take this 30 minutes to 1 hour before 1 meal a day    I explained to the patient the procedure of upper endoscopy as well as its potential risk which are approximately 1 in 1000 chance of bleeding, infection, and perforation. Relevant Orders    FL barium swallow w air contrast       Other    Anemia     Guerline Laguna was noted to have anemia. She has had anemia for a very long time. It is felt that this may be related to chronic illness. I did however note that in February 2022 her hemoglobin was normal at 12.1. In August 2023 her ferritin was normal at 142 however she did have a low TIBC suggesting more chronic disease. Would recommend following her CBC. Repeat iron studies  Check vitamin B12 level and folate  Check celiac antibody panel for completeness. Sore throat     Guerline Laguna  reports having a sore throat that started around May. Subsequently evaluated by ENT. This soreness in her throat moved to her feet and into her hands. She also was experiencing dysphagia and was eating a puréed diet. She feels that high-dose steroids has helped her raspy voice and sore throat. 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 Her sore throat has resolved. Her raspy voice seems to have come back with lowering the dose of steroids. The exact cause for her sore throat is not clear but may in fact be autoimmune related. It is quite possible this could be reflux or infection related as the symptoms seem to correlate with an upper respiratory tract infection. Please see comments under dysphagia         Relevant Orders    FL barium swallow w air contrast    Screening for colorectal cancer     I did discuss with Guerline Laguna and her  that given that she is 57years of age she needs to consider some formal colorectal cancer screening. She has multiple problems going on at this time and we can hold off on pursuing colon cancer screening. She does not have a family history of colon cancer  We could therefore consider a noninvasive test such as Cologuard.   However I would hold using a Cologuard until after she is off her anticoagulation. Anticoagulated by anticoagulation treatment     Patient is fully anticoagulated on Eliquis for right upper extremity DVT. She will be following up with vascular surgery regarding duration of anticoagulation. Based upon this we can help determine timing of upper endoscopy. History of rheumatoid arthritis     Souleymane Mo has had rheumatoid arthritis since age 18. She recently reestablished with rheumatology. She has had a right shoulder replacement, bilateral total hip replacements bilateral total knee replacements and bilateral ankle fusions. She is also had right elbow replacement.             _____________________________________________________________    HPI:   Souleymane Mo Is a delightful 61-year-old woman whom seen in the office in consultation for sore throat and dysphagia. Patient reports that she started having pain in her throat and difficulty with swallowing sometime in late May. She was diagnosed with a viral upper respiratory tract infection and/or bronchitis. She was placed on a Z-Jaswant by her primary care provider. She then later saw Dr. Sagar Chiu of ENT in June who did a direct laryngoscopy. He noticed some vocal cord edema. He recommended GI evaluation and videofluoroscopic swallowing examination. She subsequently underwent videofluoroscopic swallowing examination which is outlined below and revealed mild oropharyngeal dysphagia characterized by delayed swallow and incomplete swallow initiation resulting in pooling of the vallecula space. Once triggered, patient swallow was appropriate ROM and demonstrates a generalized mild weakness of BOT  Suspension of materials and proximal esophageal segment noted for greater than 5 seconds near C7 required 3 swallows to clear. She was not able to really follow-up much on the swallowing problem and throat pain as she was hospitalized in August for prolonged period of time. She was subsequently diagnosed with a vasculitis. She was placed on high-dose steroids. She is currently on prednisone 40 mg daily. She also began rituximab infusions. She is currently complaining of a raspy voice. Her throat pain has essentially resolved. She does at this point continue to have some difficulty swallowing at times but not as bad as it was. She does ports some dysphagia to liquids, solids, and some pills. Back in May and June she actually had to purée her foods. She was having a lot of throat pain with swallowing. On direct questioning she denies taking any medications that could lead to a pill esophagitis such as doxycycline or iron supplementation or several other meds. She did receive an iron infusion while in the hospital but had not been on any iron supplements. Her bowel habits are very regular, denies any diarrhea, constipation, bleeding or black stools. She never really felt much a lot of heartburn or indigestion. She has not been placed on any acid suppression for these symptoms. There is no family history of colon cancer, colon polyps, esophageal cancer or gastric cancer. She does not smoke tobacco.  She does not drink alcohol.     Records reviewed for 40 minutes prior to office visit including imaging studies, discharge summaries, hospital visits, laboratory data, biopsy results, consultative notes    In reviewing laboratory data hemoglobin 10.4 on 7/20/2023, hemoglobin was 12.1 on 2/7/2022 8/31/2023 ER visit for right arm swelling and pain  Laboratory data  WBC 13.06 Hgb 8.3 HCT 27.5 MCV 94 platelet count 839,530  (8/4/2023-iron 23, ferritin 142, iron saturation 18% TIBC low at 131)    INR 0.99  PTT 19  Sodium 136  Potassium 4.3  BUN 33  Creatinine 0.39  Glucose 129    CTA chest  No acute pathology in chest.  No pulmonary embolism  Healing anterior fractures of the right second through fourth ribs new since prior study    Duplex ultrasound right upper extremity-acute DVT right brachial, axillary, and subclavian veins  Therapeutic anticoagulation was recommended    8/20/2023 rheumatology follow-up  Patient diagnosed with a microscopic polyangiitis following left-sided sural nerve biopsy on 8/16/2023  Currently on rituximab  8/20/2023 started on amitriptyline  History of rheumatoid arthritis diagnosed age 18. History of multiple joint replacements-right shoulder replacement, bilateral total hip replacement  Bilateral total knee replacement  Bilateral ankle fusions    8/14/2023 through 8/25/2023 hospitalized  Diagnosed with a P ANCA vasculitis  Started on Solu-Medrol with pulse dose steroids then followed by Solu-Medrol 30 mg twice a day  Received Rituxan on 8/23 6/29/2023 videofluoroscopic swallowing examination with speech therapy  -Patient noted to have a mild oropharyngeal dysphagia characterized by delayed swallow and incomplete swallow initiation resulting in pooling of the vallecula space. Once triggered, patient swallow was appropriate ROM and demonstrates a generalized mild weakness of BOT  Suspension of materials and proximal esophageal segment noted for greater than 5 seconds near C7 required 3 swallows to clear  Recommended videostroboscopy  Recommended GI evaluation and possible evaluation of EOE        Allergies   Allergen Reactions   • Corn Oil - Food Allergy - Food Allergy Shortness Of Breath   • Gluten Meal - Food Allergy Shortness Of Breath   • Infliximab Hypertension and Throat Swelling   • Isoflavones (Soy) Shortness Of Breath   • Motrin [Ibuprofen] Throat Swelling   • Oseltamivir Shortness Of Breath   • Yeast - Food Allergy Shortness Of Breath   • Lyrica [Pregabalin] Tongue Swelling     Possible allergy. Taken previously without reaction, but had significant tongue swelling when taken with acetaminophen (7/2023).    • Tilactase    • Medical Tape Rash     If on for too long; please use paper tape   • Sulfa Antibiotics Hives     Current Outpatient Medications   Medication Sig Dispense Refill   • amitriptyline (ELAVIL) 10 mg tablet Take 1 tablet (10 mg total) by mouth daily at bedtime 90 tablet 1   • cholecalciferol (VITAMIN D3) 400 units tablet Take 1 tablet (400 Units total) by mouth daily Do not start before 2023. 30 tablet 0   • cyanocobalamin (VITAMIN B-12) 1000 MCG tablet Take 1 tablet (1,000 mcg total) by mouth daily Do not start before 2023. 30 tablet 0   • Eliquis 5 MG Take 2 tablets (10 mg total) by mouth 2 (two) times a day for 7 days, THEN 1 tablet (5 mg total) 2 (two) times a day for 23 days. 74 tablet 0   • gabapentin (NEURONTIN) 300 mg capsule Take 2 capsules (600 mg total) by mouth 2 (two) times a day 120 capsule 0   • gabapentin (NEURONTIN) 300 mg capsule Take 3 capsules (900 mg total) by mouth daily at bedtime 90 capsule 0   • levalbuterol (XOPENEX HFA) 45 mcg/act inhaler Inhale 1-2 puffs     • Magnesium Oxide 400 MG CAPS Take 1 capsule by mouth     • predniSONE 20 mg tablet Take 3 tablets (60 mg total) by mouth daily 90 tablet 0   • pyridoxine (VITAMIN B6) 50 mg tablet Take 1 tablet (50 mg total) by mouth daily Do not start before 2023. 30 tablet 0   • Ventolin  (90 Base) MCG/ACT inhaler  (Patient not taking: Reported on 2023)       No current facility-administered medications for this visit.      MEDICAL HISTORY:  Past Medical History:   Diagnosis Date   • Asthma    • RA (rheumatoid arthritis) ([address])      Past Surgical History:   Procedure Laterality Date   • ANKLE SURGERY      2 surgerys   •  SECTION     • ELBOW SURGERY     • IR LUMBAR PUNCTURE  2023   • IR MIDLINE PLACEMENT  2023   • JOINT REPLACEMENT      knees bilateral   • MUSCLE BIOPSY Left 2023    Procedure: Muscle biopsy;  Surgeon: Kita Lira To, DO;  Location: [facility];  Service: General   • TOTAL SHOULDER REPLACEMENT     • WOUND DEBRIDEMENT Left 2023    Procedure: Sural nerve biopsy;  Surgeon: Jamey King Khadar; Location: [facility];  Service: Neurosurgery     Social History     Substance and Sexual Activity   Alcohol Use Not Currently    Comment: none     Social History     Substance and Sexual Activity   Drug Use Never    Comment: none     Social History     Tobacco Use   Smoking Status Never   Smokeless Tobacco Never     Family History   Problem Relation Age of Onset   • No Known Problems Mother    • No Known Problems Father        REVIEW OF SYSTEMS:  CONSTITUTIONAL: Denies any fever, chills, rigors, and weight loss. HEENT: No earache or tinnitus. Denies hearing loss or visual disturbances. CARDIOVASCULAR: No chest pain or palpitations. RESPIRATORY: Denies any cough, hemoptysis, shortness of breath or dyspnea on exertion. She does have asthma that has been fairly well controlled but she is currently on high dose steroids. She also has allergy induced asthma problems as well. GASTROINTESTINAL: As noted in the History of Present Illness. GENITOURINARY: No problems with urination. Denies any hematuria or dysuria. NEUROLOGIC: No dizziness or vertigo, denies headaches. Her hands and feet are numb ever since her vasculitis. She cannot feed herself and cannot ambulate. MUSCULOSKELETAL: Denies any muscle or joint pain. She is wheelchair-bound and unable to walk. This has been since August 2. SKIN: Denies skin rashes or itching. ENDOCRINE: Denies excessive thirst. Denies intolerance to heat or cold. PSYCHOSOCIAL: Denies depression or anxiety. Denies any recent memory loss. Objective   Pulse (!) 114, resp. rate 18, height 5' 7" (1.702 m), SpO2 100 %. Body mass index is 18.12 kg/m². PHYSICAL EXAM:   General Appearance: Alert, cooperative, no distress. Currently in a wheelchair. HEENT: Normocephalic, atraumatic, anicteric. There is no evidence of thrush on examination.   Neck: Supple, symmetrical, trachea midline  Lungs: Clear to auscultation bilaterally; no rales, rhonchi or wheezing; respirations unlabored   Heart: Regular rate and rhythm; no murmur, rub, or gallop. Abdomen: Soft, bowel sounds normal, non-tender, non-distended; no masses, there is no hepatosplenomegaly. No spider angiomas. Examined while sitting in wheelchair therefore somewhat limited exam of the abdomen. Genitalia: Deferred   Rectal: Deferred   Extremities: There is purple discoloration of bilateral lower extremities left greater than right. The left lower extremity is somewhat cool, patient states this is not unusual for her. Trace edema bilateral lower extremities.     Skin: No jaundice, rashes, or lesions   Lymph nodes: No palpable cervical lymphadenopathy   Lab Results:   Admission on 08/31/2023, Discharged on 08/31/2023   Component Date Value   • WBC 08/31/2023 13.06 (H)    • RBC 08/31/2023 2.92 (L)    • Hemoglobin 08/31/2023 8.3 (L)    • Hematocrit 08/31/2023 27.5 (L)    • MCV 08/31/2023 94    • MCH 08/31/2023 28.4    • MCHC 08/31/2023 30.2 (L)    • RDW 08/31/2023 22.7 (H)    • MPV 08/31/2023 8.1 (L)    • Platelets 74/85/5331 409 (H)    • Sodium 08/31/2023 136    • Potassium 08/31/2023 4.3    • Chloride 08/31/2023 103    • CO2 08/31/2023 25    • ANION GAP 08/31/2023 8    • BUN 08/31/2023 33 (H)    • Creatinine 08/31/2023 0.39 (L)    • Glucose 08/31/2023 129    • Calcium 08/31/2023 8.2 (L)    • eGFR 08/31/2023 126    • Protime 08/31/2023 13.3    • INR 08/31/2023 0.99    • PTT 08/31/2023 19 (L)    • Segmented % 08/31/2023 85 (H)    • Bands % 08/31/2023 7    • Lymphocytes % 08/31/2023 5 (L)    • Monocytes % 08/31/2023 3 (L)    • Eosinophils, % 08/31/2023 0    • Basophils % 08/31/2023 0    • Absolute Neutrophils 08/31/2023 12.02 (H)    • Lymphocytes Absolute 08/31/2023 0.65    • Monocytes Absolute 08/31/2023 0.39    • Eosinophils Absolute 08/31/2023 0.00    • Basophils Absolute 08/31/2023 0.00    • RBC Morphology 08/31/2023 Present    • Platelet Estimate 10/00/6038 Adequate    • Anisocytosis 08/31/2023 Present    • Polychromasia 08/31/2023 Present    No results displayed because visit has over 200 results. No results displayed because visit has over 200 results. Radiology Results:   CTA ED chest PE Study    Result Date: 8/31/2023  Narrative: CTA - CHEST WITH IV CONTRAST - PULMONARY ANGIOGRAM INDICATION:   dvt. Right arm pain x2 days with swelling. COMPARISON: Chest CT 8/15/2023 and 12/29/2016. TECHNIQUE: CTA examination of the chest was performed using angiographic technique according to a protocol specifically tailored to evaluate for pulmonary embolism. Multiplanar 2D reformatted images were created from the source data. In addition, coronal 3D MIP postprocessing was performed on the acquisition scanner. Radiation dose length product (DLP) for this visit:  256.84 mGy-cm . This examination, like all CT scans performed in the [facility], was performed utilizing techniques to minimize radiation dose exposure, including the use of iterative  reconstruction and automated exposure control. IV Contrast:  70 mL of iohexol (OMNIPAQUE) FINDINGS: PULMONARY ARTERIAL TREE:  No pulmonary embolus is seen. LUNGS: No infiltrates. Persistent bibasilar platelike atelectasis or scarring. There is no tracheal or endobronchial lesion. PLEURA:  Unremarkable. HEART/GREAT VESSELS:  Unremarkable for patient's age. No thoracic aortic aneurysm. MEDIASTINUM AND GURDEEP:  Unremarkable. CHEST WALL AND LOWER NECK:   Diffuse subcutaneous soft tissue edema. VISUALIZED STRUCTURES IN THE UPPER ABDOMEN:  Unremarkable. OSSEOUS STRUCTURES: Healing fractures of the right 2nd, 3rd, 4th ribs anteriorly, new since the prior study. No destructive lesions. Right shoulder arthroplasty. Impression: No acute pathology in the chest. No pulmonary embolus. Healing anterior fractures of the right 2nd through 4th ribs, new since the prior study.  Workstation performed: [id number]     VAS upper limb venous duplex scan, unilateral/limited    Result Date: 8/31/2023  Narrative:  [facility] REPORT [facility]INICAL: Indications: Patient presents with right upper extremity swelling, recent hospitalization. Operative History: No prior heart or vascular surgery Risk Factors: Patient has history of rheumatoid arthritis  FINDINGS:  Right       Impression              Subclavian  Non Occlusive Thrombus  Axillary    Occlusive Subsegmental  Brachial    Occlusive Subsegmental     CONCLUSION:  Impression RIGHT UPPER LIMB: Evaluation shows evidence of occlusive acute deep vein thrombus in the axillary vein and the brachial vein. Non occlusive acute DVT noted in the subclavian vein. No evidence of superficial thrombophlebitis noted. Doppler evaluation shows a normal response to augmentation maneuvers. LEFT UPPER LIMB LIMITED: Evaluation shows no evidence of thrombus in the internal jugular vein and subclavian vein. Technical findings given to Dr. Evaristo Rosa at time of exam.  SIGNATURE: Electronically Signed by: Ernesto Severs, DO on 2023-08-31 02:29:05 PM      I have spent a total time of 85 minutes on 09/18/23 in caring for this patient including Diagnostic results, Instructions for management, Patient and family education, Impressions, Documenting in the medical record, Reviewing / ordering tests, medicine, procedures   and Obtaining or reviewing history  .       Desire Chau DO   09/18/23   Cc:

## 2023-09-15 NOTE — PROGRESS NOTES
Records reviewed and outlined below for office visit 9/18/2023    Records reviewed for 40 minutes prior to office visit including imaging studies, discharge summaries, hospital visits, laboratory data, biopsy results, consultative notes    In reviewing laboratory data hemoglobin 10.4 on 7/20/2023, hemoglobin was 12.1 on 2/7/2022 8/31/2023 ER visit for right arm swelling and pain  Laboratory data  WBC 13.06 Hgb 8.3 HCT 27.5 MCV 94 platelet count 714,883  (8/4/2023-iron 23, ferritin 142, iron saturation 18% TIBC low at 131)    INR 0.99  PTT 19  Sodium 136  Potassium 4.3  BUN 33  Creatinine 0.39  Glucose 129    CTA chest  No acute pathology in chest.  No pulmonary embolism  Healing anterior fractures of the right second through fourth ribs new since prior study    Duplex ultrasound right upper extremity-acute DVT right brachial, axillary, and subclavian veins  Therapeutic anticoagulation was recommended    8/20/2023 rheumatology follow-up  Patient diagnosed with a microscopic polyangiitis following left-sided sural nerve biopsy on 8/16/2023  Currently on rituximab  8/20/2023 started on amitriptyline  History of rheumatoid arthritis diagnosed age 18. History of multiple joint replacements-right shoulder replacement, bilateral total hip replacement  Bilateral total knee replacement  Bilateral ankle fusions    8/14/2023 through 8/25/2023 hospitalized  Diagnosed with a P ANCA vasculitis  Started on Solu-Medrol with pulse dose steroids then followed by Solu-Medrol 30 mg twice a day  Received Rituxan on 8/23 6/29/2023 videofluoroscopic swallowing examination with speech therapy  -Patient noted to have a mild oropharyngeal dysphagia characterized by delayed swallow and incomplete swallow initiation resulting in pooling of the vallecula space.   Once triggered, patient swallow was appropriate ROM and demonstrates a generalized mild weakness of BOT  Suspension of materials and proximal esophageal segment noted for greater than 5 seconds near C7 required 3 swallows to clear  Recommended videostroboscopy  Recommended GI evaluation and possible evaluation of EOE

## 2023-09-18 ENCOUNTER — APPOINTMENT (OUTPATIENT)
Dept: LAB | Facility: CLINIC | Age: 48
End: 2023-09-18
Payer: MEDICARE

## 2023-09-18 ENCOUNTER — OFFICE VISIT (OUTPATIENT)
Dept: GASTROENTEROLOGY | Facility: CLINIC | Age: 48
End: 2023-09-18
Payer: MEDICARE

## 2023-09-18 VITALS
OXYGEN SATURATION: 100 % | HEART RATE: 114 BPM | RESPIRATION RATE: 18 BRPM | BODY MASS INDEX: 18.12 KG/M2 | HEIGHT: 67 IN

## 2023-09-18 DIAGNOSIS — E53.8 DEFICIENCY OF OTHER SPECIFIED B GROUP VITAMINS: ICD-10-CM

## 2023-09-18 DIAGNOSIS — Z79.01 ANTICOAGULATED BY ANTICOAGULATION TREATMENT: ICD-10-CM

## 2023-09-18 DIAGNOSIS — Z12.11 SCREENING FOR COLORECTAL CANCER: ICD-10-CM

## 2023-09-18 DIAGNOSIS — R13.12 OROPHARYNGEAL DYSPHAGIA: Primary | ICD-10-CM

## 2023-09-18 DIAGNOSIS — I77.6 VASCULITIS (HCC): ICD-10-CM

## 2023-09-18 DIAGNOSIS — D64.9 ANEMIA, UNSPECIFIED TYPE: ICD-10-CM

## 2023-09-18 DIAGNOSIS — Z12.12 SCREENING FOR COLORECTAL CANCER: ICD-10-CM

## 2023-09-18 DIAGNOSIS — J02.9 SORE THROAT: ICD-10-CM

## 2023-09-18 DIAGNOSIS — M31.7 MICROSCOPIC POLYANGIITIS (HCC): ICD-10-CM

## 2023-09-18 DIAGNOSIS — Z87.39 HISTORY OF RHEUMATOID ARTHRITIS: ICD-10-CM

## 2023-09-18 LAB
BACTERIA UR QL AUTO: NORMAL /HPF
BILIRUB UR QL STRIP: NEGATIVE
CLARITY UR: CLEAR
COLOR UR: NORMAL
GLUCOSE UR STRIP-MCNC: NEGATIVE MG/DL
HGB UR QL STRIP.AUTO: NEGATIVE
KETONES UR STRIP-MCNC: NEGATIVE MG/DL
LEUKOCYTE ESTERASE UR QL STRIP: NEGATIVE
NITRITE UR QL STRIP: NEGATIVE
NON-SQ EPI CELLS URNS QL MICRO: NORMAL /HPF
PH UR STRIP.AUTO: 6.5 [PH]
PROT UR STRIP-MCNC: NEGATIVE MG/DL
RBC #/AREA URNS AUTO: NORMAL /HPF
SP GR UR STRIP.AUTO: 1.01 (ref 1–1.03)
UROBILINOGEN UR STRIP-ACNC: <2 MG/DL
WBC #/AREA URNS AUTO: NORMAL /HPF

## 2023-09-18 PROCEDURE — 99205 OFFICE O/P NEW HI 60 MIN: CPT | Performed by: INTERNAL MEDICINE

## 2023-09-18 PROCEDURE — 81001 URINALYSIS AUTO W/SCOPE: CPT

## 2023-09-18 RX ORDER — PANTOPRAZOLE SODIUM 20 MG/1
20 TABLET, DELAYED RELEASE ORAL DAILY
Qty: 30 TABLET | Refills: 6 | Status: SHIPPED | OUTPATIENT
Start: 2023-09-18

## 2023-09-18 NOTE — ASSESSMENT & PLAN NOTE
Souleymane Mo  reports having a sore throat that started around May. Subsequently evaluated by ENT. This soreness in her throat moved to her feet and into her hands. She also was experiencing dysphagia and was eating a puréed diet. She feels that high-dose steroids has helped her raspy voice and sore throat. 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 Her sore throat has resolved. Her raspy voice seems to have come back with lowering the dose of steroids. The exact cause for her sore throat is not clear but may in fact be autoimmune related. It is quite possible this could be reflux or infection related as the symptoms seem to correlate with an upper respiratory tract infection.   Please see comments under dysphagia

## 2023-09-18 NOTE — ASSESSMENT & PLAN NOTE
Patient was experiencing dysphagia to most foods and pills back in May. This has slowly improved with high-dose steroids. June 29 she did have a videofluoroscopic swallowing examination on steroids and she was noted to have a mild oropharyngeal dysphagia  characterized by delayed swallow and incomplete swallow initiation resulting in pooling of the vallecula space. Once triggered, patient swallow was appropriate ROM and demonstrates a generalized mild weakness of BOT  Suspension of materials and proximal esophageal segment noted for greater than 5 seconds near C7 required 3 swallows to clear. Speech therapy did recommend evaluation for EOE. At this point in time she is having much less in the line of dysphagia although at times she may have dysphagia to both liquids and solids. This may in fact be related to an underlying motility disorder versus EOE versus vasculitis. She is currently on full dose anticoagulation on Eliquis for a right upper extremity DVT that was just recently diagnosed on August 31. I would hold off on pursuing upper endoscopy until she is cleared to either stop the Eliquis or cleared to hold the Eliquis given recent acute DVT in the right upper extremity. In the meantime I will recommend an esophagram with barium tablet. Eventual EGD    In the meantime I will start her on a proton pump inhibitor. Looks like omeprazole contains corn oil and she does have a fairly severe corn allergy with possible anaphylaxis. Therefore I will place her on pantoprazole 20 mg once a day. Best to take this 30 minutes to 1 hour before 1 meal a day    I explained to the patient the procedure of upper endoscopy as well as its potential risk which are approximately 1 in 1000 chance of bleeding, infection, and perforation.

## 2023-09-18 NOTE — LETTER
September 18, 2023     Penny Anglin MD  [address]    Patient: Debby Hector   YOB: 1975   Date of Visit: 9/18/2023       Dear  Doctor: Thank you for referring Getachew Jack to me for evaluation. Below are my notes for this consultation. If you have questions, please do not hesitate to call me. I look forward to following your patient along with you. Sincerely,        Iram Gould,         CC: MD Sergio Mahoney DO Oval Fountain, DO  9/18/2023 11:42 AM  Incomplete  [facility]– [facility]  Gastroenterology Outpatient Consultation  Patient Lita Reeves   Age 57 y.o. Gender female   MRN: [id number]  [facility] [id number]     ASSESSMENT AND PLAN:   Problem List Items Addressed This Visit          Digestive    Oropharyngeal dysphagia - Primary     Patient was experiencing dysphagia to most foods and pills back in May. This has slowly improved with high-dose steroids. June 29 she did have a videofluoroscopic swallowing examination on steroids and she was noted to have a mild oropharyngeal dysphagia  characterized by delayed swallow and incomplete swallow initiation resulting in pooling of the vallecula space. Once triggered, patient swallow was appropriate ROM and demonstrates a generalized mild weakness of BOT  Suspension of materials and proximal esophageal segment noted for greater than 5 seconds near C7 required 3 swallows to clear. Speech therapy did recommend evaluation for EOE. At this point in time she is having much less in the line of dysphagia although at times she may have dysphagia to both liquids and solids. This may in fact be related to an underlying motility disorder versus EOE versus vasculitis. She is currently on full dose anticoagulation on Eliquis for a right upper extremity DVT that was just recently diagnosed on August 31.   I would hold off on pursuing upper endoscopy until she is cleared to either stop the Eliquis or cleared to hold the Eliquis given recent acute DVT in the right upper extremity. In the meantime I will recommend an esophagram with barium tablet. Eventual EGD    In the meantime I will start her on a proton pump inhibitor. Looks like omeprazole contains corn oil and she does have a fairly severe corn allergy with possible anaphylaxis. Therefore I will place her on pantoprazole 20 mg once a day. Best to take this 30 minutes to 1 hour before 1 meal a day    I explained to the patient the procedure of upper endoscopy as well as its potential risk which are approximately 1 in 1000 chance of bleeding, infection, and perforation. Relevant Orders    FL barium swallow w air contrast       Other    Anemia     Joey Toledo was noted to have anemia. She has had anemia for a very long time. It is felt that this may be related to chronic illness. I did however note that in February 2022 her hemoglobin was normal at 12.1. In August 2023 her ferritin was normal at 142 however she did have a low TIBC suggesting more chronic disease. Would recommend following her CBC. Repeat iron studies  Check vitamin B12 level and folate  Check celiac antibody panel for completeness. Sore throat     Joey Toledo  reports having a sore throat that started around May. Subsequently evaluated by ENT. This soreness in her throat moved to her feet and into her hands. She also was experiencing dysphagia and was eating a puréed diet. She feels that high-dose steroids has helped her raspy voice and sore throat. 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 Her sore throat has resolved. Her raspy voice seems to have come back with lowering the dose of steroids. The exact cause for her sore throat is not clear but may in fact be autoimmune related.   It is quite possible this could be reflux or infection related as the symptoms seem to correlate with an upper respiratory tract infection. Please see comments under dysphagia         Relevant Orders    FL barium swallow w air contrast    Screening for colorectal cancer     I did discuss with Derrick Awad and her  that given that she is 57years of age she needs to consider some formal colorectal cancer screening. She has multiple problems going on at this time and we can hold off on pursuing colon cancer screening. She does not have a family history of colon cancer  We could therefore consider a noninvasive test such as Cologuard. However I would hold using a Cologuard until after she is off her anticoagulation. Anticoagulated by anticoagulation treatment     Patient is fully anticoagulated on Eliquis for right upper extremity DVT. She will be following up with vascular surgery regarding duration of anticoagulation. Based upon this we can help determine timing of upper endoscopy. History of rheumatoid arthritis     Derrick Awad has had rheumatoid arthritis since age 18. She recently reestablished with rheumatology. She has had a right shoulder replacement, bilateral total hip replacements bilateral total knee replacements and bilateral ankle fusions. She is also had right elbow replacement.             _____________________________________________________________    HPI:   Derrick Awad Is a delightful 70-year-old woman whom seen in the office in consultation for sore throat and dysphagia. Patient reports that she started having pain in her throat and difficulty with swallowing sometime in late May. She was diagnosed with a viral upper respiratory tract infection and/or bronchitis. She was placed on a Z-Jaswant by her primary care provider. She then later saw Dr. Mukesh Cortez of ENT in June who did a direct laryngoscopy. He noticed some vocal cord edema. He recommended GI evaluation and videofluoroscopic swallowing examination.   She subsequently underwent videofluoroscopic swallowing examination which is outlined below and revealed mild oropharyngeal dysphagia characterized by delayed swallow and incomplete swallow initiation resulting in pooling of the vallecula space. Once triggered, patient swallow was appropriate ROM and demonstrates a generalized mild weakness of BOT  Suspension of materials and proximal esophageal segment noted for greater than 5 seconds near C7 required 3 swallows to clear. She was not able to really follow-up much on the swallowing problem and throat pain as she was hospitalized in August for prolonged period of time. She was subsequently diagnosed with a vasculitis. She was placed on high-dose steroids. She is currently on prednisone 40 mg daily. She also began rituximab infusions. She is currently complaining of a raspy voice. Her throat pain has essentially resolved. She does at this point continue to have some difficulty swallowing at times but not as bad as it was. She does ports some dysphagia to liquids, solids, and some pills. Back in May and June she actually had to purée her foods. She was having a lot of throat pain with swallowing. On direct questioning she denies taking any medications that could lead to a pill esophagitis such as doxycycline or iron supplementation or several other meds. She did receive an iron infusion while in the hospital but had not been on any iron supplements. Her bowel habits are very regular, denies any diarrhea, constipation, bleeding or black stools. She never really felt much a lot of heartburn or indigestion. She has not been placed on any acid suppression for these symptoms. There is no family history of colon cancer, colon polyps, esophageal cancer or gastric cancer. She does not smoke tobacco.  She does not drink alcohol.     Records reviewed for 40 minutes prior to office visit including imaging studies, discharge summaries, hospital visits, laboratory data, biopsy results, consultative notes    In reviewing laboratory data hemoglobin 10.4 on 7/20/2023, hemoglobin was 12.1 on 2/7/2022 8/31/2023 ER visit for right arm swelling and pain  Laboratory data  WBC 13.06 Hgb 8.3 HCT 27.5 MCV 94 platelet count 326,993  (8/4/2023-iron 23, ferritin 142, iron saturation 18% TIBC low at 131)    INR 0.99  PTT 19  Sodium 136  Potassium 4.3  BUN 33  Creatinine 0.39  Glucose 129    CTA chest  No acute pathology in chest.  No pulmonary embolism  Healing anterior fractures of the right second through fourth ribs new since prior study    Duplex ultrasound right upper extremity-acute DVT right brachial, axillary, and subclavian veins  Therapeutic anticoagulation was recommended    8/20/2023 rheumatology follow-up  Patient diagnosed with a microscopic polyangiitis following left-sided sural nerve biopsy on 8/16/2023  Currently on rituximab  8/20/2023 started on amitriptyline  History of rheumatoid arthritis diagnosed age 18. History of multiple joint replacements-right shoulder replacement, bilateral total hip replacement  Bilateral total knee replacement  Bilateral ankle fusions    8/14/2023 through 8/25/2023 hospitalized  Diagnosed with a P ANCA vasculitis  Started on Solu-Medrol with pulse dose steroids then followed by Solu-Medrol 30 mg twice a day  Received Rituxan on 8/23 6/29/2023 videofluoroscopic swallowing examination with speech therapy  -Patient noted to have a mild oropharyngeal dysphagia characterized by delayed swallow and incomplete swallow initiation resulting in pooling of the vallecula space.   Once triggered, patient swallow was appropriate ROM and demonstrates a generalized mild weakness of BOT  Suspension of materials and proximal esophageal segment noted for greater than 5 seconds near C7 required 3 swallows to clear  Recommended videostroboscopy  Recommended GI evaluation and possible evaluation of EOE        Allergies   Allergen Reactions   • Corn Oil - Food Allergy - Food Allergy Shortness Of Breath   • Gluten Meal - Food Allergy Shortness Of Breath   • Infliximab Hypertension and Throat Swelling   • Isoflavones (Soy) Shortness Of Breath   • Motrin [Ibuprofen] Throat Swelling   • Oseltamivir Shortness Of Breath   • Yeast - Food Allergy Shortness Of Breath   • Lyrica [Pregabalin] Tongue Swelling     Possible allergy. Taken previously without reaction, but had significant tongue swelling when taken with acetaminophen (7/2023). • Tilactase    • Medical Tape Rash     If on for too long; please use paper tape   • Sulfa Antibiotics Hives     Current Outpatient Medications   Medication Sig Dispense Refill   • amitriptyline (ELAVIL) 10 mg tablet Take 1 tablet (10 mg total) by mouth daily at bedtime 90 tablet 1   • cholecalciferol (VITAMIN D3) 400 units tablet Take 1 tablet (400 Units total) by mouth daily Do not start before August 26, 2023. 30 tablet 0   • cyanocobalamin (VITAMIN B-12) 1000 MCG tablet Take 1 tablet (1,000 mcg total) by mouth daily Do not start before August 26, 2023. 30 tablet 0   • Eliquis 5 MG Take 2 tablets (10 mg total) by mouth 2 (two) times a day for 7 days, THEN 1 tablet (5 mg total) 2 (two) times a day for 23 days. 74 tablet 0   • gabapentin (NEURONTIN) 300 mg capsule Take 2 capsules (600 mg total) by mouth 2 (two) times a day 120 capsule 0   • gabapentin (NEURONTIN) 300 mg capsule Take 3 capsules (900 mg total) by mouth daily at bedtime 90 capsule 0   • levalbuterol (XOPENEX HFA) 45 mcg/act inhaler Inhale 1-2 puffs     • Magnesium Oxide 400 MG CAPS Take 1 capsule by mouth     • predniSONE 20 mg tablet Take 3 tablets (60 mg total) by mouth daily 90 tablet 0   • pyridoxine (VITAMIN B6) 50 mg tablet Take 1 tablet (50 mg total) by mouth daily Do not start before August 26, 2023. 30 tablet 0   • Ventolin  (90 Base) MCG/ACT inhaler  (Patient not taking: Reported on 8/28/2023)       No current facility-administered medications for this visit. MEDICAL HISTORY:  Past Medical History:   Diagnosis Date   • Asthma    • RA (rheumatoid arthritis) ([address])      Past Surgical History:   Procedure Laterality Date   • ANKLE SURGERY      2 surgerys   •  SECTION     • ELBOW SURGERY     • IR LUMBAR PUNCTURE  2023   • IR MIDLINE PLACEMENT  2023   • JOINT REPLACEMENT      knees bilateral   • MUSCLE BIOPSY Left 2023    Procedure: Muscle biopsy;  Surgeon: Javier Agarwal DO;  Location: [facility];  Service: General   • TOTAL SHOULDER REPLACEMENT     • WOUND DEBRIDEMENT Left 2023    Procedure: Sural nerve biopsy;  Surgeon: Eddie Erazo; Location: [facility];  Service: Neurosurgery     Social History     Substance and Sexual Activity   Alcohol Use Not Currently    Comment: none     Social History     Substance and Sexual Activity   Drug Use Never    Comment: none     Social History     Tobacco Use   Smoking Status Never   Smokeless Tobacco Never     Family History   Problem Relation Age of Onset   • No Known Problems Mother    • No Known Problems Father        REVIEW OF SYSTEMS:  CONSTITUTIONAL: Denies any fever, chills, rigors, and weight loss. HEENT: No earache or tinnitus. Denies hearing loss or visual disturbances. CARDIOVASCULAR: No chest pain or palpitations. RESPIRATORY: Denies any cough, hemoptysis, shortness of breath or dyspnea on exertion. She does have asthma that has been fairly well controlled but she is currently on high dose steroids. She also has allergy induced asthma problems as well. GASTROINTESTINAL: As noted in the History of Present Illness. GENITOURINARY: No problems with urination. Denies any hematuria or dysuria. NEUROLOGIC: No dizziness or vertigo, denies headaches. Her hands and feet are numb ever since her vasculitis. She cannot feed herself and cannot ambulate. MUSCULOSKELETAL: Denies any muscle or joint pain. She is wheelchair-bound and unable to walk. This has been since .   SKIN: Denies skin rashes or itching. ENDOCRINE: Denies excessive thirst. Denies intolerance to heat or cold. PSYCHOSOCIAL: Denies depression or anxiety. Denies any recent memory loss. Objective   Pulse (!) 114, resp. rate 18, height 5' 7" (1.702 m), SpO2 100 %. Body mass index is 18.12 kg/m². PHYSICAL EXAM:   General Appearance: Alert, cooperative, no distress. Currently in a wheelchair. HEENT: Normocephalic, atraumatic, anicteric. There is no evidence of thrush on examination. Neck: Supple, symmetrical, trachea midline  Lungs: Clear to auscultation bilaterally; no rales, rhonchi or wheezing; respirations unlabored   Heart: Regular rate and rhythm; no murmur, rub, or gallop. Abdomen: Soft, bowel sounds normal, non-tender, non-distended; no masses, there is no hepatosplenomegaly. No spider angiomas. Examined while sitting in wheelchair therefore somewhat limited exam of the abdomen. Genitalia: Deferred   Rectal: Deferred   Extremities: There is purple discoloration of bilateral lower extremities left greater than right. The left lower extremity is somewhat cool, patient states this is not unusual for her. Trace edema bilateral lower extremities.     Skin: No jaundice, rashes, or lesions   Lymph nodes: No palpable cervical lymphadenopathy   Lab Results:   Admission on 08/31/2023, Discharged on 08/31/2023   Component Date Value   • WBC 08/31/2023 13.06 (H)    • RBC 08/31/2023 2.92 (L)    • Hemoglobin 08/31/2023 8.3 (L)    • Hematocrit 08/31/2023 27.5 (L)    • MCV 08/31/2023 94    • MCH 08/31/2023 28.4    • MCHC 08/31/2023 30.2 (L)    • RDW 08/31/2023 22.7 (H)    • MPV 08/31/2023 8.1 (L)    • Platelets 88/57/3081 409 (H)    • Sodium 08/31/2023 136    • Potassium 08/31/2023 4.3    • Chloride 08/31/2023 103    • CO2 08/31/2023 25    • ANION GAP 08/31/2023 8    • BUN 08/31/2023 33 (H)    • Creatinine 08/31/2023 0.39 (L)    • Glucose 08/31/2023 129    • Calcium 08/31/2023 8.2 (L)    • eGFR 08/31/2023 126    • Protime 08/31/2023 13.3    • INR 08/31/2023 0.99    • PTT 08/31/2023 19 (L)    • Segmented % 08/31/2023 85 (H)    • Bands % 08/31/2023 7    • Lymphocytes % 08/31/2023 5 (L)    • Monocytes % 08/31/2023 3 (L)    • Eosinophils, % 08/31/2023 0    • Basophils % 08/31/2023 0    • Absolute Neutrophils 08/31/2023 12.02 (H)    • Lymphocytes Absolute 08/31/2023 0.65    • Monocytes Absolute 08/31/2023 0.39    • Eosinophils Absolute 08/31/2023 0.00    • Basophils Absolute 08/31/2023 0.00    • RBC Morphology 08/31/2023 Present    • Platelet Estimate 55/79/4953 Adequate    • Anisocytosis 08/31/2023 Present    • Polychromasia 08/31/2023 Present    No results displayed because visit has over 200 results. No results displayed because visit has over 200 results. Radiology Results:   CTA ED chest PE Study    Result Date: 8/31/2023  Narrative: CTA - CHEST WITH IV CONTRAST - PULMONARY ANGIOGRAM INDICATION:   dvt. Right arm pain x2 days with swelling. COMPARISON: Chest CT 8/15/2023 and 12/29/2016. TECHNIQUE: CTA examination of the chest was performed using angiographic technique according to a protocol specifically tailored to evaluate for pulmonary embolism. Multiplanar 2D reformatted images were created from the source data. In addition, coronal 3D MIP postprocessing was performed on the acquisition scanner. Radiation dose length product (DLP) for this visit:  256.84 mGy-cm . This examination, like all CT scans performed in the [facility], was performed utilizing techniques to minimize radiation dose exposure, including the use of iterative  reconstruction and automated exposure control. IV Contrast:  70 mL of iohexol (OMNIPAQUE) FINDINGS: PULMONARY ARTERIAL TREE:  No pulmonary embolus is seen. LUNGS: No infiltrates. Persistent bibasilar platelike atelectasis or scarring. There is no tracheal or endobronchial lesion. PLEURA:  Unremarkable. HEART/GREAT VESSELS:  Unremarkable for patient's age.  No thoracic aortic aneurysm. MEDIASTINUM AND GURDEEP:  Unremarkable. CHEST WALL AND LOWER NECK:   Diffuse subcutaneous soft tissue edema. VISUALIZED STRUCTURES IN THE UPPER ABDOMEN:  Unremarkable. OSSEOUS STRUCTURES: Healing fractures of the right 2nd, 3rd, 4th ribs anteriorly, new since the prior study. No destructive lesions. Right shoulder arthroplasty. Impression: No acute pathology in the chest. No pulmonary embolus. Healing anterior fractures of the right 2nd through 4th ribs, new since the prior study. Workstation performed: [id number]     VAS upper limb venous duplex scan, unilateral/limited    Result Date: 8/31/2023  Narrative:  [facility] REPORT CLINICAL: Indications: Patient presents with right upper extremity swelling, recent hospitalization. Operative History: No prior heart or vascular surgery Risk Factors: Patient has history of rheumatoid arthritis  FINDINGS:  Right       Impression              Subclavian  Non Occlusive Thrombus  Axillary    Occlusive Subsegmental  Brachial    Occlusive Subsegmental     CONCLUSION:  Impression RIGHT UPPER LIMB: Evaluation shows evidence of occlusive acute deep vein thrombus in the axillary vein and the brachial vein. Non occlusive acute DVT noted in the subclavian vein. No evidence of superficial thrombophlebitis noted. Doppler evaluation shows a normal response to augmentation maneuvers. LEFT UPPER LIMB LIMITED: Evaluation shows no evidence of thrombus in the internal jugular vein and subclavian vein. Technical findings given to Dr. Parker Michaels at time of exam.  SIGNATURE: Electronically Signed by: Jimmy Juarez DO on 2023-08-31 02:29:05 PM    Regine Ruiz DO   09/18/23   Cc:    Regine Ruiz DO  9/15/2023 10:09 AM  Sign when Signing Visit  [facility]  Gastroenterology Outpatient Consultation  Patient Gloria Delgadillo   Age 57 y.o.    Gender female   MRN: [id number]  [facility] [id number]     ASSESSMENT AND PLAN:   Problem List Items Addressed This Visit    None     _____________________________________________________________    HPI: Please see complete note in Epic    Records reviewed for 40 minutes prior to office visit including imaging studies, discharge summaries, hospital visits, laboratory data, biopsy results, consultative notes    In reviewing laboratory data hemoglobin 10.4 on 7/20/2023, hemoglobin was 12.1 on 2/7/2022 8/31/2023 ER visit for right arm swelling and pain  Laboratory data  WBC 13.06 Hgb 8.3 HCT 27.5 MCV 94 platelet count 905,609  (8/4/2023-iron 23, ferritin 142, iron saturation 18% TIBC low at 131)    INR 0.99  PTT 19  Sodium 136  Potassium 4.3  BUN 33  Creatinine 0.39  Glucose 129    CTA chest  No acute pathology in chest.  No pulmonary embolism  Healing anterior fractures of the right second through fourth ribs new since prior study    Duplex ultrasound right upper extremity-acute DVT right brachial, axillary, and subclavian veins  Therapeutic anticoagulation was recommended    8/20/2023 rheumatology follow-up  Patient diagnosed with a microscopic polyangiitis following left-sided sural nerve biopsy on 8/16/2023  Currently on rituximab  8/20/2023 started on amitriptyline  History of rheumatoid arthritis diagnosed age 18. History of multiple joint replacements-right shoulder replacement, bilateral total hip replacement  Bilateral total knee replacement  Bilateral ankle fusions    8/14/2023 through 8/25/2023 hospitalized  Diagnosed with a P ANCA vasculitis  Started on Solu-Medrol with pulse dose steroids then followed by Solu-Medrol 30 mg twice a day  Received Rituxan on 8/23 6/29/2023 videofluoroscopic swallowing examination with speech therapy  -Patient noted to have a mild oropharyngeal dysphagia characterized by delayed swallow and incomplete swallow initiation resulting in pooling of the vallecula space.   Once triggered, patient swallow was appropriate ROM and demonstrates a generalized mild weakness of BOT  Suspension of materials and proximal esophageal segment noted for greater than 5 seconds near C7 required 3 swallows to clear  Recommended videostroboscopy  Recommended GI evaluation and possible evaluation of EOE        Allergies   Allergen Reactions   • Corn Oil - Food Allergy - Food Allergy Shortness Of Breath   • Gluten Meal - Food Allergy Shortness Of Breath   • Infliximab Hypertension and Throat Swelling   • Isoflavones (Soy) Shortness Of Breath   • Motrin [Ibuprofen] Throat Swelling   • Oseltamivir Shortness Of Breath   • Yeast - Food Allergy Shortness Of Breath   • Lyrica [Pregabalin] Tongue Swelling     Possible allergy. Taken previously without reaction, but had significant tongue swelling when taken with acetaminophen (7/2023). • Tilactase    • Medical Tape Rash     If on for too long; please use paper tape   • Sulfa Antibiotics Hives     Current Outpatient Medications   Medication Sig Dispense Refill   • amitriptyline (ELAVIL) 10 mg tablet Take 1 tablet (10 mg total) by mouth daily at bedtime 90 tablet 1   • cholecalciferol (VITAMIN D3) 400 units tablet Take 1 tablet (400 Units total) by mouth daily Do not start before August 26, 2023. 30 tablet 0   • cyanocobalamin (VITAMIN B-12) 1000 MCG tablet Take 1 tablet (1,000 mcg total) by mouth daily Do not start before August 26, 2023. 30 tablet 0   • Eliquis 5 MG Take 2 tablets (10 mg total) by mouth 2 (two) times a day for 7 days, THEN 1 tablet (5 mg total) 2 (two) times a day for 23 days.  74 tablet 0   • gabapentin (NEURONTIN) 300 mg capsule Take 2 capsules (600 mg total) by mouth 2 (two) times a day 120 capsule 0   • gabapentin (NEURONTIN) 300 mg capsule Take 3 capsules (900 mg total) by mouth daily at bedtime 90 capsule 0   • levalbuterol (XOPENEX HFA) 45 mcg/act inhaler Inhale 1-2 puffs     • Magnesium Oxide 400 MG CAPS Take 1 capsule by mouth     • predniSONE 20 mg tablet Take 3 tablets (60 mg total) by mouth daily 90 tablet 0   • pyridoxine (VITAMIN B6) 50 mg tablet Take 1 tablet (50 mg total) by mouth daily Do not start before 2023. 30 tablet 0   • Ventolin  (90 Base) MCG/ACT inhaler  (Patient not taking: Reported on 2023)       No current facility-administered medications for this visit. Facility-Administered Medications Ordered in Other Visits   Medication Dose Route Frequency Provider Last Rate Last Admin   • alteplase (CATHFLO) injection 2 mg  2 mg Intracatheter Q1MIN PRN Rajesh Oconnor MD         MEDICAL HISTORY:  Past Medical History:   Diagnosis Date   • Asthma    • RA (rheumatoid arthritis) ([address])      Past Surgical History:   Procedure Laterality Date   • ANKLE SURGERY      2 surgerys   •  SECTION     • ELBOW SURGERY     • IR LUMBAR PUNCTURE  2023   • IR MIDLINE PLACEMENT  2023   • JOINT REPLACEMENT      knees bilateral   • MUSCLE BIOPSY Left 2023    Procedure: Muscle biopsy;  Surgeon: Heather Liu To, ;  [facility];  Service: General   • TOTAL SHOULDER REPLACEMENT     • WOUND DEBRIDEMENT Left 2023    Procedure: Sural nerve biopsy;  Surgeon: Marva Valente; Location[facility];  Service: Neurosurgery     Social History     Substance and Sexual Activity   Alcohol Use Not Currently    Comment: none     Social History     Substance and Sexual Activity   Drug Use Never    Comment: none     Social History     Tobacco Use   Smoking Status Never   Smokeless Tobacco Never     Family History   Problem Relation Age of Onset   • No Known Problems Mother    • No Known Problems Father        REVIEW OF SYSTEMS:  CONSTITUTIONAL: Denies any fever, chills, rigors, and weight loss. HEENT: No earache or tinnitus. Denies hearing loss or visual disturbances. CARDIOVASCULAR: No chest pain or palpitations. RESPIRATORY: Denies any cough, hemoptysis, shortness of breath or dyspnea on exertion. GASTROINTESTINAL: As noted in the History of Present Illness. GENITOURINARY: No problems with urination. Denies any hematuria or dysuria. NEUROLOGIC: No dizziness or vertigo, denies headaches. MUSCULOSKELETAL: Denies any muscle or joint pain. SKIN: Denies skin rashes or itching. ENDOCRINE: Denies excessive thirst. Denies intolerance to heat or cold. PSYCHOSOCIAL: Denies depression or anxiety. Denies any recent memory loss. Objective   There were no vitals taken for this visit. There is no height or weight on file to calculate BMI. PHYSICAL EXAM:   General Appearance: Alert, cooperative, no distress  HEENT: Normocephalic, atraumatic, anicteric. Neck: Supple, symmetrical, trachea midline  Lungs: Clear to auscultation bilaterally; no rales, rhonchi or wheezing; respirations unlabored   Heart: Regular rate and rhythm; no murmur, rub, or gallop. Abdomen: Soft, bowel sounds normal, non-tender, non-distended; no masses, there is no hepatosplenomegaly.  No spider angiomas  Genitalia: Deferred   Rectal: Deferred   Extremities: No cyanosis, clubbing or edema   Skin: No jaundice, rashes, or lesions   Lymph nodes: No palpable cervical lymphadenopathy   Lab Results:   Admission on 08/31/2023, Discharged on 08/31/2023   Component Date Value   • WBC 08/31/2023 13.06 (H)    • RBC 08/31/2023 2.92 (L)    • Hemoglobin 08/31/2023 8.3 (L)    • Hematocrit 08/31/2023 27.5 (L)    • MCV 08/31/2023 94    • MCH 08/31/2023 28.4    • MCHC 08/31/2023 30.2 (L)    • RDW 08/31/2023 22.7 (H)    • MPV 08/31/2023 8.1 (L)    • Platelets 09/64/1517 409 (H)    • Sodium 08/31/2023 136    • Potassium 08/31/2023 4.3    • Chloride 08/31/2023 103    • CO2 08/31/2023 25    • ANION GAP 08/31/2023 8    • BUN 08/31/2023 33 (H)    • Creatinine 08/31/2023 0.39 (L)    • Glucose 08/31/2023 129    • Calcium 08/31/2023 8.2 (L)    • eGFR 08/31/2023 126    • Protime 08/31/2023 13.3    • INR 08/31/2023 0.99    • PTT 08/31/2023 19 (L)    • Segmented % 08/31/2023 85 (H)    • Bands % 08/31/2023 7    • Lymphocytes % 08/31/2023 5 (L)    • Monocytes % 08/31/2023 3 (L)    • Eosinophils, % 08/31/2023 0    • Basophils % 08/31/2023 0    • Absolute Neutrophils 08/31/2023 12.02 (H)    • Lymphocytes Absolute 08/31/2023 0.65    • Monocytes Absolute 08/31/2023 0.39    • Eosinophils Absolute 08/31/2023 0.00    • Basophils Absolute 08/31/2023 0.00    • RBC Morphology 08/31/2023 Present    • Platelet Estimate 14/96/9476 Adequate    • Anisocytosis 08/31/2023 Present    • Polychromasia 08/31/2023 Present    No results displayed because visit has over 200 results. No results displayed because visit has over 200 results. Radiology Results:   CTA ED chest PE Study    Result Date: 8/31/2023  Narrative: CTA - CHEST WITH IV CONTRAST - PULMONARY ANGIOGRAM INDICATION:   dvt. Right arm pain x2 days with swelling. COMPARISON: Chest CT 8/15/2023 and 12/29/2016. TECHNIQUE: CTA examination of the chest was performed using angiographic technique according to a protocol specifically tailored to evaluate for pulmonary embolism. Multiplanar 2D reformatted images were created from the source data. In addition, coronal 3D MIP postprocessing was performed on the acquisition scanner. Radiation dose length product (DLP) for this visit:  256.84 mGy-cm . This examination, like all CT scans performed in the [facility], was performed utilizing techniques to minimize radiation dose exposure, including the use of iterative  reconstruction and automated exposure control. IV Contrast:  70 mL of iohexol (OMNIPAQUE) FINDINGS: PULMONARY ARTERIAL TREE:  No pulmonary embolus is seen. LUNGS: No infiltrates. Persistent bibasilar platelike atelectasis or scarring. There is no tracheal or endobronchial lesion. PLEURA:  Unremarkable. HEART/GREAT VESSELS:  Unremarkable for patient's age. No thoracic aortic aneurysm. MEDIASTINUM AND GURDEEP:  Unremarkable. CHEST WALL AND LOWER NECK:   Diffuse subcutaneous soft tissue edema. VISUALIZED STRUCTURES IN THE UPPER ABDOMEN:  Unremarkable.  OSSEOUS STRUCTURES: Healing fractures of the right 2nd, 3rd, 4th ribs anteriorly, new since the prior study. No destructive lesions. Right shoulder arthroplasty. Impression: No acute pathology in the chest. No pulmonary embolus. Healing anterior fractures of the right 2nd through 4th ribs, new since the prior study. Workstation performed: [id number]     VAS upper limb venous duplex scan, unilateral/limited    Result Date: 8/31/2023  Narrative:  [facility] REPORT CLINICAL: Indications: Patient presents with right upper extremity swelling, recent hospitalization. Operative History: No prior heart or vascular surgery Risk Factors: Patient has history of rheumatoid arthritis  FINDINGS:  Right       Impression              Subclavian  Non Occlusive Thrombus  Axillary    Occlusive Subsegmental  Brachial    Occlusive Subsegmental     CONCLUSION:  Impression RIGHT UPPER LIMB: Evaluation shows evidence of occlusive acute deep vein thrombus in the axillary vein and the brachial vein. Non occlusive acute DVT noted in the subclavian vein. No evidence of superficial thrombophlebitis noted. Doppler evaluation shows a normal response to augmentation maneuvers. LEFT UPPER LIMB LIMITED: Evaluation shows no evidence of thrombus in the internal jugular vein and subclavian vein.   Technical findings given to Dr. Juan J Domingo at time of exam.  SIGNATURE: Electronically Signed by: Isadora Mckeon DO on 2023-08-31 02:29:05 PM    [address] DO   09/15/23   Cc:

## 2023-09-18 NOTE — PATIENT INSTRUCTIONS
Sore throat  Kimi Ramos  reports having a sore throat that started around May. Subsequently evaluated by ENT. This soreness in her throat moved to her feet and into her hands. She also was experiencing dysphagia and was eating a puréed diet. She feels that high-dose steroids has helped her raspy voice and sore throat. 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 Her sore throat has resolved. Her raspy voice seems to have come back with lowering the dose of steroids. The exact cause for her sore throat is not clear but may in fact be autoimmune related. It is quite possible this could be reflux or infection related as the symptoms seem to correlate with an upper respiratory tract infection. Please see comments under dysphagia    Oropharyngeal dysphagia  Patient was experiencing dysphagia to most foods and pills back in May. This has slowly improved with high-dose steroids. June 29 she did have a videofluoroscopic swallowing examination on steroids and she was noted to have a mild oropharyngeal dysphagia  characterized by delayed swallow and incomplete swallow initiation resulting in pooling of the vallecula space. Once triggered, patient swallow was appropriate ROM and demonstrates a generalized mild weakness of BOT  Suspension of materials and proximal esophageal segment noted for greater than 5 seconds near C7 required 3 swallows to clear. Speech therapy did recommend evaluation for EOE. At this point in time she is having much less in the line of dysphagia although at times she may have dysphagia to both liquids and solids. This may in fact be related to an underlying motility disorder versus EOE versus vasculitis. She is currently on full dose anticoagulation on Eliquis for a right upper extremity DVT that was just recently diagnosed on August 31.   I would hold off on pursuing upper endoscopy until she is cleared to either stop the Eliquis or cleared to hold the Eliquis given recent acute DVT in the right upper extremity. In the meantime I will recommend an esophagram with barium tablet. Eventual EGD    In the meantime I will start her on a proton pump inhibitor. Looks like omeprazole contains corn oil and she does have a fairly severe corn allergy with possible anaphylaxis. Therefore I will place her on pantoprazole 20 mg once a day. Best to take this 30 minutes to 1 hour before 1 meal a day    I explained to the patient the procedure of upper endoscopy as well as its potential risk which are approximately 1 in 1000 chance of bleeding, infection, and perforation. Screening for colorectal cancer  I did discuss with Chito Oshea and her  that given that she is 57years of age she needs to consider some formal colorectal cancer screening. She has multiple problems going on at this time and we can hold off on pursuing colon cancer screening. She does not have a family history of colon cancer  We could therefore consider a noninvasive test such as Cologuard. However I would hold using a Cologuard until after she is off her anticoagulation. Anemia  Chito Oshea was noted to have anemia. She has had anemia for a very long time. It is felt that this may be related to chronic illness. I did however note that in February 2022 her hemoglobin was normal at 12.1. In August 2023 her ferritin was normal at 142 however she did have a low TIBC suggesting more chronic disease. Would recommend following her CBC. Repeat iron studies  Check vitamin B12 level and folate  Check celiac antibody panel for completeness. Anticoagulated by anticoagulation treatment  Patient is fully anticoagulated on Eliquis for right upper extremity DVT. She will be following up with vascular surgery regarding duration of anticoagulation. Based upon this we can help determine timing of upper endoscopy.     History of rheumatoid arthritis  Chito Oshea has had rheumatoid arthritis since age 18. She recently reestablished with rheumatology. She has had a right shoulder replacement, bilateral total hip replacements bilateral total knee replacements and bilateral ankle fusions. She is also had right elbow replacement.

## 2023-09-18 NOTE — ASSESSMENT & PLAN NOTE
Elaine Ball has had rheumatoid arthritis since age 18. She recently reestablished with rheumatology. She has had a right shoulder replacement, bilateral total hip replacements bilateral total knee replacements and bilateral ankle fusions. She is also had right elbow replacement.

## 2023-09-18 NOTE — ASSESSMENT & PLAN NOTE
I did discuss with Ofelia Diggs and her  that given that she is 57years of age she needs to consider some formal colorectal cancer screening. She has multiple problems going on at this time and we can hold off on pursuing colon cancer screening. She does not have a family history of colon cancer  We could therefore consider a noninvasive test such as Cologuard. However I would hold using a Cologuard until after she is off her anticoagulation.

## 2023-09-18 NOTE — ASSESSMENT & PLAN NOTE
Patient is fully anticoagulated on Eliquis for right upper extremity DVT. She will be following up with vascular surgery regarding duration of anticoagulation. Based upon this we can help determine timing of upper endoscopy.

## 2023-09-18 NOTE — ASSESSMENT & PLAN NOTE
Dillon Esquivel was noted to have anemia. She has had anemia for a very long time. It is felt that this may be related to chronic illness. I did however note that in February 2022 her hemoglobin was normal at 12.1. In August 2023 her ferritin was normal at 142 however she did have a low TIBC suggesting more chronic disease. Would recommend following her CBC. Repeat iron studies  Check vitamin B12 level and folate  Check celiac antibody panel for completeness.

## 2023-09-20 ENCOUNTER — HOSPITAL ENCOUNTER (OUTPATIENT)
Dept: RADIOLOGY | Facility: HOSPITAL | Age: 48
Discharge: HOME | End: 2023-09-20
Attending: INTERNAL MEDICINE
Payer: MEDICARE

## 2023-09-20 DIAGNOSIS — R13.12 OROPHARYNGEAL DYSPHAGIA: ICD-10-CM

## 2023-09-20 DIAGNOSIS — J02.9 SORE THROAT: ICD-10-CM

## 2023-09-20 PROCEDURE — 74221 X-RAY XM ESOPHAGUS 2CNTRST: CPT

## 2023-09-21 ENCOUNTER — HOSPITAL ENCOUNTER (OUTPATIENT)
Dept: INFUSION CENTER | Facility: HOSPITAL | Age: 48
End: 2023-09-21
Payer: MEDICARE

## 2023-09-21 VITALS
DIASTOLIC BLOOD PRESSURE: 75 MMHG | HEART RATE: 123 BPM | RESPIRATION RATE: 18 BRPM | SYSTOLIC BLOOD PRESSURE: 107 MMHG | TEMPERATURE: 97.3 F

## 2023-09-21 DIAGNOSIS — I77.6 VASCULITIS (HCC): Primary | ICD-10-CM

## 2023-09-21 DIAGNOSIS — G57.93 NEUROPATHY INVOLVING BOTH LOWER EXTREMITIES: ICD-10-CM

## 2023-09-21 PROCEDURE — 96413 CHEMO IV INFUSION 1 HR: CPT

## 2023-09-21 PROCEDURE — 96415 CHEMO IV INFUSION ADDL HR: CPT

## 2023-09-21 RX ORDER — SODIUM CHLORIDE 9 MG/ML
20 INJECTION, SOLUTION INTRAVENOUS ONCE
Status: CANCELLED | OUTPATIENT
Start: 2023-09-28

## 2023-09-21 RX ORDER — ACETAMINOPHEN 325 MG/1
650 TABLET ORAL ONCE
Status: COMPLETED | OUTPATIENT
Start: 2023-09-21 | End: 2023-09-21

## 2023-09-21 RX ORDER — ACETAMINOPHEN 325 MG/1
650 TABLET ORAL ONCE
Status: CANCELLED | OUTPATIENT
Start: 2023-09-28

## 2023-09-21 RX ORDER — DIPHENHYDRAMINE HCL 25 MG
25 TABLET ORAL ONCE
Status: CANCELLED | OUTPATIENT
Start: 2023-09-28

## 2023-09-21 RX ORDER — DIPHENHYDRAMINE HCL 25 MG
25 TABLET ORAL ONCE
Status: COMPLETED | OUTPATIENT
Start: 2023-09-21 | End: 2023-09-21

## 2023-09-21 RX ORDER — SODIUM CHLORIDE 9 MG/ML
20 INJECTION, SOLUTION INTRAVENOUS ONCE
Status: COMPLETED | OUTPATIENT
Start: 2023-09-21 | End: 2023-09-21

## 2023-09-21 RX ADMIN — RITUXIMAB 600 MG: 10 INJECTION, SOLUTION INTRAVENOUS at 10:19

## 2023-09-21 RX ADMIN — DIPHENHYDRAMINE HYDROCHLORIDE 25 MG: 25 TABLET ORAL at 09:35

## 2023-09-21 RX ADMIN — SODIUM CHLORIDE 20 ML/HR: 0.9 INJECTION, SOLUTION INTRAVENOUS at 09:42

## 2023-09-21 RX ADMIN — ACETAMINOPHEN 650 MG: 325 TABLET ORAL at 09:35

## 2023-09-21 NOTE — PROGRESS NOTES
Pt tolerated rituxan infusion well without incident. Discharged in stable condition and pt aware that no further infusion appointments are scheduled at this time. AVS declined.

## 2023-09-21 NOTE — RESULT ENCOUNTER NOTE
Please inform patient that x-ray of the esophagus did not reveal any delay in passage of barium tablet. No evidence of hiatal hernia. The x-ray was somewhat limited due to her inability to stand. There was a slight delay in emptying of the contrast out of the esophagus but did clear with subsequent swallows. Good news is that there is no obstruction to the flow of barium or barium tablet. We will plan on upper endoscopy at some point when able to stop anticoagulation.   Thank you

## 2023-09-22 ENCOUNTER — OFFICE VISIT (OUTPATIENT)
Dept: RHEUMATOLOGY | Facility: CLINIC | Age: 48
End: 2023-09-22
Payer: MEDICARE

## 2023-09-22 VITALS
BODY MASS INDEX: 17.27 KG/M2 | DIASTOLIC BLOOD PRESSURE: 74 MMHG | HEIGHT: 67 IN | WEIGHT: 110 LBS | SYSTOLIC BLOOD PRESSURE: 112 MMHG

## 2023-09-22 DIAGNOSIS — I77.6 VASCULITIS (HCC): ICD-10-CM

## 2023-09-22 DIAGNOSIS — M31.7 MICROSCOPIC POLYANGIITIS (HCC): ICD-10-CM

## 2023-09-22 DIAGNOSIS — G57.93 NEUROPATHY INVOLVING BOTH LOWER EXTREMITIES: Primary | ICD-10-CM

## 2023-09-22 PROCEDURE — 99214 OFFICE O/P EST MOD 30 MIN: CPT | Performed by: INTERNAL MEDICINE

## 2023-09-22 NOTE — PROGRESS NOTES
HPI: Vandana Noland is a 58 y/o female who presents for further evaluation RA, ANCA associated vasculitis. She has past medical history asthma, RA, AAV    S/p left sural nerve and quadriceps muscle biopsy c/w vasculitis     She was admitted MPA causing neuropathy and muscle weakness. She has long-standing RA diagnosed at age 17 and has required multiple joint replacement surgeries including right shoulder, bilateral hips and bilateral knees, right elbow. She has also had bilateral ankle fusions. She has had UE/LE weakness and paresthesias for the past month. 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 She has completed 4 dose IV Rituxan 9/21/23. Per Dr Salome Velazquez maintenance 500-1000 every 6 months. She is on 40 mg prednisone. H/o RA dx when she was 17. She was on MTX, Gold injections, HCQ, Remicade (severe dyspnea) in the past. She was lost to f/u prior rheumatologist > 20 years (Dr. Charlotte Lozada) Subsequently transitioned to another rheumatologist Dr. Charyl Dancer maintained on prednisone. Lost to f/u with Dr. Charyl Dancer in 2008. Since then her RA was managed per PCP. She has had severe reaction to Ibuprofen but can tolerate Meloxicam.    Also on gabapentin and amitriptyline 10mg QHS (sent to [facility] pharmacy due to severe corn allergy per Dr. Salome Velazquez)        --------------------------------------------------------------------------------------------------------        ROS:    + as above with the addition of numbness and tingling     - for: Fevers, Chills or sweats. No HAs or scalp tenderness. No jaw claudication. No acute visual or eye changes. No dry eyes. No auditory complaints. No oral lesions or ulcers. No dry mouth. No sore throat or cough. No chest pains or palpitations. No shortness of breath, dyspnea on exertion or wheezing. No hemotpysis. No abdominal pain, GERD symptoms, diarrhea or constipation. No urinary complaints. No rashes.     All other ROS was reviewed and negative except as above         --------------------------------------------------------------------------------------------------------    Past Medical History    Past Medical History:   Diagnosis Date   • Asthma    • RA (rheumatoid arthritis) ([address])            Past Surgical History    Past Surgical History:   Procedure Laterality Date   • ANKLE SURGERY      2 surgerys   •  SECTION     • ELBOW SURGERY     • IR LUMBAR PUNCTURE  2023   • IR MIDLINE PLACEMENT  2023   • JOINT REPLACEMENT      knees bilateral   • MUSCLE BIOPSY Left 2023    Procedure: Muscle biopsy;  Surgeon: Aaron Boston ToDO;  [facility];  Service: General   • TOTAL SHOULDER REPLACEMENT     • WOUND DEBRIDEMENT Left 2023    Procedure: Sural nerve biopsy;  Surgeon: Neha Mcconnell; [facility];  Service: Neurosurgery           Family History    Family History   Problem Relation Age of Onset   • No Known Problems Mother    • No Known Problems Father             Social History    Social History     Tobacco Use   • Smoking status: Never   • Smokeless tobacco: Never   Vaping Use   • Vaping Use: Never used   Substance Use Topics   • Alcohol use: Not Currently     Comment: none   • Drug use: Never     Comment: none         Allergies    Allergies   Allergen Reactions   • Corn Oil - Food Allergy - Food Allergy Shortness Of Breath   • Gluten Meal - Food Allergy Shortness Of Breath   • Infliximab Hypertension and Throat Swelling   • Isoflavones (Soy) Shortness Of Breath   • Motrin [Ibuprofen] Throat Swelling   • Oseltamivir Shortness Of Breath   • Yeast - Food Allergy Shortness Of Breath   • Lyrica [Pregabalin] Tongue Swelling     Possible allergy. Taken previously without reaction, but had significant tongue swelling when taken with acetaminophen (2023).    • Tilactase    • Medical Tape Rash     If on for too long; please use paper tape   • Sulfa Antibiotics Hives         Medications    Current Outpatient Medications   Medication Instructions   • amitriptyline (ELAVIL) 10 mg, Oral, Daily at bedtime   • cholecalciferol (VITAMIN D3) 400 Units, Oral, Daily   • cyanocobalamin (VITAMIN B-12) 1,000 mcg, Oral, Daily   • Eliquis 5 MG Take 2 tablets (10 mg total) by mouth 2 (two) times a day for 7 days, THEN 1 tablet (5 mg total) 2 (two) times a day for 23 days. • gabapentin (NEURONTIN) 600 mg, Oral, 2 times daily   • gabapentin (NEURONTIN) 900 mg, Oral, Daily at bedtime   • levalbuterol (XOPENEX HFA) 45 mcg/act inhaler 1-2 puffs, Inhalation   • Magnesium Oxide 400 MG CAPS 1 capsule, Oral   • pantoprazole (PROTONIX) 20 mg, Oral, Daily   • predniSONE 60 mg, Oral, Daily   • pyridoxine (VITAMIN B6) 50 mg, Oral, Daily   • Ventolin  (90 Base) MCG/ACT inhaler No dose, route, or frequency recorded. Physical Exam    /74   Ht 5' 7" (1.702 m)   Wt 49.9 kg (110 lb)   BMI 17.23 kg/m²     GEN: AAO, No apparent distress. Patient is well developed. HEENT:  Pupils are equal, round and reactive. Sclera are clear. Fundoscopic exam is normal.  External ears are without lesions. Oral pharynx is clear of ulcers or other lesions. MMM. NECK:  Supple. There is no adenopathy appreciable in anterior or posterior cervical chains or supraclavicularly. JVP is normal.    HEART: Regular rate and rhythm. There is no appreciable murmur, gallop or rub. LUNGS: Clear to auscultation. ABD:  Soft, without tenderness, rebound or guarding. No appreciable organomegally. NEURO: Speech and cognition are normal.  Strength is 5/5 throughout. Tone is normal.  DTRs are 2/4 at the knees, ankles and elbows.   Gait is normal.  SKIN: There are no rashes or lesions    MUSCULOSKELETAL:   B/l ulnar deviation      ________________________________________________________________________          Results Review    Component      Latest Ref Rn 8/31/2023   WBC      4.31 - 10.16 Thousand/uL 13.06 (H)    Red Blood Cell Count 3.81 - 5.12 Million/uL 2.92 (L)    Hemoglobin      11.5 - 15.4 g/dL 8.3 (L)    HCT      34.8 - 46.1 % 27.5 (L)    MCV      82 - 98 fL 94    MCH      26.8 - 34.3 pg 28.4    MCHC      31.4 - 37.4 g/dL 30.2 (L)    RDW      11.6 - 15.1 % 22.7 (H)    MPV      8.9 - 12.7 fL 8.1 (L)    Platelet Count      162 - 390 Thousands/uL 409 (H)    Sodium      135 - 147 mmol/L 136    Potassium      3.5 - 5.3 mmol/L 4.3    Chloride      96 - 108 mmol/L 103    CO2      21 - 32 mmol/L 25    Anion Gap      mmol/L 8    BUN      5 - 25 mg/dL 33 (H)    Creatinine      0.60 - 1.30 mg/dL 0.39 (L)    Glucose, Random      65 - 140 mg/dL 129    Calcium      8.4 - 10.2 mg/dL 8.2 (L)    eGFR      ml/min/1.73sq m 126       Legend:  (H) High  (L) Low        Impressions     MPO with neuropathy  RA    Plans:    Chronic RA and now with ANCA associated vasculitis (MPA with neuropathy, + MPO and positive left sural biopsy)  Already completed 4 doses IV Rituxan per Dr Montez Monae (last dose 9/21/23)  Could consider RA dosing in 6 months (1000 mg IV days 1 and 15 vs maintenance AAV dosing 500-1000 every 4-6 months)  Lower prednisone by 10 mg every 4 weeks, stay on 10 mg till next visit  She has had severe reaction to Ibuprofen but can tolerate Meloxicam.  Also on gabapentin and amitriptyline 10mg QHS (sent to [facility] compounding pharmacy due to severe corn allergy per Dr. Montez Monae)    F/u neurology    RTC 4-5 months            Thank you for involving me in this patient's care.         Colt Bernabe MD  Department of Rheumatology  [address] [facility]

## 2023-09-25 DIAGNOSIS — M06.9 RHEUMATOID ARTHRITIS, INVOLVING UNSPECIFIED SITE, UNSPECIFIED WHETHER RHEUMATOID FACTOR PRESENT (HCC): ICD-10-CM

## 2023-09-25 DIAGNOSIS — G57.93 NEUROPATHY INVOLVING BOTH LOWER EXTREMITIES: ICD-10-CM

## 2023-09-25 RX ORDER — GABAPENTIN 300 MG/1
600 CAPSULE ORAL 2 TIMES DAILY
Qty: 120 CAPSULE | Refills: 0 | Status: SHIPPED | OUTPATIENT
Start: 2023-09-25 | End: 2023-10-25

## 2023-09-25 RX ORDER — PREDNISONE 20 MG/1
60 TABLET ORAL DAILY
Qty: 90 TABLET | Refills: 0 | Status: SHIPPED | OUTPATIENT
Start: 2023-09-25 | End: 2023-10-25

## 2023-09-25 NOTE — TELEPHONE ENCOUNTER
Reason for call:   [x] Refill   [] Prior Auth  [] Other:     Office:   [] PCP/Provider -    [x] Speciality/Provider - Rheumo    Medication: Gabapentin    Dose/Frequency: 300 mg    Quantity: #120. Two in the morning, two in the afternoon and three at night    Pharmacy: [address]    Does the patient have enough for 3 days? [] Yes   [x] No - Send as HP to POD        Reason for call:   [x] Refill   [] Prior Auth  [] Other:     Office:   [] PCP/Provider -   [x] Speciality/Provider -  Rheum    Medication: Prednison    Dose/Frequency: 20 mg     Quantity: #90    Pharmacy: [address]    Does the patient have enough for 3 days?    [] Yes   [x] No - Send as HP to POD

## 2023-09-26 ENCOUNTER — CONSULT (OUTPATIENT)
Dept: VASCULAR SURGERY | Facility: CLINIC | Age: 48
End: 2023-09-26
Payer: MEDICARE

## 2023-09-26 VITALS — OXYGEN SATURATION: 98 % | HEART RATE: 98 BPM | TEMPERATURE: 98 F

## 2023-09-26 DIAGNOSIS — R60.0 BILATERAL LOWER EXTREMITY EDEMA: ICD-10-CM

## 2023-09-26 DIAGNOSIS — M06.9 RHEUMATOID ARTHRITIS, INVOLVING UNSPECIFIED SITE, UNSPECIFIED WHETHER RHEUMATOID FACTOR PRESENT (HCC): ICD-10-CM

## 2023-09-26 DIAGNOSIS — I77.6 VASCULITIS (HCC): ICD-10-CM

## 2023-09-26 DIAGNOSIS — I82.A11 ACUTE DEEP VEIN THROMBOSIS (DVT) OF AXILLARY VEIN OF RIGHT UPPER EXTREMITY (HCC): Primary | ICD-10-CM

## 2023-09-26 PROCEDURE — 99213 OFFICE O/P EST LOW 20 MIN: CPT | Performed by: SURGERY

## 2023-09-26 RX ORDER — APIXABAN 5 MG (74)
KIT ORAL
COMMUNITY
Start: 2023-09-01 | End: 2023-09-26 | Stop reason: SDUPTHER

## 2023-09-26 NOTE — PROGRESS NOTES
Assessment/Plan:    Pt is a 58 yo F w/ RA, asthma, cardiomyopathy, DJD, peripheral neuropathy, contractures, multiple joint replacements including B hips, B knees, R shoulder and R elbow, B ankle fusions, P-ANCA vasculitis, RUE DVT    Acute deep vein thrombosis (DVT) of axillary vein of right upper extremity (HCC)  -     apixaban (Eliquis) 5 mg;  Take 1 tablet (5 mg total) by mouth 2 (two) times a day  -presented with RUE edema in the setting of recent prolonged hospitalization and midline cath R brachial  -reviewed UEV which shows acute DVT of the R brachial, axillary, and subclavian veins  -first episode, provoked, likely secondary to multiple risk factors including recent basilic mildline placement in RUE, immobility, hospitalization, vasculitis; may also have thoracic inlet/outlet mechanical abnormality given her severe arthritis disease  -currently with minimal symptoms; edema resolved; no indication for vascular intervention; she should be treated with 3-6 months of therapeutic anticoagulation and is currently tolerating eliquis without issue; given a refill for another 30days of medications and instructed to get further refills from her PCP; should have discussion with PCP and rheum regarding need for continued anticoagulation, possibly low dose could be used for prevention given her chronic immobility and possible hypercoagulability associated with autoimmune disease and chronic inflammation  -f/u PRN    Rheumatoid arthritis, involving unspecified site, unspecified whether rheumatoid factor present ([address])  Vasculitis ([address])  -presented with new BLE followed by RUE weakness and paresthesias  -newly established with Dr. Jennifer Fleming; previously seen Dr. Dennis Carrasco and Vernon Segundo  -has had longterm RA and now new dx p-ANCA vasculitis  -I am unsure if her RA and vasculitis and medications make her hypercoagulable, either in the acute phase or chronically; this should influence the decision on duration of anticoagulation; recommended patient have this discussion        Subjective:      Patient ID: Alta Dozier is a 57 y.o. female. New patient hospital follow up for right arm pain/swelling had UEV done 8/31/23. Pt presented to ED 8/31/23 for arm pain/swelling since visit Pt reports that her arm is feeling much better and swelling has gone down. . PT is taking Eliquis. Pt is a non smoker. HPI:    Patient referred for followup or her RUE DVT. The month prior to presentation, she was hospitalized for about a month during workup and diagnosis of vasculitis. She had numbness and pain of her feet and then a similar episode of her hands. Still with limited use of her hands. She has longstanding RA with multiple joints affecting but previously was able to feed herself and now is not able. She was discharged and about a week later, noted RUE edema. She had a R basilic midline catheter inserted mid upper arm during hospitalization. She presented to ED and found to have R axillary and brachial DVT. Her RUE edema and symptoms are resolved. Patient presents in wheelchair, poor mobility and arm use not yet recovered. She was started on Eliquis and continues this without issue. The following portions of the patient's history were reviewed and updated as appropriate: allergies, current medications, past family history, past medical history, past social history, past surgical history and problem list.    Review of Systems   Constitutional: Negative. HENT: Negative. Eyes: Negative. Respiratory: Negative. Cardiovascular: Negative. Gastrointestinal: Negative. Endocrine: Negative. Genitourinary: Negative. Musculoskeletal: Positive for arthralgias and back pain. Skin: Positive for wound. Allergic/Immunologic: Negative. Neurological: Negative. Hematological: Bruises/bleeds easily. Psychiatric/Behavioral: Negative.           Objective:      Pulse 98   Temp 98 °F (36.7 °C) (Temporal) SpO2 98%          Physical Exam  Cardiovascular:      Pulses:           Radial pulses are 2+ on the right side and 2+ on the left side. Dorsalis pedis pulses are 2+ on the right side and 2+ on the left side. Posterior tibial pulses are 2+ on the right side and 2+ on the left side. Heart sounds: No murmur heard. Pulmonary:      Effort: No respiratory distress. Breath sounds: No wheezing or rales. Musculoskeletal:      Right lower le+ Edema present. Left lower le+ Edema present. Comments: There are severe RA changes of multiple joints causing deformity   Skin:     Comments: Darkening of the feet with dependence; very midl stasis changes with shininess/thinngin of the skin B, no wounds           I have reviewed and made appropriate changes to the review of systems input by the medical assistant.     Vitals:    23 1138   Pulse: 98   Temp: 98 °F (36.7 °C)   TempSrc: Temporal   SpO2: 98%       Patient Active Problem List   Diagnosis   • Abnormal electrocardiogram   • Abnormal findings on diagnostic imaging of heart and coronary circulation   • Acquired bilateral club feet   • Acrocyanosis (HCC)   • Ankle arthritis   • Asthma   • Blue color skin   • Cardiac murmur   • Cardiomyopathy (HCC)   • DJD (degenerative joint disease)   • Hyponatremia   • Nonrheumatic mitral valve insufficiency   • Osteoarthritis of left hip   • Psoriasis   • Rheumatoid arthritis (HCC)   • Status post left hip replacement   • Vasculitis (HCC)   • Microcytic anemia   • Moderate protein-calorie malnutrition (HCC)   • Vitamin B6 deficiency   • Neuropathy involving both lower extremities   • Leucocytosis   • Ambulatory dysfunction   • Anemia   • SIRS (systemic inflammatory response syndrome) (HCC)   • Proteinuria   • Microscopic hematuria   • Sore throat   • Oropharyngeal dysphagia   • Screening for colorectal cancer   • Anticoagulated by anticoagulation treatment   • History of rheumatoid arthritis Past Surgical History:   Procedure Laterality Date   • ANKLE SURGERY      2 surgerys   •  SECTION     • ELBOW SURGERY     • IR LUMBAR PUNCTURE  2023   • IR MIDLINE PLACEMENT  2023   • JOINT REPLACEMENT      knees bilateral   • MUSCLE BIOPSY Left 2023    Procedure: Muscle biopsy;  Surgeon: Rox Agarwal DO;  [facility];  Service: General   • TOTAL SHOULDER REPLACEMENT     • WOUND DEBRIDEMENT Left 2023    Procedure: Sural nerve biopsy;  Surgeon: Vee Holland; Location: [facility];  Service: Neurosurgery       Family History   Problem Relation Age of Onset   • No Known Problems Mother    • No Known Problems Father        Social History     Socioeconomic History   • Marital status: /Civil Union     Spouse name: Not on file   • Number of children: Not on file   • Years of education: Not on file   • Highest education level: Not on file   Occupational History   • Not on file   Tobacco Use   • Smoking status: Never   • Smokeless tobacco: Never   Vaping Use   • Vaping Use: Never used   Substance and Sexual Activity   • Alcohol use: Not Currently     Comment: none   • Drug use: Never     Comment: none   • Sexual activity: Not Currently     Birth control/protection: None     Comment: none   Other Topics Concern   • Not on file   Social History Narrative   • Not on file     Social Determinants of Health     Financial Resource Strain: Not on file   Food Insecurity: No Food Insecurity (8/15/2023)    Hunger Vital Sign    • Worried About Running Out of Food in the Last Year: Never true    • Ran Out of Food in the Last Year: Never true   Transportation Needs: No Transportation Needs (8/15/2023)    PRAPARE - Transportation    • Lack of Transportation (Medical): No    • Lack of Transportation (Non-Medical):  No   Physical Activity: Not on file   Stress: Not on file   Social Connections: Not on file   Intimate Partner Violence: Not on file   Housing Stability: Low Risk  (2023) Housing Stability Vital Sign    • Unable to Pay for Housing in the Last Year: No    • Number of Places Lived in the Last Year: 1    • Unstable Housing in the Last Year: No       Allergies   Allergen Reactions   • Corn Oil - Food Allergy - Food Allergy Shortness Of Breath   • Gluten Meal - Food Allergy Shortness Of Breath   • Infliximab Hypertension and Throat Swelling   • Isoflavones (Soy) Shortness Of Breath   • Motrin [Ibuprofen] Throat Swelling   • Oseltamivir Shortness Of Breath   • Yeast - Food Allergy Shortness Of Breath   • Lyrica [Pregabalin] Tongue Swelling     Possible allergy. Taken previously without reaction, but had significant tongue swelling when taken with acetaminophen (7/2023). • Tilactase    • Medical Tape Rash     If on for too long; please use paper tape   • Sulfa Antibiotics Hives         Current Outpatient Medications:   •  amitriptyline (ELAVIL) 10 mg tablet, Take 1 tablet (10 mg total) by mouth daily at bedtime, Disp: 90 tablet, Rfl: 1  •  cholecalciferol (VITAMIN D3) 400 units tablet, Take 1 tablet (400 Units total) by mouth daily Do not start before August 26, 2023., Disp: 30 tablet, Rfl: 0  •  Eliquis 5 MG, Take 2 tablets (10 mg total) by mouth 2 (two) times a day for 7 days, THEN 1 tablet (5 mg total) 2 (two) times a day for 23 days. , Disp: 74 tablet, Rfl: 0  •  Eliquis DVT/PE Starter Pack 5 MG TBPK, , Disp: , Rfl:   •  gabapentin (NEURONTIN) 300 mg capsule, Take 2 capsules (600 mg total) by mouth 2 (two) times a day, Disp: 120 capsule, Rfl: 0  •  levalbuterol (XOPENEX HFA) 45 mcg/act inhaler, Inhale 1-2 puffs, Disp: , Rfl:   •  Magnesium Oxide 400 MG CAPS, Take 1 capsule by mouth, Disp: , Rfl:   •  predniSONE 20 mg tablet, Take 3 tablets (60 mg total) by mouth daily, Disp: 90 tablet, Rfl: 0  •  pyridoxine (VITAMIN B6) 50 mg tablet, Take 1 tablet (50 mg total) by mouth daily Do not start before August 26, 2023., Disp: 30 tablet, Rfl: 0  •  cyanocobalamin (VITAMIN B-12) 1000 MCG tablet, Take 1 tablet (1,000 mcg total) by mouth daily Do not start before August 26, 2023., Disp: 30 tablet, Rfl: 0  •  gabapentin (NEURONTIN) 300 mg capsule, Take 3 capsules (900 mg total) by mouth daily at bedtime, Disp: 90 capsule, Rfl: 0  •  pantoprazole (PROTONIX) 20 mg tablet, Take 1 tablet (20 mg total) by mouth daily (Patient not taking: Reported on 9/26/2023), Disp: 30 tablet, Rfl: 6  •  Ventolin  (90 Base) MCG/ACT inhaler, , Disp: , Rfl:

## 2023-10-11 DIAGNOSIS — G57.93 NEUROPATHY INVOLVING BOTH LOWER EXTREMITIES: ICD-10-CM

## 2023-10-12 RX ORDER — GABAPENTIN 300 MG/1
600 CAPSULE ORAL 2 TIMES DAILY
Qty: 120 CAPSULE | Refills: 0 | Status: SHIPPED | OUTPATIENT
Start: 2023-10-12 | End: 2023-11-11

## 2023-11-08 ENCOUNTER — HOSPITAL ENCOUNTER (OUTPATIENT)
Dept: RADIOLOGY | Facility: HOSPITAL | Age: 48
Discharge: HOME | End: 2023-11-08
Payer: MEDICARE

## 2023-11-08 DIAGNOSIS — M25.512 LEFT SHOULDER PAIN, UNSPECIFIED CHRONICITY: ICD-10-CM

## 2023-11-08 DIAGNOSIS — R07.81 RIB PAIN: ICD-10-CM

## 2023-11-08 DIAGNOSIS — R07.89 STERNUM PAIN: ICD-10-CM

## 2023-11-08 PROCEDURE — 71046 X-RAY EXAM CHEST 2 VIEWS: CPT

## 2023-11-08 PROCEDURE — 73030 X-RAY EXAM OF SHOULDER: CPT

## 2023-11-08 PROCEDURE — 71100 X-RAY EXAM RIBS UNI 2 VIEWS: CPT

## 2023-11-17 PROBLEM — Z12.12 SCREENING FOR COLORECTAL CANCER: Status: RESOLVED | Noted: 2023-09-18 | Resolved: 2023-11-17

## 2023-11-17 PROBLEM — Z12.11 SCREENING FOR COLORECTAL CANCER: Status: RESOLVED | Noted: 2023-09-18 | Resolved: 2023-11-17

## 2023-12-01 ENCOUNTER — LAB (OUTPATIENT)
Dept: LAB | Age: 48
End: 2023-12-01
Payer: MEDICARE

## 2023-12-01 DIAGNOSIS — R49.0 DYSPHONIA: ICD-10-CM

## 2023-12-01 DIAGNOSIS — R49.0 MUSCLE TENSION DYSPHONIA: ICD-10-CM

## 2023-12-01 DIAGNOSIS — R49.9 CHANGE OF VOICE: ICD-10-CM

## 2023-12-01 DIAGNOSIS — R13.12 OROPHARYNGEAL DYSPHAGIA: ICD-10-CM

## 2023-12-01 DIAGNOSIS — D72.18 EOSINOPHILIC GRANULOMATOSIS WITH POLYANGIITIS (EGPA): ICD-10-CM

## 2023-12-01 DIAGNOSIS — Z99.3 DEPENDENCE ON WHEELCHAIR: ICD-10-CM

## 2023-12-01 DIAGNOSIS — M30.1 EOSINOPHILIC GRANULOMATOSIS WITH POLYANGIITIS (EGPA): ICD-10-CM

## 2023-12-01 DIAGNOSIS — T78.04XA: ICD-10-CM

## 2023-12-01 DIAGNOSIS — H10.13 ALLERGIC CONJUNCTIVITIS OF BOTH EYES: ICD-10-CM

## 2023-12-01 DIAGNOSIS — E53.8 DEFICIENCY OF OTHER SPECIFIED B GROUP VITAMINS: ICD-10-CM

## 2023-12-01 DIAGNOSIS — J31.0 CHRONIC RHINITIS: ICD-10-CM

## 2023-12-01 DIAGNOSIS — J33.9 NASAL POLYPOSIS: ICD-10-CM

## 2023-12-01 DIAGNOSIS — M06.9 RHEUMATOID ARTHRITIS INVOLVING MULTIPLE SITES, UNSPECIFIED WHETHER RHEUMATOID FACTOR PRESENT (HCC): ICD-10-CM

## 2023-12-01 DIAGNOSIS — J45.50 SEVERE PERSISTENT ASTHMA WITHOUT COMPLICATION: ICD-10-CM

## 2023-12-01 DIAGNOSIS — I77.82 ANCA-ASSOCIATED VASCULITIS (HCC): ICD-10-CM

## 2023-12-01 DIAGNOSIS — Z91.018 FOOD ALLERGY: ICD-10-CM

## 2023-12-01 DIAGNOSIS — M31.7 MICROSCOPIC POLYANGIITIS (HCC): ICD-10-CM

## 2023-12-01 DIAGNOSIS — J45.909 ASTHMA, UNSPECIFIED ASTHMA SEVERITY, UNSPECIFIED WHETHER COMPLICATED, UNSPECIFIED WHETHER PERSISTENT: ICD-10-CM

## 2023-12-01 DIAGNOSIS — I77.6 VASCULITIS (HCC): ICD-10-CM

## 2023-12-01 DIAGNOSIS — D64.9 ANEMIA, UNSPECIFIED TYPE: ICD-10-CM

## 2023-12-01 DIAGNOSIS — J38.02 VOCAL FOLD PARESIS, BILATERAL: ICD-10-CM

## 2023-12-01 LAB
ALBUMIN SERPL BCP-MCNC: 4.3 G/DL (ref 3.5–5)
ALP SERPL-CCNC: 68 U/L (ref 34–104)
ALT SERPL W P-5'-P-CCNC: 13 U/L (ref 7–52)
ANION GAP SERPL CALCULATED.3IONS-SCNC: 10 MMOL/L
AST SERPL W P-5'-P-CCNC: 17 U/L (ref 13–39)
BACTERIA UR QL AUTO: NORMAL /HPF
BASOPHILS # BLD AUTO: 0.07 THOUSANDS/ÂΜL (ref 0–0.1)
BASOPHILS NFR BLD AUTO: 1 % (ref 0–1)
BILIRUB SERPL-MCNC: 0.44 MG/DL (ref 0.2–1)
BILIRUB UR QL STRIP: NEGATIVE
BUN SERPL-MCNC: 10 MG/DL (ref 5–25)
CALCIUM SERPL-MCNC: 9 MG/DL (ref 8.4–10.2)
CHLORIDE SERPL-SCNC: 102 MMOL/L (ref 96–108)
CLARITY UR: CLEAR
CO2 SERPL-SCNC: 27 MMOL/L (ref 21–32)
COLOR UR: YELLOW
CREAT SERPL-MCNC: 0.48 MG/DL (ref 0.6–1.3)
CRP SERPL QL: 13.8 MG/L
EOSINOPHIL # BLD AUTO: 0.03 THOUSAND/ÂΜL (ref 0–0.61)
EOSINOPHIL NFR BLD AUTO: 0 % (ref 0–6)
ERYTHROCYTE [DISTWIDTH] IN BLOOD BY AUTOMATED COUNT: 19.8 % (ref 11.6–15.1)
ERYTHROCYTE [SEDIMENTATION RATE] IN BLOOD: 42 MM/HOUR (ref 0–19)
FERRITIN SERPL-MCNC: 11 NG/ML (ref 11–307)
FOLATE SERPL-MCNC: 9.3 NG/ML
GFR SERPL CREATININE-BSD FRML MDRD: 118 ML/MIN/1.73SQ M
GLUCOSE P FAST SERPL-MCNC: 69 MG/DL (ref 65–99)
GLUCOSE UR STRIP-MCNC: NEGATIVE MG/DL
HCT VFR BLD AUTO: 31.2 % (ref 34.8–46.1)
HGB BLD-MCNC: 8.4 G/DL (ref 11.5–15.4)
HGB UR QL STRIP.AUTO: NEGATIVE
IGA SERPL-MCNC: 169 MG/DL (ref 66–433)
IGG SERPL-MCNC: 800 MG/DL (ref 635–1741)
IGM SERPL-MCNC: 78 MG/DL (ref 45–281)
IMM GRANULOCYTES # BLD AUTO: 0.11 THOUSAND/UL (ref 0–0.2)
IMM GRANULOCYTES NFR BLD AUTO: 1 % (ref 0–2)
IRON SATN MFR SERPL: 6 % (ref 15–50)
IRON SERPL-MCNC: 22 UG/DL (ref 50–212)
KETONES UR STRIP-MCNC: NEGATIVE MG/DL
LEUKOCYTE ESTERASE UR QL STRIP: NEGATIVE
LYMPHOCYTES # BLD AUTO: 2.27 THOUSANDS/ÂΜL (ref 0.6–4.47)
LYMPHOCYTES NFR BLD AUTO: 28 % (ref 14–44)
MCH RBC QN AUTO: 19.7 PG (ref 26.8–34.3)
MCHC RBC AUTO-ENTMCNC: 26.9 G/DL (ref 31.4–37.4)
MCV RBC AUTO: 73 FL (ref 82–98)
MONOCYTES # BLD AUTO: 0.91 THOUSAND/ÂΜL (ref 0.17–1.22)
MONOCYTES NFR BLD AUTO: 11 % (ref 4–12)
NEUTROPHILS # BLD AUTO: 4.87 THOUSANDS/ÂΜL (ref 1.85–7.62)
NEUTS SEG NFR BLD AUTO: 59 % (ref 43–75)
NITRITE UR QL STRIP: NEGATIVE
NON-SQ EPI CELLS URNS QL MICRO: NORMAL /HPF
NRBC BLD AUTO-RTO: 0 /100 WBCS
PH UR STRIP.AUTO: 6.5 [PH]
PLATELET # BLD AUTO: 563 THOUSANDS/UL (ref 149–390)
PMV BLD AUTO: 8.2 FL (ref 8.9–12.7)
POTASSIUM SERPL-SCNC: 3.9 MMOL/L (ref 3.5–5.3)
PROT SERPL-MCNC: 7.1 G/DL (ref 6.4–8.4)
PROT UR STRIP-MCNC: NEGATIVE MG/DL
RBC # BLD AUTO: 4.26 MILLION/UL (ref 3.81–5.12)
RBC #/AREA URNS AUTO: NORMAL /HPF
SODIUM SERPL-SCNC: 139 MMOL/L (ref 135–147)
SP GR UR STRIP.AUTO: 1.01 (ref 1–1.03)
TIBC SERPL-MCNC: 371 UG/DL (ref 250–450)
UIBC SERPL-MCNC: 349 UG/DL (ref 155–355)
UROBILINOGEN UR STRIP-ACNC: <2 MG/DL
VIT B12 SERPL-MCNC: 506 PG/ML (ref 180–914)
WBC # BLD AUTO: 8.26 THOUSAND/UL (ref 4.31–10.16)
WBC #/AREA URNS AUTO: NORMAL /HPF

## 2023-12-01 PROCEDURE — 36415 COLL VENOUS BLD VENIPUNCTURE: CPT

## 2023-12-01 PROCEDURE — 83550 IRON BINDING TEST: CPT

## 2023-12-01 PROCEDURE — 82784 ASSAY IGA/IGD/IGG/IGM EACH: CPT

## 2023-12-01 PROCEDURE — 86364 TISS TRNSGLTMNASE EA IG CLAS: CPT

## 2023-12-01 PROCEDURE — 82728 ASSAY OF FERRITIN: CPT

## 2023-12-01 PROCEDURE — 86231 EMA EACH IG CLASS: CPT

## 2023-12-01 PROCEDURE — 82607 VITAMIN B-12: CPT

## 2023-12-01 PROCEDURE — 82746 ASSAY OF FOLIC ACID SERUM: CPT

## 2023-12-01 PROCEDURE — 86258 DGP ANTIBODY EACH IG CLASS: CPT

## 2023-12-01 PROCEDURE — 83519 RIA NONANTIBODY: CPT

## 2023-12-01 PROCEDURE — 81001 URINALYSIS AUTO W/SCOPE: CPT

## 2023-12-01 PROCEDURE — 83540 ASSAY OF IRON: CPT

## 2023-12-01 PROCEDURE — 86255 FLUORESCENT ANTIBODY SCREEN: CPT

## 2023-12-01 PROCEDURE — 86003 ALLG SPEC IGE CRUDE XTRC EA: CPT

## 2023-12-01 PROCEDURE — 82785 ASSAY OF IGE: CPT

## 2023-12-01 NOTE — RESULT ENCOUNTER NOTE
Please inform patient that iron studies suggest more iron deficiency given low ferritin and low iron saturation. Has she been receiving iron infusions through hematology? Celiac antibody panel was pending. In addition her ferritin has decreased since 3 months ago from 142 now down to 11. Vitamin B12 and folate are okay. Chemistry complete is okay. CBC revealed a persistently low hemoglobin and hematocrit although overall is stable from August.  She may want to consider hematology consultation for possible iron infusion if she is not already receiving this.   Thank you

## 2023-12-02 LAB
ENDOMYSIUM IGA SER QL: NEGATIVE
GLIADIN PEPTIDE IGA SER-ACNC: 14 UNITS (ref 0–19)
GLIADIN PEPTIDE IGG SER-ACNC: 2 UNITS (ref 0–19)
IGA SERPL-MCNC: 206 MG/DL (ref 87–352)
TTG IGA SER-ACNC: <2 U/ML (ref 0–3)
TTG IGG SER-ACNC: <2 U/ML (ref 0–5)

## 2023-12-04 LAB
A ALTERNATA IGE QN: 0.17 KUA/I
A FUMIGATUS IGE QN: 0.15 KUA/I
BERMUDA GRASS IGE QN: <0.1 KUA/I
BOXELDER IGE QN: <0.1 KUA/I
C HERBARUM IGE QN: 0.42 KUA/I
CAT DANDER IGE QN: 0.73 KUA/I
CMN PIGWEED IGE QN: <0.1 KUA/I
COMMON RAGWEED IGE QN: 0.23 KUA/I
CORN IGE QN: <0.1 KUA/I
COTTONWOOD IGE QN: <0.1 KUA/I
D FARINAE IGE QN: 0.36 KUA/I
D PTERONYSS IGE QN: 0.18 KUA/I
DOG DANDER IGE QN: 0.11 KUA/I
LONDON PLANE IGE QN: <0.1 KUA/I
MOUSE URINE PROT IGE QN: <0.1 KUA/I
MT JUNIPER IGE QN: <0.1 KUA/I
MUGWORT IGE QN: <0.1 KUA/I
P NOTATUM IGE QN: <0.1 KUA/I
ROACH IGE QN: <0.1 KUA/I
SHEEP SORREL IGE QN: <0.1 KUA/I
SILVER BIRCH IGE QN: <0.1 KUA/I
TIMOTHY IGE QN: <0.1 KUA/I
TOTAL IGE SMQN RAST: 228 KU/L (ref 0–113)
WALNUT IGE QN: <0.1 KUA/I
WHITE ASH IGE QN: 0.13 KUA/I
WHITE ELM IGE QN: <0.1 KUA/I
WHITE MULBERRY IGE QN: <0.1 KUA/I
WHITE OAK IGE QN: <0.1 KUA/I

## 2023-12-05 LAB
ACHR BIND AB SER-SCNC: <0.03 NMOL/L (ref 0–0.24)
ACHR BLOCK AB/ACHR TOTAL SFR SER: 11 % (ref 0–25)

## 2023-12-06 PROBLEM — K11.7 XEROSTOMIA: Status: ACTIVE | Noted: 2023-12-06

## 2023-12-06 PROBLEM — D72.18 CHURG-STRAUSS SYNDROME: Status: ACTIVE | Noted: 2023-12-06

## 2023-12-06 PROBLEM — R13.11 ORAL PHASE DYSPHAGIA: Status: ACTIVE | Noted: 2023-12-06

## 2023-12-06 PROBLEM — R49.0 DYSPHONIA: Status: ACTIVE | Noted: 2023-12-06

## 2023-12-06 PROBLEM — J38.3 GLOTTIC INSUFFICIENCY: Status: ACTIVE | Noted: 2023-12-06

## 2023-12-06 PROBLEM — M30.1 CHURG-STRAUSS SYNDROME: Status: ACTIVE | Noted: 2023-12-06

## 2023-12-06 PROBLEM — R49.0 MUSCLE TENSION DYSPHONIA: Status: ACTIVE | Noted: 2023-12-06

## 2023-12-06 PROBLEM — J38.02 VOCAL FOLD PARESIS, BILATERAL: Status: ACTIVE | Noted: 2023-12-06

## 2023-12-06 PROBLEM — R76.8 ELEVATED SERUM IMMUNOGLOBULIN FREE LIGHT CHAIN LEVEL: Status: ACTIVE | Noted: 2023-12-06

## 2023-12-11 LAB — ACHR MOD AB/ACHR TOTAL SFR SER: 0 % (ref 0–45)

## 2024-01-08 LAB — MISCELLANEOUS LAB TEST RESULT: NORMAL

## 2024-02-16 ENCOUNTER — APPOINTMENT (OUTPATIENT)
Dept: LAB | Facility: HOSPITAL | Age: 49
End: 2024-02-16

## 2024-02-16 ENCOUNTER — APPOINTMENT (OUTPATIENT)
Dept: LAB | Facility: CLINIC | Age: 49
End: 2024-02-16
Payer: MEDICARE

## 2024-02-16 DIAGNOSIS — I77.6 VASCULITIS (HCC): ICD-10-CM

## 2024-02-16 DIAGNOSIS — E61.1 IRON DEFICIENCY: ICD-10-CM

## 2024-02-16 DIAGNOSIS — M31.7 MICROSCOPIC POLYANGIITIS (HCC): ICD-10-CM

## 2024-02-16 LAB
BACTERIA UR QL AUTO: ABNORMAL /HPF
BASOPHILS # BLD AUTO: 0.07 THOUSANDS/ÂΜL (ref 0–0.1)
BASOPHILS NFR BLD AUTO: 1 % (ref 0–1)
BILIRUB UR QL STRIP: NEGATIVE
CLARITY UR: CLEAR
COLOR UR: ABNORMAL
EOSINOPHIL # BLD AUTO: 0.15 THOUSAND/ÂΜL (ref 0–0.61)
EOSINOPHIL NFR BLD AUTO: 2 % (ref 0–6)
ERYTHROCYTE [DISTWIDTH] IN BLOOD BY AUTOMATED COUNT: 22.3 % (ref 11.6–15.1)
FERRITIN SERPL-MCNC: 12 NG/ML (ref 11–307)
GLUCOSE UR STRIP-MCNC: NEGATIVE MG/DL
HCT VFR BLD AUTO: 39.7 % (ref 34.8–46.1)
HGB BLD-MCNC: 10.8 G/DL (ref 11.5–15.4)
HGB UR QL STRIP.AUTO: NEGATIVE
IMM GRANULOCYTES # BLD AUTO: 0.03 THOUSAND/UL (ref 0–0.2)
IMM GRANULOCYTES NFR BLD AUTO: 0 % (ref 0–2)
IRON SATN MFR SERPL: 73 % (ref 15–50)
IRON SERPL-MCNC: 285 UG/DL (ref 50–212)
KETONES UR STRIP-MCNC: NEGATIVE MG/DL
LEUKOCYTE ESTERASE UR QL STRIP: NEGATIVE
LYMPHOCYTES # BLD AUTO: 0.91 THOUSANDS/ÂΜL (ref 0.6–4.47)
LYMPHOCYTES NFR BLD AUTO: 11 % (ref 14–44)
MCH RBC QN AUTO: 19.8 PG (ref 26.8–34.3)
MCHC RBC AUTO-ENTMCNC: 27.2 G/DL (ref 31.4–37.4)
MCV RBC AUTO: 73 FL (ref 82–98)
MONOCYTES # BLD AUTO: 0.5 THOUSAND/ÂΜL (ref 0.17–1.22)
MONOCYTES NFR BLD AUTO: 6 % (ref 4–12)
NEUTROPHILS # BLD AUTO: 6.7 THOUSANDS/ÂΜL (ref 1.85–7.62)
NEUTS SEG NFR BLD AUTO: 80 % (ref 43–75)
NITRITE UR QL STRIP: NEGATIVE
NON-SQ EPI CELLS URNS QL MICRO: ABNORMAL /HPF
NRBC BLD AUTO-RTO: 0 /100 WBCS
PH UR STRIP.AUTO: 6 [PH]
PLATELET # BLD AUTO: 527 THOUSANDS/UL (ref 149–390)
PMV BLD AUTO: 8.9 FL (ref 8.9–12.7)
PROT UR STRIP-MCNC: NEGATIVE MG/DL
RBC # BLD AUTO: 5.45 MILLION/UL (ref 3.81–5.12)
RBC #/AREA URNS AUTO: ABNORMAL /HPF
SP GR UR STRIP.AUTO: 1.01
TIBC SERPL-MCNC: 390 UG/DL (ref 250–450)
UIBC SERPL-MCNC: 105 UG/DL (ref 155–355)
UROBILINOGEN UR QL STRIP.AUTO: 0.2 E.U./DL
WBC # BLD AUTO: 8.36 THOUSAND/UL (ref 4.31–10.16)
WBC #/AREA URNS AUTO: ABNORMAL /HPF

## 2024-02-16 PROCEDURE — 82728 ASSAY OF FERRITIN: CPT

## 2024-02-16 PROCEDURE — 83540 ASSAY OF IRON: CPT

## 2024-02-16 PROCEDURE — 81001 URINALYSIS AUTO W/SCOPE: CPT

## 2024-02-16 PROCEDURE — 84630 ASSAY OF ZINC: CPT

## 2024-02-16 PROCEDURE — 83550 IRON BINDING TEST: CPT

## 2024-02-16 PROCEDURE — 36415 COLL VENOUS BLD VENIPUNCTURE: CPT

## 2024-02-16 PROCEDURE — 82525 ASSAY OF COPPER: CPT

## 2024-02-16 PROCEDURE — 85025 COMPLETE CBC W/AUTO DIFF WBC: CPT

## 2024-02-22 LAB — COPPER SERPL-MCNC: 125 UG/DL (ref 80–158)

## 2024-02-23 ENCOUNTER — OFFICE VISIT (OUTPATIENT)
Dept: RHEUMATOLOGY | Facility: CLINIC | Age: 49
End: 2024-02-23
Payer: MEDICARE

## 2024-02-23 ENCOUNTER — TELEPHONE (OUTPATIENT)
Dept: RHEUMATOLOGY | Facility: CLINIC | Age: 49
End: 2024-02-23

## 2024-02-23 VITALS
DIASTOLIC BLOOD PRESSURE: 62 MMHG | HEIGHT: 67 IN | SYSTOLIC BLOOD PRESSURE: 110 MMHG | HEART RATE: 95 BPM | BODY MASS INDEX: 17.23 KG/M2 | OXYGEN SATURATION: 100 %

## 2024-02-23 DIAGNOSIS — I77.6 VASCULITIS (HCC): ICD-10-CM

## 2024-02-23 DIAGNOSIS — I77.6 VASCULITIS (HCC): Primary | ICD-10-CM

## 2024-02-23 DIAGNOSIS — G57.93 NEUROPATHY INVOLVING BOTH LOWER EXTREMITIES: ICD-10-CM

## 2024-02-23 DIAGNOSIS — G57.93 NEUROPATHY INVOLVING BOTH LOWER EXTREMITIES: Primary | ICD-10-CM

## 2024-02-23 DIAGNOSIS — M05.79 RHEUMATOID ARTHRITIS INVOLVING MULTIPLE SITES WITH POSITIVE RHEUMATOID FACTOR (HCC): Primary | ICD-10-CM

## 2024-02-23 DIAGNOSIS — M06.9 RHEUMATOID ARTHRITIS, INVOLVING UNSPECIFIED SITE, UNSPECIFIED WHETHER RHEUMATOID FACTOR PRESENT (HCC): ICD-10-CM

## 2024-02-23 PROCEDURE — 99214 OFFICE O/P EST MOD 30 MIN: CPT | Performed by: INTERNAL MEDICINE

## 2024-02-23 RX ORDER — PREDNISONE 5 MG/1
5 TABLET ORAL DAILY
Qty: 30 TABLET | Refills: 11 | Status: SHIPPED | OUTPATIENT
Start: 2024-02-23

## 2024-02-23 RX ORDER — VALACYCLOVIR HYDROCHLORIDE 1 G/1
TABLET, FILM COATED ORAL
COMMUNITY
Start: 2024-01-30 | End: 2024-02-23

## 2024-02-23 RX ORDER — PREDNISONE 10 MG/1
TABLET ORAL
COMMUNITY
Start: 2023-08-26

## 2024-02-23 RX ORDER — SODIUM CHLORIDE 9 MG/ML
20 INJECTION, SOLUTION INTRAVENOUS ONCE
OUTPATIENT
Start: 2024-03-14

## 2024-02-23 RX ORDER — DIPHENHYDRAMINE HCL 25 MG
25 TABLET ORAL ONCE
OUTPATIENT
Start: 2024-03-14

## 2024-02-23 RX ORDER — METHYLPREDNISOLONE SODIUM SUCCINATE 125 MG/2ML
40 INJECTION, POWDER, LYOPHILIZED, FOR SOLUTION INTRAMUSCULAR; INTRAVENOUS ONCE
OUTPATIENT
Start: 2024-03-14

## 2024-02-23 RX ORDER — ACETAMINOPHEN 325 MG/1
650 TABLET ORAL ONCE
OUTPATIENT
Start: 2024-03-14

## 2024-02-23 NOTE — TELEPHONE ENCOUNTER
LM for pt letting her know now PA is needed for her infusion. Asked for them to call us back and give us days, times that work for her so we are able to set up her appt.

## 2024-02-23 NOTE — PROGRESS NOTES
HPI: Ирина Hector is a 47 y/o female who presents for further evaluation RA, ANCA associated vasculitis. She has past medical history asthma, RA, AAV    Since last visit she has tapered down prednisone to 10 mg daily.  Pain, swelling and stiffness small joints hands and feet much better. Neuropathy hands also better but has neuropathy LE. Now down to 10 mg daily.     Also on gabapentin (600-600-900)     She was on amitriptyline 10mg QHS (sent to [facility] pharmacy due to severe corn allergy per Dr. Rodarte)      S/p left sural nerve and quadriceps muscle biopsy c/w vasculitis     She was admitted MPA causing neuropathy and muscle weakness.  She has long-standing RA diagnosed at age 14 and has required multiple joint replacement surgeries including right shoulder, bilateral hips and bilateral knees, right elbow.   She has also had bilateral ankle fusions.  She has had UE/LE weakness and paresthesias for the past month.   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  She has completed 4 dose IV Rituxan 9/21/23. Per Dr Rodarte maintenance 500-1000 every 6 months. She is on 40 mg prednisone.      H/o RA dx when she was 14. She was on MTX, Gold injections, HCQ, Remicade (severe dyspnea) in the past. She was lost to f/u prior rheumatologist > 20 years (Dr. Mariza Aparicio) Subsequently transitioned to another rheumatologist Dr. Webb maintained on prednisone. Lost to f/u with Dr. Webb in 2008. Since then her RA was managed per PCP.     She has had severe reaction to Ibuprofen but can tolerate Meloxicam.          --------------------------------------------------------------------------------------------------------        ROS:    + as above with the addition of numbness and tingling     - for: Fevers, Chills or sweats.  No HAs or scalp tenderness.  No jaw claudication.  No acute visual or eye changes.  No dry eyes.  No auditory complaints.  No oral lesions or ulcers.  No dry  mouth.  No sore throat or cough.  No chest pains or palpitations.  No shortness of breath, dyspnea on exertion or wheezing.  No hemotpysis.  No abdominal pain, GERD symptoms, diarrhea or constipation.  No urinary complaints.  No rashes.    All other ROS was reviewed and negative except as above         --------------------------------------------------------------------------------------------------------    Past Medical History    Past Medical History:   Diagnosis Date    Asthma     Cataract     Clotting disorder ([facility])     Eczema     Hives     Night sweats     Osteopetrosis     Pneumonia     RA (rheumatoid arthritis) ([facility])     Vasculitis ([facility])     Visit for monitoring Rituxan therapy     Visual changes     health    Wears glasses            Past Surgical History    Past Surgical History:   Procedure Laterality Date    ANKLE SURGERY      2 surgerys     SECTION      ELBOW SURGERY      IR LUMBAR PUNCTURE  2023    IR MIDLINE PLACEMENT  2023    JOINT REPLACEMENT      knees bilateral    MUSCLE BIOPSY Left 2023    Procedure: Muscle biopsy;  Surgeon: Sho Agarwal DO;  [facility];  Service: General    TOTAL SHOULDER REPLACEMENT      WOUND DEBRIDEMENT Left 2023    Procedure: Sural nerve biopsy;  Surgeon: Matt Rubalcava;  [facility]: [facility];  Service: Neurosurgery           Family History    Family History   Problem Relation Age of Onset    No Known Problems Mother     Hypertension Father     No Known Problems Sister     Hypertension Brother     No Known Problems Daughter             Social History    Social History     Tobacco Use    Smoking status: Never    Smokeless tobacco: Never   Vaping Use    Vaping status: Never Used   Substance Use Topics    Alcohol use: Not Currently     Comment: none    Drug use: Never     Comment: none         Allergies    Allergies   Allergen Reactions    Corn Oil - Food Allergy - Food Allergy Shortness Of Breath     Just corn    Gluten Meal - Food Allergy  "Shortness Of Breath     Just gluten    Infliximab Hypertension and Throat Swelling    Isoflavones (Soy) Shortness Of Breath    Motrin [Ibuprofen] Throat Swelling    Oseltamivir Shortness Of Breath    Yeast - Food Allergy Shortness Of Breath    Lyrica [Pregabalin] Tongue Swelling     Possible allergy.  Taken previously without reaction, but had significant tongue swelling when taken with acetaminophen (7/2023).    Tilactase     Medical Tape Rash     If on for too long; please use paper tape    Sulfa Antibiotics Hives         Medications    Current Outpatient Medications   Medication Instructions    amitriptyline (ELAVIL) 10 mg, Oral, Daily at bedtime    apixaban (ELIQUIS) 5 mg, Oral, 2 times daily    cholecalciferol (VITAMIN D3) 400 Units, Oral, Daily    cyanocobalamin (VITAMIN B-12) 1,000 mcg, Oral, Daily    EPINEPHrine (EPIPEN) 0.3 mg, Intramuscular, Once    Ferrous Gluconate (Iron) 240 (27 Fe) MG TABS Oral    gabapentin (NEURONTIN) 900 mg, Oral, Daily at bedtime    gabapentin (NEURONTIN) 600 mg, Oral, 2 times daily    Magnesium Oxide 400 MG CAPS 1 capsule, Oral    predniSONE 10 MG (21) TBPK     pyridoxine (VITAMIN B6) 50 mg, Oral, Daily    Ventolin  (90 Base) MCG/ACT inhaler As needed          Physical Exam    /62   Pulse 95   Ht 5' 7\" (1.702 m)   SpO2 100%   BMI 17.23 kg/m²     GEN: AAO, No apparent distress.  Patient is well developed.  HEENT:  Pupils are equal, round and reactive.  Sclera are clear.  Fundoscopic exam is normal.  External ears are without lesions.  Oral pharynx is clear of ulcers or other lesions.  MMM.   NECK:  Supple.  There is no adenopathy appreciable in anterior or posterior cervical chains or supraclavicularly.  JVP is normal.    HEART: Regular rate and rhythm.  There is no appreciable murmur, gallop or rub.  LUNGS: Clear to auscultation.  ABD:  Soft, without tenderness, rebound or guarding.  No appreciable organomegally.  NEURO: Speech and cognition are normal.  Strength " is 5/5 throughout.  Tone is normal.  DTRs are 2/4 at the knees, ankles and elbows.  Gait is normal.  SKIN: There are no rashes or lesions    MUSCULOSKELETAL:   B/l ulnar deviation      ________________________________________________________________________          Results Review    Component      Latest Ref Rng 8/31/2023   WBC      4.31 - 10.16 Thousand/uL 13.06 (H)    Red Blood Cell Count      3.81 - 5.12 Million/uL 2.92 (L)    Hemoglobin      11.5 - 15.4 g/dL 8.3 (L)    HCT      34.8 - 46.1 % 27.5 (L)    MCV      82 - 98 fL 94    MCH      26.8 - 34.3 pg 28.4    MCHC      31.4 - 37.4 g/dL 30.2 (L)    RDW      11.6 - 15.1 % 22.7 (H)    MPV      8.9 - 12.7 fL 8.1 (L)    Platelet Count      149 - 390 Thousands/uL 409 (H)    Sodium      135 - 147 mmol/L 136    Potassium      3.5 - 5.3 mmol/L 4.3    Chloride      96 - 108 mmol/L 103    CO2      21 - 32 mmol/L 25    Anion Gap      mmol/L 8    BUN      5 - 25 mg/dL 33 (H)    Creatinine      0.60 - 1.30 mg/dL 0.39 (L)    Glucose, Random      65 - 140 mg/dL 129    Calcium      8.4 - 10.2 mg/dL 8.2 (L)    eGFR      ml/min/1.73sq m 126       Legend:  (H) High  (L) Low        Impressions     MPO with neuropathy  RA    Plans:    Chronic RA with deformities and now with ANCA associated vasculitis (MPA with neuropathy, + MPO and positive left sural biopsy)  Already completed 4 doses IV Rituxan per Dr Rodarte (last dose 9/21/23)  Transition to maintenance dosing 1 gm days 1 and 15   Lower prednisone  5 mg daily  She has had severe reaction to Ibuprofen but can tolerate Meloxicam.  Also on gabapentin 600-600-900  F/u neurology    RTC 4-5 months      Thank you for involving me in this patient's care.        Luan Suarez MD  Department of Rheumatology  [facility]

## 2024-02-23 NOTE — TELEPHONE ENCOUNTER
Please prior auth and schedule    Rituximab 1000 mg IV days 1 and 15    Diagnosis: ANCA associated vasculitis, microscopic polyangiitis,    This is continuation of existing therapy

## 2024-02-24 LAB — ZINC SERPL-MCNC: 80 UG/DL (ref 44–115)

## 2024-02-26 NOTE — TELEPHONE ENCOUNTER
Patients  called in regards to [facility] infusion appointments that were scheduled. She wanted to be set up in the [place] location, not [place]. I warm transferred her  to the [facility] to have her appointments changed.  She is now scheduled in [place] on:  3/14/24 @ 8:30am & 3/28/24 @ 8:30am. Thank you

## 2024-03-14 ENCOUNTER — HOSPITAL ENCOUNTER (OUTPATIENT)
Dept: INFUSION CENTER | Facility: HOSPITAL | Age: 49
End: 2024-03-14
Payer: MEDICARE

## 2024-03-14 VITALS
DIASTOLIC BLOOD PRESSURE: 85 MMHG | HEART RATE: 79 BPM | RESPIRATION RATE: 16 BRPM | SYSTOLIC BLOOD PRESSURE: 132 MMHG | TEMPERATURE: 96.6 F

## 2024-03-14 DIAGNOSIS — M05.79 RHEUMATOID ARTHRITIS INVOLVING MULTIPLE SITES WITH POSITIVE RHEUMATOID FACTOR (HCC): Primary | ICD-10-CM

## 2024-03-14 DIAGNOSIS — I77.6 VASCULITIS (HCC): ICD-10-CM

## 2024-03-14 DIAGNOSIS — G57.93 NEUROPATHY INVOLVING BOTH LOWER EXTREMITIES: ICD-10-CM

## 2024-03-14 PROCEDURE — 96415 CHEMO IV INFUSION ADDL HR: CPT

## 2024-03-14 PROCEDURE — 96375 TX/PRO/DX INJ NEW DRUG ADDON: CPT

## 2024-03-14 PROCEDURE — 96413 CHEMO IV INFUSION 1 HR: CPT

## 2024-03-14 RX ORDER — SODIUM CHLORIDE 9 MG/ML
20 INJECTION, SOLUTION INTRAVENOUS ONCE
Status: COMPLETED | OUTPATIENT
Start: 2024-03-14 | End: 2024-03-14

## 2024-03-14 RX ORDER — ACETAMINOPHEN 325 MG/1
650 TABLET ORAL ONCE
Status: CANCELLED | OUTPATIENT
Start: 2024-03-28

## 2024-03-14 RX ORDER — ACETAMINOPHEN 325 MG/1
650 TABLET ORAL ONCE
Status: COMPLETED | OUTPATIENT
Start: 2024-03-14 | End: 2024-03-14

## 2024-03-14 RX ORDER — SODIUM CHLORIDE 9 MG/ML
20 INJECTION, SOLUTION INTRAVENOUS ONCE
Status: CANCELLED | OUTPATIENT
Start: 2024-03-28

## 2024-03-14 RX ORDER — DIPHENHYDRAMINE HCL 25 MG
25 TABLET ORAL ONCE
Status: COMPLETED | OUTPATIENT
Start: 2024-03-14 | End: 2024-03-14

## 2024-03-14 RX ORDER — DIPHENHYDRAMINE HCL 25 MG
25 TABLET ORAL ONCE
Status: CANCELLED | OUTPATIENT
Start: 2024-03-28

## 2024-03-14 RX ORDER — METHYLPREDNISOLONE SODIUM SUCCINATE 125 MG/2ML
40 INJECTION, POWDER, LYOPHILIZED, FOR SOLUTION INTRAMUSCULAR; INTRAVENOUS ONCE
Status: CANCELLED | OUTPATIENT
Start: 2024-03-28

## 2024-03-14 RX ORDER — METHYLPREDNISOLONE SODIUM SUCCINATE 40 MG/ML
40 INJECTION, POWDER, LYOPHILIZED, FOR SOLUTION INTRAMUSCULAR; INTRAVENOUS ONCE
Status: COMPLETED | OUTPATIENT
Start: 2024-03-14 | End: 2024-03-14

## 2024-03-14 RX ADMIN — SODIUM CHLORIDE 20 ML/HR: 0.9 INJECTION, SOLUTION INTRAVENOUS at 09:20

## 2024-03-14 RX ADMIN — RITUXIMAB 1000 MG: 10 INJECTION, SOLUTION INTRAVENOUS at 10:07

## 2024-03-14 RX ADMIN — METHYLPREDNISOLONE SODIUM SUCCINATE 40 MG: 40 INJECTION, POWDER, FOR SOLUTION INTRAMUSCULAR; INTRAVENOUS at 09:22

## 2024-03-14 RX ADMIN — ACETAMINOPHEN 650 MG: 325 TABLET ORAL at 09:22

## 2024-03-14 RX ADMIN — DIPHENHYDRAMINE HYDROCHLORIDE 25 MG: 25 TABLET ORAL at 09:22

## 2024-03-14 NOTE — PROGRESS NOTES
Patient tolerated IV Rituxan without issues.      Ирина Hector is aware of future appt on 3/26/24 at 0800. Patient requested that her 3/28 be rescheduled to 3/26 due to going out of town for the holiday.    AVS -  No (Declined by Ирина Hector) Patient has mychart.    Patient taken in wheelchair by  off unit without incident.  All personal belongings taken with patient.

## 2024-03-26 ENCOUNTER — HOSPITAL ENCOUNTER (OUTPATIENT)
Dept: INFUSION CENTER | Facility: HOSPITAL | Age: 49
Discharge: HOME | End: 2024-03-26
Payer: MEDICARE

## 2024-03-26 VITALS
TEMPERATURE: 96.9 F | HEART RATE: 80 BPM | DIASTOLIC BLOOD PRESSURE: 96 MMHG | RESPIRATION RATE: 18 BRPM | SYSTOLIC BLOOD PRESSURE: 135 MMHG

## 2024-03-26 DIAGNOSIS — G57.93 NEUROPATHY INVOLVING BOTH LOWER EXTREMITIES: ICD-10-CM

## 2024-03-26 DIAGNOSIS — M05.79 RHEUMATOID ARTHRITIS INVOLVING MULTIPLE SITES WITH POSITIVE RHEUMATOID FACTOR (HCC): Primary | ICD-10-CM

## 2024-03-26 DIAGNOSIS — I77.6 VASCULITIS (HCC): ICD-10-CM

## 2024-03-26 PROCEDURE — 96375 TX/PRO/DX INJ NEW DRUG ADDON: CPT

## 2024-03-26 PROCEDURE — 96415 CHEMO IV INFUSION ADDL HR: CPT

## 2024-03-26 PROCEDURE — 96413 CHEMO IV INFUSION 1 HR: CPT

## 2024-03-26 RX ORDER — DIPHENHYDRAMINE HCL 25 MG
25 TABLET ORAL ONCE
Status: CANCELLED | OUTPATIENT
Start: 2024-03-28

## 2024-03-26 RX ORDER — METHYLPREDNISOLONE SODIUM SUCCINATE 40 MG/ML
40 INJECTION, POWDER, LYOPHILIZED, FOR SOLUTION INTRAMUSCULAR; INTRAVENOUS ONCE
Status: COMPLETED | OUTPATIENT
Start: 2024-03-26 | End: 2024-03-26

## 2024-03-26 RX ORDER — SODIUM CHLORIDE 9 MG/ML
20 INJECTION, SOLUTION INTRAVENOUS ONCE
Status: CANCELLED | OUTPATIENT
Start: 2024-03-28

## 2024-03-26 RX ORDER — METHYLPREDNISOLONE SODIUM SUCCINATE 125 MG/2ML
40 INJECTION, POWDER, LYOPHILIZED, FOR SOLUTION INTRAMUSCULAR; INTRAVENOUS ONCE
Status: CANCELLED | OUTPATIENT
Start: 2024-03-28

## 2024-03-26 RX ORDER — ACETAMINOPHEN 325 MG/1
650 TABLET ORAL ONCE
Status: CANCELLED | OUTPATIENT
Start: 2024-03-28

## 2024-03-26 RX ORDER — ACETAMINOPHEN 325 MG/1
650 TABLET ORAL ONCE
Status: COMPLETED | OUTPATIENT
Start: 2024-03-26 | End: 2024-03-26

## 2024-03-26 RX ORDER — DIPHENHYDRAMINE HCL 25 MG
25 TABLET ORAL ONCE
Status: COMPLETED | OUTPATIENT
Start: 2024-03-26 | End: 2024-03-26

## 2024-03-26 RX ORDER — SODIUM CHLORIDE 9 MG/ML
20 INJECTION, SOLUTION INTRAVENOUS ONCE
Status: COMPLETED | OUTPATIENT
Start: 2024-03-26 | End: 2024-03-26

## 2024-03-26 RX ADMIN — ACETAMINOPHEN 650 MG: 325 TABLET ORAL at 08:47

## 2024-03-26 RX ADMIN — METHYLPREDNISOLONE SODIUM SUCCINATE 40 MG: 40 INJECTION, POWDER, FOR SOLUTION INTRAMUSCULAR; INTRAVENOUS at 08:46

## 2024-03-26 RX ADMIN — SODIUM CHLORIDE 20 ML/HR: 0.9 INJECTION, SOLUTION INTRAVENOUS at 09:20

## 2024-03-26 RX ADMIN — DIPHENHYDRAMINE HYDROCHLORIDE 25 MG: 25 TABLET ORAL at 08:47

## 2024-03-26 RX ADMIN — RITUXIMAB 1000 MG: 10 INJECTION, SOLUTION INTRAVENOUS at 09:47

## 2024-03-26 NOTE — PROGRESS NOTES
Ирина Hector  tolerated Rituxan well with no complications.  No S/S of adverse reaction.    Ирина Hector has no further appointments at this time she states that after she sees the rheumatologist next appt will be made.     AVS declined by Ирина Hector left in stable condition with her  via wheelchair.

## 2024-05-15 NOTE — ASSESSMENT & PLAN NOTE
Addended by: HAYDEN KELLY on: 5/15/2024 04:33 PM     Modules accepted: Orders     Malnutrition Findings:   Adult Malnutrition type: Chronic illness  Adult Degree of Malnutrition: Malnutrition of moderate degree  Malnutrition Characteristics: Muscle loss, Fat loss                  360 Statement: Malnutrition of moderate degree in the context of chronic illness as evidenced by moderate depletion of the temples, visible clavicle and moderate buccal fat pad loss. Treated with oral diet and oral nutrition supplement. BMI Findings:  Adult BMI Classifications: Underweight < 18.5        Body mass index is 15.43 kg/m².

## 2024-06-20 ENCOUNTER — TELEPHONE (OUTPATIENT)
Dept: RHEUMATOLOGY | Facility: CLINIC | Age: 49
End: 2024-06-20

## 2024-08-06 ENCOUNTER — LAB (OUTPATIENT)
Dept: LAB | Facility: HOSPITAL | Age: 49
End: 2024-08-06
Payer: MEDICARE

## 2024-08-06 ENCOUNTER — TELEPHONE (OUTPATIENT)
Age: 49
End: 2024-08-06

## 2024-08-06 DIAGNOSIS — M19.90 ARTHRITIS: ICD-10-CM

## 2024-08-06 DIAGNOSIS — I77.6 VASCULITIS (HCC): ICD-10-CM

## 2024-08-06 DIAGNOSIS — M31.7 MICROSCOPIC POLYANGIITIS (HCC): ICD-10-CM

## 2024-08-06 LAB
ALBUMIN SERPL BCG-MCNC: 4.3 G/DL (ref 3.5–5)
ALP SERPL-CCNC: 81 U/L (ref 34–104)
ALT SERPL W P-5'-P-CCNC: 17 U/L (ref 7–52)
ANION GAP SERPL CALCULATED.3IONS-SCNC: 8 MMOL/L (ref 4–13)
AST SERPL W P-5'-P-CCNC: 21 U/L (ref 13–39)
BASOPHILS # BLD AUTO: 0.05 THOUSANDS/ÂΜL (ref 0–0.1)
BASOPHILS NFR BLD AUTO: 1 % (ref 0–1)
BILIRUB SERPL-MCNC: 0.46 MG/DL (ref 0.2–1)
BUN SERPL-MCNC: 20 MG/DL (ref 5–25)
C3 SERPL-MCNC: 126 MG/DL (ref 87–200)
C4 SERPL-MCNC: 38 MG/DL (ref 19–52)
CALCIUM SERPL-MCNC: 9.2 MG/DL (ref 8.4–10.2)
CHLORIDE SERPL-SCNC: 106 MMOL/L (ref 96–108)
CO2 SERPL-SCNC: 24 MMOL/L (ref 21–32)
CREAT SERPL-MCNC: 0.61 MG/DL (ref 0.6–1.3)
EOSINOPHIL # BLD AUTO: 0.51 THOUSAND/ÂΜL (ref 0–0.61)
EOSINOPHIL NFR BLD AUTO: 10 % (ref 0–6)
ERYTHROCYTE [DISTWIDTH] IN BLOOD BY AUTOMATED COUNT: 14.8 % (ref 11.6–15.1)
GFR SERPL CREATININE-BSD FRML MDRD: 109 ML/MIN/1.73SQ M
GLUCOSE P FAST SERPL-MCNC: 81 MG/DL (ref 65–99)
HCT VFR BLD AUTO: 44.7 % (ref 34.8–46.1)
HGB BLD-MCNC: 14.2 G/DL (ref 11.5–15.4)
IMM GRANULOCYTES # BLD AUTO: 0.01 THOUSAND/UL (ref 0–0.2)
IMM GRANULOCYTES NFR BLD AUTO: 0 % (ref 0–2)
LYMPHOCYTES # BLD AUTO: 1.41 THOUSANDS/ÂΜL (ref 0.6–4.47)
LYMPHOCYTES NFR BLD AUTO: 27 % (ref 14–44)
MCH RBC QN AUTO: 29.3 PG (ref 26.8–34.3)
MCHC RBC AUTO-ENTMCNC: 31.8 G/DL (ref 31.4–37.4)
MCV RBC AUTO: 92 FL (ref 82–98)
MONOCYTES # BLD AUTO: 0.68 THOUSAND/ÂΜL (ref 0.17–1.22)
MONOCYTES NFR BLD AUTO: 13 % (ref 4–12)
NEUTROPHILS # BLD AUTO: 2.63 THOUSANDS/ÂΜL (ref 1.85–7.62)
NEUTS SEG NFR BLD AUTO: 49 % (ref 43–75)
NRBC BLD AUTO-RTO: 0 /100 WBCS
PLATELET # BLD AUTO: 347 THOUSANDS/UL (ref 149–390)
PMV BLD AUTO: 10 FL (ref 8.9–12.7)
POTASSIUM SERPL-SCNC: 4.2 MMOL/L (ref 3.5–5.3)
PROT SERPL-MCNC: 7.2 G/DL (ref 6.4–8.4)
RBC # BLD AUTO: 4.85 MILLION/UL (ref 3.81–5.12)
SODIUM SERPL-SCNC: 138 MMOL/L (ref 135–147)
WBC # BLD AUTO: 5.29 THOUSAND/UL (ref 4.31–10.16)

## 2024-08-06 PROCEDURE — 86160 COMPLEMENT ANTIGEN: CPT

## 2024-08-06 PROCEDURE — 86162 COMPLEMENT TOTAL (CH50): CPT

## 2024-08-06 PROCEDURE — 85025 COMPLETE CBC W/AUTO DIFF WBC: CPT

## 2024-08-06 PROCEDURE — 80053 COMPREHEN METABOLIC PANEL: CPT

## 2024-08-06 NOTE — TELEPHONE ENCOUNTER
Pt calling to let Dr. Suarez know she is having a skin flare up. She spoke to her primary care provider and he prescribed her a prednisone burst, however he instructed her to be seen by her rheumatologist in the next seven days. No available apts in that time frame, pt added to wait list.

## 2024-08-07 LAB — CH50 SERPL-ACNC: >60 U/ML

## 2024-08-09 ENCOUNTER — APPOINTMENT (OUTPATIENT)
Dept: LAB | Facility: HOSPITAL | Age: 49
End: 2024-08-09
Payer: MEDICARE

## 2024-08-09 DIAGNOSIS — M31.7 MICROSCOPIC POLYANGIITIS (HCC): ICD-10-CM

## 2024-08-09 DIAGNOSIS — I77.6 VASCULITIS (HCC): ICD-10-CM

## 2024-08-09 LAB
BACTERIA UR QL AUTO: ABNORMAL /HPF
BILIRUB UR QL STRIP: NEGATIVE
CLARITY UR: CLEAR
COLOR UR: YELLOW
GLUCOSE UR STRIP-MCNC: NEGATIVE MG/DL
HGB UR QL STRIP.AUTO: NEGATIVE
KETONES UR STRIP-MCNC: NEGATIVE MG/DL
LEUKOCYTE ESTERASE UR QL STRIP: NEGATIVE
MUCOUS THREADS UR QL AUTO: ABNORMAL
NITRITE UR QL STRIP: NEGATIVE
NON-SQ EPI CELLS URNS QL MICRO: ABNORMAL /HPF
PH UR STRIP.AUTO: 6 [PH]
PROT UR STRIP-MCNC: NEGATIVE MG/DL
RBC #/AREA URNS AUTO: ABNORMAL /HPF
SP GR UR STRIP.AUTO: >=1.03
UROBILINOGEN UR QL STRIP.AUTO: 0.2 E.U./DL
WBC #/AREA URNS AUTO: ABNORMAL /HPF

## 2024-08-09 PROCEDURE — 81001 URINALYSIS AUTO W/SCOPE: CPT

## 2024-08-21 ENCOUNTER — TELEPHONE (OUTPATIENT)
Dept: RHEUMATOLOGY | Facility: CLINIC | Age: 49
End: 2024-08-21

## 2024-08-21 ENCOUNTER — OFFICE VISIT (OUTPATIENT)
Dept: RHEUMATOLOGY | Facility: CLINIC | Age: 49
End: 2024-08-21
Payer: MEDICARE

## 2024-08-21 VITALS — DIASTOLIC BLOOD PRESSURE: 88 MMHG | HEART RATE: 110 BPM | OXYGEN SATURATION: 100 % | SYSTOLIC BLOOD PRESSURE: 126 MMHG

## 2024-08-21 DIAGNOSIS — M31.7 MICROSCOPIC POLYANGIITIS (HCC): ICD-10-CM

## 2024-08-21 DIAGNOSIS — G57.93 NEUROPATHY INVOLVING BOTH LOWER EXTREMITIES: ICD-10-CM

## 2024-08-21 DIAGNOSIS — L10.9 PEMPHIGUS: ICD-10-CM

## 2024-08-21 DIAGNOSIS — I77.6 VASCULITIS (HCC): Primary | ICD-10-CM

## 2024-08-21 DIAGNOSIS — I77.6 VASCULITIS (HCC): ICD-10-CM

## 2024-08-21 DIAGNOSIS — M05.79 RHEUMATOID ARTHRITIS INVOLVING MULTIPLE SITES WITH POSITIVE RHEUMATOID FACTOR (HCC): Primary | ICD-10-CM

## 2024-08-21 DIAGNOSIS — M05.79 RHEUMATOID ARTHRITIS INVOLVING MULTIPLE SITES WITH POSITIVE RHEUMATOID FACTOR (HCC): ICD-10-CM

## 2024-08-21 PROCEDURE — G2211 COMPLEX E/M VISIT ADD ON: HCPCS | Performed by: INTERNAL MEDICINE

## 2024-08-21 PROCEDURE — 99215 OFFICE O/P EST HI 40 MIN: CPT | Performed by: INTERNAL MEDICINE

## 2024-08-21 RX ORDER — SODIUM CHLORIDE 9 MG/ML
20 INJECTION, SOLUTION INTRAVENOUS ONCE
OUTPATIENT
Start: 2024-08-21

## 2024-08-21 RX ORDER — ACETAMINOPHEN 325 MG/1
650 TABLET ORAL ONCE
OUTPATIENT
Start: 2024-08-21

## 2024-08-21 RX ORDER — METHYLPREDNISOLONE SODIUM SUCCINATE 125 MG/2ML
40 INJECTION, POWDER, LYOPHILIZED, FOR SOLUTION INTRAMUSCULAR; INTRAVENOUS ONCE
OUTPATIENT
Start: 2024-08-21

## 2024-08-21 RX ORDER — DIPHENHYDRAMINE HCL 25 MG
25 TABLET ORAL ONCE
OUTPATIENT
Start: 2024-08-21

## 2024-08-21 RX ORDER — PREDNISONE 5 MG/1
TABLET ORAL
Qty: 50 TABLET | Refills: 0 | Status: SHIPPED | OUTPATIENT
Start: 2024-08-21

## 2024-08-21 RX ORDER — CYPROHEPTADINE HYDROCHLORIDE 4 MG/1
TABLET ORAL
COMMUNITY
Start: 2024-08-05

## 2024-08-21 NOTE — PROGRESS NOTES
"    HPI: Ирина Hector is a 49 y/o female who presents for further evaluation RA, ANCA associated vasculitis. She has past medical history asthma, RA, AAV.     Since her last visit she has had widespread rash arms and chest , abdomen and legs. There is mild improvement with steroid. She mentions she was told \"years ago\" with bullous pemphigoid.    She has tapered down to 5 mg daily.  IV Rituxan 3/14 and 3/26  Neuropathy hands also better but has neuropathy LE.     Also on gabapentin (600-600-900)     She was on amitriptyline 10mg QHS (sent to [facility] pharmacy due to severe corn allergy per Dr. Rodarte)      S/p left sural nerve and quadriceps muscle biopsy c/w vasculitis     She was admitted MPA causing neuropathy and muscle weakness.  She has long-standing RA diagnosed at age 14 and has required multiple joint replacement surgeries including right shoulder, bilateral hips and bilateral knees, right elbow.   She has also had bilateral ankle fusions.  She has had UE/LE weakness and paresthesias for the past month.   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  She has completed 4 dose IV Rituxan 9/21/23. Per Dr Rodarte maintenance 500-1000 every 6 months. She is on 40 mg prednisone.      H/o RA dx when she was 14. She was on MTX, Gold injections, HCQ, Remicade (severe dyspnea) in the past. She was lost to f/u prior rheumatologist > 20 years (Dr. Mariza Aparicio) Subsequently transitioned to another rheumatologist Dr. Webb maintained on prednisone. Lost to f/u with Dr. Webb in 2008. Since then her RA was managed per PCP.     She has had severe reaction to Ibuprofen but can tolerate Meloxicam.          --------------------------------------------------------------------------------------------------------        ROS:    + as above with the addition of numbness and tingling     - for: Fevers, Chills or sweats.  No HAs or scalp tenderness.  No jaw claudication.  No acute visual or " eye changes.  No dry eyes.  No auditory complaints.  No oral lesions or ulcers.  No dry mouth.  No sore throat or cough.  No chest pains or palpitations.  No shortness of breath, dyspnea on exertion or wheezing.  No hemotpysis.  No abdominal pain, GERD symptoms, diarrhea or constipation.  No urinary complaints.  No rashes.    All other ROS was reviewed and negative except as above         --------------------------------------------------------------------------------------------------------    Past Medical History    Past Medical History:   Diagnosis Date    Asthma     Cataract     Clotting disorder ([facility])     Eczema     Hives     Night sweats     Osteopetrosis     Pneumonia     RA (rheumatoid arthritis) ([facility])     Vasculitis ([facility])     Visit for monitoring Rituxan therapy     Visual changes     health    Wears glasses            Past Surgical History    Past Surgical History:   Procedure Laterality Date    ANKLE SURGERY      2 surgerys     SECTION      ELBOW SURGERY      IR LUMBAR PUNCTURE  2023    IR MIDLINE PLACEMENT  2023    JOINT REPLACEMENT      knees bilateral    MUSCLE BIOPSY Left 2023    Procedure: Muscle biopsy;  Surgeon: Sho Agarwal DO;  Location: [facility];  Service: General    TOTAL SHOULDER REPLACEMENT      WOUND DEBRIDEMENT Left 2023    Procedure: Sural nerve biopsy;  Surgeon: Matt Rubalcava;  Location: [facility];  Service: Neurosurgery           Family History    Family History   Problem Relation Age of Onset    No Known Problems Mother     Hypertension Father     No Known Problems Sister     Hypertension Brother     No Known Problems Daughter             Social History    Social History     Tobacco Use    Smoking status: Never    Smokeless tobacco: Never   Vaping Use    Vaping status: Never Used   Substance Use Topics    Alcohol use: Not Currently     Comment: none    Drug use: Never     Comment: none         Allergies    Allergies   Allergen Reactions    Gluten Meal -  Food Allergy Shortness Of Breath     Just gluten    Infliximab Hypertension and Throat Swelling    Motrin [Ibuprofen] Throat Swelling    Oseltamivir Shortness Of Breath    Lyrica [Pregabalin] Tongue Swelling     Possible allergy.  Taken previously without reaction, but had significant tongue swelling when taken with acetaminophen (7/2023).    Tilactase     Medical Tape Rash     If on for too long; please use paper tape    Sulfa Antibiotics Hives         Medications    Current Outpatient Medications   Medication Instructions    cholecalciferol (VITAMIN D3) 400 Units, Oral, Daily    cyanocobalamin (VITAMIN B-12) 1,000 mcg, Oral, Daily    cyproheptadine (PERIACTIN) 4 mg tablet     EPINEPHrine (EPIPEN) 0.3 mg, Intramuscular, Once    Ferrous Gluconate (Iron) 240 (27 Fe) MG TABS Oral    gabapentin (NEURONTIN) 900 mg, Oral, Daily at bedtime    gabapentin (NEURONTIN) 600 mg, Oral, 2 times daily    Magnesium Oxide 400 MG CAPS 1 capsule, Oral    predniSONE 5 mg, Oral, Daily    pyridoxine (VITAMIN B6) 50 mg, Oral, Daily    Ventolin  (90 Base) MCG/ACT inhaler As needed          Physical Exam    /88   Pulse (!) 110   SpO2 100%     GEN: AAO, No apparent distress.  Patient is well developed.  HEENT:  Pupils are equal, round and reactive.  Sclera are clear.  Fundoscopic exam is normal.  External ears are without lesions.  Oral pharynx is clear of ulcers or other lesions.  MMM.   NECK:  Supple.  There is no adenopathy appreciable in anterior or posterior cervical chains or supraclavicularly.  JVP is normal.    HEART: Regular rate and rhythm.  There is no appreciable murmur, gallop or rub.  LUNGS: Clear to auscultation.  ABD:  Soft, without tenderness, rebound or guarding.  No appreciable organomegally.  NEURO: Speech and cognition are normal.  Strength is 5/5 throughout.  Tone is normal.  DTRs are 2/4 at the knees, ankles and elbows.  Gait is normal.  SKIN: There are no rashes or lesions    MUSCULOSKELETAL:   B/l  ulnar deviation      ________________________________________________________________________          Results Review    Component      Latest Ref Rng 8/31/2023   WBC      4.31 - 10.16 Thousand/uL 13.06 (H)    Red Blood Cell Count      3.81 - 5.12 Million/uL 2.92 (L)    Hemoglobin      11.5 - 15.4 g/dL 8.3 (L)    HCT      34.8 - 46.1 % 27.5 (L)    MCV      82 - 98 fL 94    MCH      26.8 - 34.3 pg 28.4    MCHC      31.4 - 37.4 g/dL 30.2 (L)    RDW      11.6 - 15.1 % 22.7 (H)    MPV      8.9 - 12.7 fL 8.1 (L)    Platelet Count      149 - 390 Thousands/uL 409 (H)    Sodium      135 - 147 mmol/L 136    Potassium      3.5 - 5.3 mmol/L 4.3    Chloride      96 - 108 mmol/L 103    CO2      21 - 32 mmol/L 25    Anion Gap      mmol/L 8    BUN      5 - 25 mg/dL 33 (H)    Creatinine      0.60 - 1.30 mg/dL 0.39 (L)    Glucose, Random      65 - 140 mg/dL 129    Calcium      8.4 - 10.2 mg/dL 8.2 (L)    eGFR      ml/min/1.73sq m 126       Legend:  (H) High  (L) Low        Impressions     MPO with neuropathy  RA    Plans:    Chronic RA with deformities and now with ANCA associated vasculitis (MPA with neuropathy, + MPO and positive left sural biopsy)  Already completed 4 doses IV Rituxan per Dr Rodarte (last dose IV Rituxan 3/14 and 3/26 )  continue 1 gm days 1 and 15 for now  New onset rash ?pemphigous (she has similar rash in the past) Refer to derm  Prednisone taper to baseline 5 mg daily  She has had severe reaction to Ibuprofen but can tolerate Meloxicam.  Also on gabapentin 600-600-900  F/u neurology    RTC 4-5 months      Thank you for involving me in this patient's care.        Luan Suarez MD  Department of [facility]    I have spent a total time of 45 minutes in caring for this patient on the day of the visit/encounter including Diagnostic results, Prognosis, Risks and benefits of tx options, Instructions for management, Risk factor reductions, Impressions, Counseling /  Coordination of care, Documenting in the medical record, Reviewing / ordering tests, medicine, procedures  , and Obtaining or reviewing history  .

## 2024-08-23 ENCOUNTER — APPOINTMENT (OUTPATIENT)
Dept: LAB | Facility: HOSPITAL | Age: 49
End: 2024-08-23
Payer: MEDICARE

## 2024-08-23 DIAGNOSIS — M05.79 RHEUMATOID ARTHRITIS INVOLVING MULTIPLE SITES WITH POSITIVE RHEUMATOID FACTOR (HCC): ICD-10-CM

## 2024-08-23 DIAGNOSIS — M31.7 MICROSCOPIC POLYANGIITIS (HCC): ICD-10-CM

## 2024-08-23 DIAGNOSIS — I77.6 VASCULITIS (HCC): ICD-10-CM

## 2024-08-23 PROCEDURE — 36415 COLL VENOUS BLD VENIPUNCTURE: CPT

## 2024-08-23 PROCEDURE — 82595 ASSAY OF CRYOGLOBULIN: CPT

## 2024-08-23 PROCEDURE — 86037 ANCA TITER EACH ANTIBODY: CPT

## 2024-08-23 PROCEDURE — 83520 IMMUNOASSAY QUANT NOS NONAB: CPT

## 2024-08-28 LAB
C-ANCA TITR SER IF: ABNORMAL TITER
MYELOPEROXIDASE AB SER IA-ACNC: 4.7 UNITS (ref 0–0.9)
P-ANCA ATYPICAL TITR SER IF: ABNORMAL TITER
P-ANCA TITR SER IF: ABNORMAL TITER
PROTEINASE3 AB SER IA-ACNC: <0.2 UNITS (ref 0–0.9)

## 2024-09-04 LAB — MISCELLANEOUS LAB TEST RESULT: NORMAL

## 2024-09-13 ENCOUNTER — APPOINTMENT (OUTPATIENT)
Dept: LAB | Facility: HOSPITAL | Age: 49
End: 2024-09-13
Payer: MEDICARE

## 2024-09-13 DIAGNOSIS — I77.6 VASCULITIS (HCC): Primary | ICD-10-CM

## 2024-09-13 DIAGNOSIS — G57.93 NEUROPATHY INVOLVING BOTH LOWER EXTREMITIES: ICD-10-CM

## 2024-09-13 DIAGNOSIS — E61.1 IRON DEFICIENCY: ICD-10-CM

## 2024-09-13 DIAGNOSIS — M05.79 RHEUMATOID ARTHRITIS INVOLVING MULTIPLE SITES WITH POSITIVE RHEUMATOID FACTOR (HCC): ICD-10-CM

## 2024-09-13 LAB
ALBUMIN SERPL BCG-MCNC: 4 G/DL (ref 3.5–5)
ALP SERPL-CCNC: 66 U/L (ref 34–104)
ALT SERPL W P-5'-P-CCNC: 13 U/L (ref 7–52)
ANION GAP SERPL CALCULATED.3IONS-SCNC: 9 MMOL/L (ref 4–13)
AST SERPL W P-5'-P-CCNC: 14 U/L (ref 13–39)
BASOPHILS # BLD AUTO: 0.05 THOUSANDS/ÂΜL (ref 0–0.1)
BASOPHILS NFR BLD AUTO: 1 % (ref 0–1)
BILIRUB SERPL-MCNC: 0.64 MG/DL (ref 0.2–1)
BUN SERPL-MCNC: 12 MG/DL (ref 5–25)
CALCIUM SERPL-MCNC: 8.9 MG/DL (ref 8.4–10.2)
CHLORIDE SERPL-SCNC: 102 MMOL/L (ref 96–108)
CO2 SERPL-SCNC: 27 MMOL/L (ref 21–32)
CREAT SERPL-MCNC: 0.66 MG/DL (ref 0.6–1.3)
CRP SERPL QL: 5.5 MG/L
EOSINOPHIL # BLD AUTO: 0.28 THOUSAND/ÂΜL (ref 0–0.61)
EOSINOPHIL NFR BLD AUTO: 5 % (ref 0–6)
ERYTHROCYTE [DISTWIDTH] IN BLOOD BY AUTOMATED COUNT: 13.8 % (ref 11.6–15.1)
ERYTHROCYTE [SEDIMENTATION RATE] IN BLOOD: 15 MM/HOUR (ref 0–19)
FERRITIN SERPL-MCNC: 28 NG/ML (ref 11–307)
GFR SERPL CREATININE-BSD FRML MDRD: 105 ML/MIN/1.73SQ M
GLUCOSE P FAST SERPL-MCNC: 79 MG/DL (ref 65–99)
HCT VFR BLD AUTO: 42.2 % (ref 34.8–46.1)
HGB BLD-MCNC: 13.3 G/DL (ref 11.5–15.4)
IMM GRANULOCYTES # BLD AUTO: 0.02 THOUSAND/UL (ref 0–0.2)
IMM GRANULOCYTES NFR BLD AUTO: 0 % (ref 0–2)
IRON SATN MFR SERPL: 31 % (ref 15–50)
IRON SERPL-MCNC: 96 UG/DL (ref 50–212)
LYMPHOCYTES # BLD AUTO: 1.76 THOUSANDS/ÂΜL (ref 0.6–4.47)
LYMPHOCYTES NFR BLD AUTO: 29 % (ref 14–44)
MCH RBC QN AUTO: 29.7 PG (ref 26.8–34.3)
MCHC RBC AUTO-ENTMCNC: 31.5 G/DL (ref 31.4–37.4)
MCV RBC AUTO: 94 FL (ref 82–98)
MONOCYTES # BLD AUTO: 0.83 THOUSAND/ÂΜL (ref 0.17–1.22)
MONOCYTES NFR BLD AUTO: 14 % (ref 4–12)
NEUTROPHILS # BLD AUTO: 3.07 THOUSANDS/ÂΜL (ref 1.85–7.62)
NEUTS SEG NFR BLD AUTO: 51 % (ref 43–75)
NRBC BLD AUTO-RTO: 0 /100 WBCS
PLATELET # BLD AUTO: 349 THOUSANDS/UL (ref 149–390)
PMV BLD AUTO: 9.3 FL (ref 8.9–12.7)
POTASSIUM SERPL-SCNC: 3.9 MMOL/L (ref 3.5–5.3)
PROT SERPL-MCNC: 6.6 G/DL (ref 6.4–8.4)
RBC # BLD AUTO: 4.48 MILLION/UL (ref 3.81–5.12)
SODIUM SERPL-SCNC: 138 MMOL/L (ref 135–147)
TIBC SERPL-MCNC: 310 UG/DL (ref 250–450)
UIBC SERPL-MCNC: 214 UG/DL (ref 155–355)
WBC # BLD AUTO: 6.01 THOUSAND/UL (ref 4.31–10.16)

## 2024-09-13 PROCEDURE — 82728 ASSAY OF FERRITIN: CPT

## 2024-09-13 PROCEDURE — 83550 IRON BINDING TEST: CPT

## 2024-09-13 PROCEDURE — 86140 C-REACTIVE PROTEIN: CPT

## 2024-09-13 PROCEDURE — 85025 COMPLETE CBC W/AUTO DIFF WBC: CPT

## 2024-09-13 PROCEDURE — 80053 COMPREHEN METABOLIC PANEL: CPT

## 2024-09-13 PROCEDURE — 85652 RBC SED RATE AUTOMATED: CPT

## 2024-09-13 PROCEDURE — 83540 ASSAY OF IRON: CPT

## 2024-09-13 RX ORDER — SODIUM CHLORIDE 9 MG/ML
20 INJECTION, SOLUTION INTRAVENOUS ONCE
Status: CANCELLED | OUTPATIENT
Start: 2024-09-17

## 2024-09-13 RX ORDER — DIPHENHYDRAMINE HCL 25 MG
25 TABLET ORAL ONCE
Status: CANCELLED | OUTPATIENT
Start: 2024-09-17

## 2024-09-13 RX ORDER — METHYLPREDNISOLONE SODIUM SUCCINATE 40 MG/ML
40 INJECTION, POWDER, LYOPHILIZED, FOR SOLUTION INTRAMUSCULAR; INTRAVENOUS ONCE
Status: CANCELLED | OUTPATIENT
Start: 2024-09-17

## 2024-09-13 RX ORDER — ACETAMINOPHEN 325 MG/1
650 TABLET ORAL ONCE
Status: CANCELLED | OUTPATIENT
Start: 2024-09-17

## 2024-09-17 ENCOUNTER — HOSPITAL ENCOUNTER (OUTPATIENT)
Dept: INFUSION CENTER | Facility: HOSPITAL | Age: 49
Discharge: HOME | End: 2024-09-17
Payer: MEDICARE

## 2024-09-17 VITALS
DIASTOLIC BLOOD PRESSURE: 85 MMHG | RESPIRATION RATE: 16 BRPM | TEMPERATURE: 97 F | SYSTOLIC BLOOD PRESSURE: 129 MMHG | HEART RATE: 80 BPM | OXYGEN SATURATION: 100 %

## 2024-09-17 DIAGNOSIS — G57.93 NEUROPATHY INVOLVING BOTH LOWER EXTREMITIES: Primary | ICD-10-CM

## 2024-09-17 DIAGNOSIS — I77.6 VASCULITIS (HCC): ICD-10-CM

## 2024-09-17 DIAGNOSIS — M05.79 RHEUMATOID ARTHRITIS INVOLVING MULTIPLE SITES WITH POSITIVE RHEUMATOID FACTOR (HCC): ICD-10-CM

## 2024-09-17 PROCEDURE — 96415 CHEMO IV INFUSION ADDL HR: CPT

## 2024-09-17 PROCEDURE — 96375 TX/PRO/DX INJ NEW DRUG ADDON: CPT

## 2024-09-17 PROCEDURE — 96413 CHEMO IV INFUSION 1 HR: CPT

## 2024-09-17 RX ORDER — METHYLPREDNISOLONE SODIUM SUCCINATE 40 MG/ML
40 INJECTION, POWDER, LYOPHILIZED, FOR SOLUTION INTRAMUSCULAR; INTRAVENOUS ONCE
Status: COMPLETED | OUTPATIENT
Start: 2024-09-17 | End: 2024-09-17

## 2024-09-17 RX ORDER — DIPHENHYDRAMINE HCL 25 MG
25 TABLET ORAL ONCE
Status: CANCELLED | OUTPATIENT
Start: 2024-10-01

## 2024-09-17 RX ORDER — DIPHENHYDRAMINE HCL 25 MG
25 TABLET ORAL ONCE
Status: COMPLETED | OUTPATIENT
Start: 2024-09-17 | End: 2024-09-17

## 2024-09-17 RX ORDER — SODIUM CHLORIDE 9 MG/ML
20 INJECTION, SOLUTION INTRAVENOUS ONCE
Status: CANCELLED | OUTPATIENT
Start: 2024-10-01

## 2024-09-17 RX ORDER — SODIUM CHLORIDE 9 MG/ML
20 INJECTION, SOLUTION INTRAVENOUS ONCE
Status: COMPLETED | OUTPATIENT
Start: 2024-09-17 | End: 2024-09-17

## 2024-09-17 RX ORDER — ACETAMINOPHEN 325 MG/1
650 TABLET ORAL ONCE
Status: CANCELLED | OUTPATIENT
Start: 2024-10-01

## 2024-09-17 RX ORDER — METHYLPREDNISOLONE SODIUM SUCCINATE 125 MG/2ML
40 INJECTION, POWDER, LYOPHILIZED, FOR SOLUTION INTRAMUSCULAR; INTRAVENOUS ONCE
Status: CANCELLED | OUTPATIENT
Start: 2024-10-01

## 2024-09-17 RX ORDER — ACETAMINOPHEN 325 MG/1
650 TABLET ORAL ONCE
Status: COMPLETED | OUTPATIENT
Start: 2024-09-17 | End: 2024-09-17

## 2024-09-17 RX ADMIN — METHYLPREDNISOLONE SODIUM SUCCINATE 40 MG: 40 INJECTION, POWDER, FOR SOLUTION INTRAMUSCULAR; INTRAVENOUS at 08:58

## 2024-09-17 RX ADMIN — SODIUM CHLORIDE 20 ML/HR: 0.9 INJECTION, SOLUTION INTRAVENOUS at 08:57

## 2024-09-17 RX ADMIN — DIPHENHYDRAMINE HYDROCHLORIDE 25 MG: 25 TABLET ORAL at 08:59

## 2024-09-17 RX ADMIN — ACETAMINOPHEN 650 MG: 325 TABLET ORAL at 08:58

## 2024-09-17 RX ADMIN — RITUXIMAB 1000 MG: 10 INJECTION, SOLUTION INTRAVENOUS at 09:35

## 2024-09-17 NOTE — PROGRESS NOTES
Pt here for Rituxan pt offered no complaints today and denied any recent infection or antibiotic use.   Pt tolerated tx well no adverse reactions noted. 10/2 at 830 next appt and pt declined avs

## 2024-09-17 NOTE — PLAN OF CARE
Problem: Potential for Falls  Goal: Patient will remain free of falls  Description: INTERVENTIONS:  - Educate patient/family on patient safety including physical limitations      Outcome: Progressing

## 2024-09-30 DIAGNOSIS — G57.93 NEUROPATHY INVOLVING BOTH LOWER EXTREMITIES: ICD-10-CM

## 2024-09-30 DIAGNOSIS — I77.6 VASCULITIS (HCC): Primary | ICD-10-CM

## 2024-09-30 DIAGNOSIS — M05.79 RHEUMATOID ARTHRITIS INVOLVING MULTIPLE SITES WITH POSITIVE RHEUMATOID FACTOR (HCC): ICD-10-CM

## 2024-09-30 RX ORDER — ACETAMINOPHEN 325 MG/1
650 TABLET ORAL ONCE
Status: CANCELLED | OUTPATIENT
Start: 2024-10-02

## 2024-09-30 RX ORDER — DIPHENHYDRAMINE HCL 25 MG
25 TABLET ORAL ONCE
Status: CANCELLED | OUTPATIENT
Start: 2024-10-02

## 2024-09-30 RX ORDER — SODIUM CHLORIDE 9 MG/ML
20 INJECTION, SOLUTION INTRAVENOUS ONCE
Status: CANCELLED | OUTPATIENT
Start: 2024-10-02

## 2024-09-30 RX ORDER — METHYLPREDNISOLONE SODIUM SUCCINATE 40 MG/ML
40 INJECTION, POWDER, LYOPHILIZED, FOR SOLUTION INTRAMUSCULAR; INTRAVENOUS ONCE
Status: CANCELLED | OUTPATIENT
Start: 2024-10-02

## 2024-10-02 ENCOUNTER — HOSPITAL ENCOUNTER (OUTPATIENT)
Dept: INFUSION CENTER | Facility: HOSPITAL | Age: 49
Discharge: HOME | End: 2024-10-02
Payer: MEDICARE

## 2024-10-02 VITALS
RESPIRATION RATE: 16 BRPM | OXYGEN SATURATION: 98 % | DIASTOLIC BLOOD PRESSURE: 88 MMHG | SYSTOLIC BLOOD PRESSURE: 136 MMHG | TEMPERATURE: 97.1 F | HEART RATE: 70 BPM

## 2024-10-02 DIAGNOSIS — I77.6 VASCULITIS (HCC): ICD-10-CM

## 2024-10-02 DIAGNOSIS — M05.79 RHEUMATOID ARTHRITIS INVOLVING MULTIPLE SITES WITH POSITIVE RHEUMATOID FACTOR (HCC): ICD-10-CM

## 2024-10-02 DIAGNOSIS — G57.93 NEUROPATHY INVOLVING BOTH LOWER EXTREMITIES: Primary | ICD-10-CM

## 2024-10-02 PROCEDURE — 96415 CHEMO IV INFUSION ADDL HR: CPT

## 2024-10-02 PROCEDURE — 96375 TX/PRO/DX INJ NEW DRUG ADDON: CPT

## 2024-10-02 PROCEDURE — 96413 CHEMO IV INFUSION 1 HR: CPT

## 2024-10-02 RX ORDER — ACETAMINOPHEN 325 MG/1
650 TABLET ORAL ONCE
Status: COMPLETED | OUTPATIENT
Start: 2024-10-02 | End: 2024-10-02

## 2024-10-02 RX ORDER — METHYLPREDNISOLONE SODIUM SUCCINATE 40 MG/ML
40 INJECTION, POWDER, LYOPHILIZED, FOR SOLUTION INTRAMUSCULAR; INTRAVENOUS ONCE
Status: COMPLETED | OUTPATIENT
Start: 2024-10-02 | End: 2024-10-02

## 2024-10-02 RX ORDER — ACETAMINOPHEN 325 MG/1
650 TABLET ORAL ONCE
OUTPATIENT
Start: 2024-10-16

## 2024-10-02 RX ORDER — DIPHENHYDRAMINE HCL 25 MG
25 TABLET ORAL ONCE
Status: COMPLETED | OUTPATIENT
Start: 2024-10-02 | End: 2024-10-02

## 2024-10-02 RX ORDER — SODIUM CHLORIDE 9 MG/ML
20 INJECTION, SOLUTION INTRAVENOUS ONCE
Status: COMPLETED | OUTPATIENT
Start: 2024-10-02 | End: 2024-10-02

## 2024-10-02 RX ORDER — SODIUM CHLORIDE 9 MG/ML
20 INJECTION, SOLUTION INTRAVENOUS ONCE
OUTPATIENT
Start: 2024-10-16

## 2024-10-02 RX ORDER — DIPHENHYDRAMINE HCL 25 MG
25 TABLET ORAL ONCE
OUTPATIENT
Start: 2024-10-16

## 2024-10-02 RX ORDER — METHYLPREDNISOLONE SODIUM SUCCINATE 125 MG/2ML
40 INJECTION, POWDER, LYOPHILIZED, FOR SOLUTION INTRAMUSCULAR; INTRAVENOUS ONCE
OUTPATIENT
Start: 2024-10-16

## 2024-10-02 RX ADMIN — SODIUM CHLORIDE 20 ML/HR: 0.9 INJECTION, SOLUTION INTRAVENOUS at 08:57

## 2024-10-02 RX ADMIN — ACETAMINOPHEN 650 MG: 325 TABLET ORAL at 08:57

## 2024-10-02 RX ADMIN — RITUXIMAB 1000 MG: 10 INJECTION, SOLUTION INTRAVENOUS at 09:54

## 2024-10-02 RX ADMIN — DIPHENHYDRAMINE HYDROCHLORIDE 25 MG: 25 TABLET ORAL at 08:57

## 2024-10-02 RX ADMIN — METHYLPREDNISOLONE SODIUM SUCCINATE 40 MG: 40 INJECTION, POWDER, FOR SOLUTION INTRAMUSCULAR; INTRAVENOUS at 08:57

## 2024-10-02 NOTE — PROGRESS NOTES
Ирина Hector  tolerated Rituxan treatment well with no complications.      Ирина Hector is aware of no future appointment at this time.     AVS printed and given to Ирина Hector: No (Declined by Ирина Hector).

## 2024-11-08 PROBLEM — D72.18: Status: ACTIVE | Noted: 2024-11-08

## 2024-11-08 PROBLEM — M30.1: Status: ACTIVE | Noted: 2024-11-08

## 2024-11-18 ENCOUNTER — TELEPHONE (OUTPATIENT)
Dept: RHEUMATOLOGY | Facility: CLINIC | Age: 49
End: 2024-11-18

## 2024-11-18 DIAGNOSIS — I77.6 VASCULITIS (HCC): Primary | ICD-10-CM

## 2024-11-18 RX ORDER — PREDNISONE 5 MG/1
5 TABLET ORAL DAILY
Qty: 30 TABLET | Refills: 11 | Status: SHIPPED | OUTPATIENT
Start: 2024-11-18

## 2024-11-18 NOTE — TELEPHONE ENCOUNTER
Pts daily Prednisone script was d/c'd at a recent appt with Dr. Arceo when the taper pack was supposed to be d/c'd. Please determine if Rx can be added back to list and if it can be sent to pharmacy. Pt has 2 left.    Reason for call:   [x] Refill   [] Prior Auth  [] Other:     Office:   [] PCP/Provider -   [x] Specialty/Provider - [facility] / Luan Suarez MD     Medication:   predniSONE 5 mg tablet - Take 1 tablet (5 mg total) by mouth daily     Pharmacy:   [facility] [place], [place] - [address] [place] [address]     Does the patient have enough for 3 days?   [] Yes   [x] No - Send as HP to POD

## 2024-12-17 ENCOUNTER — APPOINTMENT (OUTPATIENT)
Dept: LAB | Facility: HOSPITAL | Age: 49
End: 2024-12-17
Payer: MEDICARE

## 2024-12-17 DIAGNOSIS — E61.1 IRON DEFICIENCY: ICD-10-CM

## 2024-12-17 LAB
BASOPHILS # BLD AUTO: 0.06 THOUSANDS/ÂΜL (ref 0–0.1)
BASOPHILS NFR BLD AUTO: 1 % (ref 0–1)
EOSINOPHIL # BLD AUTO: 0.25 THOUSAND/ÂΜL (ref 0–0.61)
EOSINOPHIL NFR BLD AUTO: 4 % (ref 0–6)
ERYTHROCYTE [DISTWIDTH] IN BLOOD BY AUTOMATED COUNT: 12.7 % (ref 11.6–15.1)
FERRITIN SERPL-MCNC: 27 NG/ML (ref 11–307)
HCT VFR BLD AUTO: 41.6 % (ref 34.8–46.1)
HGB BLD-MCNC: 13.4 G/DL (ref 11.5–15.4)
IMM GRANULOCYTES # BLD AUTO: 0.02 THOUSAND/UL (ref 0–0.2)
IMM GRANULOCYTES NFR BLD AUTO: 0 % (ref 0–2)
IRON SATN MFR SERPL: 18 % (ref 15–50)
IRON SERPL-MCNC: 53 UG/DL (ref 50–212)
LYMPHOCYTES # BLD AUTO: 1.7 THOUSANDS/ÂΜL (ref 0.6–4.47)
LYMPHOCYTES NFR BLD AUTO: 26 % (ref 14–44)
MCH RBC QN AUTO: 29.8 PG (ref 26.8–34.3)
MCHC RBC AUTO-ENTMCNC: 32.2 G/DL (ref 31.4–37.4)
MCV RBC AUTO: 93 FL (ref 82–98)
MONOCYTES # BLD AUTO: 0.89 THOUSAND/ÂΜL (ref 0.17–1.22)
MONOCYTES NFR BLD AUTO: 14 % (ref 4–12)
NEUTROPHILS # BLD AUTO: 3.69 THOUSANDS/ÂΜL (ref 1.85–7.62)
NEUTS SEG NFR BLD AUTO: 55 % (ref 43–75)
NRBC BLD AUTO-RTO: 0 /100 WBCS
PLATELET # BLD AUTO: 334 THOUSANDS/UL (ref 149–390)
PMV BLD AUTO: 9.4 FL (ref 8.9–12.7)
RBC # BLD AUTO: 4.49 MILLION/UL (ref 3.81–5.12)
TIBC SERPL-MCNC: 301 UG/DL (ref 250–450)
TRANSFERRIN SERPL-MCNC: 215 MG/DL (ref 203–362)
UIBC SERPL-MCNC: 248 UG/DL (ref 155–355)
WBC # BLD AUTO: 6.61 THOUSAND/UL (ref 4.31–10.16)

## 2024-12-17 PROCEDURE — 83550 IRON BINDING TEST: CPT

## 2024-12-17 PROCEDURE — 84630 ASSAY OF ZINC: CPT

## 2024-12-17 PROCEDURE — 36415 COLL VENOUS BLD VENIPUNCTURE: CPT

## 2024-12-17 PROCEDURE — 83540 ASSAY OF IRON: CPT

## 2024-12-17 PROCEDURE — 82525 ASSAY OF COPPER: CPT

## 2024-12-17 PROCEDURE — 82728 ASSAY OF FERRITIN: CPT

## 2024-12-17 PROCEDURE — 85025 COMPLETE CBC W/AUTO DIFF WBC: CPT

## 2024-12-19 LAB
COPPER SERPL-MCNC: 132 UG/DL (ref 80–158)
ZINC SERPL-MCNC: 69 UG/DL (ref 44–115)

## 2025-01-21 ENCOUNTER — TELEPHONE (OUTPATIENT)
Dept: RHEUMATOLOGY | Facility: CLINIC | Age: 50
End: 2025-01-21

## 2025-01-21 ENCOUNTER — OFFICE VISIT (OUTPATIENT)
Dept: RHEUMATOLOGY | Facility: CLINIC | Age: 50
End: 2025-01-21
Payer: MEDICARE

## 2025-01-21 VITALS — DIASTOLIC BLOOD PRESSURE: 74 MMHG | SYSTOLIC BLOOD PRESSURE: 136 MMHG

## 2025-01-21 DIAGNOSIS — G57.93 NEUROPATHY INVOLVING BOTH LOWER EXTREMITIES: ICD-10-CM

## 2025-01-21 DIAGNOSIS — I77.6 VASCULITIS (HCC): Primary | ICD-10-CM

## 2025-01-21 DIAGNOSIS — Z79.52 CURRENT CHRONIC USE OF SYSTEMIC STEROIDS: ICD-10-CM

## 2025-01-21 DIAGNOSIS — M05.79 RHEUMATOID ARTHRITIS INVOLVING MULTIPLE SITES WITH POSITIVE RHEUMATOID FACTOR (HCC): ICD-10-CM

## 2025-01-21 DIAGNOSIS — M81.0 AGE-RELATED OSTEOPOROSIS WITHOUT CURRENT PATHOLOGICAL FRACTURE: ICD-10-CM

## 2025-01-21 DIAGNOSIS — M31.7 MICROSCOPIC POLYANGIITIS (HCC): ICD-10-CM

## 2025-01-21 PROCEDURE — G2211 COMPLEX E/M VISIT ADD ON: HCPCS | Performed by: INTERNAL MEDICINE

## 2025-01-21 PROCEDURE — 99215 OFFICE O/P EST HI 40 MIN: CPT | Performed by: INTERNAL MEDICINE

## 2025-01-21 RX ORDER — METHYLPREDNISOLONE SODIUM SUCCINATE 125 MG/2ML
40 INJECTION INTRAMUSCULAR; INTRAVENOUS ONCE
OUTPATIENT
Start: 2025-01-22

## 2025-01-21 RX ORDER — FLUCONAZOLE 150 MG/1
TABLET ORAL
COMMUNITY
Start: 2024-09-30

## 2025-01-21 RX ORDER — ACETAMINOPHEN 325 MG/1
650 TABLET ORAL ONCE
OUTPATIENT
Start: 2025-01-22

## 2025-01-21 RX ORDER — SODIUM CHLORIDE 9 MG/ML
20 INJECTION, SOLUTION INTRAVENOUS ONCE
OUTPATIENT
Start: 2025-01-22

## 2025-01-21 RX ORDER — CALCIUM CARBONATE/VITAMIN D3 500-10/5ML
LIQUID (ML) ORAL
COMMUNITY
Start: 2024-08-05

## 2025-01-21 RX ORDER — PREDNISONE 1 MG/1
TABLET ORAL
Qty: 90 TABLET | Refills: 3 | Status: SHIPPED | OUTPATIENT
Start: 2025-01-21

## 2025-01-21 RX ORDER — DIPHENHYDRAMINE HCL 25 MG
25 TABLET ORAL ONCE
OUTPATIENT
Start: 2025-01-22

## 2025-01-21 NOTE — ASSESSMENT & PLAN NOTE
Orders:    predniSONE 1 mg tablet; Take 4 mg daily (4 tablets) x 14 days and decrease by 1 mg every 2 weeks until 1 mg daily, then stop.    Antimyeloperoxidase Antibody; Future    AntiProteinase-3 Antibody; Future    RF Screen w/ Reflex to Titer; Future    Cyclic citrul peptide antibody, IgG; Future    C-reactive protein; Future

## 2025-01-21 NOTE — ASSESSMENT & PLAN NOTE
Pt presents for f/u of MPA, managed on prednisone 5 mg daily and q6 mo rituximab infusions. Currently her RA is stable without any flares, and for vasculitis just has chronic neuropathy in lower extremities that she is taking gabapentin for.   Plan:  Plan to taper down off prednisone by 1 mg every 2 weeks  Will check labs for disease monitoring   Continue q6 month rituximab infusions  Discussed not taking Nucala   RTC 6 mo  Orders:    predniSONE 1 mg tablet; Take 4 mg daily (4 tablets) x 14 days and decrease by 1 mg every 2 weeks until 1 mg daily, then stop.    Antimyeloperoxidase Antibody; Future    AntiProteinase-3 Antibody; Future    RF Screen w/ Reflex to Titer; Future    Cyclic citrul peptide antibody, IgG; Future    C-reactive protein; Future

## 2025-01-21 NOTE — PATIENT INSTRUCTIONS
We ordered a DEXA (bone scan) for you. Please call Central scheduling: [phone]  to schedule this.     We are also tapering down the prednisone by 1 mg every 2 weeks.

## 2025-01-21 NOTE — PROGRESS NOTES
Name: Ирина Hector      : 1975      MRN: [id number]  Encounter Provider: Luan Suarez MD  Encounter Date: 2025   Encounter department: [facility]  :  Assessment & Plan  Vasculitis (HCC)  Pt presents for f/u of MPA, managed on prednisone 5 mg daily and q6 mo rituximab infusions. Currently her RA is stable without any flares, and for vasculitis just has chronic neuropathy in lower extremities that she is taking gabapentin for.   Plan:  Plan to taper down off prednisone by 1 mg every 2 weeks  Will check labs for disease monitoring   Continue q6 month rituximab infusions  Discussed not taking Nucala   RTC 6 mo  Orders:    predniSONE 1 mg tablet; Take 4 mg daily (4 tablets) x 14 days and decrease by 1 mg every 2 weeks until 1 mg daily, then stop.    Antimyeloperoxidase Antibody; Future    AntiProteinase-3 Antibody; Future    RF Screen w/ Reflex to Titer; Future    Cyclic citrul peptide antibody, IgG; Future    C-reactive protein; Future    Rheumatoid arthritis involving multiple sites with positive rheumatoid factor (HCC)    Orders:    predniSONE 1 mg tablet; Take 4 mg daily (4 tablets) x 14 days and decrease by 1 mg every 2 weeks until 1 mg daily, then stop.    Antimyeloperoxidase Antibody; Future    AntiProteinase-3 Antibody; Future    RF Screen w/ Reflex to Titer; Future    Cyclic citrul peptide antibody, IgG; Future    C-reactive protein; Future    Age-related osteoporosis without current pathological fracture  Chronic steroid use for 20+ years, and mentioned rib fractures with minimal movement.   Plan:  Will get DEXA scan for osteoporosis evaluation  Orders:    DXA bone density spine hip and pelvis; Future    Antimyeloperoxidase Antibody; Future    AntiProteinase-3 Antibody; Future    RF Screen w/ Reflex to Titer; Future    Cyclic citrul peptide antibody, IgG; Future    C-reactive protein; Future    Current chronic use of systemic steroids    Orders:     DXA bone density spine hip and pelvis; Future    predniSONE 1 mg tablet; Take 4 mg daily (4 tablets) x 14 days and decrease by 1 mg every 2 weeks until 1 mg daily, then stop.    Antimyeloperoxidase Antibody; Future    AntiProteinase-3 Antibody; Future    RF Screen w/ Reflex to Titer; Future    Cyclic citrul peptide antibody, IgG; Future    C-reactive protein; Future        History of Present Illness   HPI  Ирина Hector is a 49 y.o. female who presents for f/u of RA and Microscopic polyangitis (Anca-associated vasculitis).    Permanent history  -H/o RA dx when she was 14. She was on MTX, Gold injections, HCQ, Remicade (severe dyspnea) in the past. She was lost to f/u prior rheumatologist > 20 years (Dr. Mariza Aparicio) Subsequently transitioned to another rheumatologist Dr. Webb maintained on prednisone. Lost to f/u with Dr. Webb in 2008. Since then her RA was managed per PCP.   -Patient was previously diagnosed with MPA with neuropathy and muscle weakness based on L sural nerve and quadriceps muscle biopsy which was consistent with vasculitis. Serologies here revealed a +P-ANCA 1:640 and +MPO ab >8.  She has completed 4 dose IV Rituxan 9/21/23. Per Dr Rodarte maintenance 500-1000 every 6 months.    She is currently on prednisone 5 mg daily.     Last rituximab infusion was 9/17/24 and 10/2/24.    She has NOT started Nucala for severe persistent asthma,    She has neuropathy in the LE, getting recurrent fungal infections on the left ankle. She recently reduced gabapentin dosage because she was missing afternoon doses and did not feel different without it.    From the joint perspective, no stiffness/swelling in the hands. Able to walk with walker, doing physical therapy.             Review of Systems   Constitutional: Negative.    HENT: Negative.     Eyes: Negative.    Respiratory: Negative.     Cardiovascular: Negative.    Gastrointestinal: Negative.    Musculoskeletal: Negative.    Neurological:  Positive for  numbness.          Objective   /74      Physical Exam  Constitutional:       Appearance: Normal appearance.   HENT:      Head: Normocephalic.   Eyes:      Extraocular Movements: Extraocular movements intact.      Pupils: Pupils are equal, round, and reactive to light.   Cardiovascular:      Pulses: Normal pulses.      Heart sounds: Normal heart sounds.   Pulmonary:      Effort: Pulmonary effort is normal.      Breath sounds: Normal breath sounds.   Musculoskeletal:      Comments: MSK Exam  The following areas have been examined today and do not show any signs of synovitis, tenderness, or restricted ROM unless otherwise specified below:  Neck  Shoulders  Back  Elbows  Wrists  Hands - chronic RA-related deformities in bilateral hands, no active synovitis  Hips  Knees  Ankles  Feet    Neurological:      Mental Status: She is alert and oriented to person, place, and time.      Sensory: Sensory deficit (numbness in bilateral lower extremities/feet) present.

## 2025-01-21 NOTE — TELEPHONE ENCOUNTER
Please prior auth and schedule     Rituximab 1000 mg IV every 6 months (maintenance) , Please schedule 6 months after last dose     Diagnosis: ANCA associated vasculitis, microscopic polyangiitis,     This is continuation of existing therapy

## 2025-03-06 DIAGNOSIS — M05.79 RHEUMATOID ARTHRITIS INVOLVING MULTIPLE SITES WITH POSITIVE RHEUMATOID FACTOR (HCC): ICD-10-CM

## 2025-03-06 DIAGNOSIS — I77.6 VASCULITIS (HCC): Primary | ICD-10-CM

## 2025-03-06 DIAGNOSIS — G57.93 NEUROPATHY INVOLVING BOTH LOWER EXTREMITIES: ICD-10-CM

## 2025-03-06 RX ORDER — SODIUM CHLORIDE 9 MG/ML
20 INJECTION, SOLUTION INTRAVENOUS ONCE
Status: CANCELLED | OUTPATIENT
Start: 2025-03-10

## 2025-03-06 RX ORDER — ACETAMINOPHEN 325 MG/1
650 TABLET ORAL ONCE
Status: CANCELLED | OUTPATIENT
Start: 2025-03-10

## 2025-03-06 RX ORDER — DIPHENHYDRAMINE HCL 25 MG
25 TABLET ORAL ONCE
Status: CANCELLED | OUTPATIENT
Start: 2025-03-10

## 2025-03-06 RX ORDER — METHYLPREDNISOLONE SODIUM SUCCINATE 40 MG/ML
40 INJECTION, POWDER, LYOPHILIZED, FOR SOLUTION INTRAMUSCULAR; INTRAVENOUS ONCE
Status: CANCELLED | OUTPATIENT
Start: 2025-03-10

## 2025-03-10 ENCOUNTER — APPOINTMENT (OUTPATIENT)
Dept: LAB | Facility: HOSPITAL | Age: 50
End: 2025-03-10
Payer: MEDICARE

## 2025-03-10 ENCOUNTER — HOSPITAL ENCOUNTER (OUTPATIENT)
Dept: INFUSION CENTER | Facility: HOSPITAL | Age: 50
Discharge: HOME | End: 2025-03-10
Payer: MEDICARE

## 2025-03-10 VITALS
DIASTOLIC BLOOD PRESSURE: 82 MMHG | SYSTOLIC BLOOD PRESSURE: 118 MMHG | RESPIRATION RATE: 18 BRPM | TEMPERATURE: 98 F | OXYGEN SATURATION: 99 % | HEART RATE: 92 BPM

## 2025-03-10 DIAGNOSIS — I77.6 VASCULITIS (HCC): ICD-10-CM

## 2025-03-10 DIAGNOSIS — G57.93 NEUROPATHY INVOLVING BOTH LOWER EXTREMITIES: Primary | ICD-10-CM

## 2025-03-10 DIAGNOSIS — M05.79 RHEUMATOID ARTHRITIS INVOLVING MULTIPLE SITES WITH POSITIVE RHEUMATOID FACTOR (HCC): ICD-10-CM

## 2025-03-10 DIAGNOSIS — Z79.52 CURRENT CHRONIC USE OF SYSTEMIC STEROIDS: ICD-10-CM

## 2025-03-10 DIAGNOSIS — M81.0 AGE-RELATED OSTEOPOROSIS WITHOUT CURRENT PATHOLOGICAL FRACTURE: ICD-10-CM

## 2025-03-10 LAB
CRP SERPL QL: 3.6 MG/L
RHEUMATOID FACT SER QL LA: NEGATIVE

## 2025-03-10 PROCEDURE — 96415 CHEMO IV INFUSION ADDL HR: CPT

## 2025-03-10 PROCEDURE — 83520 IMMUNOASSAY QUANT NOS NONAB: CPT

## 2025-03-10 PROCEDURE — 86200 CCP ANTIBODY: CPT

## 2025-03-10 PROCEDURE — 86430 RHEUMATOID FACTOR TEST QUAL: CPT

## 2025-03-10 PROCEDURE — 96413 CHEMO IV INFUSION 1 HR: CPT

## 2025-03-10 PROCEDURE — 86140 C-REACTIVE PROTEIN: CPT

## 2025-03-10 PROCEDURE — 96375 TX/PRO/DX INJ NEW DRUG ADDON: CPT

## 2025-03-10 RX ORDER — DIPHENHYDRAMINE HCL 25 MG
25 TABLET ORAL ONCE
OUTPATIENT
Start: 2025-09-06

## 2025-03-10 RX ORDER — METHYLPREDNISOLONE SODIUM SUCCINATE 125 MG/2ML
40 INJECTION INTRAMUSCULAR; INTRAVENOUS ONCE
OUTPATIENT
Start: 2025-09-06

## 2025-03-10 RX ORDER — ACETAMINOPHEN 325 MG/1
650 TABLET ORAL ONCE
Status: COMPLETED | OUTPATIENT
Start: 2025-03-10 | End: 2025-03-10

## 2025-03-10 RX ORDER — ACETAMINOPHEN 325 MG/1
650 TABLET ORAL ONCE
OUTPATIENT
Start: 2025-09-06

## 2025-03-10 RX ORDER — SODIUM CHLORIDE 9 MG/ML
20 INJECTION, SOLUTION INTRAVENOUS ONCE
Status: COMPLETED | OUTPATIENT
Start: 2025-03-10 | End: 2025-03-10

## 2025-03-10 RX ORDER — DIPHENHYDRAMINE HCL 25 MG
25 TABLET ORAL ONCE
Status: COMPLETED | OUTPATIENT
Start: 2025-03-10 | End: 2025-03-10

## 2025-03-10 RX ORDER — METHYLPREDNISOLONE SODIUM SUCCINATE 40 MG/ML
40 INJECTION, POWDER, LYOPHILIZED, FOR SOLUTION INTRAMUSCULAR; INTRAVENOUS ONCE
Status: COMPLETED | OUTPATIENT
Start: 2025-03-10 | End: 2025-03-10

## 2025-03-10 RX ORDER — SODIUM CHLORIDE 9 MG/ML
20 INJECTION, SOLUTION INTRAVENOUS ONCE
OUTPATIENT
Start: 2025-09-06

## 2025-03-10 RX ADMIN — METHYLPREDNISOLONE SODIUM SUCCINATE 40 MG: 40 INJECTION, POWDER, FOR SOLUTION INTRAMUSCULAR; INTRAVENOUS at 10:07

## 2025-03-10 RX ADMIN — DIPHENHYDRAMINE HYDROCHLORIDE 25 MG: 25 TABLET ORAL at 10:07

## 2025-03-10 RX ADMIN — ACETAMINOPHEN 650 MG: 325 TABLET ORAL at 10:06

## 2025-03-10 RX ADMIN — SODIUM CHLORIDE 20 ML/HR: 0.9 INJECTION, SOLUTION INTRAVENOUS at 10:07

## 2025-03-10 RX ADMIN — RITUXIMAB 1000 MG: 10 INJECTION, SOLUTION INTRAVENOUS at 11:08

## 2025-03-10 NOTE — PROGRESS NOTES
"Per Dr Suarez \"maintenance dosing Rituxan 1000 mg every 6 months\". Pt and  made aware - pt to be scheduled for next infusion in 6 months.  "

## 2025-03-10 NOTE — PROGRESS NOTES
Ирина Hector  tolerated rituxan infusion well with no complications.      Ирина Hector is aware of future appt on 9/8/25 at 8:30AM.     AVS printed and given to Ирина Hector.

## 2025-03-11 LAB
MYELOPEROXIDASE AB SER IA-ACNC: 3.9 UNITS (ref 0–0.9)
PROTEINASE3 AB SER IA-ACNC: <0.2 UNITS (ref 0–0.9)

## 2025-03-14 LAB — CCP AB SER IA-ACNC: 67 (ref ?–10)

## 2025-03-28 ENCOUNTER — TELEPHONE (OUTPATIENT)
Dept: DERMATOLOGY | Facility: CLINIC | Age: 50
End: 2025-03-28

## 2025-07-22 ENCOUNTER — OFFICE VISIT (OUTPATIENT)
Dept: RHEUMATOLOGY | Facility: CLINIC | Age: 50
End: 2025-07-22
Payer: MEDICARE

## 2025-07-22 VITALS
DIASTOLIC BLOOD PRESSURE: 82 MMHG | SYSTOLIC BLOOD PRESSURE: 128 MMHG | HEART RATE: 90 BPM | WEIGHT: 201.8 LBS | OXYGEN SATURATION: 98 % | BODY MASS INDEX: 31.61 KG/M2

## 2025-07-22 DIAGNOSIS — I77.82 ANCA-ASSOCIATED VASCULITIS (HCC): Primary | ICD-10-CM

## 2025-07-22 DIAGNOSIS — M05.79 RHEUMATOID ARTHRITIS INVOLVING MULTIPLE SITES WITH POSITIVE RHEUMATOID FACTOR (HCC): ICD-10-CM

## 2025-07-22 DIAGNOSIS — M81.0 AGE-RELATED OSTEOPOROSIS WITHOUT CURRENT PATHOLOGICAL FRACTURE: ICD-10-CM

## 2025-07-22 PROCEDURE — G2211 COMPLEX E/M VISIT ADD ON: HCPCS | Performed by: INTERNAL MEDICINE

## 2025-07-22 PROCEDURE — 99215 OFFICE O/P EST HI 40 MIN: CPT | Performed by: INTERNAL MEDICINE

## 2025-07-22 RX ORDER — FLUTICASONE FUROATE 100 UG/1
POWDER RESPIRATORY (INHALATION)
COMMUNITY
Start: 2025-06-12

## 2025-07-22 NOTE — PROGRESS NOTES
Name: Ирина Hector      : 1975      MRN: [id number]  Encounter Provider: Luan Suarez MD  Encounter Date: 2025   Encounter department: [facility] RHEUMATOLOG[facility]  :  Assessment & Plan  ANCA-associated vasculitis (HCC)  Reports symptoms have been stable, neuropathy has been stable, denies joint pain, morning stiffness or synovitis  Currently on rituximab infusions, last infusion 3/10/25  Off of steroids since 2025  Labs 3/25 MPO antibodies 3.9  decreased as compared to 2024 from 4.7, anti-LA-3 antibodies negative, CRP 3.6 and CCP 67  Neuropathy stable, working with physical therapy, is able to walk without support now  Will continue rituximab  Will order CBC and CMP    Orders:    CBC and differential; Future    Comprehensive metabolic panel; Future    Rheumatoid arthritis involving multiple sites with positive rheumatoid factor (HCC)    Orders:    CBC and differential; Future    Comprehensive metabolic panel; Future    Age-related osteoporosis without current pathological fracture  History of chronic steroid use for 20+ years with atraumatic rib fractures  DEXA scan ordered last visit, pending  Orders:    CBC and differential; Future    Comprehensive metabolic panel; Future        History of Present Illness   HPI  Ирина Hector is a 49 y.o. female with past medical history significant for RADHA and microscopic polyangiitis who presents for a follow-up appointment.    Permanent history  -H/o RA dx when she was 14. She was on MTX, Gold injections, HCQ, Remicade (severe dyspnea) in the past. She was lost to f/u prior rheumatologist > 20 years (Dr. Mariza Aparicio) Subsequently transitioned to another rheumatologist Dr. Webb maintained on prednisone. Lost to f/u with Dr. Webb in . Since then her RA was managed per PCP.   -Patient was previously diagnosed with MPA with neuropathy and muscle weakness based on L sural nerve and quadriceps muscle biopsy which  was consistent with vasculitis. Serologies here revealed a +P-ANCA 1:640 and +MPO ab >8.  She has completed 4 dose IV Rituxan 9/21/23. Per Dr Rodarte maintenance 500-1000 every 6 months.    Interval history:  At today's visit patient reports neuropathy is stable, she is working with physical therapy and is able to walk now.  She reports for the past couple of months she had severe asthma and was started on inhaled steroids.  Her last infusion of rituximab was 3/10/2025.  Denies any joint pains morning stiffness.      History obtained from: patient    Review of Systems   Constitutional: Negative.  Negative for fatigue, fever and unexpected weight change.   HENT: Negative.     Eyes: Negative.    Respiratory:  Positive for shortness of breath and wheezing.    Cardiovascular: Negative.    Gastrointestinal: Negative.    Musculoskeletal: Negative.    Skin: Negative.            Family History[1]  Past Medical History[2]  Past Surgical History[3]  Medications Ordered Prior to Encounter[4]   Social History[5]     Objective   /82 (BP Location: Right arm, Patient Position: Sitting, Cuff Size: Standard)   Pulse 90   Wt 91.5 kg (201 lb 12.8 oz)   SpO2 98%   BMI 31.61 kg/m²      Physical Exam  Constitutional:       Appearance: Normal appearance.   HENT:      Head: Normocephalic and atraumatic.      Mouth/Throat:      Mouth: Mucous membranes are dry.     Eyes:      Extraocular Movements: Extraocular movements intact.      Pupils: Pupils are equal, round, and reactive to light.     Neck:      Comments: Reduced range of motion  Cardiovascular:      Rate and Rhythm: Normal rate and regular rhythm.   Pulmonary:      Effort: Pulmonary effort is normal.      Breath sounds: Normal breath sounds.   Abdominal:      General: Abdomen is flat. There is no distension.      Palpations: Abdomen is soft.      Tenderness: There is no abdominal tenderness.     Musculoskeletal:         General: Deformity (right hand ulnar deviation, mcp  sublaxation, right wrist fused, no range of motion,) present. No swelling or tenderness.      Comments: Decreased range of motion bilateral ankle, left shoulder, left elbow     Skin:     General: Skin is warm and dry.      Findings: No erythema or rash.     Neurological:      Mental Status: She is alert and oriented to person, place, and time.      Sensory: Sensory deficit present.      Gait: Gait normal.     Psychiatric:         Mood and Affect: Mood normal.         Thought Content: Thought content normal.                  [1]   Family History  Problem Relation Name Age of Onset    No Known Problems Mother Camila     Hypertension Father Jordan Jacobsonpel     Osteoarthritis Father Jordan Jacobsonpel     Allergic rhinitis Father Jordan Durham     No Known Problems Sister Paulette     Hypertension Brother Nick     No Known Problems Daughter Jaquelin     Stroke Maternal Grandfather Keen Ahner     Osteoarthritis Maternal Grandmother Janna Ahner     Rashes / Skin problems Maternal Grandmother Janna Ahner     Osteoarthritis Paternal Grandmother Alvejannet Hoppel     Diabetes Paternal Grandmother Karla Hoppel     Osteoarthritis Maternal Aunt Kaylan Yurko     Rheum arthritis Maternal Aunt Kaylan Yurko     Rheum arthritis Paternal Aunt Tasha Radha     Eczema Maternal Aunt Kaylan Yurko     Osteoarthritis Maternal Aunt Kaylan Yurko     Rheum arthritis Maternal Aunt Kaylan Yurko     Rashes / Skin problems Maternal Aunt Kaylan Yurko     Rheum arthritis Paternal Aunt Tasha Radha     Allergic rhinitis Sister Paulette Santizofemisbah     Migraines Sister Paulette Kornafel     Allergic rhinitis Brother Nick Durham    [2]   Past Medical History:  Diagnosis Date    Allergic rhinitis 1982    I would get very itchy eyes and a stuffy nose when I would go to my uncle’s farm.    Anaphylaxis 2002    First noted during Remicaid infusion. More recently from corn.    Asthma     Cataract     Clotting disorder (HCC)     Eczema     Food intolerance 2008    Started  coughing and draining uncontrollably during pregnancy and didn’t understand why until I was allergy tested after Jaquelin was born.    Hives     Inflammation of arteries (HCC)     Nasal polyps     Discovered by ENT, Dr. Wang    Night sweats     Osteoarthritis     Osteopetrosis     Pneumonia     Psoriasis     RA (rheumatoid arthritis) (HCC)     Vasculitis (HCC)     Visit for monitoring Rituxan therapy     Visual changes     health    Wears glasses    [3]   Past Surgical History:  Procedure Laterality Date    ANKLE SURGERY      2 surgerys     SECTION      ELBOW SURGERY      FOOT SURGERY      HIP SURGERY      IR LUMBAR PUNCTURE  2023    IR MIDLINE PLACEMENT  2023    JOINT REPLACEMENT      knees bilateral    KNEE SURGERY      MUSCLE BIOPSY Left 2023    Procedure: Muscle biopsy;  Surgeon: Sho Agarwal, DO;  Location: [facility];  Service: General    SHOULDER SURGERY      SINUS SURGERY   &     Polyp removal    TOTAL SHOULDER REPLACEMENT      WOUND DEBRIDEMENT Left 2023    Procedure: Sural nerve biopsy;  Surgeon: Matt Rubalcava;  Location: [facility];  Service: Neurosurgery   [4]   Current Outpatient Medications on File Prior to Visit   Medication Sig Dispense Refill    Arnuity Ellipta 100 MCG/ACT AEPB inhaler       B Complex-Biotin-FA (B-COMPLEX PO) Take by mouth      clobetasol (TEMOVATE) 0.05 % cream       Ferrous Gluconate (Iron) 240 (27 Fe) MG TABS Take by mouth      gabapentin (NEURONTIN) 300 mg capsule TAKE 2 CAPSULES (600 MG TOTAL) BY MOUTH 2 (TWO) TIMES A  capsule 0    Magnesium Oxide 400 MG CAPS Take 1 capsule by mouth      Ventolin  (90 Base) MCG/ACT inhaler if needed      Zinc 30 MG CAPS       cholecalciferol (VITAMIN D3) 400 units tablet Take 1 tablet (400 Units total) by mouth daily Do not start before 2023. 30 tablet 0    ciclopirox (LOPROX) 0.77 % cream       cyanocobalamin (VITAMIN B-12) 1000 MCG tablet Take 1 tablet (1,000 mcg total) by  mouth daily Do not start before August 26, 2023. 30 tablet 0    EPINEPHrine (EPIPEN) 0.3 mg/0.3 mL SOAJ Inject 0.3 mL (0.3 mg total) into a muscle once for 1 dose 0.6 mL 11    fluconazole (DIFLUCAN) 150 mg tablet  (Patient not taking: Reported on 7/22/2025)      gabapentin (NEURONTIN) 300 mg capsule Take 3 capsules (900 mg total) by mouth daily at bedtime 90 capsule 0    Mepolizumab (NUCALA) subcutaneous injection Inject 3 mL (300 mg total) under the skin every 28 days (Patient not taking: Reported on 1/21/2025)      mometasone (NASONEX) 50 mcg/act nasal spray 2 sprays into each nostril daily 17 g 11    predniSONE 1 mg tablet Take 4 mg daily (4 tablets) x 14 days and decrease by 1 mg every 2 weeks until 1 mg daily, then stop. 90 tablet 3    pyridoxine (VITAMIN B6) 50 mg tablet Take 1 tablet (50 mg total) by mouth daily Do not start before August 26, 2023. 30 tablet 0     No current facility-administered medications on file prior to visit.   [5]   Social History  Tobacco Use    Smoking status: Never    Smokeless tobacco: Never   Vaping Use    Vaping status: Never Used   Substance and Sexual Activity    Alcohol use: Not Currently     Comment: none    Drug use: No     Comment: none    Sexual activity: Yes     Partners: Male     Birth control/protection: Condom Male, None     Comment: none

## (undated) DEVICE — INTENDED FOR TISSUE SEPARATION, AND OTHER PROCEDURES THAT REQUIRE A SHARP SURGICAL BLADE TO PUNCTURE OR CUT.: Brand: BARD-PARKER ® CARBON RIB-BACK BLADES

## (undated) DEVICE — PENCIL ELECTROSURG E-Z CLEAN -0035H

## (undated) DEVICE — TUBING SUCTION 5MM X 12 FT

## (undated) DEVICE — SPONGE SCRUB 4 PCT CHLORHEXIDINE

## (undated) DEVICE — SUT VICRYL PLUS 3-0 RB-1 CR/8 18 IN VCP713D

## (undated) DEVICE — GAUZE SPONGES,USP TYPE VII GAUZE, 12 PLY: Brand: CURITY

## (undated) DEVICE — SUT MONOCRYL PLUS 4-0 PS-2 18 IN MCP496G

## (undated) DEVICE — ELECTRODE BLADE MOD E-Z CLEAN 2.5IN 6.4CM -0012M

## (undated) DEVICE — GLOVE SRG BIOGEL 7.5

## (undated) DEVICE — SUT ETHILON 3-0 FS-1 18 IN 663G

## (undated) DEVICE — 3M™ TEGADERM™ TRANSPARENT FILM DRESSING FRAME STYLE, 1626W, 4 IN X 4-3/4 IN (10 CM X 12 CM), 50/CT 4CT/CASE: Brand: 3M™ TEGADERM™

## (undated) DEVICE — INTENDED FOR TISSUE SEPARATION, AND OTHER PROCEDURES THAT REQUIRE A SHARP SURGICAL BLADE TO PUNCTURE OR CUT.: Brand: BARD-PARKER SAFETY BLADES SIZE 15, STERILE

## (undated) DEVICE — BULB SYRINGE,IRRIGATION WITH PROTECTIVE CAP: Brand: DOVER

## (undated) DEVICE — BETHLEHEM UNIVERSAL MINOR GEN: Brand: CARDINAL HEALTH

## (undated) DEVICE — ADHESIVE SKIN HIGH VISCOSITY EXOFIN 1ML

## (undated) DEVICE — SUT VICRYL 3-0 SH 27 IN J416H

## (undated) DEVICE — GLOVE INDICATOR PI UNDERGLOVE SZ 7.5 BLUE

## (undated) DEVICE — CHLORAPREP HI-LITE 26ML ORANGE

## (undated) DEVICE — DRESSING ALLEVYN LIFE HEEL 25 X 25.2CM